# Patient Record
Sex: FEMALE | Race: OTHER | HISPANIC OR LATINO
[De-identification: names, ages, dates, MRNs, and addresses within clinical notes are randomized per-mention and may not be internally consistent; named-entity substitution may affect disease eponyms.]

---

## 2019-01-15 ENCOUNTER — TRANSCRIPTION ENCOUNTER (OUTPATIENT)
Age: 64
End: 2019-01-15

## 2019-02-25 PROBLEM — Z00.00 ENCOUNTER FOR PREVENTIVE HEALTH EXAMINATION: Status: ACTIVE | Noted: 2019-02-25

## 2019-02-27 ENCOUNTER — APPOINTMENT (OUTPATIENT)
Dept: ORTHOPEDIC SURGERY | Facility: CLINIC | Age: 64
End: 2019-02-27
Payer: COMMERCIAL

## 2019-02-27 VITALS
HEART RATE: 80 BPM | SYSTOLIC BLOOD PRESSURE: 125 MMHG | DIASTOLIC BLOOD PRESSURE: 77 MMHG | BODY MASS INDEX: 25.92 KG/M2 | WEIGHT: 175 LBS | HEIGHT: 69 IN

## 2019-02-27 DIAGNOSIS — M16.11 UNILATERAL PRIMARY OSTEOARTHRITIS, RIGHT HIP: ICD-10-CM

## 2019-02-27 PROCEDURE — 73502 X-RAY EXAM HIP UNI 2-3 VIEWS: CPT | Mod: RT

## 2019-02-27 PROCEDURE — 99204 OFFICE O/P NEW MOD 45 MIN: CPT

## 2019-03-08 ENCOUNTER — APPOINTMENT (OUTPATIENT)
Dept: RADIOLOGY | Facility: CLINIC | Age: 64
End: 2019-03-08
Payer: COMMERCIAL

## 2019-03-08 ENCOUNTER — OUTPATIENT (OUTPATIENT)
Dept: OUTPATIENT SERVICES | Facility: HOSPITAL | Age: 64
LOS: 1 days | End: 2019-03-08
Payer: COMMERCIAL

## 2019-03-08 DIAGNOSIS — Z00.8 ENCOUNTER FOR OTHER GENERAL EXAMINATION: ICD-10-CM

## 2019-03-08 PROCEDURE — 27093 INJECTION FOR HIP X-RAY: CPT

## 2019-03-08 PROCEDURE — 73525 CONTRAST X-RAY OF HIP: CPT

## 2019-03-08 PROCEDURE — 27093 INJECTION FOR HIP X-RAY: CPT | Mod: RT

## 2019-03-08 PROCEDURE — 73525 CONTRAST X-RAY OF HIP: CPT | Mod: 26,RT

## 2019-03-27 ENCOUNTER — APPOINTMENT (OUTPATIENT)
Dept: ORTHOPEDIC SURGERY | Facility: CLINIC | Age: 64
End: 2019-03-27

## 2019-03-28 ENCOUNTER — APPOINTMENT (OUTPATIENT)
Dept: ORTHOPEDIC SURGERY | Facility: CLINIC | Age: 64
End: 2019-03-28

## 2019-03-29 ENCOUNTER — APPOINTMENT (OUTPATIENT)
Dept: ORTHOPEDIC SURGERY | Facility: CLINIC | Age: 64
End: 2019-03-29

## 2019-04-19 ENCOUNTER — APPOINTMENT (OUTPATIENT)
Dept: ANESTHESIOLOGY | Facility: CLINIC | Age: 64
End: 2019-04-19

## 2019-04-19 ENCOUNTER — OUTPATIENT (OUTPATIENT)
Dept: OUTPATIENT SERVICES | Facility: HOSPITAL | Age: 64
LOS: 1 days | End: 2019-04-19
Payer: COMMERCIAL

## 2019-04-19 DIAGNOSIS — M46.1 SACROILIITIS, NOT ELSEWHERE CLASSIFIED: ICD-10-CM

## 2019-04-19 PROCEDURE — G0260: CPT

## 2019-06-17 ENCOUNTER — EMERGENCY (EMERGENCY)
Facility: HOSPITAL | Age: 64
LOS: 1 days | Discharge: HOME | End: 2019-06-17
Attending: STUDENT IN AN ORGANIZED HEALTH CARE EDUCATION/TRAINING PROGRAM
Payer: COMMERCIAL

## 2019-06-17 ENCOUNTER — TRANSCRIPTION ENCOUNTER (OUTPATIENT)
Age: 64
End: 2019-06-17

## 2019-06-17 VITALS
HEIGHT: 70 IN | HEART RATE: 83 BPM | DIASTOLIC BLOOD PRESSURE: 92 MMHG | TEMPERATURE: 97 F | RESPIRATION RATE: 16 BRPM | OXYGEN SATURATION: 95 % | SYSTOLIC BLOOD PRESSURE: 144 MMHG | WEIGHT: 289.91 LBS

## 2019-06-17 PROCEDURE — 99283 EMERGENCY DEPT VISIT LOW MDM: CPT | Mod: 25

## 2019-06-17 PROCEDURE — 99282 EMERGENCY DEPT VISIT SF MDM: CPT

## 2019-06-17 NOTE — ED PROVIDER NOTE - OBJECTIVE STATEMENT
64 y/o F w/ PMHx of L leg DVT, no PE at that time, here w/ 2 weeks of redness and swelling to L lower leg. Pt states this feels like his prior DVT. Denies any fever, chills, chest pain, hemoptysis, shortness of breath or any other complaints. Pt had been taken off anticoagulants several months ago after repeat US showed no DVT. Pt notes she took Xarelto that she had left over from before today.

## 2019-06-17 NOTE — ED PROVIDER NOTE - PROGRESS NOTE DETAILS
attempted to obtain doppler, vascular doppler no longer present, patient given order form for doppler in the morning, patient declined lab work and lovenox. poncho attempted to obtain doppler, vascular doppler no longer present, patient given order form for doppler in the morning, patient declined lab work and lovenox. vital stable. no signs of distress. patient states understanding to return for chest pain, sob, worsening pain, swelling or other new symptoms. states she will come back in the morning to get doppler. states that she will take he xarelto till then

## 2019-06-17 NOTE — ED ADULT NURSE NOTE - NSIMPLEMENTINTERV_GEN_ALL_ED
Implemented All Universal Safety Interventions:  [place] to call system. Call bell, personal items and telephone within reach. Instruct patient to call for assistance. Room bathroom lighting operational. Non-slip footwear when patient is off stretcher. Physically safe environment: no spills, clutter or unnecessary equipment. Stretcher in lowest position, wheels locked, appropriate side rails in place.

## 2019-06-17 NOTE — ED PROVIDER NOTE - CLINICAL SUMMARY MEDICAL DECISION MAKING FREE TEXT BOX
62 y/o F here w/ L leg swelling. Vital signs are stable, no signs of PE. Will obtain doppler to r/o DVT.

## 2019-06-17 NOTE — ED ADULT NURSE NOTE - CHIEF COMPLAINT
The patient is a 63y Female complaining of The patient is a 63y Female complaining of redness to foot.

## 2019-06-17 NOTE — ED PROVIDER NOTE - RELIEVING FACTORS
[facility]   [address], [address]  [place] [address]  Phone: [phone]  Fax: [phone]              Encounter Date: 8/16/2018    Dear Dr. Blair,    It was a pleasure seeing your patient, Buster Medina, on 8/16/2018. Diagnoses of Psoriatic arthritis (HCC), Adalimumab (Humira) long-term use, NSAID long-term use, Muscle spasms of both lower extremities, Essential hypertension, H/O gastric bypass, and Personal history of bladder cancer were pertinent to this visit.     Please find attached progress note which includes the history I obtained from Mr. Medina, my physical examination findings, my impression and recommendations.      Once again, it was a pleasure participating in your patient's care.  Please feel free to contact me if you have any questions or if I can be of any further assistance to your patients.      Sincerely,    Mira Mitchell M.D.  Electronically Signed          PROGRESS NOTE:  No notes on file  
none

## 2019-06-18 ENCOUNTER — APPOINTMENT (OUTPATIENT)
Dept: ULTRASOUND IMAGING | Facility: HOSPITAL | Age: 64
End: 2019-06-18

## 2019-06-18 ENCOUNTER — OUTPATIENT (OUTPATIENT)
Dept: OUTPATIENT SERVICES | Facility: HOSPITAL | Age: 64
LOS: 1 days | End: 2019-06-18
Payer: COMMERCIAL

## 2019-06-18 DIAGNOSIS — I82.890 ACUTE EMBOLISM AND THROMBOSIS OF OTHER SPECIFIED VEINS: ICD-10-CM

## 2019-06-18 PROCEDURE — 93971 EXTREMITY STUDY: CPT

## 2019-06-18 PROCEDURE — 93971 EXTREMITY STUDY: CPT | Mod: 26,LT

## 2019-07-12 ENCOUNTER — APPOINTMENT (OUTPATIENT)
Dept: ANESTHESIOLOGY | Facility: CLINIC | Age: 64
End: 2019-07-12

## 2019-07-12 ENCOUNTER — OUTPATIENT (OUTPATIENT)
Dept: OUTPATIENT SERVICES | Facility: HOSPITAL | Age: 64
LOS: 1 days | End: 2019-07-12
Payer: COMMERCIAL

## 2019-07-12 DIAGNOSIS — M25.551 PAIN IN RIGHT HIP: ICD-10-CM

## 2019-07-12 PROCEDURE — 77002 NEEDLE LOCALIZATION BY XRAY: CPT

## 2019-07-12 PROCEDURE — 20610 DRAIN/INJ JOINT/BURSA W/O US: CPT

## 2020-01-01 ENCOUNTER — TRANSCRIPTION ENCOUNTER (OUTPATIENT)
Age: 65
End: 2020-01-01

## 2021-01-01 ENCOUNTER — INPATIENT (INPATIENT)
Facility: HOSPITAL | Age: 66
LOS: 33 days | End: 2021-03-01
Attending: STUDENT IN AN ORGANIZED HEALTH CARE EDUCATION/TRAINING PROGRAM | Admitting: STUDENT IN AN ORGANIZED HEALTH CARE EDUCATION/TRAINING PROGRAM
Payer: COMMERCIAL

## 2021-01-01 ENCOUNTER — TRANSCRIPTION ENCOUNTER (OUTPATIENT)
Age: 66
End: 2021-01-01

## 2021-01-01 VITALS
OXYGEN SATURATION: 71 % | SYSTOLIC BLOOD PRESSURE: 132 MMHG | HEART RATE: 102 BPM | RESPIRATION RATE: 40 BRPM | DIASTOLIC BLOOD PRESSURE: 81 MMHG | TEMPERATURE: 97 F | HEIGHT: 70 IN

## 2021-01-01 DIAGNOSIS — Z51.5 ENCOUNTER FOR PALLIATIVE CARE: ICD-10-CM

## 2021-01-01 DIAGNOSIS — R53.2 FUNCTIONAL QUADRIPLEGIA: ICD-10-CM

## 2021-01-01 DIAGNOSIS — Z71.89 OTHER SPECIFIED COUNSELING: ICD-10-CM

## 2021-01-01 DIAGNOSIS — R09.89 OTHER SPECIFIED SYMPTOMS AND SIGNS INVOLVING THE CIRCULATORY AND RESPIRATORY SYSTEMS: ICD-10-CM

## 2021-01-01 DIAGNOSIS — I82.409 ACUTE EMBOLISM AND THROMBOSIS OF UNSPECIFIED DEEP VEINS OF UNSPECIFIED LOWER EXTREMITY: ICD-10-CM

## 2021-01-01 DIAGNOSIS — R06.03 ACUTE RESPIRATORY DISTRESS: ICD-10-CM

## 2021-01-01 DIAGNOSIS — U07.1 COVID-19: ICD-10-CM

## 2021-01-01 DIAGNOSIS — F41.9 ANXIETY DISORDER, UNSPECIFIED: ICD-10-CM

## 2021-01-01 DIAGNOSIS — Z29.9 ENCOUNTER FOR PROPHYLACTIC MEASURES, UNSPECIFIED: ICD-10-CM

## 2021-01-01 DIAGNOSIS — J96.00 ACUTE RESPIRATORY FAILURE, UNSPECIFIED WHETHER WITH HYPOXIA OR HYPERCAPNIA: ICD-10-CM

## 2021-01-01 DIAGNOSIS — G93.49 OTHER ENCEPHALOPATHY: ICD-10-CM

## 2021-01-01 LAB
-  AMIKACIN: SIGNIFICANT CHANGE UP
-  AMIKACIN: SIGNIFICANT CHANGE UP
-  AMOXICILLIN/CLAVULANIC ACID: SIGNIFICANT CHANGE UP
-  AMOXICILLIN/CLAVULANIC ACID: SIGNIFICANT CHANGE UP
-  AMPICILLIN/SULBACTAM: SIGNIFICANT CHANGE UP
-  AMPICILLIN/SULBACTAM: SIGNIFICANT CHANGE UP
-  AMPICILLIN: SIGNIFICANT CHANGE UP
-  AZTREONAM: SIGNIFICANT CHANGE UP
-  AZTREONAM: SIGNIFICANT CHANGE UP
-  CEFAZOLIN: SIGNIFICANT CHANGE UP
-  CEFAZOLIN: SIGNIFICANT CHANGE UP
-  CEFEPIME: SIGNIFICANT CHANGE UP
-  CEFEPIME: SIGNIFICANT CHANGE UP
-  CEFOXITIN: SIGNIFICANT CHANGE UP
-  CEFOXITIN: SIGNIFICANT CHANGE UP
-  CEFTRIAXONE: SIGNIFICANT CHANGE UP
-  CEFTRIAXONE: SIGNIFICANT CHANGE UP
-  CIPROFLOXACIN: SIGNIFICANT CHANGE UP
-  CIPROFLOXACIN: SIGNIFICANT CHANGE UP
-  DAPTOMYCIN: SIGNIFICANT CHANGE UP
-  ERTAPENEM: SIGNIFICANT CHANGE UP
-  ERTAPENEM: SIGNIFICANT CHANGE UP
-  GENTAMICIN 500: SIGNIFICANT CHANGE UP
-  GENTAMICIN: SIGNIFICANT CHANGE UP
-  GENTAMICIN: SIGNIFICANT CHANGE UP
-  IMIPENEM: SIGNIFICANT CHANGE UP
-  IMIPENEM: SIGNIFICANT CHANGE UP
-  LEVOFLOXACIN: SIGNIFICANT CHANGE UP
-  LEVOFLOXACIN: SIGNIFICANT CHANGE UP
-  LINEZOLID: SIGNIFICANT CHANGE UP
-  MEROPENEM: SIGNIFICANT CHANGE UP
-  MEROPENEM: SIGNIFICANT CHANGE UP
-  NITROFURANTOIN: SIGNIFICANT CHANGE UP
-  PIPERACILLIN/TAZOBACTAM: SIGNIFICANT CHANGE UP
-  PIPERACILLIN/TAZOBACTAM: SIGNIFICANT CHANGE UP
-  STREPTOMYCIN SYNERGY: SIGNIFICANT CHANGE UP
-  TIGECYCLINE: SIGNIFICANT CHANGE UP
-  TOBRAMYCIN: SIGNIFICANT CHANGE UP
-  TOBRAMYCIN: SIGNIFICANT CHANGE UP
-  TRIMETHOPRIM/SULFAMETHOXAZOLE: SIGNIFICANT CHANGE UP
-  TRIMETHOPRIM/SULFAMETHOXAZOLE: SIGNIFICANT CHANGE UP
-  VANCOMYCIN: SIGNIFICANT CHANGE UP
A1C WITH ESTIMATED AVERAGE GLUCOSE RESULT: 6.1 % — HIGH (ref 4–5.6)
ALBUMIN SERPL ELPH-MCNC: 2 G/DL — LOW (ref 3.3–5)
ALBUMIN SERPL ELPH-MCNC: 2.2 G/DL — LOW (ref 3.3–5)
ALBUMIN SERPL ELPH-MCNC: 2.3 G/DL — LOW (ref 3.3–5)
ALBUMIN SERPL ELPH-MCNC: 2.4 G/DL — LOW (ref 3.3–5)
ALBUMIN SERPL ELPH-MCNC: 2.5 G/DL — LOW (ref 3.3–5)
ALBUMIN SERPL ELPH-MCNC: 2.6 G/DL — LOW (ref 3.3–5)
ALBUMIN SERPL ELPH-MCNC: 2.7 G/DL — LOW (ref 3.3–5)
ALBUMIN SERPL ELPH-MCNC: 2.7 G/DL — LOW (ref 3.3–5)
ALBUMIN SERPL ELPH-MCNC: 3.1 G/DL — LOW (ref 3.3–5)
ALBUMIN SERPL ELPH-MCNC: 3.3 G/DL — SIGNIFICANT CHANGE UP (ref 3.3–5)
ALBUMIN SERPL ELPH-MCNC: 3.6 G/DL — SIGNIFICANT CHANGE UP (ref 3.3–5)
ALBUMIN SERPL ELPH-MCNC: 3.8 G/DL — SIGNIFICANT CHANGE UP (ref 3.3–5)
ALBUMIN SERPL ELPH-MCNC: 3.9 G/DL — SIGNIFICANT CHANGE UP (ref 3.3–5)
ALP SERPL-CCNC: 105 U/L — SIGNIFICANT CHANGE UP (ref 40–120)
ALP SERPL-CCNC: 106 U/L — SIGNIFICANT CHANGE UP (ref 40–120)
ALP SERPL-CCNC: 114 U/L — SIGNIFICANT CHANGE UP (ref 40–120)
ALP SERPL-CCNC: 116 U/L — SIGNIFICANT CHANGE UP (ref 40–120)
ALP SERPL-CCNC: 123 U/L — HIGH (ref 40–120)
ALP SERPL-CCNC: 179 U/L — HIGH (ref 40–120)
ALP SERPL-CCNC: 179 U/L — HIGH (ref 40–120)
ALP SERPL-CCNC: 184 U/L — HIGH (ref 40–120)
ALP SERPL-CCNC: 189 U/L — HIGH (ref 40–120)
ALP SERPL-CCNC: 198 U/L — HIGH (ref 40–120)
ALP SERPL-CCNC: 223 U/L — HIGH (ref 40–120)
ALP SERPL-CCNC: 69 U/L — SIGNIFICANT CHANGE UP (ref 40–120)
ALP SERPL-CCNC: 70 U/L — SIGNIFICANT CHANGE UP (ref 40–120)
ALP SERPL-CCNC: 71 U/L — SIGNIFICANT CHANGE UP (ref 40–120)
ALP SERPL-CCNC: 72 U/L — SIGNIFICANT CHANGE UP (ref 40–120)
ALP SERPL-CCNC: 74 U/L — SIGNIFICANT CHANGE UP (ref 40–120)
ALP SERPL-CCNC: 74 U/L — SIGNIFICANT CHANGE UP (ref 40–120)
ALP SERPL-CCNC: 76 U/L — SIGNIFICANT CHANGE UP (ref 40–120)
ALP SERPL-CCNC: 77 U/L — SIGNIFICANT CHANGE UP (ref 40–120)
ALP SERPL-CCNC: 79 U/L — SIGNIFICANT CHANGE UP (ref 40–120)
ALP SERPL-CCNC: 79 U/L — SIGNIFICANT CHANGE UP (ref 40–120)
ALP SERPL-CCNC: 81 U/L — SIGNIFICANT CHANGE UP (ref 40–120)
ALP SERPL-CCNC: 82 U/L — SIGNIFICANT CHANGE UP (ref 40–120)
ALP SERPL-CCNC: 82 U/L — SIGNIFICANT CHANGE UP (ref 40–120)
ALP SERPL-CCNC: 83 U/L — SIGNIFICANT CHANGE UP (ref 40–120)
ALP SERPL-CCNC: 84 U/L — SIGNIFICANT CHANGE UP (ref 40–120)
ALP SERPL-CCNC: 84 U/L — SIGNIFICANT CHANGE UP (ref 40–120)
ALP SERPL-CCNC: 86 U/L — SIGNIFICANT CHANGE UP (ref 40–120)
ALP SERPL-CCNC: 87 U/L — SIGNIFICANT CHANGE UP (ref 40–120)
ALP SERPL-CCNC: 88 U/L — SIGNIFICANT CHANGE UP (ref 40–120)
ALP SERPL-CCNC: 89 U/L — SIGNIFICANT CHANGE UP (ref 40–120)
ALP SERPL-CCNC: 91 U/L — SIGNIFICANT CHANGE UP (ref 40–120)
ALP SERPL-CCNC: 93 U/L — SIGNIFICANT CHANGE UP (ref 40–120)
ALP SERPL-CCNC: 94 U/L — SIGNIFICANT CHANGE UP (ref 40–120)
ALP SERPL-CCNC: 96 U/L — SIGNIFICANT CHANGE UP (ref 40–120)
ALT FLD-CCNC: 14 U/L — SIGNIFICANT CHANGE UP (ref 4–33)
ALT FLD-CCNC: 15 U/L — SIGNIFICANT CHANGE UP (ref 4–33)
ALT FLD-CCNC: 15 U/L — SIGNIFICANT CHANGE UP (ref 4–33)
ALT FLD-CCNC: 16 U/L — SIGNIFICANT CHANGE UP (ref 4–33)
ALT FLD-CCNC: 17 U/L — SIGNIFICANT CHANGE UP (ref 4–33)
ALT FLD-CCNC: 19 U/L — SIGNIFICANT CHANGE UP (ref 4–33)
ALT FLD-CCNC: 20 U/L — SIGNIFICANT CHANGE UP (ref 4–33)
ALT FLD-CCNC: 20 U/L — SIGNIFICANT CHANGE UP (ref 4–33)
ALT FLD-CCNC: 21 U/L — SIGNIFICANT CHANGE UP (ref 4–33)
ALT FLD-CCNC: 21 U/L — SIGNIFICANT CHANGE UP (ref 4–33)
ALT FLD-CCNC: 22 U/L — SIGNIFICANT CHANGE UP (ref 4–33)
ALT FLD-CCNC: 23 U/L — SIGNIFICANT CHANGE UP (ref 4–33)
ALT FLD-CCNC: 24 U/L — SIGNIFICANT CHANGE UP (ref 4–33)
ALT FLD-CCNC: 25 U/L — SIGNIFICANT CHANGE UP (ref 4–33)
ALT FLD-CCNC: 25 U/L — SIGNIFICANT CHANGE UP (ref 4–33)
ALT FLD-CCNC: 26 U/L — SIGNIFICANT CHANGE UP (ref 4–33)
ALT FLD-CCNC: 28 U/L — SIGNIFICANT CHANGE UP (ref 4–33)
ALT FLD-CCNC: 32 U/L — SIGNIFICANT CHANGE UP (ref 4–33)
ALT FLD-CCNC: 36 U/L — HIGH (ref 4–33)
ALT FLD-CCNC: 40 U/L — HIGH (ref 4–33)
ALT FLD-CCNC: 41 U/L — HIGH (ref 4–33)
ALT FLD-CCNC: 69 U/L — HIGH (ref 4–33)
ALT FLD-CCNC: 70 U/L — HIGH (ref 4–33)
ALT FLD-CCNC: 76 U/L — HIGH (ref 4–33)
ALT FLD-CCNC: 77 U/L — HIGH (ref 4–33)
ALT FLD-CCNC: 93 U/L — HIGH (ref 4–33)
ALT FLD-CCNC: 94 U/L — HIGH (ref 4–33)
ANION GAP SERPL CALC-SCNC: 10 MMOL/L — SIGNIFICANT CHANGE UP (ref 7–14)
ANION GAP SERPL CALC-SCNC: 12 MMOL/L — SIGNIFICANT CHANGE UP (ref 7–14)
ANION GAP SERPL CALC-SCNC: 12 MMOL/L — SIGNIFICANT CHANGE UP (ref 7–14)
ANION GAP SERPL CALC-SCNC: 13 MMOL/L — SIGNIFICANT CHANGE UP (ref 7–14)
ANION GAP SERPL CALC-SCNC: 13 MMOL/L — SIGNIFICANT CHANGE UP (ref 7–14)
ANION GAP SERPL CALC-SCNC: 2 MMOL/L — LOW (ref 7–14)
ANION GAP SERPL CALC-SCNC: 4 MMOL/L — LOW (ref 7–14)
ANION GAP SERPL CALC-SCNC: 4 MMOL/L — LOW (ref 7–14)
ANION GAP SERPL CALC-SCNC: 6 MMOL/L — LOW (ref 7–14)
ANION GAP SERPL CALC-SCNC: 7 MMOL/L — SIGNIFICANT CHANGE UP (ref 7–14)
ANION GAP SERPL CALC-SCNC: 8 MMOL/L — SIGNIFICANT CHANGE UP (ref 7–14)
ANION GAP SERPL CALC-SCNC: 9 MMOL/L — SIGNIFICANT CHANGE UP (ref 7–14)
ANISOCYTOSIS BLD QL: SLIGHT — SIGNIFICANT CHANGE UP
APPEARANCE UR: ABNORMAL
APPEARANCE UR: SIGNIFICANT CHANGE UP
APTT BLD: 101.7 SEC — HIGH (ref 27–36.3)
APTT BLD: 120 SEC — SIGNIFICANT CHANGE UP (ref 27–36.3)
APTT BLD: 135.5 SEC — CRITICAL HIGH (ref 27–36.3)
APTT BLD: 25.4 SEC — LOW (ref 27–36.3)
APTT BLD: 31.4 SEC — SIGNIFICANT CHANGE UP (ref 27–36.3)
APTT BLD: 48.1 SEC — HIGH (ref 27–36.3)
APTT BLD: 51.1 SEC — HIGH (ref 27–36.3)
APTT BLD: 53.5 SEC — HIGH (ref 27–36.3)
APTT BLD: 55.1 SEC — HIGH (ref 27–36.3)
APTT BLD: 55.3 SEC — HIGH (ref 27–36.3)
APTT BLD: 57 SEC — HIGH (ref 27–36.3)
APTT BLD: 57 SEC — HIGH (ref 27–36.3)
APTT BLD: 57.6 SEC — HIGH (ref 27–36.3)
APTT BLD: 58.1 SEC — HIGH (ref 27–36.3)
APTT BLD: 59 SEC — HIGH (ref 27–36.3)
APTT BLD: 63 SEC — HIGH (ref 27–36.3)
APTT BLD: 64.3 SEC — HIGH (ref 27–36.3)
APTT BLD: 66.4 SEC — HIGH (ref 27–36.3)
APTT BLD: 68 SEC — HIGH (ref 27–36.3)
APTT BLD: 69.5 SEC — HIGH (ref 27–36.3)
APTT BLD: 70.7 SEC — HIGH (ref 27–36.3)
APTT BLD: 71.2 SEC — HIGH (ref 27–36.3)
APTT BLD: 71.2 SEC — HIGH (ref 27–36.3)
APTT BLD: 71.7 SEC — HIGH (ref 27–36.3)
APTT BLD: 72.2 SEC — HIGH (ref 27–36.3)
APTT BLD: 72.2 SEC — HIGH (ref 27–36.3)
APTT BLD: 73.6 SEC — HIGH (ref 27–36.3)
APTT BLD: 74 SEC — HIGH (ref 27–36.3)
APTT BLD: 74.5 SEC — HIGH (ref 27–36.3)
APTT BLD: 75.7 SEC — HIGH (ref 27–36.3)
APTT BLD: 77.5 SEC — HIGH (ref 27–36.3)
APTT BLD: 78.7 SEC — HIGH (ref 27–36.3)
APTT BLD: 79.9 SEC — HIGH (ref 27–36.3)
APTT BLD: 81.7 SEC — HIGH (ref 27–36.3)
APTT BLD: 84.3 SEC — HIGH (ref 27–36.3)
APTT BLD: 85.5 SEC — HIGH (ref 27–36.3)
APTT BLD: 85.9 SEC — HIGH (ref 27–36.3)
APTT BLD: 86 SEC — HIGH (ref 27–36.3)
APTT BLD: 86.9 SEC — HIGH (ref 27–36.3)
APTT BLD: 87.2 SEC — HIGH (ref 27–36.3)
APTT BLD: 91.1 SEC — HIGH (ref 27–36.3)
APTT BLD: 91.5 SEC — HIGH (ref 27–36.3)
APTT BLD: 91.7 SEC — HIGH (ref 27–36.3)
APTT BLD: 96.5 SEC — HIGH (ref 27–36.3)
APTT BLD: 97.4 SEC — HIGH (ref 27–36.3)
APTT BLD: >200 SEC — CRITICAL HIGH (ref 27–36.3)
AST SERPL-CCNC: 105 U/L — HIGH (ref 4–32)
AST SERPL-CCNC: 130 U/L — HIGH (ref 4–32)
AST SERPL-CCNC: 164 U/L — HIGH (ref 4–32)
AST SERPL-CCNC: 174 U/L — HIGH (ref 4–32)
AST SERPL-CCNC: 22 U/L — SIGNIFICANT CHANGE UP (ref 4–32)
AST SERPL-CCNC: 25 U/L — SIGNIFICANT CHANGE UP (ref 4–32)
AST SERPL-CCNC: 26 U/L — SIGNIFICANT CHANGE UP (ref 4–32)
AST SERPL-CCNC: 27 U/L — SIGNIFICANT CHANGE UP (ref 4–32)
AST SERPL-CCNC: 29 U/L — SIGNIFICANT CHANGE UP (ref 4–32)
AST SERPL-CCNC: 30 U/L — SIGNIFICANT CHANGE UP (ref 4–32)
AST SERPL-CCNC: 33 U/L — HIGH (ref 4–32)
AST SERPL-CCNC: 33 U/L — HIGH (ref 4–32)
AST SERPL-CCNC: 34 U/L — HIGH (ref 4–32)
AST SERPL-CCNC: 35 U/L — HIGH (ref 4–32)
AST SERPL-CCNC: 35 U/L — HIGH (ref 4–32)
AST SERPL-CCNC: 37 U/L — HIGH (ref 4–32)
AST SERPL-CCNC: 40 U/L — HIGH (ref 4–32)
AST SERPL-CCNC: 41 U/L — HIGH (ref 4–32)
AST SERPL-CCNC: 41 U/L — HIGH (ref 4–32)
AST SERPL-CCNC: 43 U/L — HIGH (ref 4–32)
AST SERPL-CCNC: 44 U/L — HIGH (ref 4–32)
AST SERPL-CCNC: 44 U/L — HIGH (ref 4–32)
AST SERPL-CCNC: 46 U/L — HIGH (ref 4–32)
AST SERPL-CCNC: 47 U/L — HIGH (ref 4–32)
AST SERPL-CCNC: 48 U/L — HIGH (ref 4–32)
AST SERPL-CCNC: 55 U/L — HIGH (ref 4–32)
AST SERPL-CCNC: 57 U/L — HIGH (ref 4–32)
AST SERPL-CCNC: 70 U/L — HIGH (ref 4–32)
AST SERPL-CCNC: 70 U/L — HIGH (ref 4–32)
AST SERPL-CCNC: 72 U/L — HIGH (ref 4–32)
AST SERPL-CCNC: 72 U/L — HIGH (ref 4–32)
BACTERIA # UR AUTO: ABNORMAL
BACTERIA # UR AUTO: ABNORMAL
BASE EXCESS BLDA CALC-SCNC: -1.2 MMOL/L — SIGNIFICANT CHANGE UP (ref -2–2)
BASE EXCESS BLDA CALC-SCNC: -1.6 MMOL/L — SIGNIFICANT CHANGE UP (ref -2–2)
BASE EXCESS BLDA CALC-SCNC: -2.3 MMOL/L — LOW (ref -2–2)
BASE EXCESS BLDA CALC-SCNC: -3 MMOL/L — LOW (ref -2–2)
BASE EXCESS BLDA CALC-SCNC: 0.8 MMOL/L — SIGNIFICANT CHANGE UP (ref -2–2)
BASE EXCESS BLDA CALC-SCNC: 1.7 MMOL/L — SIGNIFICANT CHANGE UP (ref -2–2)
BASE EXCESS BLDA CALC-SCNC: 13.9 MMOL/L — HIGH (ref -2–2)
BASE EXCESS BLDA CALC-SCNC: 17.5 MMOL/L — HIGH (ref -2–2)
BASE EXCESS BLDA CALC-SCNC: 2.6 MMOL/L — HIGH (ref -2–2)
BASE EXCESS BLDA CALC-SCNC: 3.8 MMOL/L — HIGH (ref -2–2)
BASE EXCESS BLDA CALC-SCNC: 3.9 MMOL/L — HIGH (ref -2–2)
BASE EXCESS BLDA CALC-SCNC: 4.5 MMOL/L — HIGH (ref -2–2)
BASE EXCESS BLDA CALC-SCNC: 4.8 MMOL/L — HIGH (ref -2–2)
BASE EXCESS BLDA CALC-SCNC: 5 MMOL/L — HIGH (ref -2–2)
BASE EXCESS BLDA CALC-SCNC: 5.4 MMOL/L — HIGH (ref -2–2)
BASE EXCESS BLDA CALC-SCNC: 5.7 MMOL/L — HIGH (ref -2–2)
BASE EXCESS BLDA CALC-SCNC: 5.7 MMOL/L — HIGH (ref -2–2)
BASE EXCESS BLDA CALC-SCNC: 5.9 MMOL/L — HIGH (ref -2–2)
BASE EXCESS BLDV CALC-SCNC: -3 MMOL/L — SIGNIFICANT CHANGE UP (ref -3–2)
BASOPHILS # BLD AUTO: 0 K/UL — SIGNIFICANT CHANGE UP (ref 0–0.2)
BASOPHILS # BLD AUTO: 0 K/UL — SIGNIFICANT CHANGE UP (ref 0–0.2)
BASOPHILS # BLD AUTO: 0.02 K/UL — SIGNIFICANT CHANGE UP (ref 0–0.2)
BASOPHILS # BLD AUTO: 0.03 K/UL — SIGNIFICANT CHANGE UP (ref 0–0.2)
BASOPHILS # BLD AUTO: 0.03 K/UL — SIGNIFICANT CHANGE UP (ref 0–0.2)
BASOPHILS # BLD AUTO: 0.04 K/UL — SIGNIFICANT CHANGE UP (ref 0–0.2)
BASOPHILS # BLD AUTO: 0.05 K/UL — SIGNIFICANT CHANGE UP (ref 0–0.2)
BASOPHILS # BLD AUTO: 0.06 K/UL — SIGNIFICANT CHANGE UP (ref 0–0.2)
BASOPHILS # BLD AUTO: 0.07 K/UL — SIGNIFICANT CHANGE UP (ref 0–0.2)
BASOPHILS # BLD AUTO: 0.08 K/UL — SIGNIFICANT CHANGE UP (ref 0–0.2)
BASOPHILS # BLD AUTO: 0.09 K/UL — SIGNIFICANT CHANGE UP (ref 0–0.2)
BASOPHILS # BLD AUTO: 0.1 K/UL — SIGNIFICANT CHANGE UP (ref 0–0.2)
BASOPHILS # BLD AUTO: 0.11 K/UL — SIGNIFICANT CHANGE UP (ref 0–0.2)
BASOPHILS # BLD AUTO: 0.12 K/UL — SIGNIFICANT CHANGE UP (ref 0–0.2)
BASOPHILS # BLD AUTO: 0.12 K/UL — SIGNIFICANT CHANGE UP (ref 0–0.2)
BASOPHILS # BLD AUTO: 0.13 K/UL — SIGNIFICANT CHANGE UP (ref 0–0.2)
BASOPHILS # BLD AUTO: 0.37 K/UL — HIGH (ref 0–0.2)
BASOPHILS NFR BLD AUTO: 0 % — SIGNIFICANT CHANGE UP (ref 0–2)
BASOPHILS NFR BLD AUTO: 0 % — SIGNIFICANT CHANGE UP (ref 0–2)
BASOPHILS NFR BLD AUTO: 0.1 % — SIGNIFICANT CHANGE UP (ref 0–2)
BASOPHILS NFR BLD AUTO: 0.2 % — SIGNIFICANT CHANGE UP (ref 0–2)
BASOPHILS NFR BLD AUTO: 0.3 % — SIGNIFICANT CHANGE UP (ref 0–2)
BASOPHILS NFR BLD AUTO: 0.3 % — SIGNIFICANT CHANGE UP (ref 0–2)
BASOPHILS NFR BLD AUTO: 0.4 % — SIGNIFICANT CHANGE UP (ref 0–2)
BASOPHILS NFR BLD AUTO: 0.5 % — SIGNIFICANT CHANGE UP (ref 0–2)
BASOPHILS NFR BLD AUTO: 0.6 % — SIGNIFICANT CHANGE UP (ref 0–2)
BASOPHILS NFR BLD AUTO: 0.7 % — SIGNIFICANT CHANGE UP (ref 0–2)
BASOPHILS NFR BLD AUTO: 0.8 % — SIGNIFICANT CHANGE UP (ref 0–2)
BASOPHILS NFR BLD AUTO: 0.9 % — SIGNIFICANT CHANGE UP (ref 0–2)
BASOPHILS NFR BLD AUTO: 2.7 % — HIGH (ref 0–2)
BILIRUB SERPL-MCNC: 0.5 MG/DL — SIGNIFICANT CHANGE UP (ref 0.2–1.2)
BILIRUB SERPL-MCNC: 0.5 MG/DL — SIGNIFICANT CHANGE UP (ref 0.2–1.2)
BILIRUB SERPL-MCNC: 0.6 MG/DL — SIGNIFICANT CHANGE UP (ref 0.2–1.2)
BILIRUB SERPL-MCNC: 0.7 MG/DL — SIGNIFICANT CHANGE UP (ref 0.2–1.2)
BILIRUB SERPL-MCNC: 0.8 MG/DL — SIGNIFICANT CHANGE UP (ref 0.2–1.2)
BILIRUB SERPL-MCNC: 0.9 MG/DL — SIGNIFICANT CHANGE UP (ref 0.2–1.2)
BILIRUB SERPL-MCNC: 1 MG/DL — SIGNIFICANT CHANGE UP (ref 0.2–1.2)
BILIRUB SERPL-MCNC: 1.1 MG/DL — SIGNIFICANT CHANGE UP (ref 0.2–1.2)
BILIRUB SERPL-MCNC: 1.1 MG/DL — SIGNIFICANT CHANGE UP (ref 0.2–1.2)
BILIRUB SERPL-MCNC: 1.2 MG/DL — SIGNIFICANT CHANGE UP (ref 0.2–1.2)
BILIRUB SERPL-MCNC: 1.3 MG/DL — HIGH (ref 0.2–1.2)
BILIRUB SERPL-MCNC: 1.4 MG/DL — HIGH (ref 0.2–1.2)
BILIRUB SERPL-MCNC: 1.5 MG/DL — HIGH (ref 0.2–1.2)
BILIRUB SERPL-MCNC: 1.6 MG/DL — HIGH (ref 0.2–1.2)
BILIRUB SERPL-MCNC: 1.6 MG/DL — HIGH (ref 0.2–1.2)
BILIRUB SERPL-MCNC: 1.9 MG/DL — HIGH (ref 0.2–1.2)
BILIRUB SERPL-MCNC: 2 MG/DL — HIGH (ref 0.2–1.2)
BILIRUB SERPL-MCNC: 2.1 MG/DL — HIGH (ref 0.2–1.2)
BILIRUB SERPL-MCNC: 2.4 MG/DL — HIGH (ref 0.2–1.2)
BILIRUB SERPL-MCNC: 2.8 MG/DL — HIGH (ref 0.2–1.2)
BILIRUB SERPL-MCNC: 2.9 MG/DL — HIGH (ref 0.2–1.2)
BILIRUB UR-MCNC: ABNORMAL
BILIRUB UR-MCNC: NEGATIVE — SIGNIFICANT CHANGE UP
BLD GP AB SCN SERPL QL: NEGATIVE — SIGNIFICANT CHANGE UP
BLOOD GAS ARTERIAL - LYTES,HGB,ICA,LACT RESULT: SIGNIFICANT CHANGE UP
BLOOD GAS ARTERIAL COMPREHENSIVE RESULT: SIGNIFICANT CHANGE UP
BLOOD GAS ARTERIAL COMPREHENSIVE WITH CREATININE RESULT: SIGNIFICANT CHANGE UP
BLOOD GAS VENOUS - CREATININE: 0.8 MG/DL — SIGNIFICANT CHANGE UP (ref 0.5–1.3)
BLOOD GAS VENOUS COMPREHENSIVE RESULT: SIGNIFICANT CHANGE UP
BLOOD GAS VENOUS COMPREHENSIVE RESULT: SIGNIFICANT CHANGE UP
BUN SERPL-MCNC: 10 MG/DL — SIGNIFICANT CHANGE UP (ref 7–23)
BUN SERPL-MCNC: 10 MG/DL — SIGNIFICANT CHANGE UP (ref 7–23)
BUN SERPL-MCNC: 12 MG/DL — SIGNIFICANT CHANGE UP (ref 7–23)
BUN SERPL-MCNC: 12 MG/DL — SIGNIFICANT CHANGE UP (ref 7–23)
BUN SERPL-MCNC: 14 MG/DL — SIGNIFICANT CHANGE UP (ref 7–23)
BUN SERPL-MCNC: 15 MG/DL — SIGNIFICANT CHANGE UP (ref 7–23)
BUN SERPL-MCNC: 16 MG/DL — SIGNIFICANT CHANGE UP (ref 7–23)
BUN SERPL-MCNC: 16 MG/DL — SIGNIFICANT CHANGE UP (ref 7–23)
BUN SERPL-MCNC: 17 MG/DL — SIGNIFICANT CHANGE UP (ref 7–23)
BUN SERPL-MCNC: 17 MG/DL — SIGNIFICANT CHANGE UP (ref 7–23)
BUN SERPL-MCNC: 18 MG/DL — SIGNIFICANT CHANGE UP (ref 7–23)
BUN SERPL-MCNC: 18 MG/DL — SIGNIFICANT CHANGE UP (ref 7–23)
BUN SERPL-MCNC: 19 MG/DL — SIGNIFICANT CHANGE UP (ref 7–23)
BUN SERPL-MCNC: 20 MG/DL — SIGNIFICANT CHANGE UP (ref 7–23)
BUN SERPL-MCNC: 21 MG/DL — SIGNIFICANT CHANGE UP (ref 7–23)
BUN SERPL-MCNC: 22 MG/DL — SIGNIFICANT CHANGE UP (ref 7–23)
BUN SERPL-MCNC: 23 MG/DL — SIGNIFICANT CHANGE UP (ref 7–23)
BUN SERPL-MCNC: 24 MG/DL — HIGH (ref 7–23)
BUN SERPL-MCNC: 24 MG/DL — HIGH (ref 7–23)
BUN SERPL-MCNC: 26 MG/DL — HIGH (ref 7–23)
BUN SERPL-MCNC: 26 MG/DL — HIGH (ref 7–23)
BUN SERPL-MCNC: 27 MG/DL — HIGH (ref 7–23)
BUN SERPL-MCNC: 27 MG/DL — HIGH (ref 7–23)
BUN SERPL-MCNC: 28 MG/DL — HIGH (ref 7–23)
BUN SERPL-MCNC: 29 MG/DL — HIGH (ref 7–23)
BUN SERPL-MCNC: 29 MG/DL — HIGH (ref 7–23)
BUN SERPL-MCNC: 33 MG/DL — HIGH (ref 7–23)
BUN SERPL-MCNC: 9 MG/DL — SIGNIFICANT CHANGE UP (ref 7–23)
BUN SERPL-MCNC: 9 MG/DL — SIGNIFICANT CHANGE UP (ref 7–23)
CALCIUM SERPL-MCNC: 7.7 MG/DL — LOW (ref 8.4–10.5)
CALCIUM SERPL-MCNC: 7.8 MG/DL — LOW (ref 8.4–10.5)
CALCIUM SERPL-MCNC: 7.9 MG/DL — LOW (ref 8.4–10.5)
CALCIUM SERPL-MCNC: 8 MG/DL — LOW (ref 8.4–10.5)
CALCIUM SERPL-MCNC: 8.1 MG/DL — LOW (ref 8.4–10.5)
CALCIUM SERPL-MCNC: 8.2 MG/DL — LOW (ref 8.4–10.5)
CALCIUM SERPL-MCNC: 8.3 MG/DL — LOW (ref 8.4–10.5)
CALCIUM SERPL-MCNC: 8.4 MG/DL — SIGNIFICANT CHANGE UP (ref 8.4–10.5)
CALCIUM SERPL-MCNC: 8.6 MG/DL — SIGNIFICANT CHANGE UP (ref 8.4–10.5)
CALCIUM SERPL-MCNC: 8.7 MG/DL — SIGNIFICANT CHANGE UP (ref 8.4–10.5)
CALCIUM SERPL-MCNC: 8.8 MG/DL — SIGNIFICANT CHANGE UP (ref 8.4–10.5)
CHLORIDE BLDV-SCNC: 111 MMOL/L — HIGH (ref 96–108)
CHLORIDE SERPL-SCNC: 100 MMOL/L — SIGNIFICANT CHANGE UP (ref 98–107)
CHLORIDE SERPL-SCNC: 100 MMOL/L — SIGNIFICANT CHANGE UP (ref 98–107)
CHLORIDE SERPL-SCNC: 102 MMOL/L — SIGNIFICANT CHANGE UP (ref 98–107)
CHLORIDE SERPL-SCNC: 102 MMOL/L — SIGNIFICANT CHANGE UP (ref 98–107)
CHLORIDE SERPL-SCNC: 104 MMOL/L — SIGNIFICANT CHANGE UP (ref 98–107)
CHLORIDE SERPL-SCNC: 105 MMOL/L — SIGNIFICANT CHANGE UP (ref 98–107)
CHLORIDE SERPL-SCNC: 105 MMOL/L — SIGNIFICANT CHANGE UP (ref 98–107)
CHLORIDE SERPL-SCNC: 106 MMOL/L — SIGNIFICANT CHANGE UP (ref 98–107)
CHLORIDE SERPL-SCNC: 107 MMOL/L — SIGNIFICANT CHANGE UP (ref 98–107)
CHLORIDE SERPL-SCNC: 108 MMOL/L — HIGH (ref 98–107)
CHLORIDE SERPL-SCNC: 109 MMOL/L — HIGH (ref 98–107)
CHLORIDE SERPL-SCNC: 110 MMOL/L — HIGH (ref 98–107)
CHLORIDE SERPL-SCNC: 88 MMOL/L — LOW (ref 98–107)
CHLORIDE SERPL-SCNC: 89 MMOL/L — LOW (ref 98–107)
CHLORIDE SERPL-SCNC: 89 MMOL/L — LOW (ref 98–107)
CHLORIDE SERPL-SCNC: 90 MMOL/L — LOW (ref 98–107)
CHLORIDE SERPL-SCNC: 90 MMOL/L — LOW (ref 98–107)
CHLORIDE SERPL-SCNC: 91 MMOL/L — LOW (ref 98–107)
CHLORIDE SERPL-SCNC: 92 MMOL/L — LOW (ref 98–107)
CHLORIDE SERPL-SCNC: 93 MMOL/L — LOW (ref 98–107)
CHLORIDE SERPL-SCNC: 94 MMOL/L — LOW (ref 98–107)
CHLORIDE SERPL-SCNC: 95 MMOL/L — LOW (ref 98–107)
CHLORIDE SERPL-SCNC: 96 MMOL/L — LOW (ref 98–107)
CHLORIDE SERPL-SCNC: 96 MMOL/L — LOW (ref 98–107)
CHLORIDE SERPL-SCNC: 98 MMOL/L — SIGNIFICANT CHANGE UP (ref 98–107)
CHLORIDE SERPL-SCNC: 99 MMOL/L — SIGNIFICANT CHANGE UP (ref 98–107)
CHLORIDE SERPL-SCNC: 99 MMOL/L — SIGNIFICANT CHANGE UP (ref 98–107)
CHLORIDE UR-SCNC: <20 MMOL/L — SIGNIFICANT CHANGE UP
CHOLEST SERPL-MCNC: 163 MG/DL — SIGNIFICANT CHANGE UP
CK SERPL-CCNC: 128 U/L — SIGNIFICANT CHANGE UP (ref 25–170)
CK SERPL-CCNC: 45 U/L — SIGNIFICANT CHANGE UP (ref 25–170)
CO2 SERPL-SCNC: 21 MMOL/L — LOW (ref 22–31)
CO2 SERPL-SCNC: 21 MMOL/L — LOW (ref 22–31)
CO2 SERPL-SCNC: 23 MMOL/L — SIGNIFICANT CHANGE UP (ref 22–31)
CO2 SERPL-SCNC: 23 MMOL/L — SIGNIFICANT CHANGE UP (ref 22–31)
CO2 SERPL-SCNC: 24 MMOL/L — SIGNIFICANT CHANGE UP (ref 22–31)
CO2 SERPL-SCNC: 25 MMOL/L — SIGNIFICANT CHANGE UP (ref 22–31)
CO2 SERPL-SCNC: 27 MMOL/L — SIGNIFICANT CHANGE UP (ref 22–31)
CO2 SERPL-SCNC: 28 MMOL/L — SIGNIFICANT CHANGE UP (ref 22–31)
CO2 SERPL-SCNC: 30 MMOL/L — SIGNIFICANT CHANGE UP (ref 22–31)
CO2 SERPL-SCNC: 32 MMOL/L — HIGH (ref 22–31)
CO2 SERPL-SCNC: 32 MMOL/L — HIGH (ref 22–31)
CO2 SERPL-SCNC: 33 MMOL/L — HIGH (ref 22–31)
CO2 SERPL-SCNC: 34 MMOL/L — HIGH (ref 22–31)
CO2 SERPL-SCNC: 35 MMOL/L — HIGH (ref 22–31)
CO2 SERPL-SCNC: 36 MMOL/L — HIGH (ref 22–31)
CO2 SERPL-SCNC: 37 MMOL/L — HIGH (ref 22–31)
CO2 SERPL-SCNC: 37 MMOL/L — HIGH (ref 22–31)
CO2 SERPL-SCNC: 39 MMOL/L — HIGH (ref 22–31)
CO2 SERPL-SCNC: 39 MMOL/L — HIGH (ref 22–31)
CO2 SERPL-SCNC: 40 MMOL/L — HIGH (ref 22–31)
CO2 SERPL-SCNC: 40 MMOL/L — HIGH (ref 22–31)
CO2 SERPL-SCNC: 41 MMOL/L — HIGH (ref 22–31)
CO2 SERPL-SCNC: 43 MMOL/L — HIGH (ref 22–31)
CO2 SERPL-SCNC: 45 MMOL/L — CRITICAL HIGH (ref 22–31)
CO2 SERPL-SCNC: 46 MMOL/L — CRITICAL HIGH (ref 22–31)
CO2 SERPL-SCNC: 46 MMOL/L — CRITICAL HIGH (ref 22–31)
CO2 SERPL-SCNC: 47 MMOL/L — CRITICAL HIGH (ref 22–31)
COLOR SPEC: ABNORMAL
COLOR SPEC: ABNORMAL
CREAT ?TM UR-MCNC: 12 MG/DL — SIGNIFICANT CHANGE UP
CREAT SERPL-MCNC: 0.36 MG/DL — LOW (ref 0.5–1.3)
CREAT SERPL-MCNC: 0.39 MG/DL — LOW (ref 0.5–1.3)
CREAT SERPL-MCNC: 0.41 MG/DL — LOW (ref 0.5–1.3)
CREAT SERPL-MCNC: 0.43 MG/DL — LOW (ref 0.5–1.3)
CREAT SERPL-MCNC: 0.46 MG/DL — LOW (ref 0.5–1.3)
CREAT SERPL-MCNC: 0.46 MG/DL — LOW (ref 0.5–1.3)
CREAT SERPL-MCNC: 0.48 MG/DL — LOW (ref 0.5–1.3)
CREAT SERPL-MCNC: 0.51 MG/DL — SIGNIFICANT CHANGE UP (ref 0.5–1.3)
CREAT SERPL-MCNC: 0.56 MG/DL — SIGNIFICANT CHANGE UP (ref 0.5–1.3)
CREAT SERPL-MCNC: 0.58 MG/DL — SIGNIFICANT CHANGE UP (ref 0.5–1.3)
CREAT SERPL-MCNC: 0.59 MG/DL — SIGNIFICANT CHANGE UP (ref 0.5–1.3)
CREAT SERPL-MCNC: 0.63 MG/DL — SIGNIFICANT CHANGE UP (ref 0.5–1.3)
CREAT SERPL-MCNC: 0.64 MG/DL — SIGNIFICANT CHANGE UP (ref 0.5–1.3)
CREAT SERPL-MCNC: 0.64 MG/DL — SIGNIFICANT CHANGE UP (ref 0.5–1.3)
CREAT SERPL-MCNC: 0.65 MG/DL — SIGNIFICANT CHANGE UP (ref 0.5–1.3)
CREAT SERPL-MCNC: 0.66 MG/DL — SIGNIFICANT CHANGE UP (ref 0.5–1.3)
CREAT SERPL-MCNC: 0.67 MG/DL — SIGNIFICANT CHANGE UP (ref 0.5–1.3)
CREAT SERPL-MCNC: 0.67 MG/DL — SIGNIFICANT CHANGE UP (ref 0.5–1.3)
CREAT SERPL-MCNC: 0.69 MG/DL — SIGNIFICANT CHANGE UP (ref 0.5–1.3)
CREAT SERPL-MCNC: 0.7 MG/DL — SIGNIFICANT CHANGE UP (ref 0.5–1.3)
CREAT SERPL-MCNC: 0.7 MG/DL — SIGNIFICANT CHANGE UP (ref 0.5–1.3)
CREAT SERPL-MCNC: 0.71 MG/DL — SIGNIFICANT CHANGE UP (ref 0.5–1.3)
CREAT SERPL-MCNC: 0.72 MG/DL — SIGNIFICANT CHANGE UP (ref 0.5–1.3)
CREAT SERPL-MCNC: 0.74 MG/DL — SIGNIFICANT CHANGE UP (ref 0.5–1.3)
CREAT SERPL-MCNC: 0.77 MG/DL — SIGNIFICANT CHANGE UP (ref 0.5–1.3)
CREAT SERPL-MCNC: 0.77 MG/DL — SIGNIFICANT CHANGE UP (ref 0.5–1.3)
CREAT SERPL-MCNC: 0.8 MG/DL — SIGNIFICANT CHANGE UP (ref 0.5–1.3)
CREAT SERPL-MCNC: 0.81 MG/DL — SIGNIFICANT CHANGE UP (ref 0.5–1.3)
CREAT SERPL-MCNC: 0.85 MG/DL — SIGNIFICANT CHANGE UP (ref 0.5–1.3)
CREAT SERPL-MCNC: 0.85 MG/DL — SIGNIFICANT CHANGE UP (ref 0.5–1.3)
CREAT SERPL-MCNC: 0.86 MG/DL — SIGNIFICANT CHANGE UP (ref 0.5–1.3)
CREAT SERPL-MCNC: 1.09 MG/DL — SIGNIFICANT CHANGE UP (ref 0.5–1.3)
CRP SERPL-MCNC: 108.1 MG/L — HIGH
CRP SERPL-MCNC: 137 MG/L — HIGH
CRP SERPL-MCNC: 140.4 MG/L — HIGH
CRP SERPL-MCNC: 147.5 MG/L — HIGH
CRP SERPL-MCNC: 152.4 MG/L — HIGH
CRP SERPL-MCNC: 155.6 MG/L — HIGH
CRP SERPL-MCNC: 193.8 MG/L — HIGH
CRP SERPL-MCNC: 263.7 MG/L — HIGH
CULTURE RESULTS: NO GROWTH — SIGNIFICANT CHANGE UP
CULTURE RESULTS: SIGNIFICANT CHANGE UP
D DIMER BLD IA.RAPID-MCNC: 1001 NG/ML DDU — HIGH
D DIMER BLD IA.RAPID-MCNC: 1054 NG/ML DDU — HIGH
D DIMER BLD IA.RAPID-MCNC: 1199 NG/ML DDU — HIGH
D DIMER BLD IA.RAPID-MCNC: 1212 NG/ML DDU — HIGH
D DIMER BLD IA.RAPID-MCNC: 1242 NG/ML DDU — HIGH
D DIMER BLD IA.RAPID-MCNC: 1266 NG/ML DDU — HIGH
D DIMER BLD IA.RAPID-MCNC: 1459 NG/ML DDU — HIGH
D DIMER BLD IA.RAPID-MCNC: 2300 NG/ML DDU — HIGH
D DIMER BLD IA.RAPID-MCNC: 675 NG/ML DDU — HIGH
D DIMER BLD IA.RAPID-MCNC: 7899 NG/ML DDU — HIGH
D DIMER BLD IA.RAPID-MCNC: 812 NG/ML DDU — HIGH
D DIMER BLD IA.RAPID-MCNC: 994 NG/ML DDU — HIGH
D DIMER BLD IA.RAPID-MCNC: HIGH NG/ML DDU
D DIMER BLD IA.RAPID-MCNC: HIGH NG/ML DDU
DACRYOCYTES BLD QL SMEAR: SLIGHT — SIGNIFICANT CHANGE UP
DIFF PNL FLD: ABNORMAL
DIFF PNL FLD: ABNORMAL
E FAECIUM DNA BLD POS QL NAA+NON-PROBE: SIGNIFICANT CHANGE UP
EOSINOPHIL # BLD AUTO: 0 K/UL — SIGNIFICANT CHANGE UP (ref 0–0.5)
EOSINOPHIL # BLD AUTO: 0.01 K/UL — SIGNIFICANT CHANGE UP (ref 0–0.5)
EOSINOPHIL # BLD AUTO: 0.02 K/UL — SIGNIFICANT CHANGE UP (ref 0–0.5)
EOSINOPHIL # BLD AUTO: 0.02 K/UL — SIGNIFICANT CHANGE UP (ref 0–0.5)
EOSINOPHIL # BLD AUTO: 0.03 K/UL — SIGNIFICANT CHANGE UP (ref 0–0.5)
EOSINOPHIL # BLD AUTO: 0.07 K/UL — SIGNIFICANT CHANGE UP (ref 0–0.5)
EOSINOPHIL # BLD AUTO: 0.09 K/UL — SIGNIFICANT CHANGE UP (ref 0–0.5)
EOSINOPHIL # BLD AUTO: 0.1 K/UL — SIGNIFICANT CHANGE UP (ref 0–0.5)
EOSINOPHIL # BLD AUTO: 0.1 K/UL — SIGNIFICANT CHANGE UP (ref 0–0.5)
EOSINOPHIL # BLD AUTO: 0.11 K/UL — SIGNIFICANT CHANGE UP (ref 0–0.5)
EOSINOPHIL # BLD AUTO: 0.13 K/UL — SIGNIFICANT CHANGE UP (ref 0–0.5)
EOSINOPHIL # BLD AUTO: 0.15 K/UL — SIGNIFICANT CHANGE UP (ref 0–0.5)
EOSINOPHIL # BLD AUTO: 0.15 K/UL — SIGNIFICANT CHANGE UP (ref 0–0.5)
EOSINOPHIL # BLD AUTO: 0.17 K/UL — SIGNIFICANT CHANGE UP (ref 0–0.5)
EOSINOPHIL # BLD AUTO: 0.17 K/UL — SIGNIFICANT CHANGE UP (ref 0–0.5)
EOSINOPHIL # BLD AUTO: 0.18 K/UL — SIGNIFICANT CHANGE UP (ref 0–0.5)
EOSINOPHIL # BLD AUTO: 0.19 K/UL — SIGNIFICANT CHANGE UP (ref 0–0.5)
EOSINOPHIL # BLD AUTO: 0.24 K/UL — SIGNIFICANT CHANGE UP (ref 0–0.5)
EOSINOPHIL # BLD AUTO: 0.26 K/UL — SIGNIFICANT CHANGE UP (ref 0–0.5)
EOSINOPHIL # BLD AUTO: 0.29 K/UL — SIGNIFICANT CHANGE UP (ref 0–0.5)
EOSINOPHIL # BLD AUTO: 0.35 K/UL — SIGNIFICANT CHANGE UP (ref 0–0.5)
EOSINOPHIL # BLD AUTO: 0.37 K/UL — SIGNIFICANT CHANGE UP (ref 0–0.5)
EOSINOPHIL # BLD AUTO: 0.41 K/UL — SIGNIFICANT CHANGE UP (ref 0–0.5)
EOSINOPHIL # BLD AUTO: 0.51 K/UL — HIGH (ref 0–0.5)
EOSINOPHIL # BLD AUTO: 0.52 K/UL — HIGH (ref 0–0.5)
EOSINOPHIL # BLD AUTO: 0.58 K/UL — HIGH (ref 0–0.5)
EOSINOPHIL # BLD AUTO: 0.63 K/UL — HIGH (ref 0–0.5)
EOSINOPHIL # BLD AUTO: 0.64 K/UL — HIGH (ref 0–0.5)
EOSINOPHIL # BLD AUTO: 0.68 K/UL — HIGH (ref 0–0.5)
EOSINOPHIL # BLD AUTO: 0.78 K/UL — HIGH (ref 0–0.5)
EOSINOPHIL # BLD AUTO: 1.34 K/UL — HIGH (ref 0–0.5)
EOSINOPHIL NFR BLD AUTO: 0 % — SIGNIFICANT CHANGE UP (ref 0–6)
EOSINOPHIL NFR BLD AUTO: 0.1 % — SIGNIFICANT CHANGE UP (ref 0–6)
EOSINOPHIL NFR BLD AUTO: 0.2 % — SIGNIFICANT CHANGE UP (ref 0–6)
EOSINOPHIL NFR BLD AUTO: 0.5 % — SIGNIFICANT CHANGE UP (ref 0–6)
EOSINOPHIL NFR BLD AUTO: 0.6 % — SIGNIFICANT CHANGE UP (ref 0–6)
EOSINOPHIL NFR BLD AUTO: 0.6 % — SIGNIFICANT CHANGE UP (ref 0–6)
EOSINOPHIL NFR BLD AUTO: 0.7 % — SIGNIFICANT CHANGE UP (ref 0–6)
EOSINOPHIL NFR BLD AUTO: 0.7 % — SIGNIFICANT CHANGE UP (ref 0–6)
EOSINOPHIL NFR BLD AUTO: 0.8 % — SIGNIFICANT CHANGE UP (ref 0–6)
EOSINOPHIL NFR BLD AUTO: 0.9 % — SIGNIFICANT CHANGE UP (ref 0–6)
EOSINOPHIL NFR BLD AUTO: 1 % — SIGNIFICANT CHANGE UP (ref 0–6)
EOSINOPHIL NFR BLD AUTO: 1 % — SIGNIFICANT CHANGE UP (ref 0–6)
EOSINOPHIL NFR BLD AUTO: 1.1 % — SIGNIFICANT CHANGE UP (ref 0–6)
EOSINOPHIL NFR BLD AUTO: 1.4 % — SIGNIFICANT CHANGE UP (ref 0–6)
EOSINOPHIL NFR BLD AUTO: 1.7 % — SIGNIFICANT CHANGE UP (ref 0–6)
EOSINOPHIL NFR BLD AUTO: 1.9 % — SIGNIFICANT CHANGE UP (ref 0–6)
EOSINOPHIL NFR BLD AUTO: 2.4 % — SIGNIFICANT CHANGE UP (ref 0–6)
EOSINOPHIL NFR BLD AUTO: 2.4 % — SIGNIFICANT CHANGE UP (ref 0–6)
EOSINOPHIL NFR BLD AUTO: 2.5 % — SIGNIFICANT CHANGE UP (ref 0–6)
EOSINOPHIL NFR BLD AUTO: 2.7 % — SIGNIFICANT CHANGE UP (ref 0–6)
EOSINOPHIL NFR BLD AUTO: 3.3 % — SIGNIFICANT CHANGE UP (ref 0–6)
EOSINOPHIL NFR BLD AUTO: 4 % — SIGNIFICANT CHANGE UP (ref 0–6)
EOSINOPHIL NFR BLD AUTO: 4.8 % — SIGNIFICANT CHANGE UP (ref 0–6)
EOSINOPHIL NFR BLD AUTO: 4.9 % — SIGNIFICANT CHANGE UP (ref 0–6)
EOSINOPHIL NFR BLD AUTO: 5.2 % — SIGNIFICANT CHANGE UP (ref 0–6)
EOSINOPHIL NFR BLD AUTO: 6.2 % — HIGH (ref 0–6)
EOSINOPHIL NFR BLD AUTO: 9.9 % — HIGH (ref 0–6)
EPI CELLS # UR: 0 /HPF — SIGNIFICANT CHANGE UP (ref 0–5)
ESTIMATED AVERAGE GLUCOSE: 128 MG/DL — HIGH (ref 68–114)
FACT X ACT/NOR PPP: 65.8 % — LOW (ref 70–150)
FERRITIN SERPL-MCNC: 1030 NG/ML — HIGH (ref 15–150)
FERRITIN SERPL-MCNC: 1098 NG/ML — HIGH (ref 15–150)
FERRITIN SERPL-MCNC: 1378 NG/ML — HIGH (ref 15–150)
FERRITIN SERPL-MCNC: 1425 NG/ML — HIGH (ref 15–150)
FERRITIN SERPL-MCNC: 1523 NG/ML — HIGH (ref 15–150)
FERRITIN SERPL-MCNC: 1601 NG/ML — HIGH (ref 15–150)
FERRITIN SERPL-MCNC: 1737 NG/ML — HIGH (ref 15–150)
FERRITIN SERPL-MCNC: 2183 NG/ML — HIGH (ref 15–150)
FERRITIN SERPL-MCNC: 880 NG/ML — HIGH (ref 15–150)
FIBRINOGEN PPP-MCNC: 497 MG/DL — SIGNIFICANT CHANGE UP (ref 290–520)
FLUAV AG NPH QL: SIGNIFICANT CHANGE UP
FLUBV AG NPH QL: SIGNIFICANT CHANGE UP
GAS PNL BLDA: SIGNIFICANT CHANGE UP
GAS PNL BLDV: 138 MMOL/L — SIGNIFICANT CHANGE UP (ref 136–146)
GAS PNL BLDV: 142 MMOL/L — SIGNIFICANT CHANGE UP (ref 136–146)
GLUCOSE BLDC GLUCOMTR-MCNC: 101 MG/DL — HIGH (ref 70–99)
GLUCOSE BLDC GLUCOMTR-MCNC: 102 MG/DL — HIGH (ref 70–99)
GLUCOSE BLDC GLUCOMTR-MCNC: 102 MG/DL — HIGH (ref 70–99)
GLUCOSE BLDC GLUCOMTR-MCNC: 104 MG/DL — HIGH (ref 70–99)
GLUCOSE BLDC GLUCOMTR-MCNC: 105 MG/DL — HIGH (ref 70–99)
GLUCOSE BLDC GLUCOMTR-MCNC: 105 MG/DL — HIGH (ref 70–99)
GLUCOSE BLDC GLUCOMTR-MCNC: 107 MG/DL — HIGH (ref 70–99)
GLUCOSE BLDC GLUCOMTR-MCNC: 108 MG/DL — HIGH (ref 70–99)
GLUCOSE BLDC GLUCOMTR-MCNC: 108 MG/DL — HIGH (ref 70–99)
GLUCOSE BLDC GLUCOMTR-MCNC: 109 MG/DL — HIGH (ref 70–99)
GLUCOSE BLDC GLUCOMTR-MCNC: 110 MG/DL — HIGH (ref 70–99)
GLUCOSE BLDC GLUCOMTR-MCNC: 110 MG/DL — HIGH (ref 70–99)
GLUCOSE BLDC GLUCOMTR-MCNC: 111 MG/DL — HIGH (ref 70–99)
GLUCOSE BLDC GLUCOMTR-MCNC: 111 MG/DL — HIGH (ref 70–99)
GLUCOSE BLDC GLUCOMTR-MCNC: 113 MG/DL — HIGH (ref 70–99)
GLUCOSE BLDC GLUCOMTR-MCNC: 114 MG/DL — HIGH (ref 70–99)
GLUCOSE BLDC GLUCOMTR-MCNC: 115 MG/DL — HIGH (ref 70–99)
GLUCOSE BLDC GLUCOMTR-MCNC: 117 MG/DL — HIGH (ref 70–99)
GLUCOSE BLDC GLUCOMTR-MCNC: 118 MG/DL — HIGH (ref 70–99)
GLUCOSE BLDC GLUCOMTR-MCNC: 119 MG/DL — HIGH (ref 70–99)
GLUCOSE BLDC GLUCOMTR-MCNC: 119 MG/DL — HIGH (ref 70–99)
GLUCOSE BLDC GLUCOMTR-MCNC: 120 MG/DL — HIGH (ref 70–99)
GLUCOSE BLDC GLUCOMTR-MCNC: 121 MG/DL — HIGH (ref 70–99)
GLUCOSE BLDC GLUCOMTR-MCNC: 121 MG/DL — HIGH (ref 70–99)
GLUCOSE BLDC GLUCOMTR-MCNC: 122 MG/DL — HIGH (ref 70–99)
GLUCOSE BLDC GLUCOMTR-MCNC: 122 MG/DL — HIGH (ref 70–99)
GLUCOSE BLDC GLUCOMTR-MCNC: 123 MG/DL — HIGH (ref 70–99)
GLUCOSE BLDC GLUCOMTR-MCNC: 124 MG/DL — HIGH (ref 70–99)
GLUCOSE BLDC GLUCOMTR-MCNC: 125 MG/DL — HIGH (ref 70–99)
GLUCOSE BLDC GLUCOMTR-MCNC: 125 MG/DL — HIGH (ref 70–99)
GLUCOSE BLDC GLUCOMTR-MCNC: 127 MG/DL — HIGH (ref 70–99)
GLUCOSE BLDC GLUCOMTR-MCNC: 128 MG/DL — HIGH (ref 70–99)
GLUCOSE BLDC GLUCOMTR-MCNC: 129 MG/DL — HIGH (ref 70–99)
GLUCOSE BLDC GLUCOMTR-MCNC: 130 MG/DL — HIGH (ref 70–99)
GLUCOSE BLDC GLUCOMTR-MCNC: 130 MG/DL — HIGH (ref 70–99)
GLUCOSE BLDC GLUCOMTR-MCNC: 131 MG/DL — HIGH (ref 70–99)
GLUCOSE BLDC GLUCOMTR-MCNC: 132 MG/DL — HIGH (ref 70–99)
GLUCOSE BLDC GLUCOMTR-MCNC: 133 MG/DL — HIGH (ref 70–99)
GLUCOSE BLDC GLUCOMTR-MCNC: 134 MG/DL — HIGH (ref 70–99)
GLUCOSE BLDC GLUCOMTR-MCNC: 134 MG/DL — HIGH (ref 70–99)
GLUCOSE BLDC GLUCOMTR-MCNC: 135 MG/DL — HIGH (ref 70–99)
GLUCOSE BLDC GLUCOMTR-MCNC: 135 MG/DL — HIGH (ref 70–99)
GLUCOSE BLDC GLUCOMTR-MCNC: 136 MG/DL — HIGH (ref 70–99)
GLUCOSE BLDC GLUCOMTR-MCNC: 137 MG/DL — HIGH (ref 70–99)
GLUCOSE BLDC GLUCOMTR-MCNC: 137 MG/DL — HIGH (ref 70–99)
GLUCOSE BLDC GLUCOMTR-MCNC: 138 MG/DL — HIGH (ref 70–99)
GLUCOSE BLDC GLUCOMTR-MCNC: 139 MG/DL — HIGH (ref 70–99)
GLUCOSE BLDC GLUCOMTR-MCNC: 140 MG/DL — HIGH (ref 70–99)
GLUCOSE BLDC GLUCOMTR-MCNC: 141 MG/DL — HIGH (ref 70–99)
GLUCOSE BLDC GLUCOMTR-MCNC: 141 MG/DL — HIGH (ref 70–99)
GLUCOSE BLDC GLUCOMTR-MCNC: 142 MG/DL — HIGH (ref 70–99)
GLUCOSE BLDC GLUCOMTR-MCNC: 143 MG/DL — HIGH (ref 70–99)
GLUCOSE BLDC GLUCOMTR-MCNC: 143 MG/DL — HIGH (ref 70–99)
GLUCOSE BLDC GLUCOMTR-MCNC: 144 MG/DL — HIGH (ref 70–99)
GLUCOSE BLDC GLUCOMTR-MCNC: 144 MG/DL — HIGH (ref 70–99)
GLUCOSE BLDC GLUCOMTR-MCNC: 145 MG/DL — HIGH (ref 70–99)
GLUCOSE BLDC GLUCOMTR-MCNC: 145 MG/DL — HIGH (ref 70–99)
GLUCOSE BLDC GLUCOMTR-MCNC: 147 MG/DL — HIGH (ref 70–99)
GLUCOSE BLDC GLUCOMTR-MCNC: 148 MG/DL — HIGH (ref 70–99)
GLUCOSE BLDC GLUCOMTR-MCNC: 150 MG/DL — HIGH (ref 70–99)
GLUCOSE BLDC GLUCOMTR-MCNC: 151 MG/DL — HIGH (ref 70–99)
GLUCOSE BLDC GLUCOMTR-MCNC: 151 MG/DL — HIGH (ref 70–99)
GLUCOSE BLDC GLUCOMTR-MCNC: 152 MG/DL — HIGH (ref 70–99)
GLUCOSE BLDC GLUCOMTR-MCNC: 152 MG/DL — HIGH (ref 70–99)
GLUCOSE BLDC GLUCOMTR-MCNC: 153 MG/DL — HIGH (ref 70–99)
GLUCOSE BLDC GLUCOMTR-MCNC: 154 MG/DL — HIGH (ref 70–99)
GLUCOSE BLDC GLUCOMTR-MCNC: 155 MG/DL — HIGH (ref 70–99)
GLUCOSE BLDC GLUCOMTR-MCNC: 156 MG/DL — HIGH (ref 70–99)
GLUCOSE BLDC GLUCOMTR-MCNC: 158 MG/DL — HIGH (ref 70–99)
GLUCOSE BLDC GLUCOMTR-MCNC: 159 MG/DL — HIGH (ref 70–99)
GLUCOSE BLDC GLUCOMTR-MCNC: 160 MG/DL — HIGH (ref 70–99)
GLUCOSE BLDC GLUCOMTR-MCNC: 162 MG/DL — HIGH (ref 70–99)
GLUCOSE BLDC GLUCOMTR-MCNC: 162 MG/DL — HIGH (ref 70–99)
GLUCOSE BLDC GLUCOMTR-MCNC: 163 MG/DL — HIGH (ref 70–99)
GLUCOSE BLDC GLUCOMTR-MCNC: 164 MG/DL — HIGH (ref 70–99)
GLUCOSE BLDC GLUCOMTR-MCNC: 165 MG/DL — HIGH (ref 70–99)
GLUCOSE BLDC GLUCOMTR-MCNC: 165 MG/DL — HIGH (ref 70–99)
GLUCOSE BLDC GLUCOMTR-MCNC: 167 MG/DL — HIGH (ref 70–99)
GLUCOSE BLDC GLUCOMTR-MCNC: 170 MG/DL — HIGH (ref 70–99)
GLUCOSE BLDC GLUCOMTR-MCNC: 171 MG/DL — HIGH (ref 70–99)
GLUCOSE BLDC GLUCOMTR-MCNC: 172 MG/DL — HIGH (ref 70–99)
GLUCOSE BLDC GLUCOMTR-MCNC: 173 MG/DL — HIGH (ref 70–99)
GLUCOSE BLDC GLUCOMTR-MCNC: 187 MG/DL — HIGH (ref 70–99)
GLUCOSE BLDC GLUCOMTR-MCNC: 195 MG/DL — HIGH (ref 70–99)
GLUCOSE BLDC GLUCOMTR-MCNC: 196 MG/DL — HIGH (ref 70–99)
GLUCOSE BLDC GLUCOMTR-MCNC: 200 MG/DL — HIGH (ref 70–99)
GLUCOSE BLDC GLUCOMTR-MCNC: 202 MG/DL — HIGH (ref 70–99)
GLUCOSE BLDC GLUCOMTR-MCNC: 223 MG/DL — HIGH (ref 70–99)
GLUCOSE BLDC GLUCOMTR-MCNC: 87 MG/DL — SIGNIFICANT CHANGE UP (ref 70–99)
GLUCOSE BLDC GLUCOMTR-MCNC: 88 MG/DL — SIGNIFICANT CHANGE UP (ref 70–99)
GLUCOSE BLDC GLUCOMTR-MCNC: 89 MG/DL — SIGNIFICANT CHANGE UP (ref 70–99)
GLUCOSE BLDC GLUCOMTR-MCNC: 91 MG/DL — SIGNIFICANT CHANGE UP (ref 70–99)
GLUCOSE BLDC GLUCOMTR-MCNC: 95 MG/DL — SIGNIFICANT CHANGE UP (ref 70–99)
GLUCOSE BLDC GLUCOMTR-MCNC: 95 MG/DL — SIGNIFICANT CHANGE UP (ref 70–99)
GLUCOSE BLDC GLUCOMTR-MCNC: 98 MG/DL — SIGNIFICANT CHANGE UP (ref 70–99)
GLUCOSE BLDV-MCNC: 147 MG/DL — HIGH (ref 70–99)
GLUCOSE SERPL-MCNC: 100 MG/DL — HIGH (ref 70–99)
GLUCOSE SERPL-MCNC: 101 MG/DL — HIGH (ref 70–99)
GLUCOSE SERPL-MCNC: 103 MG/DL — HIGH (ref 70–99)
GLUCOSE SERPL-MCNC: 105 MG/DL — HIGH (ref 70–99)
GLUCOSE SERPL-MCNC: 105 MG/DL — HIGH (ref 70–99)
GLUCOSE SERPL-MCNC: 106 MG/DL — HIGH (ref 70–99)
GLUCOSE SERPL-MCNC: 108 MG/DL — HIGH (ref 70–99)
GLUCOSE SERPL-MCNC: 111 MG/DL — HIGH (ref 70–99)
GLUCOSE SERPL-MCNC: 112 MG/DL — HIGH (ref 70–99)
GLUCOSE SERPL-MCNC: 118 MG/DL — HIGH (ref 70–99)
GLUCOSE SERPL-MCNC: 118 MG/DL — HIGH (ref 70–99)
GLUCOSE SERPL-MCNC: 120 MG/DL — HIGH (ref 70–99)
GLUCOSE SERPL-MCNC: 124 MG/DL — HIGH (ref 70–99)
GLUCOSE SERPL-MCNC: 127 MG/DL — HIGH (ref 70–99)
GLUCOSE SERPL-MCNC: 132 MG/DL — HIGH (ref 70–99)
GLUCOSE SERPL-MCNC: 136 MG/DL — HIGH (ref 70–99)
GLUCOSE SERPL-MCNC: 137 MG/DL — HIGH (ref 70–99)
GLUCOSE SERPL-MCNC: 137 MG/DL — HIGH (ref 70–99)
GLUCOSE SERPL-MCNC: 139 MG/DL — HIGH (ref 70–99)
GLUCOSE SERPL-MCNC: 140 MG/DL — HIGH (ref 70–99)
GLUCOSE SERPL-MCNC: 141 MG/DL — HIGH (ref 70–99)
GLUCOSE SERPL-MCNC: 141 MG/DL — HIGH (ref 70–99)
GLUCOSE SERPL-MCNC: 142 MG/DL — HIGH (ref 70–99)
GLUCOSE SERPL-MCNC: 143 MG/DL — HIGH (ref 70–99)
GLUCOSE SERPL-MCNC: 149 MG/DL — HIGH (ref 70–99)
GLUCOSE SERPL-MCNC: 150 MG/DL — HIGH (ref 70–99)
GLUCOSE SERPL-MCNC: 153 MG/DL — HIGH (ref 70–99)
GLUCOSE SERPL-MCNC: 154 MG/DL — HIGH (ref 70–99)
GLUCOSE SERPL-MCNC: 168 MG/DL — HIGH (ref 70–99)
GLUCOSE SERPL-MCNC: 195 MG/DL — HIGH (ref 70–99)
GLUCOSE SERPL-MCNC: 90 MG/DL — SIGNIFICANT CHANGE UP (ref 70–99)
GLUCOSE SERPL-MCNC: 92 MG/DL — SIGNIFICANT CHANGE UP (ref 70–99)
GLUCOSE SERPL-MCNC: 96 MG/DL — SIGNIFICANT CHANGE UP (ref 70–99)
GLUCOSE SERPL-MCNC: 98 MG/DL — SIGNIFICANT CHANGE UP (ref 70–99)
GLUCOSE UR QL: NEGATIVE — SIGNIFICANT CHANGE UP
GLUCOSE UR QL: NEGATIVE — SIGNIFICANT CHANGE UP
GRAM STN FLD: SIGNIFICANT CHANGE UP
HCO3 BLDA-SCNC: 20 MMOL/L — LOW (ref 22–26)
HCO3 BLDA-SCNC: 22 MMOL/L — SIGNIFICANT CHANGE UP (ref 22–26)
HCO3 BLDA-SCNC: 22 MMOL/L — SIGNIFICANT CHANGE UP (ref 22–26)
HCO3 BLDA-SCNC: 23 MMOL/L — SIGNIFICANT CHANGE UP (ref 22–26)
HCO3 BLDA-SCNC: 24 MMOL/L — SIGNIFICANT CHANGE UP (ref 22–26)
HCO3 BLDA-SCNC: 26 MMOL/L — SIGNIFICANT CHANGE UP (ref 22–26)
HCO3 BLDA-SCNC: 28 MMOL/L — HIGH (ref 22–26)
HCO3 BLDA-SCNC: 29 MMOL/L — HIGH (ref 22–26)
HCO3 BLDA-SCNC: 37 MMOL/L — HIGH (ref 22–26)
HCO3 BLDA-SCNC: 40 MMOL/L — HIGH (ref 22–26)
HCO3 BLDV-SCNC: 22 MMOL/L — SIGNIFICANT CHANGE UP (ref 20–27)
HCT VFR BLD CALC: 15.2 % — CRITICAL LOW (ref 34.5–45)
HCT VFR BLD CALC: 23.9 % — LOW (ref 34.5–45)
HCT VFR BLD CALC: 24.4 % — LOW (ref 34.5–45)
HCT VFR BLD CALC: 26.3 % — LOW (ref 34.5–45)
HCT VFR BLD CALC: 26.5 % — LOW (ref 34.5–45)
HCT VFR BLD CALC: 26.7 % — LOW (ref 34.5–45)
HCT VFR BLD CALC: 27 % — LOW (ref 34.5–45)
HCT VFR BLD CALC: 27.1 % — LOW (ref 34.5–45)
HCT VFR BLD CALC: 27.5 % — LOW (ref 34.5–45)
HCT VFR BLD CALC: 27.8 % — LOW (ref 34.5–45)
HCT VFR BLD CALC: 28.3 % — LOW (ref 34.5–45)
HCT VFR BLD CALC: 28.6 % — LOW (ref 34.5–45)
HCT VFR BLD CALC: 28.7 % — LOW (ref 34.5–45)
HCT VFR BLD CALC: 29.1 % — LOW (ref 34.5–45)
HCT VFR BLD CALC: 29.2 % — LOW (ref 34.5–45)
HCT VFR BLD CALC: 29.2 % — LOW (ref 34.5–45)
HCT VFR BLD CALC: 29.9 % — LOW (ref 34.5–45)
HCT VFR BLD CALC: 30.3 % — LOW (ref 34.5–45)
HCT VFR BLD CALC: 30.5 % — LOW (ref 34.5–45)
HCT VFR BLD CALC: 30.8 % — LOW (ref 34.5–45)
HCT VFR BLD CALC: 31.1 % — LOW (ref 34.5–45)
HCT VFR BLD CALC: 31.4 % — LOW (ref 34.5–45)
HCT VFR BLD CALC: 31.7 % — LOW (ref 34.5–45)
HCT VFR BLD CALC: 31.9 % — LOW (ref 34.5–45)
HCT VFR BLD CALC: 32 % — LOW (ref 34.5–45)
HCT VFR BLD CALC: 34 % — LOW (ref 34.5–45)
HCT VFR BLD CALC: 35.9 % — SIGNIFICANT CHANGE UP (ref 34.5–45)
HCT VFR BLD CALC: 37.3 % — SIGNIFICANT CHANGE UP (ref 34.5–45)
HCT VFR BLD CALC: 38.7 % — SIGNIFICANT CHANGE UP (ref 34.5–45)
HCT VFR BLD CALC: 40.4 % — SIGNIFICANT CHANGE UP (ref 34.5–45)
HCT VFR BLD CALC: 40.6 % — SIGNIFICANT CHANGE UP (ref 34.5–45)
HCT VFR BLD CALC: 40.7 % — SIGNIFICANT CHANGE UP (ref 34.5–45)
HCT VFR BLD CALC: 40.7 % — SIGNIFICANT CHANGE UP (ref 34.5–45)
HCT VFR BLD CALC: 44.3 % — SIGNIFICANT CHANGE UP (ref 34.5–45)
HCT VFR BLD CALC: 45.3 % — HIGH (ref 34.5–45)
HCT VFR BLD CALC: 45.4 % — HIGH (ref 34.5–45)
HCT VFR BLD CALC: 45.8 % — HIGH (ref 34.5–45)
HCT VFR BLD CALC: 45.9 % — HIGH (ref 34.5–45)
HCT VFR BLD CALC: 46.9 % — HIGH (ref 34.5–45)
HCT VFR BLDA CALC: 46 % — SIGNIFICANT CHANGE UP (ref 34.5–46.5)
HCV AB S/CO SERPL IA: 0.15 S/CO — SIGNIFICANT CHANGE UP (ref 0–0.99)
HCV AB SERPL-IMP: SIGNIFICANT CHANGE UP
HDLC SERPL-MCNC: 44 MG/DL — LOW
HGB BLD CALC-MCNC: 15 G/DL — SIGNIFICANT CHANGE UP (ref 11.5–15.5)
HGB BLD-MCNC: 10.3 G/DL — LOW (ref 11.5–15.5)
HGB BLD-MCNC: 10.7 G/DL — LOW (ref 11.5–15.5)
HGB BLD-MCNC: 11.3 G/DL — LOW (ref 11.5–15.5)
HGB BLD-MCNC: 11.5 G/DL — SIGNIFICANT CHANGE UP (ref 11.5–15.5)
HGB BLD-MCNC: 12.3 G/DL — SIGNIFICANT CHANGE UP (ref 11.5–15.5)
HGB BLD-MCNC: 12.5 G/DL — SIGNIFICANT CHANGE UP (ref 11.5–15.5)
HGB BLD-MCNC: 13 G/DL — SIGNIFICANT CHANGE UP (ref 11.5–15.5)
HGB BLD-MCNC: 13.5 G/DL — SIGNIFICANT CHANGE UP (ref 11.5–15.5)
HGB BLD-MCNC: 13.9 G/DL — SIGNIFICANT CHANGE UP (ref 11.5–15.5)
HGB BLD-MCNC: 14.4 G/DL — SIGNIFICANT CHANGE UP (ref 11.5–15.5)
HGB BLD-MCNC: 14.4 G/DL — SIGNIFICANT CHANGE UP (ref 11.5–15.5)
HGB BLD-MCNC: 14.9 G/DL — SIGNIFICANT CHANGE UP (ref 11.5–15.5)
HGB BLD-MCNC: 5.3 G/DL — CRITICAL LOW (ref 11.5–15.5)
HGB BLD-MCNC: 6.9 G/DL — CRITICAL LOW (ref 11.5–15.5)
HGB BLD-MCNC: 7.2 G/DL — LOW (ref 11.5–15.5)
HGB BLD-MCNC: 7.4 G/DL — LOW (ref 11.5–15.5)
HGB BLD-MCNC: 7.4 G/DL — LOW (ref 11.5–15.5)
HGB BLD-MCNC: 7.5 G/DL — LOW (ref 11.5–15.5)
HGB BLD-MCNC: 7.7 G/DL — LOW (ref 11.5–15.5)
HGB BLD-MCNC: 7.8 G/DL — LOW (ref 11.5–15.5)
HGB BLD-MCNC: 7.9 G/DL — LOW (ref 11.5–15.5)
HGB BLD-MCNC: 8 G/DL — LOW (ref 11.5–15.5)
HGB BLD-MCNC: 8.1 G/DL — LOW (ref 11.5–15.5)
HGB BLD-MCNC: 8.1 G/DL — LOW (ref 11.5–15.5)
HGB BLD-MCNC: 8.2 G/DL — LOW (ref 11.5–15.5)
HGB BLD-MCNC: 8.2 G/DL — LOW (ref 11.5–15.5)
HGB BLD-MCNC: 8.3 G/DL — LOW (ref 11.5–15.5)
HGB BLD-MCNC: 8.4 G/DL — LOW (ref 11.5–15.5)
HGB BLD-MCNC: 8.5 G/DL — LOW (ref 11.5–15.5)
HGB BLD-MCNC: 8.6 G/DL — LOW (ref 11.5–15.5)
HGB BLD-MCNC: 8.6 G/DL — LOW (ref 11.5–15.5)
HGB BLD-MCNC: 8.7 G/DL — LOW (ref 11.5–15.5)
HGB BLD-MCNC: 8.7 G/DL — LOW (ref 11.5–15.5)
HGB BLD-MCNC: 8.8 G/DL — LOW (ref 11.5–15.5)
HGB BLD-MCNC: 8.9 G/DL — LOW (ref 11.5–15.5)
HGB BLD-MCNC: 9.2 G/DL — LOW (ref 11.5–15.5)
HGB BLD-MCNC: 9.2 G/DL — LOW (ref 11.5–15.5)
HGB BLD-MCNC: 9.3 G/DL — LOW (ref 11.5–15.5)
HGB BLD-MCNC: 9.4 G/DL — LOW (ref 11.5–15.5)
HGB BLD-MCNC: 9.4 G/DL — LOW (ref 11.5–15.5)
HGB BLD-MCNC: 9.6 G/DL — LOW (ref 11.5–15.5)
HOROWITZ INDEX BLDV+IHG-RTO: SIGNIFICANT CHANGE UP
HYALINE CASTS # UR AUTO: 4 /LPF — SIGNIFICANT CHANGE UP (ref 0–7)
IANC: 10 K/UL — HIGH (ref 1.5–8.5)
IANC: 10.77 K/UL — HIGH (ref 1.5–8.5)
IANC: 11.09 K/UL — HIGH (ref 1.5–8.5)
IANC: 11.34 K/UL — HIGH (ref 1.5–8.5)
IANC: 11.35 K/UL — HIGH (ref 1.5–8.5)
IANC: 11.74 K/UL — HIGH (ref 1.5–8.5)
IANC: 12.17 K/UL — HIGH (ref 1.5–8.5)
IANC: 12.29 K/UL — HIGH (ref 1.5–8.5)
IANC: 12.62 K/UL — HIGH (ref 1.5–8.5)
IANC: 12.8 K/UL — HIGH (ref 1.5–8.5)
IANC: 13.52 K/UL — HIGH (ref 1.5–8.5)
IANC: 13.55 K/UL — HIGH (ref 1.5–8.5)
IANC: 13.6 K/UL — HIGH (ref 1.5–8.5)
IANC: 15.18 K/UL — HIGH (ref 1.5–8.5)
IANC: 15.88 K/UL — HIGH (ref 1.5–8.5)
IANC: 6.17 K/UL — SIGNIFICANT CHANGE UP (ref 1.5–8.5)
IANC: 6.7 K/UL — SIGNIFICANT CHANGE UP (ref 1.5–8.5)
IANC: 6.76 K/UL — SIGNIFICANT CHANGE UP (ref 1.5–8.5)
IANC: 6.9 K/UL — SIGNIFICANT CHANGE UP (ref 1.5–8.5)
IANC: 7.37 K/UL — SIGNIFICANT CHANGE UP (ref 1.5–8.5)
IANC: 7.51 K/UL — SIGNIFICANT CHANGE UP (ref 1.5–8.5)
IANC: 7.63 K/UL — SIGNIFICANT CHANGE UP (ref 1.5–8.5)
IANC: 7.65 K/UL — SIGNIFICANT CHANGE UP (ref 1.5–8.5)
IANC: 7.76 K/UL — SIGNIFICANT CHANGE UP (ref 1.5–8.5)
IANC: 7.91 K/UL — SIGNIFICANT CHANGE UP (ref 1.5–8.5)
IANC: 8.09 K/UL — SIGNIFICANT CHANGE UP (ref 1.5–8.5)
IANC: 8.12 K/UL — SIGNIFICANT CHANGE UP (ref 1.5–8.5)
IANC: 8.13 K/UL — SIGNIFICANT CHANGE UP (ref 1.5–8.5)
IANC: 8.26 K/UL — SIGNIFICANT CHANGE UP (ref 1.5–8.5)
IANC: 8.42 K/UL — SIGNIFICANT CHANGE UP (ref 1.5–8.5)
IANC: 8.63 K/UL — HIGH (ref 1.5–8.5)
IANC: 8.66 K/UL — HIGH (ref 1.5–8.5)
IANC: 9.26 K/UL — HIGH (ref 1.5–8.5)
IANC: 9.94 K/UL — HIGH (ref 1.5–8.5)
IMM GRANULOCYTES NFR BLD AUTO: 10.1 % — HIGH (ref 0–1.5)
IMM GRANULOCYTES NFR BLD AUTO: 10.3 % — HIGH (ref 0–1.5)
IMM GRANULOCYTES NFR BLD AUTO: 12 % — HIGH (ref 0–1.5)
IMM GRANULOCYTES NFR BLD AUTO: 12.4 % — HIGH (ref 0–1.5)
IMM GRANULOCYTES NFR BLD AUTO: 12.5 % — HIGH (ref 0–1.5)
IMM GRANULOCYTES NFR BLD AUTO: 12.8 % — HIGH (ref 0–1.5)
IMM GRANULOCYTES NFR BLD AUTO: 13 % — HIGH (ref 0–1.5)
IMM GRANULOCYTES NFR BLD AUTO: 13.2 % — HIGH (ref 0–1.5)
IMM GRANULOCYTES NFR BLD AUTO: 13.6 % — HIGH (ref 0–1.5)
IMM GRANULOCYTES NFR BLD AUTO: 13.8 % — HIGH (ref 0–1.5)
IMM GRANULOCYTES NFR BLD AUTO: 14.4 % — HIGH (ref 0–1.5)
IMM GRANULOCYTES NFR BLD AUTO: 2 % — HIGH (ref 0–1.5)
IMM GRANULOCYTES NFR BLD AUTO: 2.1 % — HIGH (ref 0–1.5)
IMM GRANULOCYTES NFR BLD AUTO: 2.5 % — HIGH (ref 0–1.5)
IMM GRANULOCYTES NFR BLD AUTO: 4.5 % — HIGH (ref 0–1.5)
IMM GRANULOCYTES NFR BLD AUTO: 4.6 % — HIGH (ref 0–1.5)
IMM GRANULOCYTES NFR BLD AUTO: 4.7 % — HIGH (ref 0–1.5)
IMM GRANULOCYTES NFR BLD AUTO: 4.7 % — HIGH (ref 0–1.5)
IMM GRANULOCYTES NFR BLD AUTO: 4.8 % — HIGH (ref 0–1.5)
IMM GRANULOCYTES NFR BLD AUTO: 4.9 % — HIGH (ref 0–1.5)
IMM GRANULOCYTES NFR BLD AUTO: 6.4 % — HIGH (ref 0–1.5)
IMM GRANULOCYTES NFR BLD AUTO: 6.8 % — HIGH (ref 0–1.5)
IMM GRANULOCYTES NFR BLD AUTO: 7.1 % — HIGH (ref 0–1.5)
IMM GRANULOCYTES NFR BLD AUTO: 7.2 % — HIGH (ref 0–1.5)
IMM GRANULOCYTES NFR BLD AUTO: 7.4 % — HIGH (ref 0–1.5)
IMM GRANULOCYTES NFR BLD AUTO: 7.5 % — HIGH (ref 0–1.5)
IMM GRANULOCYTES NFR BLD AUTO: 7.8 % — HIGH (ref 0–1.5)
IMM GRANULOCYTES NFR BLD AUTO: 9 % — HIGH (ref 0–1.5)
IMM GRANULOCYTES NFR BLD AUTO: 9.5 % — HIGH (ref 0–1.5)
INR BLD: 1.14 RATIO — SIGNIFICANT CHANGE UP (ref 0.88–1.16)
INR BLD: 1.14 RATIO — SIGNIFICANT CHANGE UP (ref 0.88–1.16)
INR BLD: 1.15 RATIO — SIGNIFICANT CHANGE UP (ref 0.88–1.16)
INR BLD: 1.15 RATIO — SIGNIFICANT CHANGE UP (ref 0.88–1.16)
INR BLD: 1.16 RATIO — SIGNIFICANT CHANGE UP (ref 0.88–1.16)
INR BLD: 1.26 RATIO — HIGH (ref 0.88–1.16)
INR BLD: 1.27 RATIO — HIGH (ref 0.88–1.16)
INR BLD: 1.28 RATIO — HIGH (ref 0.88–1.16)
INR BLD: 1.28 RATIO — SIGNIFICANT CHANGE UP (ref 0.88–1.16)
INR BLD: 1.32 RATIO — HIGH (ref 0.88–1.16)
INR BLD: 1.33 RATIO — HIGH (ref 0.88–1.16)
INR BLD: 1.36 RATIO — HIGH (ref 0.88–1.16)
KETONES UR-MCNC: NEGATIVE — SIGNIFICANT CHANGE UP
KETONES UR-MCNC: NEGATIVE — SIGNIFICANT CHANGE UP
LACTATE BLDA-MCNC: 1.4 MMOL/L — SIGNIFICANT CHANGE UP (ref 0.5–2)
LACTATE BLDA-MCNC: 2 MMOL/L — SIGNIFICANT CHANGE UP (ref 0.5–2)
LACTATE BLDA-MCNC: 2.4 MMOL/L — HIGH (ref 0.5–2)
LACTATE BLDV-MCNC: 1.9 MMOL/L — SIGNIFICANT CHANGE UP (ref 0.5–2)
LDH SERPL L TO P-CCNC: 1158 U/L — HIGH (ref 135–225)
LDH SERPL L TO P-CCNC: 343 U/L — HIGH (ref 135–225)
LDH SERPL L TO P-CCNC: 355 U/L — HIGH (ref 135–225)
LDH SERPL L TO P-CCNC: 404 U/L — HIGH (ref 135–225)
LDH SERPL L TO P-CCNC: 418 U/L — HIGH (ref 135–225)
LDH SERPL L TO P-CCNC: 478 U/L — HIGH (ref 135–225)
LEUKOCYTE ESTERASE UR-ACNC: ABNORMAL
LEUKOCYTE ESTERASE UR-ACNC: ABNORMAL
LIPID PNL WITH DIRECT LDL SERPL: 96 MG/DL — SIGNIFICANT CHANGE UP
LMWH PPP CHRO-ACNC: 0.33 IU/ML — LOW (ref 0.5–1)
LYMPHOCYTES # BLD AUTO: 0.24 K/UL — LOW (ref 1–3.3)
LYMPHOCYTES # BLD AUTO: 0.42 K/UL — LOW (ref 1–3.3)
LYMPHOCYTES # BLD AUTO: 0.51 K/UL — LOW (ref 1–3.3)
LYMPHOCYTES # BLD AUTO: 0.71 K/UL — LOW (ref 1–3.3)
LYMPHOCYTES # BLD AUTO: 0.8 K/UL — LOW (ref 1–3.3)
LYMPHOCYTES # BLD AUTO: 0.91 K/UL — LOW (ref 1–3.3)
LYMPHOCYTES # BLD AUTO: 0.94 K/UL — LOW (ref 1–3.3)
LYMPHOCYTES # BLD AUTO: 0.99 K/UL — LOW (ref 1–3.3)
LYMPHOCYTES # BLD AUTO: 1.08 K/UL — SIGNIFICANT CHANGE UP (ref 1–3.3)
LYMPHOCYTES # BLD AUTO: 1.1 K/UL — SIGNIFICANT CHANGE UP (ref 1–3.3)
LYMPHOCYTES # BLD AUTO: 1.11 K/UL — SIGNIFICANT CHANGE UP (ref 1–3.3)
LYMPHOCYTES # BLD AUTO: 1.13 K/UL — SIGNIFICANT CHANGE UP (ref 1–3.3)
LYMPHOCYTES # BLD AUTO: 1.13 K/UL — SIGNIFICANT CHANGE UP (ref 1–3.3)
LYMPHOCYTES # BLD AUTO: 1.14 K/UL — SIGNIFICANT CHANGE UP (ref 1–3.3)
LYMPHOCYTES # BLD AUTO: 1.16 K/UL — SIGNIFICANT CHANGE UP (ref 1–3.3)
LYMPHOCYTES # BLD AUTO: 1.22 K/UL — SIGNIFICANT CHANGE UP (ref 1–3.3)
LYMPHOCYTES # BLD AUTO: 1.26 K/UL — SIGNIFICANT CHANGE UP (ref 1–3.3)
LYMPHOCYTES # BLD AUTO: 1.28 K/UL — SIGNIFICANT CHANGE UP (ref 1–3.3)
LYMPHOCYTES # BLD AUTO: 1.3 K/UL — SIGNIFICANT CHANGE UP (ref 1–3.3)
LYMPHOCYTES # BLD AUTO: 1.34 K/UL — SIGNIFICANT CHANGE UP (ref 1–3.3)
LYMPHOCYTES # BLD AUTO: 1.37 K/UL — SIGNIFICANT CHANGE UP (ref 1–3.3)
LYMPHOCYTES # BLD AUTO: 1.46 K/UL — SIGNIFICANT CHANGE UP (ref 1–3.3)
LYMPHOCYTES # BLD AUTO: 1.54 K/UL — SIGNIFICANT CHANGE UP (ref 1–3.3)
LYMPHOCYTES # BLD AUTO: 1.56 K/UL — SIGNIFICANT CHANGE UP (ref 1–3.3)
LYMPHOCYTES # BLD AUTO: 1.6 K/UL — SIGNIFICANT CHANGE UP (ref 1–3.3)
LYMPHOCYTES # BLD AUTO: 1.67 K/UL — SIGNIFICANT CHANGE UP (ref 1–3.3)
LYMPHOCYTES # BLD AUTO: 1.68 K/UL — SIGNIFICANT CHANGE UP (ref 1–3.3)
LYMPHOCYTES # BLD AUTO: 1.79 K/UL — SIGNIFICANT CHANGE UP (ref 1–3.3)
LYMPHOCYTES # BLD AUTO: 1.88 K/UL — SIGNIFICANT CHANGE UP (ref 1–3.3)
LYMPHOCYTES # BLD AUTO: 1.89 K/UL — SIGNIFICANT CHANGE UP (ref 1–3.3)
LYMPHOCYTES # BLD AUTO: 1.98 K/UL — SIGNIFICANT CHANGE UP (ref 1–3.3)
LYMPHOCYTES # BLD AUTO: 10.4 % — LOW (ref 13–44)
LYMPHOCYTES # BLD AUTO: 10.5 % — LOW (ref 13–44)
LYMPHOCYTES # BLD AUTO: 10.9 % — LOW (ref 13–44)
LYMPHOCYTES # BLD AUTO: 10.9 % — LOW (ref 13–44)
LYMPHOCYTES # BLD AUTO: 11.4 % — LOW (ref 13–44)
LYMPHOCYTES # BLD AUTO: 11.8 % — LOW (ref 13–44)
LYMPHOCYTES # BLD AUTO: 12.6 % — LOW (ref 13–44)
LYMPHOCYTES # BLD AUTO: 14.6 % — SIGNIFICANT CHANGE UP (ref 13–44)
LYMPHOCYTES # BLD AUTO: 14.6 % — SIGNIFICANT CHANGE UP (ref 13–44)
LYMPHOCYTES # BLD AUTO: 15 % — SIGNIFICANT CHANGE UP (ref 13–44)
LYMPHOCYTES # BLD AUTO: 15.4 % — SIGNIFICANT CHANGE UP (ref 13–44)
LYMPHOCYTES # BLD AUTO: 15.9 % — SIGNIFICANT CHANGE UP (ref 13–44)
LYMPHOCYTES # BLD AUTO: 18.4 % — SIGNIFICANT CHANGE UP (ref 13–44)
LYMPHOCYTES # BLD AUTO: 2.2 K/UL — SIGNIFICANT CHANGE UP (ref 1–3.3)
LYMPHOCYTES # BLD AUTO: 2.27 K/UL — SIGNIFICANT CHANGE UP (ref 1–3.3)
LYMPHOCYTES # BLD AUTO: 2.4 K/UL — SIGNIFICANT CHANGE UP (ref 1–3.3)
LYMPHOCYTES # BLD AUTO: 2.6 % — LOW (ref 13–44)
LYMPHOCYTES # BLD AUTO: 2.6 % — LOW (ref 13–44)
LYMPHOCYTES # BLD AUTO: 21.6 % — SIGNIFICANT CHANGE UP (ref 13–44)
LYMPHOCYTES # BLD AUTO: 3.7 % — LOW (ref 13–44)
LYMPHOCYTES # BLD AUTO: 5.7 % — LOW (ref 13–44)
LYMPHOCYTES # BLD AUTO: 5.9 % — LOW (ref 13–44)
LYMPHOCYTES # BLD AUTO: 6 % — LOW (ref 13–44)
LYMPHOCYTES # BLD AUTO: 6.4 % — LOW (ref 13–44)
LYMPHOCYTES # BLD AUTO: 6.7 % — LOW (ref 13–44)
LYMPHOCYTES # BLD AUTO: 6.9 % — LOW (ref 13–44)
LYMPHOCYTES # BLD AUTO: 6.9 % — LOW (ref 13–44)
LYMPHOCYTES # BLD AUTO: 7.5 % — LOW (ref 13–44)
LYMPHOCYTES # BLD AUTO: 7.5 % — LOW (ref 13–44)
LYMPHOCYTES # BLD AUTO: 7.8 % — LOW (ref 13–44)
LYMPHOCYTES # BLD AUTO: 8.2 % — LOW (ref 13–44)
LYMPHOCYTES # BLD AUTO: 8.5 % — LOW (ref 13–44)
LYMPHOCYTES # BLD AUTO: 8.6 % — LOW (ref 13–44)
LYMPHOCYTES # BLD AUTO: 8.7 % — LOW (ref 13–44)
LYMPHOCYTES # BLD AUTO: 8.8 % — LOW (ref 13–44)
LYMPHOCYTES # BLD AUTO: 9.5 % — LOW (ref 13–44)
LYMPHOCYTES # BLD AUTO: 9.9 % — LOW (ref 13–44)
MACROCYTES BLD QL: SLIGHT — SIGNIFICANT CHANGE UP
MAGNESIUM SERPL-MCNC: 1.7 MG/DL — SIGNIFICANT CHANGE UP (ref 1.6–2.6)
MAGNESIUM SERPL-MCNC: 1.9 MG/DL — SIGNIFICANT CHANGE UP (ref 1.6–2.6)
MAGNESIUM SERPL-MCNC: 1.9 MG/DL — SIGNIFICANT CHANGE UP (ref 1.6–2.6)
MAGNESIUM SERPL-MCNC: 2 MG/DL — SIGNIFICANT CHANGE UP (ref 1.6–2.6)
MAGNESIUM SERPL-MCNC: 2 MG/DL — SIGNIFICANT CHANGE UP (ref 1.6–2.6)
MAGNESIUM SERPL-MCNC: 2.1 MG/DL — SIGNIFICANT CHANGE UP (ref 1.6–2.6)
MAGNESIUM SERPL-MCNC: 2.2 MG/DL — SIGNIFICANT CHANGE UP (ref 1.6–2.6)
MAGNESIUM SERPL-MCNC: 2.3 MG/DL — SIGNIFICANT CHANGE UP (ref 1.6–2.6)
MAGNESIUM SERPL-MCNC: 2.4 MG/DL — SIGNIFICANT CHANGE UP (ref 1.6–2.6)
MAGNESIUM SERPL-MCNC: 2.5 MG/DL — SIGNIFICANT CHANGE UP (ref 1.6–2.6)
MAGNESIUM SERPL-MCNC: 2.6 MG/DL — SIGNIFICANT CHANGE UP (ref 1.6–2.6)
MAGNESIUM SERPL-MCNC: 2.7 MG/DL — HIGH (ref 1.6–2.6)
MAGNESIUM SERPL-MCNC: 2.9 MG/DL — HIGH (ref 1.6–2.6)
MAGNESIUM SERPL-MCNC: 2.9 MG/DL — HIGH (ref 1.6–2.6)
MAGNESIUM SERPL-MCNC: 3.1 MG/DL — HIGH (ref 1.6–2.6)
MANUAL SMEAR VERIFICATION: SIGNIFICANT CHANGE UP
MCHC RBC-ENTMCNC: 27.6 GM/DL — LOW (ref 32–36)
MCHC RBC-ENTMCNC: 27.7 GM/DL — LOW (ref 32–36)
MCHC RBC-ENTMCNC: 27.9 PG — SIGNIFICANT CHANGE UP (ref 27–34)
MCHC RBC-ENTMCNC: 28 GM/DL — LOW (ref 32–36)
MCHC RBC-ENTMCNC: 28 PG — SIGNIFICANT CHANGE UP (ref 27–34)
MCHC RBC-ENTMCNC: 28.1 GM/DL — LOW (ref 32–36)
MCHC RBC-ENTMCNC: 28.2 PG — SIGNIFICANT CHANGE UP (ref 27–34)
MCHC RBC-ENTMCNC: 28.3 GM/DL — LOW (ref 32–36)
MCHC RBC-ENTMCNC: 28.3 PG — SIGNIFICANT CHANGE UP (ref 27–34)
MCHC RBC-ENTMCNC: 28.4 GM/DL — LOW (ref 32–36)
MCHC RBC-ENTMCNC: 28.4 PG — SIGNIFICANT CHANGE UP (ref 27–34)
MCHC RBC-ENTMCNC: 28.5 GM/DL — LOW (ref 32–36)
MCHC RBC-ENTMCNC: 28.5 GM/DL — LOW (ref 32–36)
MCHC RBC-ENTMCNC: 28.5 PG — SIGNIFICANT CHANGE UP (ref 27–34)
MCHC RBC-ENTMCNC: 28.6 GM/DL — LOW (ref 32–36)
MCHC RBC-ENTMCNC: 28.6 PG — SIGNIFICANT CHANGE UP (ref 27–34)
MCHC RBC-ENTMCNC: 28.7 PG — SIGNIFICANT CHANGE UP (ref 27–34)
MCHC RBC-ENTMCNC: 28.8 GM/DL — LOW (ref 32–36)
MCHC RBC-ENTMCNC: 28.8 GM/DL — LOW (ref 32–36)
MCHC RBC-ENTMCNC: 28.9 GM/DL — LOW (ref 32–36)
MCHC RBC-ENTMCNC: 28.9 PG — SIGNIFICANT CHANGE UP (ref 27–34)
MCHC RBC-ENTMCNC: 29 GM/DL — LOW (ref 32–36)
MCHC RBC-ENTMCNC: 29 PG — SIGNIFICANT CHANGE UP (ref 27–34)
MCHC RBC-ENTMCNC: 29.2 PG — SIGNIFICANT CHANGE UP (ref 27–34)
MCHC RBC-ENTMCNC: 29.3 GM/DL — LOW (ref 32–36)
MCHC RBC-ENTMCNC: 29.3 GM/DL — LOW (ref 32–36)
MCHC RBC-ENTMCNC: 29.4 GM/DL — LOW (ref 32–36)
MCHC RBC-ENTMCNC: 29.4 GM/DL — LOW (ref 32–36)
MCHC RBC-ENTMCNC: 29.4 PG — SIGNIFICANT CHANGE UP (ref 27–34)
MCHC RBC-ENTMCNC: 29.5 GM/DL — LOW (ref 32–36)
MCHC RBC-ENTMCNC: 29.5 PG — SIGNIFICANT CHANGE UP (ref 27–34)
MCHC RBC-ENTMCNC: 29.6 GM/DL — LOW (ref 32–36)
MCHC RBC-ENTMCNC: 29.7 GM/DL — LOW (ref 32–36)
MCHC RBC-ENTMCNC: 29.7 GM/DL — LOW (ref 32–36)
MCHC RBC-ENTMCNC: 29.8 GM/DL — LOW (ref 32–36)
MCHC RBC-ENTMCNC: 29.8 PG — SIGNIFICANT CHANGE UP (ref 27–34)
MCHC RBC-ENTMCNC: 29.8 PG — SIGNIFICANT CHANGE UP (ref 27–34)
MCHC RBC-ENTMCNC: 30.1 GM/DL — LOW (ref 32–36)
MCHC RBC-ENTMCNC: 30.2 GM/DL — LOW (ref 32–36)
MCHC RBC-ENTMCNC: 30.3 GM/DL — LOW (ref 32–36)
MCHC RBC-ENTMCNC: 30.4 GM/DL — LOW (ref 32–36)
MCHC RBC-ENTMCNC: 30.6 GM/DL — LOW (ref 32–36)
MCHC RBC-ENTMCNC: 30.6 GM/DL — LOW (ref 32–36)
MCHC RBC-ENTMCNC: 30.7 GM/DL — LOW (ref 32–36)
MCHC RBC-ENTMCNC: 31.7 GM/DL — LOW (ref 32–36)
MCHC RBC-ENTMCNC: 31.8 GM/DL — LOW (ref 32–36)
MCHC RBC-ENTMCNC: 31.8 GM/DL — LOW (ref 32–36)
MCHC RBC-ENTMCNC: 32.9 PG — SIGNIFICANT CHANGE UP (ref 27–34)
MCHC RBC-ENTMCNC: 34.9 GM/DL — SIGNIFICANT CHANGE UP (ref 32–36)
MCV RBC AUTO: 100 FL — SIGNIFICANT CHANGE UP (ref 80–100)
MCV RBC AUTO: 100.3 FL — HIGH (ref 80–100)
MCV RBC AUTO: 100.7 FL — HIGH (ref 80–100)
MCV RBC AUTO: 100.7 FL — HIGH (ref 80–100)
MCV RBC AUTO: 100.8 FL — HIGH (ref 80–100)
MCV RBC AUTO: 101.1 FL — HIGH (ref 80–100)
MCV RBC AUTO: 101.3 FL — HIGH (ref 80–100)
MCV RBC AUTO: 102.5 FL — HIGH (ref 80–100)
MCV RBC AUTO: 102.7 FL — HIGH (ref 80–100)
MCV RBC AUTO: 89.5 FL — SIGNIFICANT CHANGE UP (ref 80–100)
MCV RBC AUTO: 89.9 FL — SIGNIFICANT CHANGE UP (ref 80–100)
MCV RBC AUTO: 90 FL — SIGNIFICANT CHANGE UP (ref 80–100)
MCV RBC AUTO: 92.9 FL — SIGNIFICANT CHANGE UP (ref 80–100)
MCV RBC AUTO: 93.1 FL — SIGNIFICANT CHANGE UP (ref 80–100)
MCV RBC AUTO: 93.7 FL — SIGNIFICANT CHANGE UP (ref 80–100)
MCV RBC AUTO: 93.9 FL — SIGNIFICANT CHANGE UP (ref 80–100)
MCV RBC AUTO: 94 FL — SIGNIFICANT CHANGE UP (ref 80–100)
MCV RBC AUTO: 94.2 FL — SIGNIFICANT CHANGE UP (ref 80–100)
MCV RBC AUTO: 94.2 FL — SIGNIFICANT CHANGE UP (ref 80–100)
MCV RBC AUTO: 94.4 FL — SIGNIFICANT CHANGE UP (ref 80–100)
MCV RBC AUTO: 94.4 FL — SIGNIFICANT CHANGE UP (ref 80–100)
MCV RBC AUTO: 95.1 FL — SIGNIFICANT CHANGE UP (ref 80–100)
MCV RBC AUTO: 95.7 FL — SIGNIFICANT CHANGE UP (ref 80–100)
MCV RBC AUTO: 96 FL — SIGNIFICANT CHANGE UP (ref 80–100)
MCV RBC AUTO: 96.1 FL — SIGNIFICANT CHANGE UP (ref 80–100)
MCV RBC AUTO: 96.3 FL — SIGNIFICANT CHANGE UP (ref 80–100)
MCV RBC AUTO: 96.4 FL — SIGNIFICANT CHANGE UP (ref 80–100)
MCV RBC AUTO: 96.9 FL — SIGNIFICANT CHANGE UP (ref 80–100)
MCV RBC AUTO: 97.1 FL — SIGNIFICANT CHANGE UP (ref 80–100)
MCV RBC AUTO: 97.3 FL — SIGNIFICANT CHANGE UP (ref 80–100)
MCV RBC AUTO: 97.3 FL — SIGNIFICANT CHANGE UP (ref 80–100)
MCV RBC AUTO: 97.8 FL — SIGNIFICANT CHANGE UP (ref 80–100)
MCV RBC AUTO: 97.9 FL — SIGNIFICANT CHANGE UP (ref 80–100)
MCV RBC AUTO: 98 FL — SIGNIFICANT CHANGE UP (ref 80–100)
MCV RBC AUTO: 98.1 FL — SIGNIFICANT CHANGE UP (ref 80–100)
MCV RBC AUTO: 98.2 FL — SIGNIFICANT CHANGE UP (ref 80–100)
MCV RBC AUTO: 98.6 FL — SIGNIFICANT CHANGE UP (ref 80–100)
MCV RBC AUTO: 99 FL — SIGNIFICANT CHANGE UP (ref 80–100)
MCV RBC AUTO: 99.6 FL — SIGNIFICANT CHANGE UP (ref 80–100)
MCV RBC AUTO: 99.6 FL — SIGNIFICANT CHANGE UP (ref 80–100)
MCV RBC AUTO: 99.7 FL — SIGNIFICANT CHANGE UP (ref 80–100)
METAMYELOCYTES # FLD: 3.6 % — HIGH (ref 0–1)
METHOD TYPE: SIGNIFICANT CHANGE UP
MICROCYTES BLD QL: SLIGHT — SIGNIFICANT CHANGE UP
MONOCYTES # BLD AUTO: 0.24 K/UL — SIGNIFICANT CHANGE UP (ref 0–0.9)
MONOCYTES # BLD AUTO: 0.26 K/UL — SIGNIFICANT CHANGE UP (ref 0–0.9)
MONOCYTES # BLD AUTO: 0.49 K/UL — SIGNIFICANT CHANGE UP (ref 0–0.9)
MONOCYTES # BLD AUTO: 0.66 K/UL — SIGNIFICANT CHANGE UP (ref 0–0.9)
MONOCYTES # BLD AUTO: 0.73 K/UL — SIGNIFICANT CHANGE UP (ref 0–0.9)
MONOCYTES # BLD AUTO: 0.75 K/UL — SIGNIFICANT CHANGE UP (ref 0–0.9)
MONOCYTES # BLD AUTO: 0.76 K/UL — SIGNIFICANT CHANGE UP (ref 0–0.9)
MONOCYTES # BLD AUTO: 0.79 K/UL — SIGNIFICANT CHANGE UP (ref 0–0.9)
MONOCYTES # BLD AUTO: 0.83 K/UL — SIGNIFICANT CHANGE UP (ref 0–0.9)
MONOCYTES # BLD AUTO: 0.87 K/UL — SIGNIFICANT CHANGE UP (ref 0–0.9)
MONOCYTES # BLD AUTO: 0.91 K/UL — HIGH (ref 0–0.9)
MONOCYTES # BLD AUTO: 0.93 K/UL — HIGH (ref 0–0.9)
MONOCYTES # BLD AUTO: 0.95 K/UL — HIGH (ref 0–0.9)
MONOCYTES # BLD AUTO: 0.96 K/UL — HIGH (ref 0–0.9)
MONOCYTES # BLD AUTO: 0.97 K/UL — HIGH (ref 0–0.9)
MONOCYTES # BLD AUTO: 1 K/UL — HIGH (ref 0–0.9)
MONOCYTES # BLD AUTO: 1.03 K/UL — HIGH (ref 0–0.9)
MONOCYTES # BLD AUTO: 1.14 K/UL — HIGH (ref 0–0.9)
MONOCYTES # BLD AUTO: 1.16 K/UL — HIGH (ref 0–0.9)
MONOCYTES # BLD AUTO: 1.21 K/UL — HIGH (ref 0–0.9)
MONOCYTES # BLD AUTO: 1.22 K/UL — HIGH (ref 0–0.9)
MONOCYTES # BLD AUTO: 1.29 K/UL — HIGH (ref 0–0.9)
MONOCYTES # BLD AUTO: 1.3 K/UL — HIGH (ref 0–0.9)
MONOCYTES # BLD AUTO: 1.31 K/UL — HIGH (ref 0–0.9)
MONOCYTES # BLD AUTO: 1.35 K/UL — HIGH (ref 0–0.9)
MONOCYTES # BLD AUTO: 1.37 K/UL — HIGH (ref 0–0.9)
MONOCYTES # BLD AUTO: 1.46 K/UL — HIGH (ref 0–0.9)
MONOCYTES # BLD AUTO: 1.51 K/UL — HIGH (ref 0–0.9)
MONOCYTES # BLD AUTO: 1.52 K/UL — HIGH (ref 0–0.9)
MONOCYTES # BLD AUTO: 1.64 K/UL — HIGH (ref 0–0.9)
MONOCYTES # BLD AUTO: 1.84 K/UL — HIGH (ref 0–0.9)
MONOCYTES # BLD AUTO: 2.07 K/UL — HIGH (ref 0–0.9)
MONOCYTES NFR BLD AUTO: 1.8 % — LOW (ref 2–14)
MONOCYTES NFR BLD AUTO: 10.5 % — SIGNIFICANT CHANGE UP (ref 2–14)
MONOCYTES NFR BLD AUTO: 10.5 % — SIGNIFICANT CHANGE UP (ref 2–14)
MONOCYTES NFR BLD AUTO: 10.6 % — SIGNIFICANT CHANGE UP (ref 2–14)
MONOCYTES NFR BLD AUTO: 10.6 % — SIGNIFICANT CHANGE UP (ref 2–14)
MONOCYTES NFR BLD AUTO: 11.4 % — SIGNIFICANT CHANGE UP (ref 2–14)
MONOCYTES NFR BLD AUTO: 11.5 % — SIGNIFICANT CHANGE UP (ref 2–14)
MONOCYTES NFR BLD AUTO: 11.8 % — SIGNIFICANT CHANGE UP (ref 2–14)
MONOCYTES NFR BLD AUTO: 12.2 % — SIGNIFICANT CHANGE UP (ref 2–14)
MONOCYTES NFR BLD AUTO: 12.2 % — SIGNIFICANT CHANGE UP (ref 2–14)
MONOCYTES NFR BLD AUTO: 2.6 % — SIGNIFICANT CHANGE UP (ref 2–14)
MONOCYTES NFR BLD AUTO: 4.5 % — SIGNIFICANT CHANGE UP (ref 2–14)
MONOCYTES NFR BLD AUTO: 4.8 % — SIGNIFICANT CHANGE UP (ref 2–14)
MONOCYTES NFR BLD AUTO: 4.9 % — SIGNIFICANT CHANGE UP (ref 2–14)
MONOCYTES NFR BLD AUTO: 5.3 % — SIGNIFICANT CHANGE UP (ref 2–14)
MONOCYTES NFR BLD AUTO: 5.4 % — SIGNIFICANT CHANGE UP (ref 2–14)
MONOCYTES NFR BLD AUTO: 5.4 % — SIGNIFICANT CHANGE UP (ref 2–14)
MONOCYTES NFR BLD AUTO: 5.5 % — SIGNIFICANT CHANGE UP (ref 2–14)
MONOCYTES NFR BLD AUTO: 6 % — SIGNIFICANT CHANGE UP (ref 2–14)
MONOCYTES NFR BLD AUTO: 6 % — SIGNIFICANT CHANGE UP (ref 2–14)
MONOCYTES NFR BLD AUTO: 6.3 % — SIGNIFICANT CHANGE UP (ref 2–14)
MONOCYTES NFR BLD AUTO: 6.8 % — SIGNIFICANT CHANGE UP (ref 2–14)
MONOCYTES NFR BLD AUTO: 7.2 % — SIGNIFICANT CHANGE UP (ref 2–14)
MONOCYTES NFR BLD AUTO: 7.8 % — SIGNIFICANT CHANGE UP (ref 2–14)
MONOCYTES NFR BLD AUTO: 7.9 % — SIGNIFICANT CHANGE UP (ref 2–14)
MONOCYTES NFR BLD AUTO: 8.1 % — SIGNIFICANT CHANGE UP (ref 2–14)
MONOCYTES NFR BLD AUTO: 8.1 % — SIGNIFICANT CHANGE UP (ref 2–14)
MONOCYTES NFR BLD AUTO: 8.2 % — SIGNIFICANT CHANGE UP (ref 2–14)
MONOCYTES NFR BLD AUTO: 8.4 % — SIGNIFICANT CHANGE UP (ref 2–14)
MONOCYTES NFR BLD AUTO: 8.7 % — SIGNIFICANT CHANGE UP (ref 2–14)
MONOCYTES NFR BLD AUTO: 8.8 % — SIGNIFICANT CHANGE UP (ref 2–14)
MONOCYTES NFR BLD AUTO: 8.9 % — SIGNIFICANT CHANGE UP (ref 2–14)
MONOCYTES NFR BLD AUTO: 9.3 % — SIGNIFICANT CHANGE UP (ref 2–14)
MONOCYTES NFR BLD AUTO: 9.6 % — SIGNIFICANT CHANGE UP (ref 2–14)
MRSA PCR RESULT.: DETECTED
MYELOCYTES NFR BLD: 0.9 % — HIGH (ref 0–0)
NEUTROPHILS # BLD AUTO: 10 K/UL — HIGH (ref 1.8–7.4)
NEUTROPHILS # BLD AUTO: 10.77 K/UL — HIGH (ref 1.8–7.4)
NEUTROPHILS # BLD AUTO: 11.09 K/UL — HIGH (ref 1.8–7.4)
NEUTROPHILS # BLD AUTO: 11.17 K/UL — HIGH (ref 1.8–7.4)
NEUTROPHILS # BLD AUTO: 11.34 K/UL — HIGH (ref 1.8–7.4)
NEUTROPHILS # BLD AUTO: 11.35 K/UL — HIGH (ref 1.8–7.4)
NEUTROPHILS # BLD AUTO: 11.38 K/UL — HIGH (ref 1.8–7.4)
NEUTROPHILS # BLD AUTO: 11.74 K/UL — HIGH (ref 1.8–7.4)
NEUTROPHILS # BLD AUTO: 12.62 K/UL — HIGH (ref 1.8–7.4)
NEUTROPHILS # BLD AUTO: 12.8 K/UL — HIGH (ref 1.8–7.4)
NEUTROPHILS # BLD AUTO: 13.52 K/UL — HIGH (ref 1.8–7.4)
NEUTROPHILS # BLD AUTO: 13.55 K/UL — HIGH (ref 1.8–7.4)
NEUTROPHILS # BLD AUTO: 13.55 K/UL — HIGH (ref 1.8–7.4)
NEUTROPHILS # BLD AUTO: 13.6 K/UL — HIGH (ref 1.8–7.4)
NEUTROPHILS # BLD AUTO: 15.18 K/UL — HIGH (ref 1.8–7.4)
NEUTROPHILS # BLD AUTO: 15.88 K/UL — HIGH (ref 1.8–7.4)
NEUTROPHILS # BLD AUTO: 6.17 K/UL — SIGNIFICANT CHANGE UP (ref 1.8–7.4)
NEUTROPHILS # BLD AUTO: 6.7 K/UL — SIGNIFICANT CHANGE UP (ref 1.8–7.4)
NEUTROPHILS # BLD AUTO: 6.76 K/UL — SIGNIFICANT CHANGE UP (ref 1.8–7.4)
NEUTROPHILS # BLD AUTO: 6.9 K/UL — SIGNIFICANT CHANGE UP (ref 1.8–7.4)
NEUTROPHILS # BLD AUTO: 7.37 K/UL — SIGNIFICANT CHANGE UP (ref 1.8–7.4)
NEUTROPHILS # BLD AUTO: 7.51 K/UL — HIGH (ref 1.8–7.4)
NEUTROPHILS # BLD AUTO: 7.63 K/UL — HIGH (ref 1.8–7.4)
NEUTROPHILS # BLD AUTO: 7.65 K/UL — HIGH (ref 1.8–7.4)
NEUTROPHILS # BLD AUTO: 7.91 K/UL — HIGH (ref 1.8–7.4)
NEUTROPHILS # BLD AUTO: 8.09 K/UL — HIGH (ref 1.8–7.4)
NEUTROPHILS # BLD AUTO: 8.12 K/UL — HIGH (ref 1.8–7.4)
NEUTROPHILS # BLD AUTO: 8.26 K/UL — HIGH (ref 1.8–7.4)
NEUTROPHILS # BLD AUTO: 8.42 K/UL — HIGH (ref 1.8–7.4)
NEUTROPHILS # BLD AUTO: 8.6 K/UL — HIGH (ref 1.8–7.4)
NEUTROPHILS # BLD AUTO: 8.63 K/UL — HIGH (ref 1.8–7.4)
NEUTROPHILS # BLD AUTO: 8.66 K/UL — HIGH (ref 1.8–7.4)
NEUTROPHILS # BLD AUTO: 9.04 K/UL — HIGH (ref 1.8–7.4)
NEUTROPHILS # BLD AUTO: 9.94 K/UL — HIGH (ref 1.8–7.4)
NEUTROPHILS NFR BLD AUTO: 41.8 % — LOW (ref 43–77)
NEUTROPHILS NFR BLD AUTO: 53.6 % — SIGNIFICANT CHANGE UP (ref 43–77)
NEUTROPHILS NFR BLD AUTO: 55.2 % — SIGNIFICANT CHANGE UP (ref 43–77)
NEUTROPHILS NFR BLD AUTO: 55.7 % — SIGNIFICANT CHANGE UP (ref 43–77)
NEUTROPHILS NFR BLD AUTO: 61.4 % — SIGNIFICANT CHANGE UP (ref 43–77)
NEUTROPHILS NFR BLD AUTO: 61.4 % — SIGNIFICANT CHANGE UP (ref 43–77)
NEUTROPHILS NFR BLD AUTO: 61.7 % — SIGNIFICANT CHANGE UP (ref 43–77)
NEUTROPHILS NFR BLD AUTO: 62.4 % — SIGNIFICANT CHANGE UP (ref 43–77)
NEUTROPHILS NFR BLD AUTO: 63.6 % — SIGNIFICANT CHANGE UP (ref 43–77)
NEUTROPHILS NFR BLD AUTO: 64.7 % — SIGNIFICANT CHANGE UP (ref 43–77)
NEUTROPHILS NFR BLD AUTO: 65.3 % — SIGNIFICANT CHANGE UP (ref 43–77)
NEUTROPHILS NFR BLD AUTO: 66.2 % — SIGNIFICANT CHANGE UP (ref 43–77)
NEUTROPHILS NFR BLD AUTO: 66.7 % — SIGNIFICANT CHANGE UP (ref 43–77)
NEUTROPHILS NFR BLD AUTO: 67 % — SIGNIFICANT CHANGE UP (ref 43–77)
NEUTROPHILS NFR BLD AUTO: 67.8 % — SIGNIFICANT CHANGE UP (ref 43–77)
NEUTROPHILS NFR BLD AUTO: 69.9 % — SIGNIFICANT CHANGE UP (ref 43–77)
NEUTROPHILS NFR BLD AUTO: 70.3 % — SIGNIFICANT CHANGE UP (ref 43–77)
NEUTROPHILS NFR BLD AUTO: 73.3 % — SIGNIFICANT CHANGE UP (ref 43–77)
NEUTROPHILS NFR BLD AUTO: 73.4 % — SIGNIFICANT CHANGE UP (ref 43–77)
NEUTROPHILS NFR BLD AUTO: 74.6 % — SIGNIFICANT CHANGE UP (ref 43–77)
NEUTROPHILS NFR BLD AUTO: 75.1 % — SIGNIFICANT CHANGE UP (ref 43–77)
NEUTROPHILS NFR BLD AUTO: 75.4 % — SIGNIFICANT CHANGE UP (ref 43–77)
NEUTROPHILS NFR BLD AUTO: 75.9 % — SIGNIFICANT CHANGE UP (ref 43–77)
NEUTROPHILS NFR BLD AUTO: 77.3 % — HIGH (ref 43–77)
NEUTROPHILS NFR BLD AUTO: 77.7 % — HIGH (ref 43–77)
NEUTROPHILS NFR BLD AUTO: 80.9 % — HIGH (ref 43–77)
NEUTROPHILS NFR BLD AUTO: 82.2 % — HIGH (ref 43–77)
NEUTROPHILS NFR BLD AUTO: 82.5 % — HIGH (ref 43–77)
NEUTROPHILS NFR BLD AUTO: 84.2 % — HIGH (ref 43–77)
NEUTROPHILS NFR BLD AUTO: 85.4 % — HIGH (ref 43–77)
NEUTROPHILS NFR BLD AUTO: 85.9 % — HIGH (ref 43–77)
NEUTROPHILS NFR BLD AUTO: 93.1 % — HIGH (ref 43–77)
NEUTS BAND # BLD: 38.2 % — CRITICAL HIGH (ref 0–6)
NITRITE UR-MCNC: NEGATIVE — SIGNIFICANT CHANGE UP
NITRITE UR-MCNC: POSITIVE
NON HDL CHOLESTEROL: 119 MG/DL — SIGNIFICANT CHANGE UP
NRBC # BLD: 0 /100 WBCS — SIGNIFICANT CHANGE UP
NRBC # BLD: 1 /100 WBCS — SIGNIFICANT CHANGE UP
NRBC # BLD: 2 /100 WBCS — SIGNIFICANT CHANGE UP
NRBC # BLD: 3 /100 WBCS — SIGNIFICANT CHANGE UP
NRBC # BLD: 4 /100 WBCS — SIGNIFICANT CHANGE UP
NRBC # BLD: 4 /100 WBCS — SIGNIFICANT CHANGE UP
NRBC # BLD: 5 /100 WBCS — SIGNIFICANT CHANGE UP
NRBC # BLD: 5 /100 WBCS — SIGNIFICANT CHANGE UP
NRBC # FLD: 0 K/UL — SIGNIFICANT CHANGE UP
NRBC # FLD: 0.02 K/UL — HIGH
NRBC # FLD: 0.02 K/UL — HIGH
NRBC # FLD: 0.03 K/UL — HIGH
NRBC # FLD: 0.04 K/UL — HIGH
NRBC # FLD: 0.04 K/UL — HIGH
NRBC # FLD: 0.06 K/UL — HIGH
NRBC # FLD: 0.08 K/UL — HIGH
NRBC # FLD: 0.08 K/UL — HIGH
NRBC # FLD: 0.09 K/UL — HIGH
NRBC # FLD: 0.09 K/UL — HIGH
NRBC # FLD: 0.1 K/UL — HIGH
NRBC # FLD: 0.13 K/UL — HIGH
NRBC # FLD: 0.17 K/UL — HIGH
NRBC # FLD: 0.18 K/UL — HIGH
NRBC # FLD: 0.19 K/UL — HIGH
NRBC # FLD: 0.2 K/UL — HIGH
NRBC # FLD: 0.23 K/UL — HIGH
NRBC # FLD: 0.25 K/UL — HIGH
NRBC # FLD: 0.32 K/UL — HIGH
NRBC # FLD: 0.32 K/UL — HIGH
NRBC # FLD: 0.36 K/UL — HIGH
NRBC # FLD: 0.43 K/UL — HIGH
NRBC # FLD: 0.45 K/UL — HIGH
NRBC # FLD: 0.47 K/UL — HIGH
NRBC # FLD: 0.55 K/UL — HIGH
NRBC # FLD: 0.58 K/UL — HIGH
NRBC # FLD: 0.66 K/UL — HIGH
NRBC # FLD: 0.77 K/UL — HIGH
NT-PROBNP SERPL-SCNC: 1416 PG/ML — HIGH
ORGANISM # SPEC MICROSCOPIC CNT: SIGNIFICANT CHANGE UP
OSMOLALITY UR: 268 MOSM/KG — SIGNIFICANT CHANGE UP (ref 50–1200)
PCO2 BLDA: 35 MMHG — SIGNIFICANT CHANGE UP (ref 32–48)
PCO2 BLDA: 48 MMHG — SIGNIFICANT CHANGE UP (ref 32–48)
PCO2 BLDA: 49 MMHG — HIGH (ref 32–48)
PCO2 BLDA: 50 MMHG — HIGH (ref 32–48)
PCO2 BLDA: 51 MMHG — HIGH (ref 32–48)
PCO2 BLDA: 52 MMHG — HIGH (ref 32–48)
PCO2 BLDA: 53 MMHG — HIGH (ref 32–48)
PCO2 BLDA: 53 MMHG — HIGH (ref 32–48)
PCO2 BLDA: 55 MMHG — HIGH (ref 32–48)
PCO2 BLDA: 55 MMHG — HIGH (ref 32–48)
PCO2 BLDA: 57 MMHG — HIGH (ref 32–48)
PCO2 BLDA: 59 MMHG — HIGH (ref 32–48)
PCO2 BLDA: 60 MMHG — HIGH (ref 32–48)
PCO2 BLDA: 64 MMHG — HIGH (ref 32–48)
PCO2 BLDA: 70 MMHG — CRITICAL HIGH (ref 32–48)
PCO2 BLDA: 70 MMHG — CRITICAL HIGH (ref 32–48)
PCO2 BLDA: 73 MMHG — CRITICAL HIGH (ref 32–48)
PCO2 BLDA: 85 MMHG — CRITICAL HIGH (ref 32–48)
PCO2 BLDV: 33 MMHG — LOW (ref 41–51)
PH BLD: 7.37 — SIGNIFICANT CHANGE UP (ref 7.35–7.45)
PH BLDA: 7.17 — CRITICAL LOW (ref 7.35–7.45)
PH BLDA: 7.19 — CRITICAL LOW (ref 7.35–7.45)
PH BLDA: 7.27 — LOW (ref 7.35–7.45)
PH BLDA: 7.29 — LOW (ref 7.35–7.45)
PH BLDA: 7.3 — LOW (ref 7.35–7.45)
PH BLDA: 7.31 — LOW (ref 7.35–7.45)
PH BLDA: 7.33 — LOW (ref 7.35–7.45)
PH BLDA: 7.34 — LOW (ref 7.35–7.45)
PH BLDA: 7.36 — SIGNIFICANT CHANGE UP (ref 7.35–7.45)
PH BLDA: 7.37 — SIGNIFICANT CHANGE UP (ref 7.35–7.45)
PH BLDA: 7.38 — SIGNIFICANT CHANGE UP (ref 7.35–7.45)
PH BLDA: 7.39 — SIGNIFICANT CHANGE UP (ref 7.35–7.45)
PH BLDA: 7.39 — SIGNIFICANT CHANGE UP (ref 7.35–7.45)
PH BLDA: 7.4 — SIGNIFICANT CHANGE UP (ref 7.35–7.45)
PH BLDA: 7.41 — SIGNIFICANT CHANGE UP (ref 7.35–7.45)
PH BLDV: 7.41 — SIGNIFICANT CHANGE UP (ref 7.32–7.43)
PH UR: 6 — SIGNIFICANT CHANGE UP (ref 5–8)
PH UR: 6.5 — SIGNIFICANT CHANGE UP (ref 5–8)
PHOSPHATE SERPL-MCNC: 1.5 MG/DL — LOW (ref 2.5–4.5)
PHOSPHATE SERPL-MCNC: 1.8 MG/DL — LOW (ref 2.5–4.5)
PHOSPHATE SERPL-MCNC: 1.9 MG/DL — LOW (ref 2.5–4.5)
PHOSPHATE SERPL-MCNC: 2 MG/DL — LOW (ref 2.5–4.5)
PHOSPHATE SERPL-MCNC: 2.1 MG/DL — LOW (ref 2.5–4.5)
PHOSPHATE SERPL-MCNC: 2.2 MG/DL — LOW (ref 2.5–4.5)
PHOSPHATE SERPL-MCNC: 2.3 MG/DL — LOW (ref 2.5–4.5)
PHOSPHATE SERPL-MCNC: 2.4 MG/DL — LOW (ref 2.5–4.5)
PHOSPHATE SERPL-MCNC: 2.7 MG/DL — SIGNIFICANT CHANGE UP (ref 2.5–4.5)
PHOSPHATE SERPL-MCNC: 2.8 MG/DL — SIGNIFICANT CHANGE UP (ref 2.5–4.5)
PHOSPHATE SERPL-MCNC: 2.8 MG/DL — SIGNIFICANT CHANGE UP (ref 2.5–4.5)
PHOSPHATE SERPL-MCNC: 2.9 MG/DL — SIGNIFICANT CHANGE UP (ref 2.5–4.5)
PHOSPHATE SERPL-MCNC: 2.9 MG/DL — SIGNIFICANT CHANGE UP (ref 2.5–4.5)
PHOSPHATE SERPL-MCNC: 3.1 MG/DL — SIGNIFICANT CHANGE UP (ref 2.5–4.5)
PHOSPHATE SERPL-MCNC: 3.1 MG/DL — SIGNIFICANT CHANGE UP (ref 2.5–4.5)
PHOSPHATE SERPL-MCNC: 3.2 MG/DL — SIGNIFICANT CHANGE UP (ref 2.5–4.5)
PHOSPHATE SERPL-MCNC: 3.2 MG/DL — SIGNIFICANT CHANGE UP (ref 2.5–4.5)
PHOSPHATE SERPL-MCNC: 3.3 MG/DL — SIGNIFICANT CHANGE UP (ref 2.5–4.5)
PHOSPHATE SERPL-MCNC: 3.3 MG/DL — SIGNIFICANT CHANGE UP (ref 2.5–4.5)
PHOSPHATE SERPL-MCNC: 3.4 MG/DL — SIGNIFICANT CHANGE UP (ref 2.5–4.5)
PHOSPHATE SERPL-MCNC: 3.4 MG/DL — SIGNIFICANT CHANGE UP (ref 2.5–4.5)
PHOSPHATE SERPL-MCNC: 3.5 MG/DL — SIGNIFICANT CHANGE UP (ref 2.5–4.5)
PHOSPHATE SERPL-MCNC: 3.7 MG/DL — SIGNIFICANT CHANGE UP (ref 2.5–4.5)
PHOSPHATE SERPL-MCNC: 4 MG/DL — SIGNIFICANT CHANGE UP (ref 2.5–4.5)
PHOSPHATE SERPL-MCNC: 4.1 MG/DL — SIGNIFICANT CHANGE UP (ref 2.5–4.5)
PHOSPHATE SERPL-MCNC: 4.1 MG/DL — SIGNIFICANT CHANGE UP (ref 2.5–4.5)
PHOSPHATE SERPL-MCNC: 4.4 MG/DL — SIGNIFICANT CHANGE UP (ref 2.5–4.5)
PLAT MORPH BLD: NORMAL — SIGNIFICANT CHANGE UP
PLATELET # BLD AUTO: 151 K/UL — SIGNIFICANT CHANGE UP (ref 150–400)
PLATELET # BLD AUTO: 171 K/UL — SIGNIFICANT CHANGE UP (ref 150–400)
PLATELET # BLD AUTO: 174 K/UL — SIGNIFICANT CHANGE UP (ref 150–400)
PLATELET # BLD AUTO: 181 K/UL — SIGNIFICANT CHANGE UP (ref 150–400)
PLATELET # BLD AUTO: 182 K/UL — SIGNIFICANT CHANGE UP (ref 150–400)
PLATELET # BLD AUTO: 185 K/UL — SIGNIFICANT CHANGE UP (ref 150–400)
PLATELET # BLD AUTO: 186 K/UL — SIGNIFICANT CHANGE UP (ref 150–400)
PLATELET # BLD AUTO: 194 K/UL — SIGNIFICANT CHANGE UP (ref 150–400)
PLATELET # BLD AUTO: 196 K/UL — SIGNIFICANT CHANGE UP (ref 150–400)
PLATELET # BLD AUTO: 197 K/UL — SIGNIFICANT CHANGE UP (ref 150–400)
PLATELET # BLD AUTO: 204 K/UL — SIGNIFICANT CHANGE UP (ref 150–400)
PLATELET # BLD AUTO: 213 K/UL — SIGNIFICANT CHANGE UP (ref 150–400)
PLATELET # BLD AUTO: 214 K/UL — SIGNIFICANT CHANGE UP (ref 150–400)
PLATELET # BLD AUTO: 218 K/UL — SIGNIFICANT CHANGE UP (ref 150–400)
PLATELET # BLD AUTO: 219 K/UL — SIGNIFICANT CHANGE UP (ref 150–400)
PLATELET # BLD AUTO: 222 K/UL — SIGNIFICANT CHANGE UP (ref 150–400)
PLATELET # BLD AUTO: 224 K/UL — SIGNIFICANT CHANGE UP (ref 150–400)
PLATELET # BLD AUTO: 224 K/UL — SIGNIFICANT CHANGE UP (ref 150–400)
PLATELET # BLD AUTO: 228 K/UL — SIGNIFICANT CHANGE UP (ref 150–400)
PLATELET # BLD AUTO: 229 K/UL — SIGNIFICANT CHANGE UP (ref 150–400)
PLATELET # BLD AUTO: 232 K/UL — SIGNIFICANT CHANGE UP (ref 150–400)
PLATELET # BLD AUTO: 236 K/UL — SIGNIFICANT CHANGE UP (ref 150–400)
PLATELET # BLD AUTO: 239 K/UL — SIGNIFICANT CHANGE UP (ref 150–400)
PLATELET # BLD AUTO: 239 K/UL — SIGNIFICANT CHANGE UP (ref 150–400)
PLATELET # BLD AUTO: 241 K/UL — SIGNIFICANT CHANGE UP (ref 150–400)
PLATELET # BLD AUTO: 242 K/UL — SIGNIFICANT CHANGE UP (ref 150–400)
PLATELET # BLD AUTO: 242 K/UL — SIGNIFICANT CHANGE UP (ref 150–400)
PLATELET # BLD AUTO: 254 K/UL — SIGNIFICANT CHANGE UP (ref 150–400)
PLATELET # BLD AUTO: 254 K/UL — SIGNIFICANT CHANGE UP (ref 150–400)
PLATELET # BLD AUTO: 255 K/UL — SIGNIFICANT CHANGE UP (ref 150–400)
PLATELET # BLD AUTO: 256 K/UL — SIGNIFICANT CHANGE UP (ref 150–400)
PLATELET # BLD AUTO: 264 K/UL — SIGNIFICANT CHANGE UP (ref 150–400)
PLATELET # BLD AUTO: 269 K/UL — SIGNIFICANT CHANGE UP (ref 150–400)
PLATELET # BLD AUTO: 270 K/UL — SIGNIFICANT CHANGE UP (ref 150–400)
PLATELET # BLD AUTO: 272 K/UL — SIGNIFICANT CHANGE UP (ref 150–400)
PLATELET # BLD AUTO: 272 K/UL — SIGNIFICANT CHANGE UP (ref 150–400)
PLATELET # BLD AUTO: 274 K/UL — SIGNIFICANT CHANGE UP (ref 150–400)
PLATELET # BLD AUTO: 278 K/UL — SIGNIFICANT CHANGE UP (ref 150–400)
PLATELET # BLD AUTO: 280 K/UL — SIGNIFICANT CHANGE UP (ref 150–400)
PLATELET # BLD AUTO: 283 K/UL — SIGNIFICANT CHANGE UP (ref 150–400)
PLATELET # BLD AUTO: 285 K/UL — SIGNIFICANT CHANGE UP (ref 150–400)
PLATELET # BLD AUTO: 288 K/UL — SIGNIFICANT CHANGE UP (ref 150–400)
PLATELET # BLD AUTO: 302 K/UL — SIGNIFICANT CHANGE UP (ref 150–400)
PLATELET COUNT - ESTIMATE: NORMAL — SIGNIFICANT CHANGE UP
PO2 BLDA: 100 MMHG — SIGNIFICANT CHANGE UP (ref 83–108)
PO2 BLDA: 101 MMHG — SIGNIFICANT CHANGE UP (ref 83–108)
PO2 BLDA: 106 MMHG — SIGNIFICANT CHANGE UP (ref 83–108)
PO2 BLDA: 114 MMHG — HIGH (ref 83–108)
PO2 BLDA: 56 MMHG — LOW (ref 83–108)
PO2 BLDA: 64 MMHG — LOW (ref 83–108)
PO2 BLDA: 66 MMHG — LOW (ref 83–108)
PO2 BLDA: 67 MMHG — LOW (ref 83–108)
PO2 BLDA: 70 MMHG — LOW (ref 83–108)
PO2 BLDA: 72 MMHG — LOW (ref 83–108)
PO2 BLDA: 74 MMHG — LOW (ref 83–108)
PO2 BLDA: 75 MMHG — LOW (ref 83–108)
PO2 BLDA: 75 MMHG — LOW (ref 83–108)
PO2 BLDA: 77 MMHG — LOW (ref 83–108)
PO2 BLDA: 78 MMHG — LOW (ref 83–108)
PO2 BLDA: 86 MMHG — SIGNIFICANT CHANGE UP (ref 83–108)
PO2 BLDA: 89 MMHG — SIGNIFICANT CHANGE UP (ref 83–108)
PO2 BLDA: 90 MMHG — SIGNIFICANT CHANGE UP (ref 83–108)
PO2 BLDV: 64 MMHG — HIGH (ref 35–40)
POIKILOCYTOSIS BLD QL AUTO: SLIGHT — SIGNIFICANT CHANGE UP
POLYCHROMASIA BLD QL SMEAR: SLIGHT — SIGNIFICANT CHANGE UP
POTASSIUM BLDV-SCNC: 3.4 MMOL/L — SIGNIFICANT CHANGE UP (ref 3.4–4.5)
POTASSIUM BLDV-SCNC: 4.2 MMOL/L — SIGNIFICANT CHANGE UP (ref 3.4–4.5)
POTASSIUM SERPL-MCNC: 2.9 MMOL/L — CRITICAL LOW (ref 3.5–5.3)
POTASSIUM SERPL-MCNC: 3.1 MMOL/L — LOW (ref 3.5–5.3)
POTASSIUM SERPL-MCNC: 3.5 MMOL/L — SIGNIFICANT CHANGE UP (ref 3.5–5.3)
POTASSIUM SERPL-MCNC: 3.6 MMOL/L — SIGNIFICANT CHANGE UP (ref 3.5–5.3)
POTASSIUM SERPL-MCNC: 3.7 MMOL/L — SIGNIFICANT CHANGE UP (ref 3.5–5.3)
POTASSIUM SERPL-MCNC: 3.8 MMOL/L — SIGNIFICANT CHANGE UP (ref 3.5–5.3)
POTASSIUM SERPL-MCNC: 3.9 MMOL/L — SIGNIFICANT CHANGE UP (ref 3.5–5.3)
POTASSIUM SERPL-MCNC: 4 MMOL/L — SIGNIFICANT CHANGE UP (ref 3.5–5.3)
POTASSIUM SERPL-MCNC: 4.1 MMOL/L — SIGNIFICANT CHANGE UP (ref 3.5–5.3)
POTASSIUM SERPL-MCNC: 4.2 MMOL/L — SIGNIFICANT CHANGE UP (ref 3.5–5.3)
POTASSIUM SERPL-MCNC: 4.3 MMOL/L — SIGNIFICANT CHANGE UP (ref 3.5–5.3)
POTASSIUM SERPL-MCNC: 4.4 MMOL/L — SIGNIFICANT CHANGE UP (ref 3.5–5.3)
POTASSIUM SERPL-MCNC: 4.5 MMOL/L — SIGNIFICANT CHANGE UP (ref 3.5–5.3)
POTASSIUM SERPL-MCNC: 4.6 MMOL/L — SIGNIFICANT CHANGE UP (ref 3.5–5.3)
POTASSIUM SERPL-MCNC: 4.8 MMOL/L — SIGNIFICANT CHANGE UP (ref 3.5–5.3)
POTASSIUM SERPL-MCNC: 4.9 MMOL/L — SIGNIFICANT CHANGE UP (ref 3.5–5.3)
POTASSIUM SERPL-MCNC: 5 MMOL/L — SIGNIFICANT CHANGE UP (ref 3.5–5.3)
POTASSIUM SERPL-MCNC: 5.2 MMOL/L — SIGNIFICANT CHANGE UP (ref 3.5–5.3)
POTASSIUM SERPL-SCNC: 2.9 MMOL/L — CRITICAL LOW (ref 3.5–5.3)
POTASSIUM SERPL-SCNC: 3.1 MMOL/L — LOW (ref 3.5–5.3)
POTASSIUM SERPL-SCNC: 3.5 MMOL/L — SIGNIFICANT CHANGE UP (ref 3.5–5.3)
POTASSIUM SERPL-SCNC: 3.6 MMOL/L — SIGNIFICANT CHANGE UP (ref 3.5–5.3)
POTASSIUM SERPL-SCNC: 3.7 MMOL/L — SIGNIFICANT CHANGE UP (ref 3.5–5.3)
POTASSIUM SERPL-SCNC: 3.8 MMOL/L — SIGNIFICANT CHANGE UP (ref 3.5–5.3)
POTASSIUM SERPL-SCNC: 3.9 MMOL/L — SIGNIFICANT CHANGE UP (ref 3.5–5.3)
POTASSIUM SERPL-SCNC: 4 MMOL/L — SIGNIFICANT CHANGE UP (ref 3.5–5.3)
POTASSIUM SERPL-SCNC: 4.1 MMOL/L — SIGNIFICANT CHANGE UP (ref 3.5–5.3)
POTASSIUM SERPL-SCNC: 4.2 MMOL/L — SIGNIFICANT CHANGE UP (ref 3.5–5.3)
POTASSIUM SERPL-SCNC: 4.3 MMOL/L — SIGNIFICANT CHANGE UP (ref 3.5–5.3)
POTASSIUM SERPL-SCNC: 4.4 MMOL/L — SIGNIFICANT CHANGE UP (ref 3.5–5.3)
POTASSIUM SERPL-SCNC: 4.5 MMOL/L — SIGNIFICANT CHANGE UP (ref 3.5–5.3)
POTASSIUM SERPL-SCNC: 4.6 MMOL/L — SIGNIFICANT CHANGE UP (ref 3.5–5.3)
POTASSIUM SERPL-SCNC: 4.8 MMOL/L — SIGNIFICANT CHANGE UP (ref 3.5–5.3)
POTASSIUM SERPL-SCNC: 4.9 MMOL/L — SIGNIFICANT CHANGE UP (ref 3.5–5.3)
POTASSIUM SERPL-SCNC: 5 MMOL/L — SIGNIFICANT CHANGE UP (ref 3.5–5.3)
POTASSIUM SERPL-SCNC: 5.2 MMOL/L — SIGNIFICANT CHANGE UP (ref 3.5–5.3)
POTASSIUM UR-SCNC: 5.3 MMOL/L — SIGNIFICANT CHANGE UP
PROCALCITONIN SERPL-MCNC: 0.2 NG/ML — HIGH (ref 0.02–0.1)
PROCALCITONIN SERPL-MCNC: 0.34 NG/ML — HIGH (ref 0.02–0.1)
PROCALCITONIN SERPL-MCNC: 0.35 NG/ML — HIGH (ref 0.02–0.1)
PROCALCITONIN SERPL-MCNC: 0.38 NG/ML — HIGH (ref 0.02–0.1)
PROCALCITONIN SERPL-MCNC: 0.39 NG/ML — HIGH (ref 0.02–0.1)
PROCALCITONIN SERPL-MCNC: 0.46 NG/ML — HIGH (ref 0.02–0.1)
PROCALCITONIN SERPL-MCNC: 0.49 NG/ML — HIGH (ref 0.02–0.1)
PROCALCITONIN SERPL-MCNC: 0.64 NG/ML — HIGH (ref 0.02–0.1)
PROCALCITONIN SERPL-MCNC: 0.74 NG/ML — HIGH (ref 0.02–0.1)
PROCALCITONIN SERPL-MCNC: 1 NG/ML — HIGH (ref 0.02–0.1)
PROT SERPL-MCNC: 6.4 G/DL — SIGNIFICANT CHANGE UP (ref 6–8.3)
PROT SERPL-MCNC: 6.5 G/DL — SIGNIFICANT CHANGE UP (ref 6–8.3)
PROT SERPL-MCNC: 6.5 G/DL — SIGNIFICANT CHANGE UP (ref 6–8.3)
PROT SERPL-MCNC: 6.6 G/DL — SIGNIFICANT CHANGE UP (ref 6–8.3)
PROT SERPL-MCNC: 6.7 G/DL — SIGNIFICANT CHANGE UP (ref 6–8.3)
PROT SERPL-MCNC: 6.8 G/DL — SIGNIFICANT CHANGE UP (ref 6–8.3)
PROT SERPL-MCNC: 6.9 G/DL — SIGNIFICANT CHANGE UP (ref 6–8.3)
PROT SERPL-MCNC: 7 G/DL — SIGNIFICANT CHANGE UP (ref 6–8.3)
PROT SERPL-MCNC: 7.1 G/DL — SIGNIFICANT CHANGE UP (ref 6–8.3)
PROT SERPL-MCNC: 7.2 G/DL — SIGNIFICANT CHANGE UP (ref 6–8.3)
PROT SERPL-MCNC: 7.3 G/DL — SIGNIFICANT CHANGE UP (ref 6–8.3)
PROT SERPL-MCNC: 7.4 G/DL — SIGNIFICANT CHANGE UP (ref 6–8.3)
PROT SERPL-MCNC: 7.5 G/DL — SIGNIFICANT CHANGE UP (ref 6–8.3)
PROT SERPL-MCNC: 7.8 G/DL — SIGNIFICANT CHANGE UP (ref 6–8.3)
PROT SERPL-MCNC: 7.9 G/DL — SIGNIFICANT CHANGE UP (ref 6–8.3)
PROT SERPL-MCNC: 7.9 G/DL — SIGNIFICANT CHANGE UP (ref 6–8.3)
PROT UR-MCNC: ABNORMAL
PROT UR-MCNC: ABNORMAL
PROTHROM AB SERPL-ACNC: 13 SEC — SIGNIFICANT CHANGE UP (ref 10.6–13.6)
PROTHROM AB SERPL-ACNC: 13.2 SEC — SIGNIFICANT CHANGE UP (ref 10.6–13.6)
PROTHROM AB SERPL-ACNC: 14.2 SEC — HIGH (ref 10.6–13.6)
PROTHROM AB SERPL-ACNC: 14.4 SEC — HIGH (ref 10.6–13.6)
PROTHROM AB SERPL-ACNC: 14.4 SEC — HIGH (ref 10.6–13.6)
PROTHROM AB SERPL-ACNC: 14.4 SEC — SIGNIFICANT CHANGE UP (ref 10.6–13.6)
PROTHROM AB SERPL-ACNC: 14.9 SEC — HIGH (ref 10.6–13.6)
PROTHROM AB SERPL-ACNC: 15.1 SEC — HIGH (ref 10.6–13.6)
PROTHROM AB SERPL-ACNC: 15.3 SEC — HIGH (ref 10.6–13.6)
RBC # BLD: 1.61 M/UL — LOW (ref 3.8–5.2)
RBC # BLD: 2.36 M/UL — LOW (ref 3.8–5.2)
RBC # BLD: 2.42 M/UL — LOW (ref 3.8–5.2)
RBC # BLD: 2.56 M/UL — LOW (ref 3.8–5.2)
RBC # BLD: 2.64 M/UL — LOW (ref 3.8–5.2)
RBC # BLD: 2.66 M/UL — LOW (ref 3.8–5.2)
RBC # BLD: 2.68 M/UL — LOW (ref 3.8–5.2)
RBC # BLD: 2.72 M/UL — LOW (ref 3.8–5.2)
RBC # BLD: 2.75 M/UL — LOW (ref 3.8–5.2)
RBC # BLD: 2.79 M/UL — LOW (ref 3.8–5.2)
RBC # BLD: 2.79 M/UL — LOW (ref 3.8–5.2)
RBC # BLD: 2.81 M/UL — LOW (ref 3.8–5.2)
RBC # BLD: 2.83 M/UL — LOW (ref 3.8–5.2)
RBC # BLD: 2.84 M/UL — LOW (ref 3.8–5.2)
RBC # BLD: 2.86 M/UL — LOW (ref 3.8–5.2)
RBC # BLD: 2.93 M/UL — LOW (ref 3.8–5.2)
RBC # BLD: 2.98 M/UL — LOW (ref 3.8–5.2)
RBC # BLD: 3 M/UL — LOW (ref 3.8–5.2)
RBC # BLD: 3 M/UL — LOW (ref 3.8–5.2)
RBC # BLD: 3.03 M/UL — LOW (ref 3.8–5.2)
RBC # BLD: 3.04 M/UL — LOW (ref 3.8–5.2)
RBC # BLD: 3.09 M/UL — LOW (ref 3.8–5.2)
RBC # BLD: 3.12 M/UL — LOW (ref 3.8–5.2)
RBC # BLD: 3.18 M/UL — LOW (ref 3.8–5.2)
RBC # BLD: 3.21 M/UL — LOW (ref 3.8–5.2)
RBC # BLD: 3.27 M/UL — LOW (ref 3.8–5.2)
RBC # BLD: 3.29 M/UL — LOW (ref 3.8–5.2)
RBC # BLD: 3.29 M/UL — LOW (ref 3.8–5.2)
RBC # BLD: 3.34 M/UL — LOW (ref 3.8–5.2)
RBC # BLD: 3.62 M/UL — LOW (ref 3.8–5.2)
RBC # BLD: 3.75 M/UL — LOW (ref 3.8–5.2)
RBC # BLD: 3.98 M/UL — SIGNIFICANT CHANGE UP (ref 3.8–5.2)
RBC # BLD: 4.02 M/UL — SIGNIFICANT CHANGE UP (ref 3.8–5.2)
RBC # BLD: 4.28 M/UL — SIGNIFICANT CHANGE UP (ref 3.8–5.2)
RBC # BLD: 4.3 M/UL — SIGNIFICANT CHANGE UP (ref 3.8–5.2)
RBC # BLD: 4.34 M/UL — SIGNIFICANT CHANGE UP (ref 3.8–5.2)
RBC # BLD: 4.38 M/UL — SIGNIFICANT CHANGE UP (ref 3.8–5.2)
RBC # BLD: 4.56 M/UL — SIGNIFICANT CHANGE UP (ref 3.8–5.2)
RBC # BLD: 4.78 M/UL — SIGNIFICANT CHANGE UP (ref 3.8–5.2)
RBC # BLD: 4.86 M/UL — SIGNIFICANT CHANGE UP (ref 3.8–5.2)
RBC # BLD: 5.04 M/UL — SIGNIFICANT CHANGE UP (ref 3.8–5.2)
RBC # BLD: 5.07 M/UL — SIGNIFICANT CHANGE UP (ref 3.8–5.2)
RBC # BLD: 5.21 M/UL — HIGH (ref 3.8–5.2)
RBC # FLD: 14.5 % — SIGNIFICANT CHANGE UP (ref 10.3–14.5)
RBC # FLD: 15.3 % — HIGH (ref 10.3–14.5)
RBC # FLD: 15.3 % — HIGH (ref 10.3–14.5)
RBC # FLD: 15.4 % — HIGH (ref 10.3–14.5)
RBC # FLD: 15.4 % — HIGH (ref 10.3–14.5)
RBC # FLD: 15.5 % — HIGH (ref 10.3–14.5)
RBC # FLD: 15.6 % — HIGH (ref 10.3–14.5)
RBC # FLD: 15.7 % — HIGH (ref 10.3–14.5)
RBC # FLD: 15.8 % — HIGH (ref 10.3–14.5)
RBC # FLD: 15.8 % — HIGH (ref 10.3–14.5)
RBC # FLD: 15.9 % — HIGH (ref 10.3–14.5)
RBC # FLD: 16.2 % — HIGH (ref 10.3–14.5)
RBC # FLD: 16.2 % — HIGH (ref 10.3–14.5)
RBC # FLD: 17.2 % — HIGH (ref 10.3–14.5)
RBC # FLD: 18.6 % — HIGH (ref 10.3–14.5)
RBC # FLD: 18.7 % — HIGH (ref 10.3–14.5)
RBC # FLD: 18.9 % — HIGH (ref 10.3–14.5)
RBC # FLD: 19.3 % — HIGH (ref 10.3–14.5)
RBC # FLD: 19.5 % — HIGH (ref 10.3–14.5)
RBC # FLD: 19.5 % — HIGH (ref 10.3–14.5)
RBC # FLD: 19.6 % — HIGH (ref 10.3–14.5)
RBC # FLD: 21 % — HIGH (ref 10.3–14.5)
RBC # FLD: 21 % — HIGH (ref 10.3–14.5)
RBC # FLD: 21.1 % — HIGH (ref 10.3–14.5)
RBC # FLD: 21.3 % — HIGH (ref 10.3–14.5)
RBC # FLD: 21.4 % — HIGH (ref 10.3–14.5)
RBC # FLD: 21.4 % — HIGH (ref 10.3–14.5)
RBC BLD AUTO: ABNORMAL
RBC CASTS # UR COMP ASSIST: 41 /HPF — HIGH (ref 0–4)
RBC CASTS # UR COMP ASSIST: >50 /HPF — SIGNIFICANT CHANGE UP (ref 0–4)
RH IG SCN BLD-IMP: POSITIVE — SIGNIFICANT CHANGE UP
S AUREUS DNA NOSE QL NAA+PROBE: DETECTED
SAO2 % BLDA: 87.5 % — LOW (ref 95–99)
SAO2 % BLDA: 91.5 % — LOW (ref 95–99)
SAO2 % BLDA: 91.9 % — LOW (ref 95–99)
SAO2 % BLDA: 92.6 % — LOW (ref 95–99)
SAO2 % BLDA: 93.4 % — LOW (ref 95–99)
SAO2 % BLDA: 94.2 % — LOW (ref 95–99)
SAO2 % BLDA: 94.4 % — LOW (ref 95–99)
SAO2 % BLDA: 94.6 % — LOW (ref 95–99)
SAO2 % BLDA: 94.7 % — LOW (ref 95–99)
SAO2 % BLDA: 94.7 % — LOW (ref 95–99)
SAO2 % BLDA: 94.9 % — LOW (ref 95–99)
SAO2 % BLDA: 95.1 % — SIGNIFICANT CHANGE UP (ref 95–99)
SAO2 % BLDA: 95.1 % — SIGNIFICANT CHANGE UP (ref 95–99)
SAO2 % BLDA: 95.6 % — SIGNIFICANT CHANGE UP (ref 95–99)
SAO2 % BLDA: 97 % — SIGNIFICANT CHANGE UP (ref 95–99)
SAO2 % BLDA: 97.1 % — SIGNIFICANT CHANGE UP (ref 95–99)
SAO2 % BLDA: 98.2 % — SIGNIFICANT CHANGE UP (ref 95–99)
SAO2 % BLDA: 98.2 % — SIGNIFICANT CHANGE UP (ref 95–99)
SAO2 % BLDV: 92.6 % — HIGH (ref 60–85)
SARS-COV-2 RNA SPEC QL NAA+PROBE: DETECTED
SARS-COV-2 RNA SPEC QL NAA+PROBE: SIGNIFICANT CHANGE UP
SMUDGE CELLS # BLD: PRESENT — SIGNIFICANT CHANGE UP
SODIUM SERPL-SCNC: 127 MMOL/L — LOW (ref 135–145)
SODIUM SERPL-SCNC: 130 MMOL/L — LOW (ref 135–145)
SODIUM SERPL-SCNC: 131 MMOL/L — LOW (ref 135–145)
SODIUM SERPL-SCNC: 132 MMOL/L — LOW (ref 135–145)
SODIUM SERPL-SCNC: 132 MMOL/L — LOW (ref 135–145)
SODIUM SERPL-SCNC: 133 MMOL/L — LOW (ref 135–145)
SODIUM SERPL-SCNC: 134 MMOL/L — LOW (ref 135–145)
SODIUM SERPL-SCNC: 134 MMOL/L — LOW (ref 135–145)
SODIUM SERPL-SCNC: 136 MMOL/L — SIGNIFICANT CHANGE UP (ref 135–145)
SODIUM SERPL-SCNC: 137 MMOL/L — SIGNIFICANT CHANGE UP (ref 135–145)
SODIUM SERPL-SCNC: 138 MMOL/L — SIGNIFICANT CHANGE UP (ref 135–145)
SODIUM SERPL-SCNC: 139 MMOL/L — SIGNIFICANT CHANGE UP (ref 135–145)
SODIUM SERPL-SCNC: 140 MMOL/L — SIGNIFICANT CHANGE UP (ref 135–145)
SODIUM SERPL-SCNC: 141 MMOL/L — SIGNIFICANT CHANGE UP (ref 135–145)
SODIUM SERPL-SCNC: 142 MMOL/L — SIGNIFICANT CHANGE UP (ref 135–145)
SODIUM SERPL-SCNC: 142 MMOL/L — SIGNIFICANT CHANGE UP (ref 135–145)
SODIUM SERPL-SCNC: 143 MMOL/L — SIGNIFICANT CHANGE UP (ref 135–145)
SODIUM SERPL-SCNC: 143 MMOL/L — SIGNIFICANT CHANGE UP (ref 135–145)
SODIUM SERPL-SCNC: 145 MMOL/L — SIGNIFICANT CHANGE UP (ref 135–145)
SODIUM SERPL-SCNC: 145 MMOL/L — SIGNIFICANT CHANGE UP (ref 135–145)
SODIUM UR-SCNC: <20 MMOL/L — SIGNIFICANT CHANGE UP
SP GR SPEC: 1.01 — SIGNIFICANT CHANGE UP (ref 1.01–1.02)
SP GR SPEC: 1.03 — HIGH (ref 1.01–1.02)
SPECIMEN SOURCE: SIGNIFICANT CHANGE UP
T3 SERPL-MCNC: 33 NG/DL — LOW (ref 80–200)
T4 FREE SERPL-MCNC: 0.7 NG/DL — LOW (ref 0.9–1.8)
TRIGL SERPL-MCNC: 117 MG/DL — SIGNIFICANT CHANGE UP
TRIGL SERPL-MCNC: 229 MG/DL — HIGH
TRIGL SERPL-MCNC: 246 MG/DL — HIGH
TRIGL SERPL-MCNC: 322 MG/DL — HIGH
TROPONIN T, HIGH SENSITIVITY RESULT: 65 NG/L — CRITICAL HIGH
TROPONIN T, HIGH SENSITIVITY RESULT: 67 NG/L — CRITICAL HIGH
TSH SERPL-MCNC: 0.52 UIU/ML — SIGNIFICANT CHANGE UP (ref 0.27–4.2)
TSH SERPL-MCNC: 4.1 UIU/ML — SIGNIFICANT CHANGE UP (ref 0.27–4.2)
UROBILINOGEN FLD QL: ABNORMAL
UROBILINOGEN FLD QL: ABNORMAL
VANCOMYCIN TROUGH SERPL-MCNC: 12.5 UG/ML — SIGNIFICANT CHANGE UP (ref 10–20)
VANCOMYCIN TROUGH SERPL-MCNC: 14.6 UG/ML — SIGNIFICANT CHANGE UP (ref 10–20)
VANCOMYCIN TROUGH SERPL-MCNC: 22.3 UG/ML — HIGH (ref 10–20)
VANCOMYCIN TROUGH SERPL-MCNC: 41.8 UG/ML — CRITICAL HIGH (ref 10–20)
VANCOMYCIN TROUGH SERPL-MCNC: 55.5 UG/ML — CRITICAL HIGH (ref 10–20)
VANCOMYCIN TROUGH SERPL-MCNC: 9.1 UG/ML — LOW (ref 10–20)
VARIANT LYMPHS # BLD: 6.4 % — HIGH (ref 0–6)
VWF AG ACT/NOR PPP IA: 756 % — HIGH (ref 50–150)
WBC # BLD: 10.21 K/UL — SIGNIFICANT CHANGE UP (ref 3.8–10.5)
WBC # BLD: 10.25 K/UL — SIGNIFICANT CHANGE UP (ref 3.8–10.5)
WBC # BLD: 10.25 K/UL — SIGNIFICANT CHANGE UP (ref 3.8–10.5)
WBC # BLD: 10.35 K/UL — SIGNIFICANT CHANGE UP (ref 3.8–10.5)
WBC # BLD: 10.54 K/UL — HIGH (ref 3.8–10.5)
WBC # BLD: 10.85 K/UL — HIGH (ref 3.8–10.5)
WBC # BLD: 11.04 K/UL — HIGH (ref 3.8–10.5)
WBC # BLD: 11.09 K/UL — HIGH (ref 3.8–10.5)
WBC # BLD: 11.22 K/UL — HIGH (ref 3.8–10.5)
WBC # BLD: 11.96 K/UL — HIGH (ref 3.8–10.5)
WBC # BLD: 12.21 K/UL — HIGH (ref 3.8–10.5)
WBC # BLD: 12.25 K/UL — HIGH (ref 3.8–10.5)
WBC # BLD: 12.44 K/UL — HIGH (ref 3.8–10.5)
WBC # BLD: 12.51 K/UL — HIGH (ref 3.8–10.5)
WBC # BLD: 12.73 K/UL — HIGH (ref 3.8–10.5)
WBC # BLD: 12.77 K/UL — HIGH (ref 3.8–10.5)
WBC # BLD: 12.88 K/UL — HIGH (ref 3.8–10.5)
WBC # BLD: 13.04 K/UL — HIGH (ref 3.8–10.5)
WBC # BLD: 13.24 K/UL — HIGH (ref 3.8–10.5)
WBC # BLD: 13.25 K/UL — HIGH (ref 3.8–10.5)
WBC # BLD: 13.43 K/UL — HIGH (ref 3.8–10.5)
WBC # BLD: 13.5 K/UL — HIGH (ref 3.8–10.5)
WBC # BLD: 13.56 K/UL — HIGH (ref 3.8–10.5)
WBC # BLD: 13.84 K/UL — HIGH (ref 3.8–10.5)
WBC # BLD: 14.04 K/UL — HIGH (ref 3.8–10.5)
WBC # BLD: 14.23 K/UL — HIGH (ref 3.8–10.5)
WBC # BLD: 14.25 K/UL — HIGH (ref 3.8–10.5)
WBC # BLD: 14.35 K/UL — HIGH (ref 3.8–10.5)
WBC # BLD: 14.81 K/UL — HIGH (ref 3.8–10.5)
WBC # BLD: 15.11 K/UL — HIGH (ref 3.8–10.5)
WBC # BLD: 15.4 K/UL — HIGH (ref 3.8–10.5)
WBC # BLD: 16.19 K/UL — HIGH (ref 3.8–10.5)
WBC # BLD: 16.53 K/UL — HIGH (ref 3.8–10.5)
WBC # BLD: 16.76 K/UL — HIGH (ref 3.8–10.5)
WBC # BLD: 16.8 K/UL — HIGH (ref 3.8–10.5)
WBC # BLD: 17.44 K/UL — HIGH (ref 3.8–10.5)
WBC # BLD: 17.7 K/UL — HIGH (ref 3.8–10.5)
WBC # BLD: 17.77 K/UL — HIGH (ref 3.8–10.5)
WBC # BLD: 18.17 K/UL — HIGH (ref 3.8–10.5)
WBC # BLD: 18.48 K/UL — HIGH (ref 3.8–10.5)
WBC # BLD: 19.19 K/UL — HIGH (ref 3.8–10.5)
WBC # BLD: 9.24 K/UL — SIGNIFICANT CHANGE UP (ref 3.8–10.5)
WBC # BLD: 9.71 K/UL — SIGNIFICANT CHANGE UP (ref 3.8–10.5)
WBC # FLD AUTO: 10.21 K/UL — SIGNIFICANT CHANGE UP (ref 3.8–10.5)
WBC # FLD AUTO: 10.25 K/UL — SIGNIFICANT CHANGE UP (ref 3.8–10.5)
WBC # FLD AUTO: 10.25 K/UL — SIGNIFICANT CHANGE UP (ref 3.8–10.5)
WBC # FLD AUTO: 10.35 K/UL — SIGNIFICANT CHANGE UP (ref 3.8–10.5)
WBC # FLD AUTO: 10.54 K/UL — HIGH (ref 3.8–10.5)
WBC # FLD AUTO: 10.85 K/UL — HIGH (ref 3.8–10.5)
WBC # FLD AUTO: 11.04 K/UL — HIGH (ref 3.8–10.5)
WBC # FLD AUTO: 11.09 K/UL — HIGH (ref 3.8–10.5)
WBC # FLD AUTO: 11.22 K/UL — HIGH (ref 3.8–10.5)
WBC # FLD AUTO: 11.96 K/UL — HIGH (ref 3.8–10.5)
WBC # FLD AUTO: 12.21 K/UL — HIGH (ref 3.8–10.5)
WBC # FLD AUTO: 12.25 K/UL — HIGH (ref 3.8–10.5)
WBC # FLD AUTO: 12.44 K/UL — HIGH (ref 3.8–10.5)
WBC # FLD AUTO: 12.51 K/UL — HIGH (ref 3.8–10.5)
WBC # FLD AUTO: 12.73 K/UL — HIGH (ref 3.8–10.5)
WBC # FLD AUTO: 12.77 K/UL — HIGH (ref 3.8–10.5)
WBC # FLD AUTO: 12.88 K/UL — HIGH (ref 3.8–10.5)
WBC # FLD AUTO: 13.04 K/UL — HIGH (ref 3.8–10.5)
WBC # FLD AUTO: 13.24 K/UL — HIGH (ref 3.8–10.5)
WBC # FLD AUTO: 13.25 K/UL — HIGH (ref 3.8–10.5)
WBC # FLD AUTO: 13.43 K/UL — HIGH (ref 3.8–10.5)
WBC # FLD AUTO: 13.5 K/UL — HIGH (ref 3.8–10.5)
WBC # FLD AUTO: 13.56 K/UL — HIGH (ref 3.8–10.5)
WBC # FLD AUTO: 13.84 K/UL — HIGH (ref 3.8–10.5)
WBC # FLD AUTO: 14.04 K/UL — HIGH (ref 3.8–10.5)
WBC # FLD AUTO: 14.23 K/UL — HIGH (ref 3.8–10.5)
WBC # FLD AUTO: 14.25 K/UL — HIGH (ref 3.8–10.5)
WBC # FLD AUTO: 14.35 K/UL — HIGH (ref 3.8–10.5)
WBC # FLD AUTO: 14.81 K/UL — HIGH (ref 3.8–10.5)
WBC # FLD AUTO: 15.11 K/UL — HIGH (ref 3.8–10.5)
WBC # FLD AUTO: 15.4 K/UL — HIGH (ref 3.8–10.5)
WBC # FLD AUTO: 16.19 K/UL — HIGH (ref 3.8–10.5)
WBC # FLD AUTO: 16.53 K/UL — HIGH (ref 3.8–10.5)
WBC # FLD AUTO: 16.76 K/UL — HIGH (ref 3.8–10.5)
WBC # FLD AUTO: 16.8 K/UL — HIGH (ref 3.8–10.5)
WBC # FLD AUTO: 17.44 K/UL — HIGH (ref 3.8–10.5)
WBC # FLD AUTO: 17.7 K/UL — HIGH (ref 3.8–10.5)
WBC # FLD AUTO: 17.77 K/UL — HIGH (ref 3.8–10.5)
WBC # FLD AUTO: 18.17 K/UL — HIGH (ref 3.8–10.5)
WBC # FLD AUTO: 18.48 K/UL — HIGH (ref 3.8–10.5)
WBC # FLD AUTO: 19.19 K/UL — HIGH (ref 3.8–10.5)
WBC # FLD AUTO: 9.24 K/UL — SIGNIFICANT CHANGE UP (ref 3.8–10.5)
WBC # FLD AUTO: 9.71 K/UL — SIGNIFICANT CHANGE UP (ref 3.8–10.5)
WBC UR QL: 47 /HPF — HIGH (ref 0–5)
WBC UR QL: SIGNIFICANT CHANGE UP /HPF (ref 0–5)

## 2021-01-01 PROCEDURE — 99291 CRITICAL CARE FIRST HOUR: CPT | Mod: 25

## 2021-01-01 PROCEDURE — 99291 CRITICAL CARE FIRST HOUR: CPT

## 2021-01-01 PROCEDURE — 99223 1ST HOSP IP/OBS HIGH 75: CPT

## 2021-01-01 PROCEDURE — 71045 X-RAY EXAM CHEST 1 VIEW: CPT | Mod: 26,76

## 2021-01-01 PROCEDURE — 71045 X-RAY EXAM CHEST 1 VIEW: CPT | Mod: 26

## 2021-01-01 PROCEDURE — 36556 INSERT NON-TUNNEL CV CATH: CPT

## 2021-01-01 PROCEDURE — 93970 EXTREMITY STUDY: CPT | Mod: 26

## 2021-01-01 PROCEDURE — 99285 EMERGENCY DEPT VISIT HI MDM: CPT

## 2021-01-01 PROCEDURE — 99254 IP/OBS CNSLTJ NEW/EST MOD 60: CPT

## 2021-01-01 PROCEDURE — 99233 SBSQ HOSP IP/OBS HIGH 50: CPT

## 2021-01-01 PROCEDURE — 76937 US GUIDE VASCULAR ACCESS: CPT | Mod: 26

## 2021-01-01 PROCEDURE — 99233 SBSQ HOSP IP/OBS HIGH 50: CPT | Mod: GC

## 2021-01-01 PROCEDURE — 74177 CT ABD & PELVIS W/CONTRAST: CPT | Mod: 26

## 2021-01-01 PROCEDURE — 99255 IP/OBS CONSLTJ NEW/EST HI 80: CPT

## 2021-01-01 PROCEDURE — 31624 DX BRONCHOSCOPE/LAVAGE: CPT

## 2021-01-01 PROCEDURE — 76536 US EXAM OF HEAD AND NECK: CPT | Mod: 26,GC

## 2021-01-01 PROCEDURE — 71260 CT THORAX DX C+: CPT | Mod: 26

## 2021-01-01 PROCEDURE — 99254 IP/OBS CNSLTJ NEW/EST MOD 60: CPT | Mod: 57

## 2021-01-01 PROCEDURE — 99358 PROLONG SERVICE W/O CONTACT: CPT

## 2021-01-01 PROCEDURE — 76705 ECHO EXAM OF ABDOMEN: CPT | Mod: 26

## 2021-01-01 PROCEDURE — 99253 IP/OBS CNSLTJ NEW/EST LOW 45: CPT

## 2021-01-01 RX ORDER — DEXAMETHASONE 0.5 MG/5ML
6 ELIXIR ORAL ONCE
Refills: 0 | Status: COMPLETED | OUTPATIENT
Start: 2021-01-01 | End: 2021-01-01

## 2021-01-01 RX ORDER — LANOLIN ALCOHOL/MO/W.PET/CERES
3 CREAM (GRAM) TOPICAL AT BEDTIME
Refills: 0 | Status: DISCONTINUED | OUTPATIENT
Start: 2021-01-01 | End: 2021-01-01

## 2021-01-01 RX ORDER — ACETAMINOPHEN 500 MG
1000 TABLET ORAL ONCE
Refills: 0 | Status: COMPLETED | OUTPATIENT
Start: 2021-01-01 | End: 2021-01-01

## 2021-01-01 RX ORDER — MEROPENEM 1 G/30ML
1000 INJECTION INTRAVENOUS EVERY 8 HOURS
Refills: 0 | Status: DISCONTINUED | OUTPATIENT
Start: 2021-01-01 | End: 2021-01-01

## 2021-01-01 RX ORDER — CASPOFUNGIN ACETATE 7 MG/ML
50 INJECTION, POWDER, LYOPHILIZED, FOR SOLUTION INTRAVENOUS EVERY 24 HOURS
Refills: 0 | Status: DISCONTINUED | OUTPATIENT
Start: 2021-01-01 | End: 2021-01-01

## 2021-01-01 RX ORDER — MEROPENEM 1 G/30ML
INJECTION INTRAVENOUS
Refills: 0 | Status: DISCONTINUED | OUTPATIENT
Start: 2021-01-01 | End: 2021-01-01

## 2021-01-01 RX ORDER — DEXAMETHASONE 0.5 MG/5ML
2 ELIXIR ORAL DAILY
Refills: 0 | Status: DISCONTINUED | OUTPATIENT
Start: 2021-01-01 | End: 2021-01-01

## 2021-01-01 RX ORDER — DEXTROSE 50 % IN WATER 50 %
15 SYRINGE (ML) INTRAVENOUS ONCE
Refills: 0 | Status: DISCONTINUED | OUTPATIENT
Start: 2021-01-01 | End: 2021-01-01

## 2021-01-01 RX ORDER — KETAMINE HYDROCHLORIDE 100 MG/ML
0.25 INJECTION INTRAMUSCULAR; INTRAVENOUS
Qty: 1000 | Refills: 0 | Status: DISCONTINUED | OUTPATIENT
Start: 2021-01-01 | End: 2021-01-01

## 2021-01-01 RX ORDER — MIDAZOLAM HYDROCHLORIDE 1 MG/ML
0.02 INJECTION, SOLUTION INTRAMUSCULAR; INTRAVENOUS
Qty: 100 | Refills: 0 | Status: DISCONTINUED | OUTPATIENT
Start: 2021-01-01 | End: 2021-01-01

## 2021-01-01 RX ORDER — HEPARIN SODIUM 5000 [USP'U]/ML
10000 INJECTION INTRAVENOUS; SUBCUTANEOUS EVERY 6 HOURS
Refills: 0 | Status: DISCONTINUED | OUTPATIENT
Start: 2021-01-01 | End: 2021-01-01

## 2021-01-01 RX ORDER — SODIUM CHLORIDE 9 MG/ML
500 INJECTION, SOLUTION INTRAVENOUS ONCE
Refills: 0 | Status: COMPLETED | OUTPATIENT
Start: 2021-01-01 | End: 2021-01-01

## 2021-01-01 RX ORDER — POLYETHYLENE GLYCOL 3350 17 G/17G
17 POWDER, FOR SOLUTION ORAL DAILY
Refills: 0 | Status: DISCONTINUED | OUTPATIENT
Start: 2021-01-01 | End: 2021-01-01

## 2021-01-01 RX ORDER — NYSTATIN CREAM 100000 [USP'U]/G
1 CREAM TOPICAL
Refills: 0 | Status: DISCONTINUED | OUTPATIENT
Start: 2021-01-01 | End: 2021-01-01

## 2021-01-01 RX ORDER — CASPOFUNGIN ACETATE 7 MG/ML
50 INJECTION, POWDER, LYOPHILIZED, FOR SOLUTION INTRAVENOUS EVERY 24 HOURS
Refills: 0 | Status: DISCONTINUED | OUTPATIENT
Start: 2021-03-02 | End: 2021-01-01

## 2021-01-01 RX ORDER — QUETIAPINE FUMARATE 200 MG/1
0 TABLET, FILM COATED ORAL
Qty: 0 | Refills: 1 | DISCHARGE

## 2021-01-01 RX ORDER — PROPOFOL 10 MG/ML
50 INJECTION, EMULSION INTRAVENOUS
Qty: 500 | Refills: 0 | Status: DISCONTINUED | OUTPATIENT
Start: 2021-01-01 | End: 2021-01-01

## 2021-01-01 RX ORDER — INSULIN LISPRO 100/ML
VIAL (ML) SUBCUTANEOUS EVERY 6 HOURS
Refills: 0 | Status: DISCONTINUED | OUTPATIENT
Start: 2021-01-01 | End: 2021-01-01

## 2021-01-01 RX ORDER — REMDESIVIR 5 MG/ML
100 INJECTION INTRAVENOUS EVERY 24 HOURS
Refills: 0 | Status: DISCONTINUED | OUTPATIENT
Start: 2021-01-01 | End: 2021-01-01

## 2021-01-01 RX ORDER — POTASSIUM CHLORIDE 20 MEQ
10 PACKET (EA) ORAL
Refills: 0 | Status: COMPLETED | OUTPATIENT
Start: 2021-01-01 | End: 2021-01-01

## 2021-01-01 RX ORDER — SODIUM CHLORIDE 9 MG/ML
1000 INJECTION, SOLUTION INTRAVENOUS
Refills: 0 | Status: DISCONTINUED | OUTPATIENT
Start: 2021-01-01 | End: 2021-01-01

## 2021-01-01 RX ORDER — IBUPROFEN 200 MG
400 TABLET ORAL ONCE
Refills: 0 | Status: COMPLETED | OUTPATIENT
Start: 2021-01-01 | End: 2021-01-01

## 2021-01-01 RX ORDER — KETAMINE HYDROCHLORIDE 100 MG/ML
2 INJECTION INTRAMUSCULAR; INTRAVENOUS
Qty: 2000 | Refills: 0 | Status: DISCONTINUED | OUTPATIENT
Start: 2021-01-01 | End: 2021-01-01

## 2021-01-01 RX ORDER — ZOLPIDEM TARTRATE 10 MG/1
0 TABLET ORAL
Qty: 0 | Refills: 0 | DISCHARGE

## 2021-01-01 RX ORDER — KETAMINE HYDROCHLORIDE 100 MG/ML
0.2 INJECTION INTRAMUSCULAR; INTRAVENOUS
Qty: 1000 | Refills: 0 | Status: DISCONTINUED | OUTPATIENT
Start: 2021-01-01 | End: 2021-01-01

## 2021-01-01 RX ORDER — CASPOFUNGIN ACETATE 7 MG/ML
70 INJECTION, POWDER, LYOPHILIZED, FOR SOLUTION INTRAVENOUS ONCE
Refills: 0 | Status: COMPLETED | OUTPATIENT
Start: 2021-01-01 | End: 2021-01-01

## 2021-01-01 RX ORDER — MIDAZOLAM HYDROCHLORIDE 1 MG/ML
2 INJECTION, SOLUTION INTRAMUSCULAR; INTRAVENOUS ONCE
Refills: 0 | Status: DISCONTINUED | OUTPATIENT
Start: 2021-01-01 | End: 2021-01-01

## 2021-01-01 RX ORDER — ENOXAPARIN SODIUM 100 MG/ML
160 INJECTION SUBCUTANEOUS
Refills: 0 | Status: DISCONTINUED | OUTPATIENT
Start: 2021-01-01 | End: 2021-01-01

## 2021-01-01 RX ORDER — LINEZOLID 600 MG/300ML
INJECTION, SOLUTION INTRAVENOUS
Refills: 0 | Status: DISCONTINUED | OUTPATIENT
Start: 2021-01-01 | End: 2021-01-01

## 2021-01-01 RX ORDER — ALPRAZOLAM 0.25 MG
0 TABLET ORAL
Qty: 0 | Refills: 0 | DISCHARGE

## 2021-01-01 RX ORDER — POTASSIUM CHLORIDE 20 MEQ
40 PACKET (EA) ORAL ONCE
Refills: 0 | Status: COMPLETED | OUTPATIENT
Start: 2021-01-01 | End: 2021-01-01

## 2021-01-01 RX ORDER — HEPARIN SODIUM 5000 [USP'U]/ML
1500 INJECTION INTRAVENOUS; SUBCUTANEOUS
Qty: 25000 | Refills: 0 | Status: DISCONTINUED | OUTPATIENT
Start: 2021-01-01 | End: 2021-01-01

## 2021-01-01 RX ORDER — VANCOMYCIN HCL 1 G
1000 VIAL (EA) INTRAVENOUS EVERY 12 HOURS
Refills: 0 | Status: DISCONTINUED | OUTPATIENT
Start: 2021-01-01 | End: 2021-01-01

## 2021-01-01 RX ORDER — CEFTRIAXONE 500 MG/1
1000 INJECTION, POWDER, FOR SOLUTION INTRAMUSCULAR; INTRAVENOUS ONCE
Refills: 0 | Status: COMPLETED | OUTPATIENT
Start: 2021-01-01 | End: 2021-01-01

## 2021-01-01 RX ORDER — SODIUM CHLORIDE 9 MG/ML
500 INJECTION, SOLUTION INTRAVENOUS ONCE
Refills: 0 | Status: DISCONTINUED | OUTPATIENT
Start: 2021-01-01 | End: 2021-01-01

## 2021-01-01 RX ORDER — HEPARIN SODIUM 5000 [USP'U]/ML
1600 INJECTION INTRAVENOUS; SUBCUTANEOUS
Qty: 25000 | Refills: 0 | Status: DISCONTINUED | OUTPATIENT
Start: 2021-01-01 | End: 2021-01-01

## 2021-01-01 RX ORDER — ERYTHROMYCIN BASE 5 MG/GRAM
1 OINTMENT (GRAM) OPHTHALMIC (EYE) DAILY
Refills: 0 | Status: DISCONTINUED | OUTPATIENT
Start: 2021-01-01 | End: 2021-01-01

## 2021-01-01 RX ORDER — FENTANYL CITRATE 50 UG/ML
0.5 INJECTION INTRAVENOUS
Qty: 2500 | Refills: 0 | Status: DISCONTINUED | OUTPATIENT
Start: 2021-01-01 | End: 2021-01-01

## 2021-01-01 RX ORDER — ENOXAPARIN SODIUM 100 MG/ML
130 INJECTION SUBCUTANEOUS
Refills: 0 | Status: DISCONTINUED | OUTPATIENT
Start: 2021-01-01 | End: 2021-01-01

## 2021-01-01 RX ORDER — ACETAZOLAMIDE 250 MG/1
500 TABLET ORAL ONCE
Refills: 0 | Status: COMPLETED | OUTPATIENT
Start: 2021-01-01 | End: 2021-01-01

## 2021-01-01 RX ORDER — SODIUM,POTASSIUM PHOSPHATES 278-250MG
1 POWDER IN PACKET (EA) ORAL
Refills: 0 | Status: COMPLETED | OUTPATIENT
Start: 2021-01-01 | End: 2021-01-01

## 2021-01-01 RX ORDER — HEPARIN SODIUM 5000 [USP'U]/ML
10000 INJECTION INTRAVENOUS; SUBCUTANEOUS ONCE
Refills: 0 | Status: COMPLETED | OUTPATIENT
Start: 2021-01-01 | End: 2021-01-01

## 2021-01-01 RX ORDER — NOREPINEPHRINE BITARTRATE/D5W 8 MG/250ML
0.05 PLASTIC BAG, INJECTION (ML) INTRAVENOUS
Qty: 16 | Refills: 0 | Status: DISCONTINUED | OUTPATIENT
Start: 2021-01-01 | End: 2021-01-01

## 2021-01-01 RX ORDER — CEFTRIAXONE 500 MG/1
1000 INJECTION, POWDER, FOR SOLUTION INTRAMUSCULAR; INTRAVENOUS EVERY 24 HOURS
Refills: 0 | Status: DISCONTINUED | OUTPATIENT
Start: 2021-01-01 | End: 2021-01-01

## 2021-01-01 RX ORDER — LINEZOLID 600 MG/300ML
600 INJECTION, SOLUTION INTRAVENOUS ONCE
Refills: 0 | Status: COMPLETED | OUTPATIENT
Start: 2021-01-01 | End: 2021-01-01

## 2021-01-01 RX ORDER — DEXAMETHASONE 0.5 MG/5ML
6 ELIXIR ORAL DAILY
Refills: 0 | Status: DISCONTINUED | OUTPATIENT
Start: 2021-01-01 | End: 2021-01-01

## 2021-01-01 RX ORDER — CISATRACURIUM BESYLATE 2 MG/ML
3 INJECTION INTRAVENOUS
Qty: 200 | Refills: 0 | Status: DISCONTINUED | OUTPATIENT
Start: 2021-01-01 | End: 2021-01-01

## 2021-01-01 RX ORDER — FUROSEMIDE 40 MG
40 TABLET ORAL ONCE
Refills: 0 | Status: COMPLETED | OUTPATIENT
Start: 2021-01-01 | End: 2021-01-01

## 2021-01-01 RX ORDER — FUROSEMIDE 40 MG
60 TABLET ORAL ONCE
Refills: 0 | Status: COMPLETED | OUTPATIENT
Start: 2021-01-01 | End: 2021-01-01

## 2021-01-01 RX ORDER — ZOLPIDEM TARTRATE 10 MG/1
5 TABLET ORAL AT BEDTIME
Refills: 0 | Status: DISCONTINUED | OUTPATIENT
Start: 2021-01-01 | End: 2021-01-01

## 2021-01-01 RX ORDER — CISATRACURIUM BESYLATE 2 MG/ML
10 INJECTION INTRAVENOUS ONCE
Refills: 0 | Status: COMPLETED | OUTPATIENT
Start: 2021-01-01 | End: 2021-01-01

## 2021-01-01 RX ORDER — ALBUMIN HUMAN 25 %
250 VIAL (ML) INTRAVENOUS ONCE
Refills: 0 | Status: COMPLETED | OUTPATIENT
Start: 2021-01-01 | End: 2021-01-01

## 2021-01-01 RX ORDER — DEXAMETHASONE 0.5 MG/5ML
4 ELIXIR ORAL DAILY
Refills: 0 | Status: DISCONTINUED | OUTPATIENT
Start: 2021-01-01 | End: 2021-01-01

## 2021-01-01 RX ORDER — CASPOFUNGIN ACETATE 7 MG/ML
INJECTION, POWDER, LYOPHILIZED, FOR SOLUTION INTRAVENOUS
Refills: 0 | Status: DISCONTINUED | OUTPATIENT
Start: 2021-01-01 | End: 2021-01-01

## 2021-01-01 RX ORDER — MAGNESIUM SULFATE 500 MG/ML
1 VIAL (ML) INJECTION ONCE
Refills: 0 | Status: COMPLETED | OUTPATIENT
Start: 2021-01-01 | End: 2021-01-01

## 2021-01-01 RX ORDER — QUETIAPINE FUMARATE 200 MG/1
400 TABLET, FILM COATED ORAL AT BEDTIME
Refills: 0 | Status: DISCONTINUED | OUTPATIENT
Start: 2021-01-01 | End: 2021-01-01

## 2021-01-01 RX ORDER — HEPARIN SODIUM 5000 [USP'U]/ML
1400 INJECTION INTRAVENOUS; SUBCUTANEOUS
Qty: 25000 | Refills: 0 | Status: DISCONTINUED | OUTPATIENT
Start: 2021-01-01 | End: 2021-01-01

## 2021-01-01 RX ORDER — CISATRACURIUM BESYLATE 2 MG/ML
20 INJECTION INTRAVENOUS ONCE
Refills: 0 | Status: COMPLETED | OUTPATIENT
Start: 2021-01-01 | End: 2021-01-01

## 2021-01-01 RX ORDER — LINEZOLID 600 MG/300ML
600 INJECTION, SOLUTION INTRAVENOUS EVERY 12 HOURS
Refills: 0 | Status: DISCONTINUED | OUTPATIENT
Start: 2021-01-01 | End: 2021-01-01

## 2021-01-01 RX ORDER — MEROPENEM 1 G/30ML
1000 INJECTION INTRAVENOUS ONCE
Refills: 0 | Status: COMPLETED | OUTPATIENT
Start: 2021-01-01 | End: 2021-01-01

## 2021-01-01 RX ORDER — ACETAMINOPHEN 500 MG
650 TABLET ORAL EVERY 6 HOURS
Refills: 0 | Status: DISCONTINUED | OUTPATIENT
Start: 2021-01-01 | End: 2021-01-01

## 2021-01-01 RX ORDER — DEXAMETHASONE 0.5 MG/5ML
6 ELIXIR ORAL DAILY
Refills: 0 | Status: COMPLETED | OUTPATIENT
Start: 2021-01-01 | End: 2021-01-01

## 2021-01-01 RX ORDER — FENTANYL CITRATE 50 UG/ML
4 INJECTION INTRAVENOUS
Qty: 2500 | Refills: 0 | Status: DISCONTINUED | OUTPATIENT
Start: 2021-01-01 | End: 2021-01-01

## 2021-01-01 RX ORDER — FUROSEMIDE 40 MG
40 TABLET ORAL
Refills: 0 | Status: COMPLETED | OUTPATIENT
Start: 2021-01-01 | End: 2021-01-01

## 2021-01-01 RX ORDER — POTASSIUM CHLORIDE 20 MEQ
40 PACKET (EA) ORAL EVERY 4 HOURS
Refills: 0 | Status: COMPLETED | OUTPATIENT
Start: 2021-01-01 | End: 2021-01-01

## 2021-01-01 RX ORDER — PANTOPRAZOLE SODIUM 20 MG/1
40 TABLET, DELAYED RELEASE ORAL DAILY
Refills: 0 | Status: DISCONTINUED | OUTPATIENT
Start: 2021-01-01 | End: 2021-01-01

## 2021-01-01 RX ORDER — HEPARIN SODIUM 5000 [USP'U]/ML
5000 INJECTION INTRAVENOUS; SUBCUTANEOUS EVERY 6 HOURS
Refills: 0 | Status: DISCONTINUED | OUTPATIENT
Start: 2021-01-01 | End: 2021-01-01

## 2021-01-01 RX ORDER — HEPARIN SODIUM 5000 [USP'U]/ML
INJECTION INTRAVENOUS; SUBCUTANEOUS
Qty: 25000 | Refills: 0 | Status: DISCONTINUED | OUTPATIENT
Start: 2021-01-01 | End: 2021-01-01

## 2021-01-01 RX ORDER — POTASSIUM PHOSPHATE, MONOBASIC POTASSIUM PHOSPHATE, DIBASIC 236; 224 MG/ML; MG/ML
15 INJECTION, SOLUTION INTRAVENOUS ONCE
Refills: 0 | Status: COMPLETED | OUTPATIENT
Start: 2021-01-01 | End: 2021-01-01

## 2021-01-01 RX ORDER — LACTULOSE 10 G/15ML
20 SOLUTION ORAL ONCE
Refills: 0 | Status: COMPLETED | OUTPATIENT
Start: 2021-01-01 | End: 2021-01-01

## 2021-01-01 RX ORDER — CHLORHEXIDINE GLUCONATE 213 G/1000ML
1 SOLUTION TOPICAL
Refills: 0 | Status: DISCONTINUED | OUTPATIENT
Start: 2021-01-01 | End: 2021-01-01

## 2021-01-01 RX ORDER — POLYETHYLENE GLYCOL 3350 17 G/17G
17 POWDER, FOR SOLUTION ORAL
Refills: 0 | Status: DISCONTINUED | OUTPATIENT
Start: 2021-01-01 | End: 2021-01-01

## 2021-01-01 RX ORDER — HEPARIN SODIUM 5000 [USP'U]/ML
1800 INJECTION INTRAVENOUS; SUBCUTANEOUS
Qty: 25000 | Refills: 0 | Status: DISCONTINUED | OUTPATIENT
Start: 2021-01-01 | End: 2021-01-01

## 2021-01-01 RX ORDER — METHYLNALTREXONE BROMIDE 12 MG/.6ML
12 INJECTION, SOLUTION SUBCUTANEOUS ONCE
Refills: 0 | Status: COMPLETED | OUTPATIENT
Start: 2021-01-01 | End: 2021-01-01

## 2021-01-01 RX ORDER — MUPIROCIN 20 MG/G
1 OINTMENT TOPICAL
Refills: 0 | Status: COMPLETED | OUTPATIENT
Start: 2021-01-01 | End: 2021-01-01

## 2021-01-01 RX ORDER — INSULIN LISPRO 100/ML
VIAL (ML) SUBCUTANEOUS
Refills: 0 | Status: DISCONTINUED | OUTPATIENT
Start: 2021-01-01 | End: 2021-01-01

## 2021-01-01 RX ORDER — HEPARIN SODIUM 5000 [USP'U]/ML
14 INJECTION INTRAVENOUS; SUBCUTANEOUS
Qty: 25000 | Refills: 0 | Status: DISCONTINUED | OUTPATIENT
Start: 2021-01-01 | End: 2021-01-01

## 2021-01-01 RX ORDER — PIPERACILLIN AND TAZOBACTAM 4; .5 G/20ML; G/20ML
3.38 INJECTION, POWDER, LYOPHILIZED, FOR SOLUTION INTRAVENOUS EVERY 8 HOURS
Refills: 0 | Status: DISCONTINUED | OUTPATIENT
Start: 2021-01-01 | End: 2021-01-01

## 2021-01-01 RX ORDER — LEVOTHYROXINE SODIUM 125 MCG
0 TABLET ORAL
Qty: 0 | Refills: 3 | DISCHARGE

## 2021-01-01 RX ORDER — ACETAZOLAMIDE 250 MG/1
250 TABLET ORAL ONCE
Refills: 0 | Status: COMPLETED | OUTPATIENT
Start: 2021-01-01 | End: 2021-01-01

## 2021-01-01 RX ORDER — NOREPINEPHRINE BITARTRATE/D5W 8 MG/250ML
0.07 PLASTIC BAG, INJECTION (ML) INTRAVENOUS
Qty: 16 | Refills: 0 | Status: DISCONTINUED | OUTPATIENT
Start: 2021-01-01 | End: 2021-01-01

## 2021-01-01 RX ORDER — ALPRAZOLAM 0.25 MG
0.5 TABLET ORAL
Refills: 0 | Status: DISCONTINUED | OUTPATIENT
Start: 2021-01-01 | End: 2021-01-01

## 2021-01-01 RX ORDER — ACETAZOLAMIDE 250 MG/1
250 TABLET ORAL ONCE
Refills: 0 | Status: DISCONTINUED | OUTPATIENT
Start: 2021-01-01 | End: 2021-01-01

## 2021-01-01 RX ORDER — ONDANSETRON 8 MG/1
4 TABLET, FILM COATED ORAL EVERY 8 HOURS
Refills: 0 | Status: DISCONTINUED | OUTPATIENT
Start: 2021-01-01 | End: 2021-01-01

## 2021-01-01 RX ORDER — REMDESIVIR 5 MG/ML
INJECTION INTRAVENOUS
Refills: 0 | Status: DISCONTINUED | OUTPATIENT
Start: 2021-01-01 | End: 2021-01-01

## 2021-01-01 RX ORDER — VANCOMYCIN HCL 1 G
1750 VIAL (EA) INTRAVENOUS EVERY 8 HOURS
Refills: 0 | Status: DISCONTINUED | OUTPATIENT
Start: 2021-01-01 | End: 2021-01-01

## 2021-01-01 RX ORDER — PROPOFOL 10 MG/ML
50 INJECTION, EMULSION INTRAVENOUS
Qty: 1000 | Refills: 0 | Status: DISCONTINUED | OUTPATIENT
Start: 2021-01-01 | End: 2021-01-01

## 2021-01-01 RX ORDER — SODIUM CHLORIDE 9 MG/ML
1000 INJECTION, SOLUTION INTRAVENOUS
Refills: 0 | Status: COMPLETED | OUTPATIENT
Start: 2021-01-01 | End: 2021-01-01

## 2021-01-01 RX ORDER — PHENOBARBITAL 60 MG
700 TABLET ORAL ONCE
Refills: 0 | Status: DISCONTINUED | OUTPATIENT
Start: 2021-01-01 | End: 2021-01-01

## 2021-01-01 RX ORDER — VANCOMYCIN HCL 1 G
1750 VIAL (EA) INTRAVENOUS EVERY 12 HOURS
Refills: 0 | Status: DISCONTINUED | OUTPATIENT
Start: 2021-01-01 | End: 2021-01-01

## 2021-01-01 RX ORDER — FENTANYL CITRATE 50 UG/ML
100 INJECTION INTRAVENOUS ONCE
Refills: 0 | Status: DISCONTINUED | OUTPATIENT
Start: 2021-01-01 | End: 2021-01-01

## 2021-01-01 RX ORDER — REMDESIVIR 5 MG/ML
200 INJECTION INTRAVENOUS EVERY 24 HOURS
Refills: 0 | Status: COMPLETED | OUTPATIENT
Start: 2021-01-01 | End: 2021-01-01

## 2021-01-01 RX ORDER — POTASSIUM CHLORIDE 20 MEQ
40 PACKET (EA) ORAL EVERY 4 HOURS
Refills: 0 | Status: DISCONTINUED | OUTPATIENT
Start: 2021-01-01 | End: 2021-01-01

## 2021-01-01 RX ORDER — POTASSIUM PHOSPHATE, MONOBASIC POTASSIUM PHOSPHATE, DIBASIC 236; 224 MG/ML; MG/ML
30 INJECTION, SOLUTION INTRAVENOUS ONCE
Refills: 0 | Status: COMPLETED | OUTPATIENT
Start: 2021-01-01 | End: 2021-01-01

## 2021-01-01 RX ORDER — MUPIROCIN 20 MG/G
1 OINTMENT TOPICAL
Refills: 0 | Status: DISCONTINUED | OUTPATIENT
Start: 2021-01-01 | End: 2021-01-01

## 2021-01-01 RX ORDER — CEFTRIAXONE 500 MG/1
INJECTION, POWDER, FOR SOLUTION INTRAMUSCULAR; INTRAVENOUS
Refills: 0 | Status: DISCONTINUED | OUTPATIENT
Start: 2021-01-01 | End: 2021-01-01

## 2021-01-01 RX ORDER — LACTULOSE 10 G/15ML
20 SOLUTION ORAL
Refills: 0 | Status: DISCONTINUED | OUTPATIENT
Start: 2021-01-01 | End: 2021-01-01

## 2021-01-01 RX ORDER — ALBUMIN HUMAN 25 %
100 VIAL (ML) INTRAVENOUS ONCE
Refills: 0 | Status: COMPLETED | OUTPATIENT
Start: 2021-01-01 | End: 2021-01-01

## 2021-01-01 RX ORDER — VANCOMYCIN HCL 1 G
1500 VIAL (EA) INTRAVENOUS ONCE
Refills: 0 | Status: COMPLETED | OUTPATIENT
Start: 2021-01-01 | End: 2021-01-01

## 2021-01-01 RX ORDER — ALBUMIN HUMAN 25 %
100 VIAL (ML) INTRAVENOUS ONCE
Refills: 0 | Status: DISCONTINUED | OUTPATIENT
Start: 2021-01-01 | End: 2021-01-01

## 2021-01-01 RX ORDER — VANCOMYCIN HCL 1 G
1500 VIAL (EA) INTRAVENOUS EVERY 12 HOURS
Refills: 0 | Status: DISCONTINUED | OUTPATIENT
Start: 2021-01-01 | End: 2021-01-01

## 2021-01-01 RX ORDER — PIPERACILLIN AND TAZOBACTAM 4; .5 G/20ML; G/20ML
3.38 INJECTION, POWDER, LYOPHILIZED, FOR SOLUTION INTRAVENOUS ONCE
Refills: 0 | Status: COMPLETED | OUTPATIENT
Start: 2021-01-01 | End: 2021-01-01

## 2021-01-01 RX ORDER — ACETAMINOPHEN 500 MG
975 TABLET ORAL EVERY 6 HOURS
Refills: 0 | Status: DISCONTINUED | OUTPATIENT
Start: 2021-01-01 | End: 2021-01-01

## 2021-01-01 RX ORDER — VASOPRESSIN 20 [USP'U]/ML
0.04 INJECTION INTRAVENOUS
Qty: 50 | Refills: 0 | Status: DISCONTINUED | OUTPATIENT
Start: 2021-01-01 | End: 2021-01-01

## 2021-01-01 RX ORDER — DIAZEPAM 5 MG
10 TABLET ORAL EVERY 8 HOURS
Refills: 0 | Status: DISCONTINUED | OUTPATIENT
Start: 2021-01-01 | End: 2021-01-01

## 2021-01-01 RX ORDER — FUROSEMIDE 40 MG
40 TABLET ORAL
Refills: 0 | Status: DISCONTINUED | OUTPATIENT
Start: 2021-01-01 | End: 2021-01-01

## 2021-01-01 RX ORDER — SODIUM CHLORIDE 9 MG/ML
10 INJECTION INTRAMUSCULAR; INTRAVENOUS; SUBCUTANEOUS
Refills: 0 | Status: DISCONTINUED | OUTPATIENT
Start: 2021-01-01 | End: 2021-01-01

## 2021-01-01 RX ORDER — CASPOFUNGIN ACETATE 7 MG/ML
50 INJECTION, POWDER, LYOPHILIZED, FOR SOLUTION INTRAVENOUS ONCE
Refills: 0 | Status: COMPLETED | OUTPATIENT
Start: 2021-01-01 | End: 2021-01-01

## 2021-01-01 RX ORDER — NOREPINEPHRINE BITARTRATE/D5W 8 MG/250ML
0.05 PLASTIC BAG, INJECTION (ML) INTRAVENOUS
Qty: 8 | Refills: 0 | Status: DISCONTINUED | OUTPATIENT
Start: 2021-01-01 | End: 2021-01-01

## 2021-01-01 RX ORDER — PROPOFOL 10 MG/ML
10 INJECTION, EMULSION INTRAVENOUS
Qty: 1000 | Refills: 0 | Status: DISCONTINUED | OUTPATIENT
Start: 2021-01-01 | End: 2021-01-01

## 2021-01-01 RX ORDER — VANCOMYCIN HCL 1 G
VIAL (EA) INTRAVENOUS
Refills: 0 | Status: DISCONTINUED | OUTPATIENT
Start: 2021-01-01 | End: 2021-01-01

## 2021-01-01 RX ORDER — DEXTROSE 50 % IN WATER 50 %
25 SYRINGE (ML) INTRAVENOUS ONCE
Refills: 0 | Status: DISCONTINUED | OUTPATIENT
Start: 2021-01-01 | End: 2021-01-01

## 2021-01-01 RX ORDER — CHLORHEXIDINE GLUCONATE 213 G/1000ML
15 SOLUTION TOPICAL EVERY 12 HOURS
Refills: 0 | Status: DISCONTINUED | OUTPATIENT
Start: 2021-01-01 | End: 2021-01-01

## 2021-01-01 RX ORDER — POTASSIUM CHLORIDE 20 MEQ
40 PACKET (EA) ORAL ONCE
Refills: 0 | Status: DISCONTINUED | OUTPATIENT
Start: 2021-01-01 | End: 2021-01-01

## 2021-01-01 RX ORDER — DEXTROSE 50 % IN WATER 50 %
12.5 SYRINGE (ML) INTRAVENOUS ONCE
Refills: 0 | Status: DISCONTINUED | OUTPATIENT
Start: 2021-01-01 | End: 2021-01-01

## 2021-01-01 RX ORDER — MEROPENEM 1 G/30ML
1000 INJECTION INTRAVENOUS EVERY 8 HOURS
Refills: 0 | Status: COMPLETED | OUTPATIENT
Start: 2021-01-01 | End: 2021-01-01

## 2021-01-01 RX ORDER — POTASSIUM CHLORIDE 20 MEQ
20 PACKET (EA) ORAL
Refills: 0 | Status: COMPLETED | OUTPATIENT
Start: 2021-01-01 | End: 2021-01-01

## 2021-01-01 RX ORDER — SENNA PLUS 8.6 MG/1
10 TABLET ORAL AT BEDTIME
Refills: 0 | Status: DISCONTINUED | OUTPATIENT
Start: 2021-01-01 | End: 2021-01-01

## 2021-01-01 RX ORDER — GLUCAGON INJECTION, SOLUTION 0.5 MG/.1ML
1 INJECTION, SOLUTION SUBCUTANEOUS ONCE
Refills: 0 | Status: DISCONTINUED | OUTPATIENT
Start: 2021-01-01 | End: 2021-01-01

## 2021-01-01 RX ORDER — ENOXAPARIN SODIUM 100 MG/ML
40 INJECTION SUBCUTANEOUS DAILY
Refills: 0 | Status: DISCONTINUED | OUTPATIENT
Start: 2021-01-01 | End: 2021-01-01

## 2021-01-01 RX ADMIN — CHLORHEXIDINE GLUCONATE 15 MILLILITER(S): 213 SOLUTION TOPICAL at 05:36

## 2021-01-01 RX ADMIN — PROPOFOL 39 MICROGRAM(S)/KG/MIN: 10 INJECTION, EMULSION INTRAVENOUS at 13:54

## 2021-01-01 RX ADMIN — MIDAZOLAM HYDROCHLORIDE 2.6 MG/KG/HR: 1 INJECTION, SOLUTION INTRAMUSCULAR; INTRAVENOUS at 16:00

## 2021-01-01 RX ADMIN — CHLORHEXIDINE GLUCONATE 15 MILLILITER(S): 213 SOLUTION TOPICAL at 05:19

## 2021-01-01 RX ADMIN — PROPOFOL 39 MICROGRAM(S)/KG/MIN: 10 INJECTION, EMULSION INTRAVENOUS at 02:52

## 2021-01-01 RX ADMIN — CHLORHEXIDINE GLUCONATE 1 APPLICATION(S): 213 SOLUTION TOPICAL at 10:58

## 2021-01-01 RX ADMIN — Medication 1: at 23:46

## 2021-01-01 RX ADMIN — Medication 300 MILLIGRAM(S): at 07:13

## 2021-01-01 RX ADMIN — POLYETHYLENE GLYCOL 3350 17 GRAM(S): 17 POWDER, FOR SOLUTION ORAL at 18:42

## 2021-01-01 RX ADMIN — Medication 210.76 MILLIGRAM(S): at 11:53

## 2021-01-01 RX ADMIN — HEPARIN SODIUM 16 UNIT(S)/HR: 5000 INJECTION INTRAVENOUS; SUBCUTANEOUS at 10:00

## 2021-01-01 RX ADMIN — Medication 40 MILLIGRAM(S): at 09:59

## 2021-01-01 RX ADMIN — POTASSIUM PHOSPHATE, MONOBASIC POTASSIUM PHOSPHATE, DIBASIC 83.33 MILLIMOLE(S): 236; 224 INJECTION, SOLUTION INTRAVENOUS at 06:27

## 2021-01-01 RX ADMIN — SODIUM CHLORIDE 80 MILLILITER(S): 9 INJECTION, SOLUTION INTRAVENOUS at 23:48

## 2021-01-01 RX ADMIN — Medication 60 MILLIGRAM(S): at 11:19

## 2021-01-01 RX ADMIN — KETAMINE HYDROCHLORIDE 3.25 MG/KG/HR: 100 INJECTION INTRAMUSCULAR; INTRAVENOUS at 23:29

## 2021-01-01 RX ADMIN — CHLORHEXIDINE GLUCONATE 15 MILLILITER(S): 213 SOLUTION TOPICAL at 17:01

## 2021-01-01 RX ADMIN — FENTANYL CITRATE 1.3 MICROGRAM(S)/KG/HR: 50 INJECTION INTRAVENOUS at 09:16

## 2021-01-01 RX ADMIN — HEPARIN SODIUM 1700 UNIT(S)/HR: 5000 INJECTION INTRAVENOUS; SUBCUTANEOUS at 13:41

## 2021-01-01 RX ADMIN — MEROPENEM 100 MILLIGRAM(S): 1 INJECTION INTRAVENOUS at 14:31

## 2021-01-01 RX ADMIN — Medication 650 MILLIGRAM(S): at 04:12

## 2021-01-01 RX ADMIN — MIDAZOLAM HYDROCHLORIDE 2 MILLIGRAM(S): 1 INJECTION, SOLUTION INTRAMUSCULAR; INTRAVENOUS at 08:56

## 2021-01-01 RX ADMIN — PANTOPRAZOLE SODIUM 40 MILLIGRAM(S): 20 TABLET, DELAYED RELEASE ORAL at 10:31

## 2021-01-01 RX ADMIN — FENTANYL CITRATE 1.3 MICROGRAM(S)/KG/HR: 50 INJECTION INTRAVENOUS at 11:58

## 2021-01-01 RX ADMIN — KETAMINE HYDROCHLORIDE 3.25 MG/KG/HR: 100 INJECTION INTRAMUSCULAR; INTRAVENOUS at 02:22

## 2021-01-01 RX ADMIN — CHLORHEXIDINE GLUCONATE 15 MILLILITER(S): 213 SOLUTION TOPICAL at 17:35

## 2021-01-01 RX ADMIN — MIDAZOLAM HYDROCHLORIDE 2.6 MG/KG/HR: 1 INJECTION, SOLUTION INTRAMUSCULAR; INTRAVENOUS at 07:44

## 2021-01-01 RX ADMIN — Medication 6 MILLIGRAM(S): at 05:19

## 2021-01-01 RX ADMIN — MEROPENEM 100 MILLIGRAM(S): 1 INJECTION INTRAVENOUS at 16:18

## 2021-01-01 RX ADMIN — ENOXAPARIN SODIUM 130 MILLIGRAM(S): 100 INJECTION SUBCUTANEOUS at 17:07

## 2021-01-01 RX ADMIN — LINEZOLID 300 MILLIGRAM(S): 600 INJECTION, SOLUTION INTRAVENOUS at 17:19

## 2021-01-01 RX ADMIN — MUPIROCIN 1 APPLICATION(S): 20 OINTMENT TOPICAL at 17:38

## 2021-01-01 RX ADMIN — POLYETHYLENE GLYCOL 3350 17 GRAM(S): 17 POWDER, FOR SOLUTION ORAL at 04:27

## 2021-01-01 RX ADMIN — PANTOPRAZOLE SODIUM 40 MILLIGRAM(S): 20 TABLET, DELAYED RELEASE ORAL at 12:25

## 2021-01-01 RX ADMIN — FENTANYL CITRATE 6.5 MICROGRAM(S)/KG/HR: 50 INJECTION INTRAVENOUS at 07:00

## 2021-01-01 RX ADMIN — Medication 8.53 MICROGRAM(S)/KG/MIN: at 21:31

## 2021-01-01 RX ADMIN — PANTOPRAZOLE SODIUM 40 MILLIGRAM(S): 20 TABLET, DELAYED RELEASE ORAL at 10:08

## 2021-01-01 RX ADMIN — MEROPENEM 100 MILLIGRAM(S): 1 INJECTION INTRAVENOUS at 13:17

## 2021-01-01 RX ADMIN — Medication 12.2 MICROGRAM(S)/KG/MIN: at 16:16

## 2021-01-01 RX ADMIN — POLYETHYLENE GLYCOL 3350 17 GRAM(S): 17 POWDER, FOR SOLUTION ORAL at 16:01

## 2021-01-01 RX ADMIN — PANTOPRAZOLE SODIUM 40 MILLIGRAM(S): 20 TABLET, DELAYED RELEASE ORAL at 14:13

## 2021-01-01 RX ADMIN — CHLORHEXIDINE GLUCONATE 15 MILLILITER(S): 213 SOLUTION TOPICAL at 18:05

## 2021-01-01 RX ADMIN — FENTANYL CITRATE 1.3 MICROGRAM(S)/KG/HR: 50 INJECTION INTRAVENOUS at 08:13

## 2021-01-01 RX ADMIN — MIDAZOLAM HYDROCHLORIDE 2.6 MG/KG/HR: 1 INJECTION, SOLUTION INTRAMUSCULAR; INTRAVENOUS at 23:30

## 2021-01-01 RX ADMIN — PROPOFOL 39 MICROGRAM(S)/KG/MIN: 10 INJECTION, EMULSION INTRAVENOUS at 09:01

## 2021-01-01 RX ADMIN — PANTOPRAZOLE SODIUM 40 MILLIGRAM(S): 20 TABLET, DELAYED RELEASE ORAL at 12:35

## 2021-01-01 RX ADMIN — PROPOFOL 39 MICROGRAM(S)/KG/MIN: 10 INJECTION, EMULSION INTRAVENOUS at 04:22

## 2021-01-01 RX ADMIN — FENTANYL CITRATE 1.3 MICROGRAM(S)/KG/HR: 50 INJECTION INTRAVENOUS at 12:54

## 2021-01-01 RX ADMIN — CHLORHEXIDINE GLUCONATE 1 APPLICATION(S): 213 SOLUTION TOPICAL at 05:29

## 2021-01-01 RX ADMIN — SENNA PLUS 10 MILLILITER(S): 8.6 TABLET ORAL at 21:57

## 2021-01-01 RX ADMIN — PROPOFOL 39 MICROGRAM(S)/KG/MIN: 10 INJECTION, EMULSION INTRAVENOUS at 06:09

## 2021-01-01 RX ADMIN — Medication 8.53 MICROGRAM(S)/KG/MIN: at 08:14

## 2021-01-01 RX ADMIN — Medication 975 MILLIGRAM(S): at 10:31

## 2021-01-01 RX ADMIN — PROPOFOL 39 MICROGRAM(S)/KG/MIN: 10 INJECTION, EMULSION INTRAVENOUS at 18:46

## 2021-01-01 RX ADMIN — PROPOFOL 39 MICROGRAM(S)/KG/MIN: 10 INJECTION, EMULSION INTRAVENOUS at 11:50

## 2021-01-01 RX ADMIN — FENTANYL CITRATE 1.3 MICROGRAM(S)/KG/HR: 50 INJECTION INTRAVENOUS at 10:37

## 2021-01-01 RX ADMIN — KETAMINE HYDROCHLORIDE 2.6 MG/KG/HR: 100 INJECTION INTRAMUSCULAR; INTRAVENOUS at 12:12

## 2021-01-01 RX ADMIN — MIDAZOLAM HYDROCHLORIDE 2 MILLIGRAM(S): 1 INJECTION, SOLUTION INTRAMUSCULAR; INTRAVENOUS at 10:12

## 2021-01-01 RX ADMIN — CHLORHEXIDINE GLUCONATE 15 MILLILITER(S): 213 SOLUTION TOPICAL at 18:45

## 2021-01-01 RX ADMIN — FENTANYL CITRATE 10.4 MICROGRAM(S)/KG/HR: 50 INJECTION INTRAVENOUS at 04:17

## 2021-01-01 RX ADMIN — CHLORHEXIDINE GLUCONATE 1 APPLICATION(S): 213 SOLUTION TOPICAL at 05:23

## 2021-01-01 RX ADMIN — PROPOFOL 39 MICROGRAM(S)/KG/MIN: 10 INJECTION, EMULSION INTRAVENOUS at 18:07

## 2021-01-01 RX ADMIN — HEPARIN SODIUM 10000 UNIT(S): 5000 INJECTION INTRAVENOUS; SUBCUTANEOUS at 22:00

## 2021-01-01 RX ADMIN — KETAMINE HYDROCHLORIDE 2.6 MG/KG/HR: 100 INJECTION INTRAMUSCULAR; INTRAVENOUS at 00:19

## 2021-01-01 RX ADMIN — Medication 8.53 MICROGRAM(S)/KG/MIN: at 02:49

## 2021-01-01 RX ADMIN — Medication 0: at 17:05

## 2021-01-01 RX ADMIN — CHLORHEXIDINE GLUCONATE 15 MILLILITER(S): 213 SOLUTION TOPICAL at 04:05

## 2021-01-01 RX ADMIN — CASPOFUNGIN ACETATE 260 MILLIGRAM(S): 7 INJECTION, POWDER, LYOPHILIZED, FOR SOLUTION INTRAVENOUS at 14:33

## 2021-01-01 RX ADMIN — ONDANSETRON 4 MILLIGRAM(S): 8 TABLET, FILM COATED ORAL at 12:16

## 2021-01-01 RX ADMIN — CHLORHEXIDINE GLUCONATE 1 APPLICATION(S): 213 SOLUTION TOPICAL at 04:30

## 2021-01-01 RX ADMIN — Medication 250 MILLIGRAM(S): at 20:03

## 2021-01-01 RX ADMIN — KETAMINE HYDROCHLORIDE 2.6 MG/KG/HR: 100 INJECTION INTRAMUSCULAR; INTRAVENOUS at 16:02

## 2021-01-01 RX ADMIN — CHLORHEXIDINE GLUCONATE 15 MILLILITER(S): 213 SOLUTION TOPICAL at 18:52

## 2021-01-01 RX ADMIN — Medication 6 MILLIGRAM(S): at 05:14

## 2021-01-01 RX ADMIN — CHLORHEXIDINE GLUCONATE 15 MILLILITER(S): 213 SOLUTION TOPICAL at 05:34

## 2021-01-01 RX ADMIN — Medication 40 MILLIGRAM(S): at 12:26

## 2021-01-01 RX ADMIN — CHLORHEXIDINE GLUCONATE 15 MILLILITER(S): 213 SOLUTION TOPICAL at 05:48

## 2021-01-01 RX ADMIN — CHLORHEXIDINE GLUCONATE 15 MILLILITER(S): 213 SOLUTION TOPICAL at 04:20

## 2021-01-01 RX ADMIN — FENTANYL CITRATE 10.4 MICROGRAM(S)/KG/HR: 50 INJECTION INTRAVENOUS at 19:49

## 2021-01-01 RX ADMIN — POLYETHYLENE GLYCOL 3350 17 GRAM(S): 17 POWDER, FOR SOLUTION ORAL at 05:29

## 2021-01-01 RX ADMIN — KETAMINE HYDROCHLORIDE 3.25 MG/KG/HR: 100 INJECTION INTRAMUSCULAR; INTRAVENOUS at 12:43

## 2021-01-01 RX ADMIN — HEPARIN SODIUM 1400 UNIT(S)/HR: 5000 INJECTION INTRAVENOUS; SUBCUTANEOUS at 23:32

## 2021-01-01 RX ADMIN — PROPOFOL 39 MICROGRAM(S)/KG/MIN: 10 INJECTION, EMULSION INTRAVENOUS at 10:55

## 2021-01-01 RX ADMIN — FENTANYL CITRATE 6.5 MICROGRAM(S)/KG/HR: 50 INJECTION INTRAVENOUS at 03:58

## 2021-01-01 RX ADMIN — PROPOFOL 39 MICROGRAM(S)/KG/MIN: 10 INJECTION, EMULSION INTRAVENOUS at 07:12

## 2021-01-01 RX ADMIN — Medication 2 MILLIGRAM(S): at 04:32

## 2021-01-01 RX ADMIN — Medication 8.53 MICROGRAM(S)/KG/MIN: at 07:12

## 2021-01-01 RX ADMIN — FENTANYL CITRATE 1.3 MICROGRAM(S)/KG/HR: 50 INJECTION INTRAVENOUS at 00:18

## 2021-01-01 RX ADMIN — Medication 1: at 23:28

## 2021-01-01 RX ADMIN — FENTANYL CITRATE 10.4 MICROGRAM(S)/KG/HR: 50 INJECTION INTRAVENOUS at 05:22

## 2021-01-01 RX ADMIN — HEPARIN SODIUM 15 UNIT(S)/HR: 5000 INJECTION INTRAVENOUS; SUBCUTANEOUS at 08:17

## 2021-01-01 RX ADMIN — PANTOPRAZOLE SODIUM 40 MILLIGRAM(S): 20 TABLET, DELAYED RELEASE ORAL at 12:16

## 2021-01-01 RX ADMIN — CISATRACURIUM BESYLATE 23.4 MICROGRAM(S)/KG/MIN: 2 INJECTION INTRAVENOUS at 00:30

## 2021-01-01 RX ADMIN — FENTANYL CITRATE 10.4 MICROGRAM(S)/KG/HR: 50 INJECTION INTRAVENOUS at 09:40

## 2021-01-01 RX ADMIN — FENTANYL CITRATE 10.4 MICROGRAM(S)/KG/HR: 50 INJECTION INTRAVENOUS at 13:00

## 2021-01-01 RX ADMIN — KETAMINE HYDROCHLORIDE 2.6 MG/KG/HR: 100 INJECTION INTRAMUSCULAR; INTRAVENOUS at 10:56

## 2021-01-01 RX ADMIN — Medication 1: at 17:18

## 2021-01-01 RX ADMIN — CISATRACURIUM BESYLATE 23.4 MICROGRAM(S)/KG/MIN: 2 INJECTION INTRAVENOUS at 09:19

## 2021-01-01 RX ADMIN — POLYETHYLENE GLYCOL 3350 17 GRAM(S): 17 POWDER, FOR SOLUTION ORAL at 04:52

## 2021-01-01 RX ADMIN — CISATRACURIUM BESYLATE 23.4 MICROGRAM(S)/KG/MIN: 2 INJECTION INTRAVENOUS at 23:11

## 2021-01-01 RX ADMIN — Medication 1: at 16:59

## 2021-01-01 RX ADMIN — Medication 1: at 05:47

## 2021-01-01 RX ADMIN — MEROPENEM 100 MILLIGRAM(S): 1 INJECTION INTRAVENOUS at 04:19

## 2021-01-01 RX ADMIN — Medication 1: at 04:02

## 2021-01-01 RX ADMIN — LINEZOLID 300 MILLIGRAM(S): 600 INJECTION, SOLUTION INTRAVENOUS at 04:21

## 2021-01-01 RX ADMIN — PROPOFOL 39 MICROGRAM(S)/KG/MIN: 10 INJECTION, EMULSION INTRAVENOUS at 02:45

## 2021-01-01 RX ADMIN — KETAMINE HYDROCHLORIDE 2.6 MG/KG/HR: 100 INJECTION INTRAMUSCULAR; INTRAVENOUS at 11:37

## 2021-01-01 RX ADMIN — Medication 975 MILLIGRAM(S): at 08:00

## 2021-01-01 RX ADMIN — CHLORHEXIDINE GLUCONATE 1 APPLICATION(S): 213 SOLUTION TOPICAL at 09:30

## 2021-01-01 RX ADMIN — Medication 40 MILLIGRAM(S): at 10:54

## 2021-01-01 RX ADMIN — Medication 50 MILLIEQUIVALENT(S): at 02:10

## 2021-01-01 RX ADMIN — Medication 6 MILLIGRAM(S): at 04:52

## 2021-01-01 RX ADMIN — PROPOFOL 39 MICROGRAM(S)/KG/MIN: 10 INJECTION, EMULSION INTRAVENOUS at 04:33

## 2021-01-01 RX ADMIN — PANTOPRAZOLE SODIUM 40 MILLIGRAM(S): 20 TABLET, DELAYED RELEASE ORAL at 11:19

## 2021-01-01 RX ADMIN — MIDAZOLAM HYDROCHLORIDE 2.6 MG/KG/HR: 1 INJECTION, SOLUTION INTRAMUSCULAR; INTRAVENOUS at 11:37

## 2021-01-01 RX ADMIN — POLYETHYLENE GLYCOL 3350 17 GRAM(S): 17 POWDER, FOR SOLUTION ORAL at 04:35

## 2021-01-01 RX ADMIN — Medication 40 MILLIGRAM(S): at 16:02

## 2021-01-01 RX ADMIN — CISATRACURIUM BESYLATE 23.4 MICROGRAM(S)/KG/MIN: 2 INJECTION INTRAVENOUS at 23:40

## 2021-01-01 RX ADMIN — Medication 60 MILLIGRAM(S): at 09:31

## 2021-01-01 RX ADMIN — Medication 6.09 MICROGRAM(S)/KG/MIN: at 20:40

## 2021-01-01 RX ADMIN — CHLORHEXIDINE GLUCONATE 15 MILLILITER(S): 213 SOLUTION TOPICAL at 17:17

## 2021-01-01 RX ADMIN — Medication 1: at 12:00

## 2021-01-01 RX ADMIN — CHLORHEXIDINE GLUCONATE 1 APPLICATION(S): 213 SOLUTION TOPICAL at 05:53

## 2021-01-01 RX ADMIN — Medication 1: at 11:13

## 2021-01-01 RX ADMIN — MEROPENEM 100 MILLIGRAM(S): 1 INJECTION INTRAVENOUS at 22:00

## 2021-01-01 RX ADMIN — PANTOPRAZOLE SODIUM 40 MILLIGRAM(S): 20 TABLET, DELAYED RELEASE ORAL at 10:48

## 2021-01-01 RX ADMIN — KETAMINE HYDROCHLORIDE 3.25 MG/KG/HR: 100 INJECTION INTRAMUSCULAR; INTRAVENOUS at 06:24

## 2021-01-01 RX ADMIN — CHLORHEXIDINE GLUCONATE 15 MILLILITER(S): 213 SOLUTION TOPICAL at 17:19

## 2021-01-01 RX ADMIN — CHLORHEXIDINE GLUCONATE 15 MILLILITER(S): 213 SOLUTION TOPICAL at 08:54

## 2021-01-01 RX ADMIN — CHLORHEXIDINE GLUCONATE 15 MILLILITER(S): 213 SOLUTION TOPICAL at 04:47

## 2021-01-01 RX ADMIN — MUPIROCIN 1 APPLICATION(S): 20 OINTMENT TOPICAL at 20:04

## 2021-01-01 RX ADMIN — POLYETHYLENE GLYCOL 3350 17 GRAM(S): 17 POWDER, FOR SOLUTION ORAL at 13:15

## 2021-01-01 RX ADMIN — MIDAZOLAM HYDROCHLORIDE 2.6 MG/KG/HR: 1 INJECTION, SOLUTION INTRAMUSCULAR; INTRAVENOUS at 14:54

## 2021-01-01 RX ADMIN — CISATRACURIUM BESYLATE 23.4 MICROGRAM(S)/KG/MIN: 2 INJECTION INTRAVENOUS at 07:53

## 2021-01-01 RX ADMIN — MEROPENEM 100 MILLIGRAM(S): 1 INJECTION INTRAVENOUS at 22:45

## 2021-01-01 RX ADMIN — CHLORHEXIDINE GLUCONATE 15 MILLILITER(S): 213 SOLUTION TOPICAL at 16:06

## 2021-01-01 RX ADMIN — FENTANYL CITRATE 1.3 MICROGRAM(S)/KG/HR: 50 INJECTION INTRAVENOUS at 11:37

## 2021-01-01 RX ADMIN — CHLORHEXIDINE GLUCONATE 1 APPLICATION(S): 213 SOLUTION TOPICAL at 04:03

## 2021-01-01 RX ADMIN — KETAMINE HYDROCHLORIDE 2.6 MG/KG/HR: 100 INJECTION INTRAMUSCULAR; INTRAVENOUS at 14:54

## 2021-01-01 RX ADMIN — FENTANYL CITRATE 10.4 MICROGRAM(S)/KG/HR: 50 INJECTION INTRAVENOUS at 10:15

## 2021-01-01 RX ADMIN — HEPARIN SODIUM 14 UNIT(S)/HR: 5000 INJECTION INTRAVENOUS; SUBCUTANEOUS at 07:52

## 2021-01-01 RX ADMIN — Medication 6 MILLIGRAM(S): at 04:17

## 2021-01-01 RX ADMIN — LINEZOLID 300 MILLIGRAM(S): 600 INJECTION, SOLUTION INTRAVENOUS at 14:16

## 2021-01-01 RX ADMIN — CISATRACURIUM BESYLATE 23.4 MICROGRAM(S)/KG/MIN: 2 INJECTION INTRAVENOUS at 14:46

## 2021-01-01 RX ADMIN — CISATRACURIUM BESYLATE 23.4 MICROGRAM(S)/KG/MIN: 2 INJECTION INTRAVENOUS at 17:22

## 2021-01-01 RX ADMIN — MEROPENEM 100 MILLIGRAM(S): 1 INJECTION INTRAVENOUS at 05:34

## 2021-01-01 RX ADMIN — MIDAZOLAM HYDROCHLORIDE 2.6 MG/KG/HR: 1 INJECTION, SOLUTION INTRAMUSCULAR; INTRAVENOUS at 10:57

## 2021-01-01 RX ADMIN — KETAMINE HYDROCHLORIDE 3.25 MG/KG/HR: 100 INJECTION INTRAMUSCULAR; INTRAVENOUS at 13:32

## 2021-01-01 RX ADMIN — FENTANYL CITRATE 1.3 MICROGRAM(S)/KG/HR: 50 INJECTION INTRAVENOUS at 21:40

## 2021-01-01 RX ADMIN — FENTANYL CITRATE 1.3 MICROGRAM(S)/KG/HR: 50 INJECTION INTRAVENOUS at 13:09

## 2021-01-01 RX ADMIN — Medication 4 MILLIGRAM(S): at 05:47

## 2021-01-01 RX ADMIN — Medication 975 MILLIGRAM(S): at 07:50

## 2021-01-01 RX ADMIN — Medication 40 MILLIGRAM(S): at 10:01

## 2021-01-01 RX ADMIN — MEROPENEM 100 MILLIGRAM(S): 1 INJECTION INTRAVENOUS at 22:31

## 2021-01-01 RX ADMIN — LINEZOLID 300 MILLIGRAM(S): 600 INJECTION, SOLUTION INTRAVENOUS at 16:50

## 2021-01-01 RX ADMIN — Medication 6 MILLIGRAM(S): at 16:14

## 2021-01-01 RX ADMIN — HEPARIN SODIUM 1400 UNIT(S)/HR: 5000 INJECTION INTRAVENOUS; SUBCUTANEOUS at 05:30

## 2021-01-01 RX ADMIN — CHLORHEXIDINE GLUCONATE 15 MILLILITER(S): 213 SOLUTION TOPICAL at 05:16

## 2021-01-01 RX ADMIN — CISATRACURIUM BESYLATE 23.4 MICROGRAM(S)/KG/MIN: 2 INJECTION INTRAVENOUS at 15:25

## 2021-01-01 RX ADMIN — ACETAZOLAMIDE 250 MILLIGRAM(S): 250 TABLET ORAL at 12:40

## 2021-01-01 RX ADMIN — PROPOFOL 39 MICROGRAM(S)/KG/MIN: 10 INJECTION, EMULSION INTRAVENOUS at 07:38

## 2021-01-01 RX ADMIN — PANTOPRAZOLE SODIUM 40 MILLIGRAM(S): 20 TABLET, DELAYED RELEASE ORAL at 11:18

## 2021-01-01 RX ADMIN — PROPOFOL 39 MICROGRAM(S)/KG/MIN: 10 INJECTION, EMULSION INTRAVENOUS at 20:21

## 2021-01-01 RX ADMIN — ACETAZOLAMIDE 500 MILLIGRAM(S): 250 TABLET ORAL at 01:42

## 2021-01-01 RX ADMIN — PROPOFOL 39 MICROGRAM(S)/KG/MIN: 10 INJECTION, EMULSION INTRAVENOUS at 17:01

## 2021-01-01 RX ADMIN — Medication 6 MILLIGRAM(S): at 05:16

## 2021-01-01 RX ADMIN — POLYETHYLENE GLYCOL 3350 17 GRAM(S): 17 POWDER, FOR SOLUTION ORAL at 16:50

## 2021-01-01 RX ADMIN — KETAMINE HYDROCHLORIDE 3.25 MG/KG/HR: 100 INJECTION INTRAMUSCULAR; INTRAVENOUS at 04:12

## 2021-01-01 RX ADMIN — KETAMINE HYDROCHLORIDE 3.25 MG/KG/HR: 100 INJECTION INTRAMUSCULAR; INTRAVENOUS at 06:13

## 2021-01-01 RX ADMIN — KETAMINE HYDROCHLORIDE 3.25 MG/KG/HR: 100 INJECTION INTRAMUSCULAR; INTRAVENOUS at 18:27

## 2021-01-01 RX ADMIN — Medication 10 MILLIGRAM(S): at 04:37

## 2021-01-01 RX ADMIN — FENTANYL CITRATE 10.4 MICROGRAM(S)/KG/HR: 50 INJECTION INTRAVENOUS at 00:51

## 2021-01-01 RX ADMIN — FENTANYL CITRATE 10.4 MICROGRAM(S)/KG/HR: 50 INJECTION INTRAVENOUS at 20:39

## 2021-01-01 RX ADMIN — LINEZOLID 300 MILLIGRAM(S): 600 INJECTION, SOLUTION INTRAVENOUS at 16:01

## 2021-01-01 RX ADMIN — KETAMINE HYDROCHLORIDE 3.25 MG/KG/HR: 100 INJECTION INTRAMUSCULAR; INTRAVENOUS at 11:04

## 2021-01-01 RX ADMIN — Medication 63.75 MILLIMOLE(S): at 05:00

## 2021-01-01 RX ADMIN — MIDAZOLAM HYDROCHLORIDE 2.6 MG/KG/HR: 1 INJECTION, SOLUTION INTRAMUSCULAR; INTRAVENOUS at 05:44

## 2021-01-01 RX ADMIN — PROPOFOL 39 MICROGRAM(S)/KG/MIN: 10 INJECTION, EMULSION INTRAVENOUS at 20:40

## 2021-01-01 RX ADMIN — KETAMINE HYDROCHLORIDE 3.25 MG/KG/HR: 100 INJECTION INTRAMUSCULAR; INTRAVENOUS at 10:54

## 2021-01-01 RX ADMIN — Medication 650 MILLIGRAM(S): at 18:43

## 2021-01-01 RX ADMIN — POLYETHYLENE GLYCOL 3350 17 GRAM(S): 17 POWDER, FOR SOLUTION ORAL at 22:37

## 2021-01-01 RX ADMIN — POLYETHYLENE GLYCOL 3350 17 GRAM(S): 17 POWDER, FOR SOLUTION ORAL at 14:31

## 2021-01-01 RX ADMIN — Medication 40 MILLIEQUIVALENT(S): at 11:41

## 2021-01-01 RX ADMIN — HEPARIN SODIUM 14 UNIT(S)/HR: 5000 INJECTION INTRAVENOUS; SUBCUTANEOUS at 06:57

## 2021-01-01 RX ADMIN — Medication 250 MILLIGRAM(S): at 18:43

## 2021-01-01 RX ADMIN — CHLORHEXIDINE GLUCONATE 1 APPLICATION(S): 213 SOLUTION TOPICAL at 04:22

## 2021-01-01 RX ADMIN — CHLORHEXIDINE GLUCONATE 1 APPLICATION(S): 213 SOLUTION TOPICAL at 05:02

## 2021-01-01 RX ADMIN — Medication 1 PACKET(S): at 05:44

## 2021-01-01 RX ADMIN — ENOXAPARIN SODIUM 130 MILLIGRAM(S): 100 INJECTION SUBCUTANEOUS at 04:53

## 2021-01-01 RX ADMIN — MEROPENEM 100 MILLIGRAM(S): 1 INJECTION INTRAVENOUS at 04:33

## 2021-01-01 RX ADMIN — MIDAZOLAM HYDROCHLORIDE 2.6 MG/KG/HR: 1 INJECTION, SOLUTION INTRAMUSCULAR; INTRAVENOUS at 10:07

## 2021-01-01 RX ADMIN — Medication 85 MILLIMOLE(S): at 08:30

## 2021-01-01 RX ADMIN — Medication 40 MILLIEQUIVALENT(S): at 18:30

## 2021-01-01 RX ADMIN — FENTANYL CITRATE 1.3 MICROGRAM(S)/KG/HR: 50 INJECTION INTRAVENOUS at 04:32

## 2021-01-01 RX ADMIN — CHLORHEXIDINE GLUCONATE 15 MILLILITER(S): 213 SOLUTION TOPICAL at 04:29

## 2021-01-01 RX ADMIN — FENTANYL CITRATE 1.3 MICROGRAM(S)/KG/HR: 50 INJECTION INTRAVENOUS at 18:18

## 2021-01-01 RX ADMIN — PROPOFOL 39 MICROGRAM(S)/KG/MIN: 10 INJECTION, EMULSION INTRAVENOUS at 05:23

## 2021-01-01 RX ADMIN — Medication 1: at 00:31

## 2021-01-01 RX ADMIN — CISATRACURIUM BESYLATE 23.4 MICROGRAM(S)/KG/MIN: 2 INJECTION INTRAVENOUS at 07:00

## 2021-01-01 RX ADMIN — Medication 2 MILLIGRAM(S): at 05:22

## 2021-01-01 RX ADMIN — Medication 0.5 MILLIGRAM(S): at 22:51

## 2021-01-01 RX ADMIN — Medication 8.53 MICROGRAM(S)/KG/MIN: at 07:38

## 2021-01-01 RX ADMIN — CISATRACURIUM BESYLATE 23.4 MICROGRAM(S)/KG/MIN: 2 INJECTION INTRAVENOUS at 04:55

## 2021-01-01 RX ADMIN — PROPOFOL 39 MICROGRAM(S)/KG/MIN: 10 INJECTION, EMULSION INTRAVENOUS at 05:45

## 2021-01-01 RX ADMIN — FENTANYL CITRATE 1.3 MICROGRAM(S)/KG/HR: 50 INJECTION INTRAVENOUS at 00:10

## 2021-01-01 RX ADMIN — HEPARIN SODIUM 14 UNIT(S)/HR: 5000 INJECTION INTRAVENOUS; SUBCUTANEOUS at 08:45

## 2021-01-01 RX ADMIN — CHLORHEXIDINE GLUCONATE 1 APPLICATION(S): 213 SOLUTION TOPICAL at 06:04

## 2021-01-01 RX ADMIN — PANTOPRAZOLE SODIUM 40 MILLIGRAM(S): 20 TABLET, DELAYED RELEASE ORAL at 11:39

## 2021-01-01 RX ADMIN — MEROPENEM 100 MILLIGRAM(S): 1 INJECTION INTRAVENOUS at 04:20

## 2021-01-01 RX ADMIN — Medication 1: at 01:39

## 2021-01-01 RX ADMIN — Medication 125 MILLILITER(S): at 12:17

## 2021-01-01 RX ADMIN — FENTANYL CITRATE 10.4 MICROGRAM(S)/KG/HR: 50 INJECTION INTRAVENOUS at 02:23

## 2021-01-01 RX ADMIN — Medication 650 MILLIGRAM(S): at 08:18

## 2021-01-01 RX ADMIN — FENTANYL CITRATE 1.3 MICROGRAM(S)/KG/HR: 50 INJECTION INTRAVENOUS at 16:02

## 2021-01-01 RX ADMIN — CASPOFUNGIN ACETATE 260 MILLIGRAM(S): 7 INJECTION, POWDER, LYOPHILIZED, FOR SOLUTION INTRAVENOUS at 12:26

## 2021-01-01 RX ADMIN — CHLORHEXIDINE GLUCONATE 15 MILLILITER(S): 213 SOLUTION TOPICAL at 16:01

## 2021-01-01 RX ADMIN — FENTANYL CITRATE 1.3 MICROGRAM(S)/KG/HR: 50 INJECTION INTRAVENOUS at 14:16

## 2021-01-01 RX ADMIN — PROPOFOL 39 MICROGRAM(S)/KG/MIN: 10 INJECTION, EMULSION INTRAVENOUS at 15:25

## 2021-01-01 RX ADMIN — Medication 40 MILLIGRAM(S): at 17:00

## 2021-01-01 RX ADMIN — MEROPENEM 100 MILLIGRAM(S): 1 INJECTION INTRAVENOUS at 13:55

## 2021-01-01 RX ADMIN — HEPARIN SODIUM 18 UNIT(S)/HR: 5000 INJECTION INTRAVENOUS; SUBCUTANEOUS at 20:42

## 2021-01-01 RX ADMIN — HEPARIN SODIUM 15 UNIT(S)/HR: 5000 INJECTION INTRAVENOUS; SUBCUTANEOUS at 06:31

## 2021-01-01 RX ADMIN — KETAMINE HYDROCHLORIDE 3.25 MG/KG/HR: 100 INJECTION INTRAMUSCULAR; INTRAVENOUS at 19:39

## 2021-01-01 RX ADMIN — FENTANYL CITRATE 1.3 MICROGRAM(S)/KG/HR: 50 INJECTION INTRAVENOUS at 12:05

## 2021-01-01 RX ADMIN — SENNA PLUS 10 MILLILITER(S): 8.6 TABLET ORAL at 20:49

## 2021-01-01 RX ADMIN — CHLORHEXIDINE GLUCONATE 1 APPLICATION(S): 213 SOLUTION TOPICAL at 04:04

## 2021-01-01 RX ADMIN — CHLORHEXIDINE GLUCONATE 15 MILLILITER(S): 213 SOLUTION TOPICAL at 06:27

## 2021-01-01 RX ADMIN — CISATRACURIUM BESYLATE 23.4 MICROGRAM(S)/KG/MIN: 2 INJECTION INTRAVENOUS at 02:41

## 2021-01-01 RX ADMIN — CHLORHEXIDINE GLUCONATE 15 MILLILITER(S): 213 SOLUTION TOPICAL at 17:06

## 2021-01-01 RX ADMIN — LINEZOLID 300 MILLIGRAM(S): 600 INJECTION, SOLUTION INTRAVENOUS at 16:03

## 2021-01-01 RX ADMIN — MEROPENEM 100 MILLIGRAM(S): 1 INJECTION INTRAVENOUS at 05:35

## 2021-01-01 RX ADMIN — CASPOFUNGIN ACETATE 260 MILLIGRAM(S): 7 INJECTION, POWDER, LYOPHILIZED, FOR SOLUTION INTRAVENOUS at 10:09

## 2021-01-01 RX ADMIN — MEROPENEM 100 MILLIGRAM(S): 1 INJECTION INTRAVENOUS at 13:43

## 2021-01-01 RX ADMIN — LINEZOLID 300 MILLIGRAM(S): 600 INJECTION, SOLUTION INTRAVENOUS at 17:07

## 2021-01-01 RX ADMIN — MEROPENEM 100 MILLIGRAM(S): 1 INJECTION INTRAVENOUS at 15:34

## 2021-01-01 RX ADMIN — MIDAZOLAM HYDROCHLORIDE 2.6 MG/KG/HR: 1 INJECTION, SOLUTION INTRAMUSCULAR; INTRAVENOUS at 21:30

## 2021-01-01 RX ADMIN — POLYETHYLENE GLYCOL 3350 17 GRAM(S): 17 POWDER, FOR SOLUTION ORAL at 10:06

## 2021-01-01 RX ADMIN — PANTOPRAZOLE SODIUM 40 MILLIGRAM(S): 20 TABLET, DELAYED RELEASE ORAL at 10:38

## 2021-01-01 RX ADMIN — HEPARIN SODIUM 15 UNIT(S)/HR: 5000 INJECTION INTRAVENOUS; SUBCUTANEOUS at 00:46

## 2021-01-01 RX ADMIN — Medication 3 MILLIGRAM(S): at 21:55

## 2021-01-01 RX ADMIN — MIDAZOLAM HYDROCHLORIDE 2.6 MG/KG/HR: 1 INJECTION, SOLUTION INTRAMUSCULAR; INTRAVENOUS at 15:26

## 2021-01-01 RX ADMIN — KETAMINE HYDROCHLORIDE 3.25 MG/KG/HR: 100 INJECTION INTRAMUSCULAR; INTRAVENOUS at 05:24

## 2021-01-01 RX ADMIN — ENOXAPARIN SODIUM 130 MILLIGRAM(S): 100 INJECTION SUBCUTANEOUS at 22:20

## 2021-01-01 RX ADMIN — KETAMINE HYDROCHLORIDE 3.25 MG/KG/HR: 100 INJECTION INTRAMUSCULAR; INTRAVENOUS at 08:46

## 2021-01-01 RX ADMIN — SENNA PLUS 10 MILLILITER(S): 8.6 TABLET ORAL at 21:47

## 2021-01-01 RX ADMIN — Medication 60 MILLIGRAM(S): at 04:12

## 2021-01-01 RX ADMIN — CHLORHEXIDINE GLUCONATE 15 MILLILITER(S): 213 SOLUTION TOPICAL at 05:30

## 2021-01-01 RX ADMIN — HEPARIN SODIUM 14 UNIT(S)/HR: 5000 INJECTION INTRAVENOUS; SUBCUTANEOUS at 07:38

## 2021-01-01 RX ADMIN — CHLORHEXIDINE GLUCONATE 15 MILLILITER(S): 213 SOLUTION TOPICAL at 17:07

## 2021-01-01 RX ADMIN — MEROPENEM 100 MILLIGRAM(S): 1 INJECTION INTRAVENOUS at 20:39

## 2021-01-01 RX ADMIN — HEPARIN SODIUM 14 UNIT(S)/HR: 5000 INJECTION INTRAVENOUS; SUBCUTANEOUS at 20:43

## 2021-01-01 RX ADMIN — KETAMINE HYDROCHLORIDE 3.25 MG/KG/HR: 100 INJECTION INTRAMUSCULAR; INTRAVENOUS at 23:11

## 2021-01-01 RX ADMIN — KETAMINE HYDROCHLORIDE 3.25 MG/KG/HR: 100 INJECTION INTRAMUSCULAR; INTRAVENOUS at 12:38

## 2021-01-01 RX ADMIN — FENTANYL CITRATE 1.3 MICROGRAM(S)/KG/HR: 50 INJECTION INTRAVENOUS at 10:41

## 2021-01-01 RX ADMIN — KETAMINE HYDROCHLORIDE 3.25 MG/KG/HR: 100 INJECTION INTRAMUSCULAR; INTRAVENOUS at 15:06

## 2021-01-01 RX ADMIN — CHLORHEXIDINE GLUCONATE 1 APPLICATION(S): 213 SOLUTION TOPICAL at 04:50

## 2021-01-01 RX ADMIN — NYSTATIN CREAM 1 APPLICATION(S): 100000 CREAM TOPICAL at 16:02

## 2021-01-01 RX ADMIN — PROPOFOL 39 MICROGRAM(S)/KG/MIN: 10 INJECTION, EMULSION INTRAVENOUS at 04:24

## 2021-01-01 RX ADMIN — Medication 250 MILLIGRAM(S): at 14:28

## 2021-01-01 RX ADMIN — PANTOPRAZOLE SODIUM 40 MILLIGRAM(S): 20 TABLET, DELAYED RELEASE ORAL at 12:27

## 2021-01-01 RX ADMIN — Medication 650 MILLIGRAM(S): at 08:34

## 2021-01-01 RX ADMIN — SODIUM CHLORIDE 80 MILLILITER(S): 9 INJECTION, SOLUTION INTRAVENOUS at 11:17

## 2021-01-01 RX ADMIN — Medication 1 APPLICATION(S): at 11:31

## 2021-01-01 RX ADMIN — Medication 650 MILLIGRAM(S): at 21:48

## 2021-01-01 RX ADMIN — Medication 1: at 16:36

## 2021-01-01 RX ADMIN — KETAMINE HYDROCHLORIDE 3.25 MG/KG/HR: 100 INJECTION INTRAMUSCULAR; INTRAVENOUS at 13:01

## 2021-01-01 RX ADMIN — LINEZOLID 300 MILLIGRAM(S): 600 INJECTION, SOLUTION INTRAVENOUS at 05:12

## 2021-01-01 RX ADMIN — Medication 8.53 MICROGRAM(S)/KG/MIN: at 14:05

## 2021-01-01 RX ADMIN — Medication 50 MILLIEQUIVALENT(S): at 04:34

## 2021-01-01 RX ADMIN — Medication 40 MILLIEQUIVALENT(S): at 05:44

## 2021-01-01 RX ADMIN — PANTOPRAZOLE SODIUM 40 MILLIGRAM(S): 20 TABLET, DELAYED RELEASE ORAL at 11:31

## 2021-01-01 RX ADMIN — CHLORHEXIDINE GLUCONATE 1 APPLICATION(S): 213 SOLUTION TOPICAL at 05:30

## 2021-01-01 RX ADMIN — CISATRACURIUM BESYLATE 23.4 MICROGRAM(S)/KG/MIN: 2 INJECTION INTRAVENOUS at 10:36

## 2021-01-01 RX ADMIN — Medication 250 MILLIGRAM(S): at 06:13

## 2021-01-01 RX ADMIN — CEFTRIAXONE 100 MILLIGRAM(S): 500 INJECTION, POWDER, FOR SOLUTION INTRAMUSCULAR; INTRAVENOUS at 15:46

## 2021-01-01 RX ADMIN — PROPOFOL 39 MICROGRAM(S)/KG/MIN: 10 INJECTION, EMULSION INTRAVENOUS at 19:45

## 2021-01-01 RX ADMIN — CHLORHEXIDINE GLUCONATE 15 MILLILITER(S): 213 SOLUTION TOPICAL at 04:50

## 2021-01-01 RX ADMIN — CHLORHEXIDINE GLUCONATE 15 MILLILITER(S): 213 SOLUTION TOPICAL at 16:16

## 2021-01-01 RX ADMIN — PANTOPRAZOLE SODIUM 40 MILLIGRAM(S): 20 TABLET, DELAYED RELEASE ORAL at 12:32

## 2021-01-01 RX ADMIN — REMDESIVIR 500 MILLIGRAM(S): 5 INJECTION INTRAVENOUS at 12:17

## 2021-01-01 RX ADMIN — Medication 650 MILLIGRAM(S): at 23:01

## 2021-01-01 RX ADMIN — FENTANYL CITRATE 1.3 MICROGRAM(S)/KG/HR: 50 INJECTION INTRAVENOUS at 20:02

## 2021-01-01 RX ADMIN — PANTOPRAZOLE SODIUM 40 MILLIGRAM(S): 20 TABLET, DELAYED RELEASE ORAL at 11:08

## 2021-01-01 RX ADMIN — Medication 250 MILLIGRAM(S): at 15:39

## 2021-01-01 RX ADMIN — PANTOPRAZOLE SODIUM 40 MILLIGRAM(S): 20 TABLET, DELAYED RELEASE ORAL at 16:02

## 2021-01-01 RX ADMIN — KETAMINE HYDROCHLORIDE 3.25 MG/KG/HR: 100 INJECTION INTRAMUSCULAR; INTRAVENOUS at 22:44

## 2021-01-01 RX ADMIN — KETAMINE HYDROCHLORIDE 2.6 MG/KG/HR: 100 INJECTION INTRAMUSCULAR; INTRAVENOUS at 19:10

## 2021-01-01 RX ADMIN — FENTANYL CITRATE 1.3 MICROGRAM(S)/KG/HR: 50 INJECTION INTRAVENOUS at 16:15

## 2021-01-01 RX ADMIN — CISATRACURIUM BESYLATE 23.4 MICROGRAM(S)/KG/MIN: 2 INJECTION INTRAVENOUS at 17:31

## 2021-01-01 RX ADMIN — FENTANYL CITRATE 10.4 MICROGRAM(S)/KG/HR: 50 INJECTION INTRAVENOUS at 07:44

## 2021-01-01 RX ADMIN — Medication 2 MILLIGRAM(S): at 04:37

## 2021-01-01 RX ADMIN — MIDAZOLAM HYDROCHLORIDE 2.6 MG/KG/HR: 1 INJECTION, SOLUTION INTRAMUSCULAR; INTRAVENOUS at 05:34

## 2021-01-01 RX ADMIN — FENTANYL CITRATE 1.3 MICROGRAM(S)/KG/HR: 50 INJECTION INTRAVENOUS at 08:46

## 2021-01-01 RX ADMIN — Medication 300 MILLIGRAM(S): at 12:24

## 2021-01-01 RX ADMIN — Medication 400 MILLIGRAM(S): at 12:15

## 2021-01-01 RX ADMIN — PROPOFOL 39 MICROGRAM(S)/KG/MIN: 10 INJECTION, EMULSION INTRAVENOUS at 11:37

## 2021-01-01 RX ADMIN — MEROPENEM 100 MILLIGRAM(S): 1 INJECTION INTRAVENOUS at 20:50

## 2021-01-01 RX ADMIN — Medication 40 MILLIEQUIVALENT(S): at 05:27

## 2021-01-01 RX ADMIN — PROPOFOL 39 MICROGRAM(S)/KG/MIN: 10 INJECTION, EMULSION INTRAVENOUS at 23:30

## 2021-01-01 RX ADMIN — Medication 6.09 MICROGRAM(S)/KG/MIN: at 08:01

## 2021-01-01 RX ADMIN — CEFTRIAXONE 100 MILLIGRAM(S): 500 INJECTION, POWDER, FOR SOLUTION INTRAMUSCULAR; INTRAVENOUS at 12:24

## 2021-01-01 RX ADMIN — FENTANYL CITRATE 1.3 MICROGRAM(S)/KG/HR: 50 INJECTION INTRAVENOUS at 09:30

## 2021-01-01 RX ADMIN — Medication 250 MILLIGRAM(S): at 04:02

## 2021-01-01 RX ADMIN — CHLORHEXIDINE GLUCONATE 15 MILLILITER(S): 213 SOLUTION TOPICAL at 04:13

## 2021-01-01 RX ADMIN — PANTOPRAZOLE SODIUM 40 MILLIGRAM(S): 20 TABLET, DELAYED RELEASE ORAL at 11:14

## 2021-01-01 RX ADMIN — PANTOPRAZOLE SODIUM 40 MILLIGRAM(S): 20 TABLET, DELAYED RELEASE ORAL at 10:51

## 2021-01-01 RX ADMIN — CISATRACURIUM BESYLATE 23.4 MICROGRAM(S)/KG/MIN: 2 INJECTION INTRAVENOUS at 15:52

## 2021-01-01 RX ADMIN — CHLORHEXIDINE GLUCONATE 15 MILLILITER(S): 213 SOLUTION TOPICAL at 16:37

## 2021-01-01 RX ADMIN — Medication 400 MILLIGRAM(S): at 14:46

## 2021-01-01 RX ADMIN — PROPOFOL 39 MICROGRAM(S)/KG/MIN: 10 INJECTION, EMULSION INTRAVENOUS at 08:14

## 2021-01-01 RX ADMIN — NYSTATIN CREAM 1 APPLICATION(S): 100000 CREAM TOPICAL at 16:37

## 2021-01-01 RX ADMIN — CHLORHEXIDINE GLUCONATE 1 APPLICATION(S): 213 SOLUTION TOPICAL at 05:07

## 2021-01-01 RX ADMIN — MEROPENEM 100 MILLIGRAM(S): 1 INJECTION INTRAVENOUS at 04:05

## 2021-01-01 RX ADMIN — CEFTRIAXONE 100 MILLIGRAM(S): 500 INJECTION, POWDER, FOR SOLUTION INTRAMUSCULAR; INTRAVENOUS at 20:30

## 2021-01-01 RX ADMIN — CASPOFUNGIN ACETATE 260 MILLIGRAM(S): 7 INJECTION, POWDER, LYOPHILIZED, FOR SOLUTION INTRAVENOUS at 18:03

## 2021-01-01 RX ADMIN — KETAMINE HYDROCHLORIDE 3.25 MG/KG/HR: 100 INJECTION INTRAMUSCULAR; INTRAVENOUS at 20:28

## 2021-01-01 RX ADMIN — ENOXAPARIN SODIUM 130 MILLIGRAM(S): 100 INJECTION SUBCUTANEOUS at 05:14

## 2021-01-01 RX ADMIN — PROPOFOL 39 MICROGRAM(S)/KG/MIN: 10 INJECTION, EMULSION INTRAVENOUS at 06:12

## 2021-01-01 RX ADMIN — Medication 1 PACKET(S): at 00:00

## 2021-01-01 RX ADMIN — MEROPENEM 100 MILLIGRAM(S): 1 INJECTION INTRAVENOUS at 21:25

## 2021-01-01 RX ADMIN — Medication 40 MILLIGRAM(S): at 15:56

## 2021-01-01 RX ADMIN — PANTOPRAZOLE SODIUM 40 MILLIGRAM(S): 20 TABLET, DELAYED RELEASE ORAL at 11:00

## 2021-01-01 RX ADMIN — PANTOPRAZOLE SODIUM 40 MILLIGRAM(S): 20 TABLET, DELAYED RELEASE ORAL at 12:05

## 2021-01-01 RX ADMIN — SENNA PLUS 10 MILLILITER(S): 8.6 TABLET ORAL at 01:13

## 2021-01-01 RX ADMIN — Medication 1 APPLICATION(S): at 12:00

## 2021-01-01 RX ADMIN — KETAMINE HYDROCHLORIDE 3.25 MG/KG/HR: 100 INJECTION INTRAMUSCULAR; INTRAVENOUS at 01:49

## 2021-01-01 RX ADMIN — FENTANYL CITRATE 1.3 MICROGRAM(S)/KG/HR: 50 INJECTION INTRAVENOUS at 05:56

## 2021-01-01 RX ADMIN — PROPOFOL 39 MICROGRAM(S)/KG/MIN: 10 INJECTION, EMULSION INTRAVENOUS at 14:01

## 2021-01-01 RX ADMIN — CHLORHEXIDINE GLUCONATE 15 MILLILITER(S): 213 SOLUTION TOPICAL at 18:42

## 2021-01-01 RX ADMIN — Medication 1: at 05:11

## 2021-01-01 RX ADMIN — CHLORHEXIDINE GLUCONATE 15 MILLILITER(S): 213 SOLUTION TOPICAL at 17:04

## 2021-01-01 RX ADMIN — PROPOFOL 39 MICROGRAM(S)/KG/MIN: 10 INJECTION, EMULSION INTRAVENOUS at 06:36

## 2021-01-01 RX ADMIN — FENTANYL CITRATE 1.3 MICROGRAM(S)/KG/HR: 50 INJECTION INTRAVENOUS at 05:14

## 2021-01-01 RX ADMIN — PROPOFOL 39 MICROGRAM(S)/KG/MIN: 10 INJECTION, EMULSION INTRAVENOUS at 02:23

## 2021-01-01 RX ADMIN — FENTANYL CITRATE 1.3 MICROGRAM(S)/KG/HR: 50 INJECTION INTRAVENOUS at 04:48

## 2021-01-01 RX ADMIN — CHLORHEXIDINE GLUCONATE 1 APPLICATION(S): 213 SOLUTION TOPICAL at 05:04

## 2021-01-01 RX ADMIN — CASPOFUNGIN ACETATE 260 MILLIGRAM(S): 7 INJECTION, POWDER, LYOPHILIZED, FOR SOLUTION INTRAVENOUS at 17:04

## 2021-01-01 RX ADMIN — PROPOFOL 39 MICROGRAM(S)/KG/MIN: 10 INJECTION, EMULSION INTRAVENOUS at 00:20

## 2021-01-01 RX ADMIN — Medication 1 APPLICATION(S): at 10:03

## 2021-01-01 RX ADMIN — Medication 1 APPLICATION(S): at 10:08

## 2021-01-01 RX ADMIN — CHLORHEXIDINE GLUCONATE 15 MILLILITER(S): 213 SOLUTION TOPICAL at 17:00

## 2021-01-01 RX ADMIN — Medication 1: at 12:41

## 2021-01-01 RX ADMIN — Medication 1: at 16:51

## 2021-01-01 RX ADMIN — FENTANYL CITRATE 1.3 MICROGRAM(S)/KG/HR: 50 INJECTION INTRAVENOUS at 19:38

## 2021-01-01 RX ADMIN — Medication 1 APPLICATION(S): at 10:05

## 2021-01-01 RX ADMIN — FENTANYL CITRATE 10.4 MICROGRAM(S)/KG/HR: 50 INJECTION INTRAVENOUS at 20:52

## 2021-01-01 RX ADMIN — PROPOFOL 39 MICROGRAM(S)/KG/MIN: 10 INJECTION, EMULSION INTRAVENOUS at 09:40

## 2021-01-01 RX ADMIN — CHLORHEXIDINE GLUCONATE 1 APPLICATION(S): 213 SOLUTION TOPICAL at 05:46

## 2021-01-01 RX ADMIN — MEROPENEM 100 MILLIGRAM(S): 1 INJECTION INTRAVENOUS at 20:02

## 2021-01-01 RX ADMIN — PROPOFOL 39 MICROGRAM(S)/KG/MIN: 10 INJECTION, EMULSION INTRAVENOUS at 06:25

## 2021-01-01 RX ADMIN — PROPOFOL 39 MICROGRAM(S)/KG/MIN: 10 INJECTION, EMULSION INTRAVENOUS at 12:35

## 2021-01-01 RX ADMIN — PIPERACILLIN AND TAZOBACTAM 200 GRAM(S): 4; .5 INJECTION, POWDER, LYOPHILIZED, FOR SOLUTION INTRAVENOUS at 02:37

## 2021-01-01 RX ADMIN — Medication 650 MILLIGRAM(S): at 05:01

## 2021-01-01 RX ADMIN — MEROPENEM 100 MILLIGRAM(S): 1 INJECTION INTRAVENOUS at 00:30

## 2021-01-01 RX ADMIN — PROPOFOL 39 MICROGRAM(S)/KG/MIN: 10 INJECTION, EMULSION INTRAVENOUS at 09:18

## 2021-01-01 RX ADMIN — POLYETHYLENE GLYCOL 3350 17 GRAM(S): 17 POWDER, FOR SOLUTION ORAL at 18:40

## 2021-01-01 RX ADMIN — Medication 100 MILLIEQUIVALENT(S): at 13:43

## 2021-01-01 RX ADMIN — KETAMINE HYDROCHLORIDE 2.6 MG/KG/HR: 100 INJECTION INTRAMUSCULAR; INTRAVENOUS at 14:33

## 2021-01-01 RX ADMIN — KETAMINE HYDROCHLORIDE 3.25 MG/KG/HR: 100 INJECTION INTRAMUSCULAR; INTRAVENOUS at 17:43

## 2021-01-01 RX ADMIN — FENTANYL CITRATE 1.3 MICROGRAM(S)/KG/HR: 50 INJECTION INTRAVENOUS at 17:58

## 2021-01-01 RX ADMIN — SENNA PLUS 10 MILLILITER(S): 8.6 TABLET ORAL at 22:31

## 2021-01-01 RX ADMIN — CHLORHEXIDINE GLUCONATE 15 MILLILITER(S): 213 SOLUTION TOPICAL at 05:46

## 2021-01-01 RX ADMIN — Medication 1: at 10:04

## 2021-01-01 RX ADMIN — POLYETHYLENE GLYCOL 3350 17 GRAM(S): 17 POWDER, FOR SOLUTION ORAL at 06:41

## 2021-01-01 RX ADMIN — PROPOFOL 39 MICROGRAM(S)/KG/MIN: 10 INJECTION, EMULSION INTRAVENOUS at 03:21

## 2021-01-01 RX ADMIN — SENNA PLUS 10 MILLILITER(S): 8.6 TABLET ORAL at 21:23

## 2021-01-01 RX ADMIN — SENNA PLUS 10 MILLILITER(S): 8.6 TABLET ORAL at 20:32

## 2021-01-01 RX ADMIN — FENTANYL CITRATE 1.3 MICROGRAM(S)/KG/HR: 50 INJECTION INTRAVENOUS at 04:00

## 2021-01-01 RX ADMIN — KETAMINE HYDROCHLORIDE 3.25 MG/KG/HR: 100 INJECTION INTRAMUSCULAR; INTRAVENOUS at 18:58

## 2021-01-01 RX ADMIN — MEROPENEM 100 MILLIGRAM(S): 1 INJECTION INTRAVENOUS at 13:54

## 2021-01-01 RX ADMIN — FENTANYL CITRATE 1.3 MICROGRAM(S)/KG/HR: 50 INJECTION INTRAVENOUS at 22:57

## 2021-01-01 RX ADMIN — MEROPENEM 100 MILLIGRAM(S): 1 INJECTION INTRAVENOUS at 04:13

## 2021-01-01 RX ADMIN — CISATRACURIUM BESYLATE 23.4 MICROGRAM(S)/KG/MIN: 2 INJECTION INTRAVENOUS at 22:13

## 2021-01-01 RX ADMIN — HEPARIN SODIUM 1400 UNIT(S)/HR: 5000 INJECTION INTRAVENOUS; SUBCUTANEOUS at 11:39

## 2021-01-01 RX ADMIN — PROPOFOL 39 MICROGRAM(S)/KG/MIN: 10 INJECTION, EMULSION INTRAVENOUS at 14:05

## 2021-01-01 RX ADMIN — KETAMINE HYDROCHLORIDE 2.6 MG/KG/HR: 100 INJECTION INTRAMUSCULAR; INTRAVENOUS at 23:29

## 2021-01-01 RX ADMIN — FENTANYL CITRATE 1.3 MICROGRAM(S)/KG/HR: 50 INJECTION INTRAVENOUS at 04:04

## 2021-01-01 RX ADMIN — FENTANYL CITRATE 10.4 MICROGRAM(S)/KG/HR: 50 INJECTION INTRAVENOUS at 14:04

## 2021-01-01 RX ADMIN — CHLORHEXIDINE GLUCONATE 15 MILLILITER(S): 213 SOLUTION TOPICAL at 17:20

## 2021-01-01 RX ADMIN — Medication 1 PACKET(S): at 09:43

## 2021-01-01 RX ADMIN — PROPOFOL 39 MICROGRAM(S)/KG/MIN: 10 INJECTION, EMULSION INTRAVENOUS at 03:39

## 2021-01-01 RX ADMIN — Medication 100 MILLIEQUIVALENT(S): at 15:44

## 2021-01-01 RX ADMIN — REMDESIVIR 500 MILLIGRAM(S): 5 INJECTION INTRAVENOUS at 12:16

## 2021-01-01 RX ADMIN — FENTANYL CITRATE 1.3 MICROGRAM(S)/KG/HR: 50 INJECTION INTRAVENOUS at 04:25

## 2021-01-01 RX ADMIN — FENTANYL CITRATE 1.3 MICROGRAM(S)/KG/HR: 50 INJECTION INTRAVENOUS at 07:30

## 2021-01-01 RX ADMIN — FENTANYL CITRATE 1.3 MICROGRAM(S)/KG/HR: 50 INJECTION INTRAVENOUS at 02:41

## 2021-01-01 RX ADMIN — NYSTATIN CREAM 1 APPLICATION(S): 100000 CREAM TOPICAL at 05:29

## 2021-01-01 RX ADMIN — MUPIROCIN 1 APPLICATION(S): 20 OINTMENT TOPICAL at 18:43

## 2021-01-01 RX ADMIN — PANTOPRAZOLE SODIUM 40 MILLIGRAM(S): 20 TABLET, DELAYED RELEASE ORAL at 11:25

## 2021-01-01 RX ADMIN — MUPIROCIN 1 APPLICATION(S): 20 OINTMENT TOPICAL at 04:35

## 2021-01-01 RX ADMIN — MUPIROCIN 1 APPLICATION(S): 20 OINTMENT TOPICAL at 16:27

## 2021-01-01 RX ADMIN — FENTANYL CITRATE 1.3 MICROGRAM(S)/KG/HR: 50 INJECTION INTRAVENOUS at 18:44

## 2021-01-01 RX ADMIN — FENTANYL CITRATE 10.4 MICROGRAM(S)/KG/HR: 50 INJECTION INTRAVENOUS at 16:39

## 2021-01-01 RX ADMIN — FENTANYL CITRATE 1.3 MICROGRAM(S)/KG/HR: 50 INJECTION INTRAVENOUS at 17:00

## 2021-01-01 RX ADMIN — KETAMINE HYDROCHLORIDE 2.6 MG/KG/HR: 100 INJECTION INTRAMUSCULAR; INTRAVENOUS at 05:22

## 2021-01-01 RX ADMIN — CHLORHEXIDINE GLUCONATE 15 MILLILITER(S): 213 SOLUTION TOPICAL at 05:12

## 2021-01-01 RX ADMIN — MIDAZOLAM HYDROCHLORIDE 2.6 MG/KG/HR: 1 INJECTION, SOLUTION INTRAMUSCULAR; INTRAVENOUS at 10:29

## 2021-01-01 RX ADMIN — CHLORHEXIDINE GLUCONATE 15 MILLILITER(S): 213 SOLUTION TOPICAL at 16:56

## 2021-01-01 RX ADMIN — Medication 1 APPLICATION(S): at 10:02

## 2021-01-01 RX ADMIN — Medication 60 MILLIGRAM(S): at 01:42

## 2021-01-01 RX ADMIN — Medication 250 MILLIGRAM(S): at 06:10

## 2021-01-01 RX ADMIN — Medication 1 PACKET(S): at 12:38

## 2021-01-01 RX ADMIN — Medication 12.2 MICROGRAM(S)/KG/MIN: at 22:52

## 2021-01-01 RX ADMIN — PANTOPRAZOLE SODIUM 40 MILLIGRAM(S): 20 TABLET, DELAYED RELEASE ORAL at 10:03

## 2021-01-01 RX ADMIN — CHLORHEXIDINE GLUCONATE 15 MILLILITER(S): 213 SOLUTION TOPICAL at 05:02

## 2021-01-01 RX ADMIN — LINEZOLID 300 MILLIGRAM(S): 600 INJECTION, SOLUTION INTRAVENOUS at 05:22

## 2021-01-01 RX ADMIN — HEPARIN SODIUM 1700 UNIT(S)/HR: 5000 INJECTION INTRAVENOUS; SUBCUTANEOUS at 14:56

## 2021-01-01 RX ADMIN — KETAMINE HYDROCHLORIDE 3.25 MG/KG/HR: 100 INJECTION INTRAMUSCULAR; INTRAVENOUS at 19:45

## 2021-01-01 RX ADMIN — KETAMINE HYDROCHLORIDE 2.6 MG/KG/HR: 100 INJECTION INTRAMUSCULAR; INTRAVENOUS at 04:17

## 2021-01-01 RX ADMIN — CHLORHEXIDINE GLUCONATE 15 MILLILITER(S): 213 SOLUTION TOPICAL at 16:17

## 2021-01-01 RX ADMIN — PROPOFOL 39 MICROGRAM(S)/KG/MIN: 10 INJECTION, EMULSION INTRAVENOUS at 11:53

## 2021-01-01 RX ADMIN — NYSTATIN CREAM 1 APPLICATION(S): 100000 CREAM TOPICAL at 05:02

## 2021-01-01 RX ADMIN — CISATRACURIUM BESYLATE 23.4 MICROGRAM(S)/KG/MIN: 2 INJECTION INTRAVENOUS at 06:11

## 2021-01-01 RX ADMIN — PANTOPRAZOLE SODIUM 40 MILLIGRAM(S): 20 TABLET, DELAYED RELEASE ORAL at 10:01

## 2021-01-01 RX ADMIN — PROPOFOL 39 MICROGRAM(S)/KG/MIN: 10 INJECTION, EMULSION INTRAVENOUS at 23:59

## 2021-01-01 RX ADMIN — CISATRACURIUM BESYLATE 23.4 MICROGRAM(S)/KG/MIN: 2 INJECTION INTRAVENOUS at 05:45

## 2021-01-01 RX ADMIN — MEROPENEM 100 MILLIGRAM(S): 1 INJECTION INTRAVENOUS at 05:59

## 2021-01-01 RX ADMIN — PROPOFOL 39 MICROGRAM(S)/KG/MIN: 10 INJECTION, EMULSION INTRAVENOUS at 08:47

## 2021-01-01 RX ADMIN — CASPOFUNGIN ACETATE 260 MILLIGRAM(S): 7 INJECTION, POWDER, LYOPHILIZED, FOR SOLUTION INTRAVENOUS at 12:27

## 2021-01-01 RX ADMIN — PANTOPRAZOLE SODIUM 40 MILLIGRAM(S): 20 TABLET, DELAYED RELEASE ORAL at 15:34

## 2021-01-01 RX ADMIN — HEPARIN SODIUM 14 UNIT(S)/HR: 5000 INJECTION INTRAVENOUS; SUBCUTANEOUS at 04:33

## 2021-01-01 RX ADMIN — POLYETHYLENE GLYCOL 3350 17 GRAM(S): 17 POWDER, FOR SOLUTION ORAL at 10:08

## 2021-01-01 RX ADMIN — HEPARIN SODIUM 18 UNIT(S)/HR: 5000 INJECTION INTRAVENOUS; SUBCUTANEOUS at 14:30

## 2021-01-01 RX ADMIN — HEPARIN SODIUM 2300 UNIT(S)/HR: 5000 INJECTION INTRAVENOUS; SUBCUTANEOUS at 21:54

## 2021-01-01 RX ADMIN — CHLORHEXIDINE GLUCONATE 15 MILLILITER(S): 213 SOLUTION TOPICAL at 05:22

## 2021-01-01 RX ADMIN — PANTOPRAZOLE SODIUM 40 MILLIGRAM(S): 20 TABLET, DELAYED RELEASE ORAL at 14:31

## 2021-01-01 RX ADMIN — KETAMINE HYDROCHLORIDE 3.25 MG/KG/HR: 100 INJECTION INTRAMUSCULAR; INTRAVENOUS at 10:36

## 2021-01-01 RX ADMIN — CISATRACURIUM BESYLATE 23.4 MICROGRAM(S)/KG/MIN: 2 INJECTION INTRAVENOUS at 04:16

## 2021-01-01 RX ADMIN — Medication 4 MILLIGRAM(S): at 05:56

## 2021-01-01 RX ADMIN — FENTANYL CITRATE 10.4 MICROGRAM(S)/KG/HR: 50 INJECTION INTRAVENOUS at 15:14

## 2021-01-01 RX ADMIN — POLYETHYLENE GLYCOL 3350 17 GRAM(S): 17 POWDER, FOR SOLUTION ORAL at 18:43

## 2021-01-01 RX ADMIN — CHLORHEXIDINE GLUCONATE 1 APPLICATION(S): 213 SOLUTION TOPICAL at 05:51

## 2021-01-01 RX ADMIN — PROPOFOL 39 MICROGRAM(S)/KG/MIN: 10 INJECTION, EMULSION INTRAVENOUS at 01:30

## 2021-01-01 RX ADMIN — Medication 6 MILLIGRAM(S): at 05:32

## 2021-01-01 RX ADMIN — KETAMINE HYDROCHLORIDE 2.6 MG/KG/HR: 100 INJECTION INTRAMUSCULAR; INTRAVENOUS at 15:04

## 2021-01-01 RX ADMIN — CISATRACURIUM BESYLATE 23.4 MICROGRAM(S)/KG/MIN: 2 INJECTION INTRAVENOUS at 19:41

## 2021-01-01 RX ADMIN — FENTANYL CITRATE 1.3 MICROGRAM(S)/KG/HR: 50 INJECTION INTRAVENOUS at 22:00

## 2021-01-01 RX ADMIN — KETAMINE HYDROCHLORIDE 2.6 MG/KG/HR: 100 INJECTION INTRAMUSCULAR; INTRAVENOUS at 08:13

## 2021-01-01 RX ADMIN — Medication 50 MILLILITER(S): at 18:51

## 2021-01-01 RX ADMIN — POLYETHYLENE GLYCOL 3350 17 GRAM(S): 17 POWDER, FOR SOLUTION ORAL at 10:07

## 2021-01-01 RX ADMIN — KETAMINE HYDROCHLORIDE 3.25 MG/KG/HR: 100 INJECTION INTRAMUSCULAR; INTRAVENOUS at 06:09

## 2021-01-01 RX ADMIN — MEROPENEM 100 MILLIGRAM(S): 1 INJECTION INTRAVENOUS at 14:17

## 2021-01-01 RX ADMIN — CHLORHEXIDINE GLUCONATE 15 MILLILITER(S): 213 SOLUTION TOPICAL at 06:22

## 2021-01-01 RX ADMIN — CHLORHEXIDINE GLUCONATE 1 APPLICATION(S): 213 SOLUTION TOPICAL at 04:06

## 2021-01-01 RX ADMIN — Medication 100 GRAM(S): at 05:26

## 2021-01-01 RX ADMIN — PROPOFOL 39 MICROGRAM(S)/KG/MIN: 10 INJECTION, EMULSION INTRAVENOUS at 16:24

## 2021-01-01 RX ADMIN — Medication 300 MILLIGRAM(S): at 18:15

## 2021-01-01 RX ADMIN — CHLORHEXIDINE GLUCONATE 15 MILLILITER(S): 213 SOLUTION TOPICAL at 05:51

## 2021-01-01 RX ADMIN — HEPARIN SODIUM 18 UNIT(S)/HR: 5000 INJECTION INTRAVENOUS; SUBCUTANEOUS at 03:42

## 2021-01-01 RX ADMIN — FENTANYL CITRATE 1.3 MICROGRAM(S)/KG/HR: 50 INJECTION INTRAVENOUS at 06:36

## 2021-01-01 RX ADMIN — KETAMINE HYDROCHLORIDE 3.25 MG/KG/HR: 100 INJECTION INTRAMUSCULAR; INTRAVENOUS at 10:50

## 2021-01-01 RX ADMIN — PROPOFOL 39 MICROGRAM(S)/KG/MIN: 10 INJECTION, EMULSION INTRAVENOUS at 15:32

## 2021-01-01 RX ADMIN — CHLORHEXIDINE GLUCONATE 15 MILLILITER(S): 213 SOLUTION TOPICAL at 18:40

## 2021-01-01 RX ADMIN — FENTANYL CITRATE 1.3 MICROGRAM(S)/KG/HR: 50 INJECTION INTRAVENOUS at 05:32

## 2021-01-01 RX ADMIN — Medication 975 MILLIGRAM(S): at 13:42

## 2021-01-01 RX ADMIN — CHLORHEXIDINE GLUCONATE 1 APPLICATION(S): 213 SOLUTION TOPICAL at 04:20

## 2021-01-01 RX ADMIN — HEPARIN SODIUM 1400 UNIT(S)/HR: 5000 INJECTION INTRAVENOUS; SUBCUTANEOUS at 06:37

## 2021-01-01 RX ADMIN — FENTANYL CITRATE 1.3 MICROGRAM(S)/KG/HR: 50 INJECTION INTRAVENOUS at 06:37

## 2021-01-01 RX ADMIN — MEROPENEM 100 MILLIGRAM(S): 1 INJECTION INTRAVENOUS at 04:02

## 2021-01-01 RX ADMIN — MUPIROCIN 1 APPLICATION(S): 20 OINTMENT TOPICAL at 04:22

## 2021-01-01 RX ADMIN — ENOXAPARIN SODIUM 130 MILLIGRAM(S): 100 INJECTION SUBCUTANEOUS at 05:06

## 2021-01-01 RX ADMIN — CHLORHEXIDINE GLUCONATE 1 APPLICATION(S): 213 SOLUTION TOPICAL at 06:38

## 2021-01-01 RX ADMIN — CEFTRIAXONE 100 MILLIGRAM(S): 500 INJECTION, POWDER, FOR SOLUTION INTRAMUSCULAR; INTRAVENOUS at 15:51

## 2021-01-01 RX ADMIN — MIDAZOLAM HYDROCHLORIDE 2.6 MG/KG/HR: 1 INJECTION, SOLUTION INTRAMUSCULAR; INTRAVENOUS at 00:20

## 2021-01-01 RX ADMIN — FENTANYL CITRATE 10.4 MICROGRAM(S)/KG/HR: 50 INJECTION INTRAVENOUS at 22:00

## 2021-01-01 RX ADMIN — Medication 300 MILLIGRAM(S): at 04:21

## 2021-01-01 RX ADMIN — HEPARIN SODIUM 18 UNIT(S)/HR: 5000 INJECTION INTRAVENOUS; SUBCUTANEOUS at 05:57

## 2021-01-01 RX ADMIN — Medication 0.5 MILLIGRAM(S): at 15:47

## 2021-01-01 RX ADMIN — CHLORHEXIDINE GLUCONATE 15 MILLILITER(S): 213 SOLUTION TOPICAL at 16:03

## 2021-01-01 RX ADMIN — FENTANYL CITRATE 10.4 MICROGRAM(S)/KG/HR: 50 INJECTION INTRAVENOUS at 10:45

## 2021-01-01 RX ADMIN — FENTANYL CITRATE 10.4 MICROGRAM(S)/KG/HR: 50 INJECTION INTRAVENOUS at 08:17

## 2021-01-01 RX ADMIN — KETAMINE HYDROCHLORIDE 2.6 MG/KG/HR: 100 INJECTION INTRAMUSCULAR; INTRAVENOUS at 16:37

## 2021-01-01 RX ADMIN — FENTANYL CITRATE 1.3 MICROGRAM(S)/KG/HR: 50 INJECTION INTRAVENOUS at 15:51

## 2021-01-01 RX ADMIN — PROPOFOL 39 MICROGRAM(S)/KG/MIN: 10 INJECTION, EMULSION INTRAVENOUS at 22:59

## 2021-01-01 RX ADMIN — Medication 250 MILLIGRAM(S): at 07:00

## 2021-01-01 RX ADMIN — KETAMINE HYDROCHLORIDE 2.6 MG/KG/HR: 100 INJECTION INTRAMUSCULAR; INTRAVENOUS at 03:25

## 2021-01-01 RX ADMIN — PANTOPRAZOLE SODIUM 40 MILLIGRAM(S): 20 TABLET, DELAYED RELEASE ORAL at 11:28

## 2021-01-01 RX ADMIN — PROPOFOL 39 MICROGRAM(S)/KG/MIN: 10 INJECTION, EMULSION INTRAVENOUS at 10:29

## 2021-01-01 RX ADMIN — PROPOFOL 39 MICROGRAM(S)/KG/MIN: 10 INJECTION, EMULSION INTRAVENOUS at 07:44

## 2021-01-01 RX ADMIN — CISATRACURIUM BESYLATE 23.4 MICROGRAM(S)/KG/MIN: 2 INJECTION INTRAVENOUS at 08:13

## 2021-01-01 RX ADMIN — Medication 650 MILLIGRAM(S): at 16:59

## 2021-01-01 RX ADMIN — CISATRACURIUM BESYLATE 10 MILLIGRAM(S): 2 INJECTION INTRAVENOUS at 23:44

## 2021-01-01 RX ADMIN — MIDAZOLAM HYDROCHLORIDE 2.6 MG/KG/HR: 1 INJECTION, SOLUTION INTRAMUSCULAR; INTRAVENOUS at 10:46

## 2021-01-01 RX ADMIN — KETAMINE HYDROCHLORIDE 3.25 MG/KG/HR: 100 INJECTION INTRAMUSCULAR; INTRAVENOUS at 04:17

## 2021-01-01 RX ADMIN — CHLORHEXIDINE GLUCONATE 15 MILLILITER(S): 213 SOLUTION TOPICAL at 04:36

## 2021-01-01 RX ADMIN — MEROPENEM 100 MILLIGRAM(S): 1 INJECTION INTRAVENOUS at 17:08

## 2021-01-01 RX ADMIN — PROPOFOL 39 MICROGRAM(S)/KG/MIN: 10 INJECTION, EMULSION INTRAVENOUS at 07:00

## 2021-01-01 RX ADMIN — FENTANYL CITRATE 10.4 MICROGRAM(S)/KG/HR: 50 INJECTION INTRAVENOUS at 06:08

## 2021-01-01 RX ADMIN — Medication 250 MILLIGRAM(S): at 21:05

## 2021-01-01 RX ADMIN — FENTANYL CITRATE 10.4 MICROGRAM(S)/KG/HR: 50 INJECTION INTRAVENOUS at 01:49

## 2021-01-01 RX ADMIN — CHLORHEXIDINE GLUCONATE 1 APPLICATION(S): 213 SOLUTION TOPICAL at 04:14

## 2021-01-01 RX ADMIN — FENTANYL CITRATE 1.3 MICROGRAM(S)/KG/HR: 50 INJECTION INTRAVENOUS at 19:20

## 2021-01-01 RX ADMIN — PROPOFOL 39 MICROGRAM(S)/KG/MIN: 10 INJECTION, EMULSION INTRAVENOUS at 20:01

## 2021-01-01 RX ADMIN — PANTOPRAZOLE SODIUM 40 MILLIGRAM(S): 20 TABLET, DELAYED RELEASE ORAL at 12:21

## 2021-01-01 RX ADMIN — Medication 40 MILLIGRAM(S): at 21:22

## 2021-01-01 RX ADMIN — SENNA PLUS 10 MILLILITER(S): 8.6 TABLET ORAL at 22:53

## 2021-01-01 RX ADMIN — Medication 10 MILLIGRAM(S): at 13:54

## 2021-01-01 RX ADMIN — PROPOFOL 39 MICROGRAM(S)/KG/MIN: 10 INJECTION, EMULSION INTRAVENOUS at 21:03

## 2021-01-01 RX ADMIN — MEROPENEM 100 MILLIGRAM(S): 1 INJECTION INTRAVENOUS at 04:47

## 2021-01-01 RX ADMIN — FENTANYL CITRATE 1.3 MICROGRAM(S)/KG/HR: 50 INJECTION INTRAVENOUS at 10:36

## 2021-01-01 RX ADMIN — Medication 650 MILLIGRAM(S): at 17:08

## 2021-01-01 RX ADMIN — HEPARIN SODIUM 1400 UNIT(S)/HR: 5000 INJECTION INTRAVENOUS; SUBCUTANEOUS at 07:52

## 2021-01-01 RX ADMIN — CISATRACURIUM BESYLATE 23.4 MICROGRAM(S)/KG/MIN: 2 INJECTION INTRAVENOUS at 18:46

## 2021-01-01 RX ADMIN — FENTANYL CITRATE 1.3 MICROGRAM(S)/KG/HR: 50 INJECTION INTRAVENOUS at 13:00

## 2021-01-01 RX ADMIN — MEROPENEM 100 MILLIGRAM(S): 1 INJECTION INTRAVENOUS at 04:31

## 2021-01-01 RX ADMIN — PROPOFOL 39 MICROGRAM(S)/KG/MIN: 10 INJECTION, EMULSION INTRAVENOUS at 05:51

## 2021-01-01 RX ADMIN — MIDAZOLAM HYDROCHLORIDE 2.6 MG/KG/HR: 1 INJECTION, SOLUTION INTRAMUSCULAR; INTRAVENOUS at 18:13

## 2021-01-01 RX ADMIN — PROPOFOL 39 MICROGRAM(S)/KG/MIN: 10 INJECTION, EMULSION INTRAVENOUS at 04:04

## 2021-01-01 RX ADMIN — KETAMINE HYDROCHLORIDE 2.6 MG/KG/HR: 100 INJECTION INTRAMUSCULAR; INTRAVENOUS at 11:10

## 2021-01-01 RX ADMIN — MUPIROCIN 1 APPLICATION(S): 20 OINTMENT TOPICAL at 04:28

## 2021-01-01 RX ADMIN — KETAMINE HYDROCHLORIDE 3.25 MG/KG/HR: 100 INJECTION INTRAMUSCULAR; INTRAVENOUS at 18:18

## 2021-01-01 RX ADMIN — Medication 1: at 22:20

## 2021-01-01 RX ADMIN — LINEZOLID 300 MILLIGRAM(S): 600 INJECTION, SOLUTION INTRAVENOUS at 16:05

## 2021-01-01 RX ADMIN — POLYETHYLENE GLYCOL 3350 17 GRAM(S): 17 POWDER, FOR SOLUTION ORAL at 17:07

## 2021-01-01 RX ADMIN — CASPOFUNGIN ACETATE 260 MILLIGRAM(S): 7 INJECTION, POWDER, LYOPHILIZED, FOR SOLUTION INTRAVENOUS at 16:02

## 2021-01-01 RX ADMIN — PROPOFOL 39 MICROGRAM(S)/KG/MIN: 10 INJECTION, EMULSION INTRAVENOUS at 08:55

## 2021-01-01 RX ADMIN — PROPOFOL 39 MICROGRAM(S)/KG/MIN: 10 INJECTION, EMULSION INTRAVENOUS at 04:48

## 2021-01-01 RX ADMIN — POLYETHYLENE GLYCOL 3350 17 GRAM(S): 17 POWDER, FOR SOLUTION ORAL at 04:05

## 2021-01-01 RX ADMIN — POLYETHYLENE GLYCOL 3350 17 GRAM(S): 17 POWDER, FOR SOLUTION ORAL at 18:05

## 2021-01-01 RX ADMIN — KETAMINE HYDROCHLORIDE 2.6 MG/KG/HR: 100 INJECTION INTRAMUSCULAR; INTRAVENOUS at 14:51

## 2021-01-01 RX ADMIN — Medication 40 MILLIGRAM(S): at 10:28

## 2021-01-01 RX ADMIN — Medication 8.53 MICROGRAM(S)/KG/MIN: at 07:51

## 2021-01-01 RX ADMIN — Medication 0.5 MILLIGRAM(S): at 19:52

## 2021-01-01 RX ADMIN — POLYETHYLENE GLYCOL 3350 17 GRAM(S): 17 POWDER, FOR SOLUTION ORAL at 16:58

## 2021-01-01 RX ADMIN — KETAMINE HYDROCHLORIDE 2.6 MG/KG/HR: 100 INJECTION INTRAMUSCULAR; INTRAVENOUS at 08:55

## 2021-01-01 RX ADMIN — CHLORHEXIDINE GLUCONATE 15 MILLILITER(S): 213 SOLUTION TOPICAL at 18:04

## 2021-01-01 RX ADMIN — POLYETHYLENE GLYCOL 3350 17 GRAM(S): 17 POWDER, FOR SOLUTION ORAL at 05:47

## 2021-01-01 RX ADMIN — KETAMINE HYDROCHLORIDE 3.25 MG/KG/HR: 100 INJECTION INTRAMUSCULAR; INTRAVENOUS at 10:09

## 2021-01-01 RX ADMIN — FENTANYL CITRATE 1.3 MICROGRAM(S)/KG/HR: 50 INJECTION INTRAVENOUS at 07:57

## 2021-01-01 RX ADMIN — Medication 650 MILLIGRAM(S): at 16:37

## 2021-01-01 RX ADMIN — Medication 40 MILLIGRAM(S): at 18:40

## 2021-01-01 RX ADMIN — SENNA PLUS 10 MILLILITER(S): 8.6 TABLET ORAL at 20:01

## 2021-01-01 RX ADMIN — Medication 250 MILLIGRAM(S): at 04:32

## 2021-01-01 RX ADMIN — FENTANYL CITRATE 1.3 MICROGRAM(S)/KG/HR: 50 INJECTION INTRAVENOUS at 21:00

## 2021-01-01 RX ADMIN — KETAMINE HYDROCHLORIDE 3.25 MG/KG/HR: 100 INJECTION INTRAMUSCULAR; INTRAVENOUS at 11:55

## 2021-01-01 RX ADMIN — Medication 60 MILLIGRAM(S): at 10:38

## 2021-01-01 RX ADMIN — Medication 12.2 MICROGRAM(S)/KG/MIN: at 21:04

## 2021-01-01 RX ADMIN — PROPOFOL 39 MICROGRAM(S)/KG/MIN: 10 INJECTION, EMULSION INTRAVENOUS at 05:56

## 2021-01-01 RX ADMIN — Medication 975 MILLIGRAM(S): at 13:56

## 2021-01-01 RX ADMIN — Medication 1 PACKET(S): at 11:37

## 2021-01-01 RX ADMIN — Medication 650 MILLIGRAM(S): at 16:50

## 2021-01-01 RX ADMIN — CISATRACURIUM BESYLATE 23.4 MICROGRAM(S)/KG/MIN: 2 INJECTION INTRAVENOUS at 05:37

## 2021-01-01 RX ADMIN — Medication 1: at 10:27

## 2021-01-01 RX ADMIN — Medication 1 APPLICATION(S): at 12:21

## 2021-01-01 RX ADMIN — SENNA PLUS 10 MILLILITER(S): 8.6 TABLET ORAL at 21:25

## 2021-01-01 RX ADMIN — MEROPENEM 100 MILLIGRAM(S): 1 INJECTION INTRAVENOUS at 05:24

## 2021-01-01 RX ADMIN — MIDAZOLAM HYDROCHLORIDE 2.6 MG/KG/HR: 1 INJECTION, SOLUTION INTRAMUSCULAR; INTRAVENOUS at 04:33

## 2021-01-01 RX ADMIN — MEROPENEM 100 MILLIGRAM(S): 1 INJECTION INTRAVENOUS at 05:57

## 2021-01-01 RX ADMIN — Medication 6 MILLIGRAM(S): at 20:34

## 2021-01-01 RX ADMIN — FENTANYL CITRATE 10.4 MICROGRAM(S)/KG/HR: 50 INJECTION INTRAVENOUS at 08:37

## 2021-01-01 RX ADMIN — CHLORHEXIDINE GLUCONATE 15 MILLILITER(S): 213 SOLUTION TOPICAL at 16:58

## 2021-01-01 RX ADMIN — Medication 250 MILLIGRAM(S): at 22:14

## 2021-01-01 RX ADMIN — Medication 40 MILLIEQUIVALENT(S): at 21:52

## 2021-01-01 RX ADMIN — KETAMINE HYDROCHLORIDE 3.25 MG/KG/HR: 100 INJECTION INTRAMUSCULAR; INTRAVENOUS at 07:12

## 2021-01-01 RX ADMIN — Medication 1: at 05:58

## 2021-01-01 RX ADMIN — FENTANYL CITRATE 1.3 MICROGRAM(S)/KG/HR: 50 INJECTION INTRAVENOUS at 19:41

## 2021-01-01 RX ADMIN — KETAMINE HYDROCHLORIDE 3.25 MG/KG/HR: 100 INJECTION INTRAMUSCULAR; INTRAVENOUS at 20:39

## 2021-01-01 RX ADMIN — FENTANYL CITRATE 1.3 MICROGRAM(S)/KG/HR: 50 INJECTION INTRAVENOUS at 09:50

## 2021-01-01 RX ADMIN — POLYETHYLENE GLYCOL 3350 17 GRAM(S): 17 POWDER, FOR SOLUTION ORAL at 18:03

## 2021-01-01 RX ADMIN — Medication 8.53 MICROGRAM(S)/KG/MIN: at 05:45

## 2021-01-01 RX ADMIN — FENTANYL CITRATE 1.3 MICROGRAM(S)/KG/HR: 50 INJECTION INTRAVENOUS at 03:27

## 2021-01-01 RX ADMIN — Medication 650 MILLIGRAM(S): at 12:39

## 2021-01-01 RX ADMIN — FENTANYL CITRATE 1.3 MICROGRAM(S)/KG/HR: 50 INJECTION INTRAVENOUS at 21:03

## 2021-01-01 RX ADMIN — FENTANYL CITRATE 10.4 MICROGRAM(S)/KG/HR: 50 INJECTION INTRAVENOUS at 17:35

## 2021-01-01 RX ADMIN — Medication 1: at 11:38

## 2021-01-01 RX ADMIN — FENTANYL CITRATE 1.3 MICROGRAM(S)/KG/HR: 50 INJECTION INTRAVENOUS at 22:43

## 2021-01-01 RX ADMIN — LINEZOLID 300 MILLIGRAM(S): 600 INJECTION, SOLUTION INTRAVENOUS at 06:28

## 2021-01-01 RX ADMIN — PANTOPRAZOLE SODIUM 40 MILLIGRAM(S): 20 TABLET, DELAYED RELEASE ORAL at 12:40

## 2021-01-01 RX ADMIN — Medication 975 MILLIGRAM(S): at 22:42

## 2021-01-01 RX ADMIN — KETAMINE HYDROCHLORIDE 3.25 MG/KG/HR: 100 INJECTION INTRAMUSCULAR; INTRAVENOUS at 17:35

## 2021-01-01 RX ADMIN — KETAMINE HYDROCHLORIDE 3.25 MG/KG/HR: 100 INJECTION INTRAMUSCULAR; INTRAVENOUS at 18:56

## 2021-01-01 RX ADMIN — Medication 1 APPLICATION(S): at 11:20

## 2021-01-01 RX ADMIN — CHLORHEXIDINE GLUCONATE 15 MILLILITER(S): 213 SOLUTION TOPICAL at 04:02

## 2021-01-01 RX ADMIN — KETAMINE HYDROCHLORIDE 2.6 MG/KG/HR: 100 INJECTION INTRAMUSCULAR; INTRAVENOUS at 07:52

## 2021-01-01 RX ADMIN — Medication 2: at 23:03

## 2021-01-01 RX ADMIN — KETAMINE HYDROCHLORIDE 3.25 MG/KG/HR: 100 INJECTION INTRAMUSCULAR; INTRAVENOUS at 22:58

## 2021-01-01 RX ADMIN — MEROPENEM 100 MILLIGRAM(S): 1 INJECTION INTRAVENOUS at 13:15

## 2021-01-01 RX ADMIN — PROPOFOL 39 MICROGRAM(S)/KG/MIN: 10 INJECTION, EMULSION INTRAVENOUS at 08:17

## 2021-01-01 RX ADMIN — FENTANYL CITRATE 1.3 MICROGRAM(S)/KG/HR: 50 INJECTION INTRAVENOUS at 22:37

## 2021-01-01 RX ADMIN — FENTANYL CITRATE 1.3 MICROGRAM(S)/KG/HR: 50 INJECTION INTRAVENOUS at 17:05

## 2021-01-01 RX ADMIN — SENNA PLUS 10 MILLILITER(S): 8.6 TABLET ORAL at 22:38

## 2021-01-01 RX ADMIN — POLYETHYLENE GLYCOL 3350 17 GRAM(S): 17 POWDER, FOR SOLUTION ORAL at 05:52

## 2021-01-01 RX ADMIN — CHLORHEXIDINE GLUCONATE 15 MILLILITER(S): 213 SOLUTION TOPICAL at 05:37

## 2021-01-01 RX ADMIN — Medication 40 MILLIGRAM(S): at 23:36

## 2021-01-01 RX ADMIN — PROPOFOL 39 MICROGRAM(S)/KG/MIN: 10 INJECTION, EMULSION INTRAVENOUS at 19:38

## 2021-01-01 RX ADMIN — FENTANYL CITRATE 1.3 MICROGRAM(S)/KG/HR: 50 INJECTION INTRAVENOUS at 17:01

## 2021-01-01 RX ADMIN — CISATRACURIUM BESYLATE 23.4 MICROGRAM(S)/KG/MIN: 2 INJECTION INTRAVENOUS at 00:10

## 2021-01-01 RX ADMIN — KETAMINE HYDROCHLORIDE 3.25 MG/KG/HR: 100 INJECTION INTRAMUSCULAR; INTRAVENOUS at 19:49

## 2021-01-01 RX ADMIN — PANTOPRAZOLE SODIUM 40 MILLIGRAM(S): 20 TABLET, DELAYED RELEASE ORAL at 13:15

## 2021-01-01 RX ADMIN — PROPOFOL 39 MICROGRAM(S)/KG/MIN: 10 INJECTION, EMULSION INTRAVENOUS at 08:46

## 2021-01-01 RX ADMIN — PROPOFOL 39 MICROGRAM(S)/KG/MIN: 10 INJECTION, EMULSION INTRAVENOUS at 13:03

## 2021-01-01 RX ADMIN — KETAMINE HYDROCHLORIDE 2.6 MG/KG/HR: 100 INJECTION INTRAMUSCULAR; INTRAVENOUS at 19:40

## 2021-01-01 RX ADMIN — POLYETHYLENE GLYCOL 3350 17 GRAM(S): 17 POWDER, FOR SOLUTION ORAL at 05:13

## 2021-01-01 RX ADMIN — CHLORHEXIDINE GLUCONATE 15 MILLILITER(S): 213 SOLUTION TOPICAL at 17:48

## 2021-01-01 RX ADMIN — MEROPENEM 100 MILLIGRAM(S): 1 INJECTION INTRAVENOUS at 18:41

## 2021-01-01 RX ADMIN — Medication 40 MILLIGRAM(S): at 18:05

## 2021-01-01 RX ADMIN — CASPOFUNGIN ACETATE 260 MILLIGRAM(S): 7 INJECTION, POWDER, LYOPHILIZED, FOR SOLUTION INTRAVENOUS at 16:16

## 2021-01-01 RX ADMIN — Medication 6 MILLIGRAM(S): at 04:20

## 2021-01-01 RX ADMIN — PROPOFOL 39 MICROGRAM(S)/KG/MIN: 10 INJECTION, EMULSION INTRAVENOUS at 22:56

## 2021-01-01 RX ADMIN — CISATRACURIUM BESYLATE 23.4 MICROGRAM(S)/KG/MIN: 2 INJECTION INTRAVENOUS at 12:17

## 2021-01-01 RX ADMIN — CHLORHEXIDINE GLUCONATE 15 MILLILITER(S): 213 SOLUTION TOPICAL at 16:02

## 2021-01-01 RX ADMIN — Medication 6.09 MICROGRAM(S)/KG/MIN: at 08:53

## 2021-01-01 RX ADMIN — PROPOFOL 39 MICROGRAM(S)/KG/MIN: 10 INJECTION, EMULSION INTRAVENOUS at 09:20

## 2021-01-01 RX ADMIN — MEROPENEM 100 MILLIGRAM(S): 1 INJECTION INTRAVENOUS at 12:12

## 2021-01-01 RX ADMIN — CASPOFUNGIN ACETATE 260 MILLIGRAM(S): 7 INJECTION, POWDER, LYOPHILIZED, FOR SOLUTION INTRAVENOUS at 12:24

## 2021-01-01 RX ADMIN — FENTANYL CITRATE 1.3 MICROGRAM(S)/KG/HR: 50 INJECTION INTRAVENOUS at 15:55

## 2021-01-01 RX ADMIN — HEPARIN SODIUM 5000 UNIT(S): 5000 INJECTION INTRAVENOUS; SUBCUTANEOUS at 13:44

## 2021-01-01 RX ADMIN — Medication 40 MILLIEQUIVALENT(S): at 04:13

## 2021-01-01 RX ADMIN — FENTANYL CITRATE 1.3 MICROGRAM(S)/KG/HR: 50 INJECTION INTRAVENOUS at 16:54

## 2021-01-01 RX ADMIN — Medication 10 MILLIGRAM(S): at 21:24

## 2021-01-01 RX ADMIN — KETAMINE HYDROCHLORIDE 3.25 MG/KG/HR: 100 INJECTION INTRAMUSCULAR; INTRAVENOUS at 02:43

## 2021-01-01 RX ADMIN — Medication 650 MILLIGRAM(S): at 19:08

## 2021-01-01 RX ADMIN — CHLORHEXIDINE GLUCONATE 1 APPLICATION(S): 213 SOLUTION TOPICAL at 05:17

## 2021-01-01 RX ADMIN — PROPOFOL 39 MICROGRAM(S)/KG/MIN: 10 INJECTION, EMULSION INTRAVENOUS at 17:05

## 2021-01-01 RX ADMIN — POLYETHYLENE GLYCOL 3350 17 GRAM(S): 17 POWDER, FOR SOLUTION ORAL at 04:14

## 2021-01-01 RX ADMIN — Medication 1: at 23:09

## 2021-01-01 RX ADMIN — FENTANYL CITRATE 1.3 MICROGRAM(S)/KG/HR: 50 INJECTION INTRAVENOUS at 14:08

## 2021-01-01 RX ADMIN — FENTANYL CITRATE 1.3 MICROGRAM(S)/KG/HR: 50 INJECTION INTRAVENOUS at 16:37

## 2021-01-01 RX ADMIN — Medication 1 APPLICATION(S): at 10:07

## 2021-01-01 RX ADMIN — Medication 12.2 MICROGRAM(S)/KG/MIN: at 07:53

## 2021-01-01 RX ADMIN — FENTANYL CITRATE 1.3 MICROGRAM(S)/KG/HR: 50 INJECTION INTRAVENOUS at 09:48

## 2021-01-01 RX ADMIN — Medication 50 MILLIEQUIVALENT(S): at 10:10

## 2021-01-01 RX ADMIN — PROPOFOL 39 MICROGRAM(S)/KG/MIN: 10 INJECTION, EMULSION INTRAVENOUS at 22:52

## 2021-01-01 RX ADMIN — PROPOFOL 39 MICROGRAM(S)/KG/MIN: 10 INJECTION, EMULSION INTRAVENOUS at 17:00

## 2021-01-01 RX ADMIN — CHLORHEXIDINE GLUCONATE 1 APPLICATION(S): 213 SOLUTION TOPICAL at 05:12

## 2021-01-01 RX ADMIN — Medication 650 MILLIGRAM(S): at 18:06

## 2021-01-01 RX ADMIN — MIDAZOLAM HYDROCHLORIDE 2.6 MG/KG/HR: 1 INJECTION, SOLUTION INTRAMUSCULAR; INTRAVENOUS at 11:30

## 2021-01-01 RX ADMIN — KETAMINE HYDROCHLORIDE 3.25 MG/KG/HR: 100 INJECTION INTRAMUSCULAR; INTRAVENOUS at 08:17

## 2021-01-01 RX ADMIN — VASOPRESSIN 2.4 UNIT(S)/MIN: 20 INJECTION INTRAVENOUS at 08:15

## 2021-01-01 RX ADMIN — HEPARIN SODIUM 15 UNIT(S)/HR: 5000 INJECTION INTRAVENOUS; SUBCUTANEOUS at 14:06

## 2021-01-01 RX ADMIN — ENOXAPARIN SODIUM 130 MILLIGRAM(S): 100 INJECTION SUBCUTANEOUS at 04:20

## 2021-01-01 RX ADMIN — FENTANYL CITRATE 1.3 MICROGRAM(S)/KG/HR: 50 INJECTION INTRAVENOUS at 08:53

## 2021-01-01 RX ADMIN — Medication 1: at 05:46

## 2021-01-01 RX ADMIN — PROPOFOL 39 MICROGRAM(S)/KG/MIN: 10 INJECTION, EMULSION INTRAVENOUS at 01:02

## 2021-01-01 RX ADMIN — KETAMINE HYDROCHLORIDE 3.25 MG/KG/HR: 100 INJECTION INTRAMUSCULAR; INTRAVENOUS at 19:38

## 2021-01-01 RX ADMIN — FENTANYL CITRATE 1.3 MICROGRAM(S)/KG/HR: 50 INJECTION INTRAVENOUS at 04:30

## 2021-01-01 RX ADMIN — HEPARIN SODIUM 18 UNIT(S)/HR: 5000 INJECTION INTRAVENOUS; SUBCUTANEOUS at 04:30

## 2021-01-01 RX ADMIN — Medication 300 MILLIGRAM(S): at 17:04

## 2021-01-01 RX ADMIN — CISATRACURIUM BESYLATE 20 MILLIGRAM(S): 2 INJECTION INTRAVENOUS at 10:34

## 2021-01-01 RX ADMIN — FENTANYL CITRATE 10.4 MICROGRAM(S)/KG/HR: 50 INJECTION INTRAVENOUS at 21:24

## 2021-01-01 RX ADMIN — Medication 6 MILLIGRAM(S): at 20:31

## 2021-01-01 RX ADMIN — Medication 8.53 MICROGRAM(S)/KG/MIN: at 08:16

## 2021-01-01 RX ADMIN — HEPARIN SODIUM 5000 UNIT(S): 5000 INJECTION INTRAVENOUS; SUBCUTANEOUS at 14:57

## 2021-01-01 RX ADMIN — SENNA PLUS 10 MILLILITER(S): 8.6 TABLET ORAL at 20:39

## 2021-01-01 RX ADMIN — Medication 125 MILLILITER(S): at 11:51

## 2021-01-01 RX ADMIN — FENTANYL CITRATE 1.3 MICROGRAM(S)/KG/HR: 50 INJECTION INTRAVENOUS at 01:45

## 2021-01-01 RX ADMIN — MEROPENEM 100 MILLIGRAM(S): 1 INJECTION INTRAVENOUS at 20:34

## 2021-01-01 RX ADMIN — KETAMINE HYDROCHLORIDE 3.25 MG/KG/HR: 100 INJECTION INTRAMUSCULAR; INTRAVENOUS at 08:56

## 2021-01-01 RX ADMIN — Medication 1 PACKET(S): at 17:48

## 2021-01-01 RX ADMIN — PROPOFOL 39 MICROGRAM(S)/KG/MIN: 10 INJECTION, EMULSION INTRAVENOUS at 01:49

## 2021-01-01 RX ADMIN — CHLORHEXIDINE GLUCONATE 1 APPLICATION(S): 213 SOLUTION TOPICAL at 06:22

## 2021-01-01 RX ADMIN — HEPARIN SODIUM 14 UNIT(S)/HR: 5000 INJECTION INTRAVENOUS; SUBCUTANEOUS at 10:57

## 2021-01-01 RX ADMIN — MUPIROCIN 1 APPLICATION(S): 20 OINTMENT TOPICAL at 21:57

## 2021-01-01 RX ADMIN — CHLORHEXIDINE GLUCONATE 1 APPLICATION(S): 213 SOLUTION TOPICAL at 05:38

## 2021-01-01 RX ADMIN — Medication 6 MILLIGRAM(S): at 05:52

## 2021-01-01 RX ADMIN — SODIUM CHLORIDE 500 MILLILITER(S): 9 INJECTION, SOLUTION INTRAVENOUS at 01:00

## 2021-01-01 RX ADMIN — KETAMINE HYDROCHLORIDE 3.25 MG/KG/HR: 100 INJECTION INTRAMUSCULAR; INTRAVENOUS at 21:07

## 2021-01-01 RX ADMIN — KETAMINE HYDROCHLORIDE 3.25 MG/KG/HR: 100 INJECTION INTRAMUSCULAR; INTRAVENOUS at 14:04

## 2021-01-01 RX ADMIN — SENNA PLUS 10 MILLILITER(S): 8.6 TABLET ORAL at 20:34

## 2021-01-01 RX ADMIN — Medication 650 MILLIGRAM(S): at 06:04

## 2021-01-01 RX ADMIN — PROPOFOL 39 MICROGRAM(S)/KG/MIN: 10 INJECTION, EMULSION INTRAVENOUS at 20:39

## 2021-01-01 RX ADMIN — KETAMINE HYDROCHLORIDE 3.25 MG/KG/HR: 100 INJECTION INTRAMUSCULAR; INTRAVENOUS at 22:10

## 2021-01-01 RX ADMIN — FENTANYL CITRATE 1.3 MICROGRAM(S)/KG/HR: 50 INJECTION INTRAVENOUS at 01:30

## 2021-01-01 RX ADMIN — ONDANSETRON 4 MILLIGRAM(S): 8 TABLET, FILM COATED ORAL at 15:43

## 2021-01-01 RX ADMIN — PROPOFOL 39 MICROGRAM(S)/KG/MIN: 10 INJECTION, EMULSION INTRAVENOUS at 04:18

## 2021-01-01 RX ADMIN — FENTANYL CITRATE 1.3 MICROGRAM(S)/KG/HR: 50 INJECTION INTRAVENOUS at 06:13

## 2021-01-01 RX ADMIN — PANTOPRAZOLE SODIUM 40 MILLIGRAM(S): 20 TABLET, DELAYED RELEASE ORAL at 12:38

## 2021-01-01 RX ADMIN — KETAMINE HYDROCHLORIDE 3.25 MG/KG/HR: 100 INJECTION INTRAMUSCULAR; INTRAVENOUS at 02:27

## 2021-01-01 RX ADMIN — KETAMINE HYDROCHLORIDE 3.25 MG/KG/HR: 100 INJECTION INTRAMUSCULAR; INTRAVENOUS at 07:38

## 2021-01-01 RX ADMIN — Medication 1 PACKET(S): at 17:17

## 2021-01-01 RX ADMIN — Medication 85 MILLIMOLE(S): at 09:32

## 2021-01-01 RX ADMIN — Medication 40 MILLIGRAM(S): at 10:57

## 2021-01-01 RX ADMIN — MUPIROCIN 1 APPLICATION(S): 20 OINTMENT TOPICAL at 09:58

## 2021-01-01 RX ADMIN — FENTANYL CITRATE 6.5 MICROGRAM(S)/KG/HR: 50 INJECTION INTRAVENOUS at 22:51

## 2021-01-01 RX ADMIN — FENTANYL CITRATE 10.4 MICROGRAM(S)/KG/HR: 50 INJECTION INTRAVENOUS at 02:49

## 2021-01-01 RX ADMIN — Medication 650 MILLIGRAM(S): at 01:31

## 2021-01-01 RX ADMIN — Medication 400 MILLIGRAM(S): at 22:19

## 2021-01-01 RX ADMIN — Medication 2 MILLIGRAM(S): at 05:35

## 2021-01-01 RX ADMIN — CHLORHEXIDINE GLUCONATE 1 APPLICATION(S): 213 SOLUTION TOPICAL at 05:37

## 2021-01-01 RX ADMIN — FENTANYL CITRATE 10.4 MICROGRAM(S)/KG/HR: 50 INJECTION INTRAVENOUS at 13:02

## 2021-01-01 RX ADMIN — HEPARIN SODIUM 14 UNIT(S)/HR: 5000 INJECTION INTRAVENOUS; SUBCUTANEOUS at 18:05

## 2021-01-01 RX ADMIN — MEROPENEM 100 MILLIGRAM(S): 1 INJECTION INTRAVENOUS at 21:22

## 2021-01-01 RX ADMIN — FENTANYL CITRATE 1.3 MICROGRAM(S)/KG/HR: 50 INJECTION INTRAVENOUS at 10:25

## 2021-01-01 RX ADMIN — Medication 6 MILLIGRAM(S): at 20:11

## 2021-01-01 RX ADMIN — CHLORHEXIDINE GLUCONATE 1 APPLICATION(S): 213 SOLUTION TOPICAL at 04:46

## 2021-01-01 RX ADMIN — KETAMINE HYDROCHLORIDE 3.25 MG/KG/HR: 100 INJECTION INTRAMUSCULAR; INTRAVENOUS at 07:00

## 2021-01-01 RX ADMIN — PROPOFOL 39 MICROGRAM(S)/KG/MIN: 10 INJECTION, EMULSION INTRAVENOUS at 19:10

## 2021-01-01 RX ADMIN — Medication 250 MILLIGRAM(S): at 21:24

## 2021-01-01 RX ADMIN — HEPARIN SODIUM 15 UNIT(S)/HR: 5000 INJECTION INTRAVENOUS; SUBCUTANEOUS at 13:04

## 2021-01-01 RX ADMIN — PROPOFOL 39 MICROGRAM(S)/KG/MIN: 10 INJECTION, EMULSION INTRAVENOUS at 22:17

## 2021-01-01 RX ADMIN — MEROPENEM 100 MILLIGRAM(S): 1 INJECTION INTRAVENOUS at 14:09

## 2021-01-01 RX ADMIN — MIDAZOLAM HYDROCHLORIDE 2.6 MG/KG/HR: 1 INJECTION, SOLUTION INTRAMUSCULAR; INTRAVENOUS at 09:50

## 2021-01-01 RX ADMIN — CHLORHEXIDINE GLUCONATE 15 MILLILITER(S): 213 SOLUTION TOPICAL at 05:07

## 2021-01-01 RX ADMIN — LINEZOLID 300 MILLIGRAM(S): 600 INJECTION, SOLUTION INTRAVENOUS at 05:01

## 2021-01-01 RX ADMIN — HEPARIN SODIUM 14 UNIT(S)/HR: 5000 INJECTION INTRAVENOUS; SUBCUTANEOUS at 22:15

## 2021-01-01 RX ADMIN — FENTANYL CITRATE 6.5 MICROGRAM(S)/KG/HR: 50 INJECTION INTRAVENOUS at 17:43

## 2021-01-01 RX ADMIN — CHLORHEXIDINE GLUCONATE 15 MILLILITER(S): 213 SOLUTION TOPICAL at 16:50

## 2021-01-01 RX ADMIN — Medication 650 MILLIGRAM(S): at 08:53

## 2021-01-01 RX ADMIN — MIDAZOLAM HYDROCHLORIDE 2.6 MG/KG/HR: 1 INJECTION, SOLUTION INTRAMUSCULAR; INTRAVENOUS at 13:00

## 2021-01-01 RX ADMIN — Medication 8.53 MICROGRAM(S)/KG/MIN: at 19:45

## 2021-01-01 RX ADMIN — KETAMINE HYDROCHLORIDE 3.25 MG/KG/HR: 100 INJECTION INTRAMUSCULAR; INTRAVENOUS at 15:42

## 2021-01-01 RX ADMIN — CHLORHEXIDINE GLUCONATE 15 MILLILITER(S): 213 SOLUTION TOPICAL at 05:53

## 2021-01-01 RX ADMIN — HEPARIN SODIUM 14 UNIT(S)/HR: 5000 INJECTION INTRAVENOUS; SUBCUTANEOUS at 08:14

## 2021-01-01 RX ADMIN — PANTOPRAZOLE SODIUM 40 MILLIGRAM(S): 20 TABLET, DELAYED RELEASE ORAL at 11:13

## 2021-01-01 RX ADMIN — FENTANYL CITRATE 1.3 MICROGRAM(S)/KG/HR: 50 INJECTION INTRAVENOUS at 02:21

## 2021-01-01 RX ADMIN — Medication 6 MILLIGRAM(S): at 19:40

## 2021-01-01 RX ADMIN — REMDESIVIR 500 MILLIGRAM(S): 5 INJECTION INTRAVENOUS at 12:08

## 2021-01-01 RX ADMIN — POLYETHYLENE GLYCOL 3350 17 GRAM(S): 17 POWDER, FOR SOLUTION ORAL at 17:35

## 2021-01-01 RX ADMIN — CHLORHEXIDINE GLUCONATE 15 MILLILITER(S): 213 SOLUTION TOPICAL at 06:03

## 2021-01-01 RX ADMIN — HEPARIN SODIUM 14 UNIT(S)/HR: 5000 INJECTION INTRAVENOUS; SUBCUTANEOUS at 05:45

## 2021-01-01 RX ADMIN — PANTOPRAZOLE SODIUM 40 MILLIGRAM(S): 20 TABLET, DELAYED RELEASE ORAL at 15:43

## 2021-01-01 RX ADMIN — PROPOFOL 39 MICROGRAM(S)/KG/MIN: 10 INJECTION, EMULSION INTRAVENOUS at 00:24

## 2021-01-01 RX ADMIN — FENTANYL CITRATE 10.4 MICROGRAM(S)/KG/HR: 50 INJECTION INTRAVENOUS at 13:32

## 2021-01-01 RX ADMIN — Medication 100 MILLIEQUIVALENT(S): at 14:43

## 2021-01-01 RX ADMIN — KETAMINE HYDROCHLORIDE 2.6 MG/KG/HR: 100 INJECTION INTRAMUSCULAR; INTRAVENOUS at 11:50

## 2021-01-01 RX ADMIN — Medication 400 MILLIGRAM(S): at 22:51

## 2021-01-01 RX ADMIN — KETAMINE HYDROCHLORIDE 3.25 MG/KG/HR: 100 INJECTION INTRAMUSCULAR; INTRAVENOUS at 08:14

## 2021-01-01 RX ADMIN — FENTANYL CITRATE 10.4 MICROGRAM(S)/KG/HR: 50 INJECTION INTRAVENOUS at 07:37

## 2021-01-01 RX ADMIN — CHLORHEXIDINE GLUCONATE 1 APPLICATION(S): 213 SOLUTION TOPICAL at 04:33

## 2021-01-01 RX ADMIN — SODIUM CHLORIDE 80 MILLILITER(S): 9 INJECTION, SOLUTION INTRAVENOUS at 14:28

## 2021-01-01 RX ADMIN — POTASSIUM PHOSPHATE, MONOBASIC POTASSIUM PHOSPHATE, DIBASIC 62.5 MILLIMOLE(S): 236; 224 INJECTION, SOLUTION INTRAVENOUS at 07:45

## 2021-01-01 RX ADMIN — Medication 6 MILLIGRAM(S): at 05:29

## 2021-01-01 RX ADMIN — POLYETHYLENE GLYCOL 3350 17 GRAM(S): 17 POWDER, FOR SOLUTION ORAL at 04:31

## 2021-01-01 RX ADMIN — Medication 650 MILLIGRAM(S): at 13:05

## 2021-01-01 RX ADMIN — PROPOFOL 39 MICROGRAM(S)/KG/MIN: 10 INJECTION, EMULSION INTRAVENOUS at 19:50

## 2021-01-01 RX ADMIN — Medication 3 MILLIGRAM(S): at 01:03

## 2021-01-01 RX ADMIN — Medication 1: at 05:34

## 2021-01-01 RX ADMIN — FENTANYL CITRATE 10.4 MICROGRAM(S)/KG/HR: 50 INJECTION INTRAVENOUS at 04:12

## 2021-01-01 RX ADMIN — MEROPENEM 100 MILLIGRAM(S): 1 INJECTION INTRAVENOUS at 21:56

## 2021-01-01 RX ADMIN — Medication 6 MILLIGRAM(S): at 20:48

## 2021-01-01 RX ADMIN — MEROPENEM 100 MILLIGRAM(S): 1 INJECTION INTRAVENOUS at 04:27

## 2021-01-01 RX ADMIN — MEROPENEM 100 MILLIGRAM(S): 1 INJECTION INTRAVENOUS at 09:04

## 2021-01-01 RX ADMIN — QUETIAPINE FUMARATE 400 MILLIGRAM(S): 200 TABLET, FILM COATED ORAL at 00:40

## 2021-01-01 RX ADMIN — MUPIROCIN 1 APPLICATION(S): 20 OINTMENT TOPICAL at 04:46

## 2021-01-01 RX ADMIN — SENNA PLUS 10 MILLILITER(S): 8.6 TABLET ORAL at 20:15

## 2021-01-01 RX ADMIN — ENOXAPARIN SODIUM 160 MILLIGRAM(S): 100 INJECTION SUBCUTANEOUS at 16:24

## 2021-01-01 RX ADMIN — PROPOFOL 39 MICROGRAM(S)/KG/MIN: 10 INJECTION, EMULSION INTRAVENOUS at 11:10

## 2021-01-01 RX ADMIN — Medication 400 MILLIGRAM(S): at 10:05

## 2021-01-01 RX ADMIN — LINEZOLID 300 MILLIGRAM(S): 600 INJECTION, SOLUTION INTRAVENOUS at 06:26

## 2021-01-01 RX ADMIN — MEROPENEM 100 MILLIGRAM(S): 1 INJECTION INTRAVENOUS at 21:55

## 2021-01-01 RX ADMIN — PROPOFOL 39 MICROGRAM(S)/KG/MIN: 10 INJECTION, EMULSION INTRAVENOUS at 16:02

## 2021-01-01 RX ADMIN — Medication 1 PACKET(S): at 08:54

## 2021-01-01 RX ADMIN — POLYETHYLENE GLYCOL 3350 17 GRAM(S): 17 POWDER, FOR SOLUTION ORAL at 11:28

## 2021-01-01 RX ADMIN — CHLORHEXIDINE GLUCONATE 1 APPLICATION(S): 213 SOLUTION TOPICAL at 04:34

## 2021-01-01 RX ADMIN — CISATRACURIUM BESYLATE 23.4 MICROGRAM(S)/KG/MIN: 2 INJECTION INTRAVENOUS at 14:54

## 2021-01-01 RX ADMIN — Medication 1: at 16:06

## 2021-01-01 RX ADMIN — MIDAZOLAM HYDROCHLORIDE 2.6 MG/KG/HR: 1 INJECTION, SOLUTION INTRAMUSCULAR; INTRAVENOUS at 02:49

## 2021-01-01 RX ADMIN — CISATRACURIUM BESYLATE 20 MILLIGRAM(S): 2 INJECTION INTRAVENOUS at 09:13

## 2021-01-01 RX ADMIN — PANTOPRAZOLE SODIUM 40 MILLIGRAM(S): 20 TABLET, DELAYED RELEASE ORAL at 11:42

## 2021-01-01 RX ADMIN — CISATRACURIUM BESYLATE 20 MILLIGRAM(S): 2 INJECTION INTRAVENOUS at 17:22

## 2021-01-01 RX ADMIN — Medication 6 MILLIGRAM(S): at 20:38

## 2021-01-01 RX ADMIN — Medication 40 MILLIEQUIVALENT(S): at 04:33

## 2021-01-01 RX ADMIN — PROPOFOL 39 MICROGRAM(S)/KG/MIN: 10 INJECTION, EMULSION INTRAVENOUS at 00:18

## 2021-01-01 RX ADMIN — FENTANYL CITRATE 1.3 MICROGRAM(S)/KG/HR: 50 INJECTION INTRAVENOUS at 12:17

## 2021-01-01 RX ADMIN — FENTANYL CITRATE 1.3 MICROGRAM(S)/KG/HR: 50 INJECTION INTRAVENOUS at 17:18

## 2021-01-01 RX ADMIN — PROPOFOL 39 MICROGRAM(S)/KG/MIN: 10 INJECTION, EMULSION INTRAVENOUS at 09:50

## 2021-01-01 RX ADMIN — CHLORHEXIDINE GLUCONATE 15 MILLILITER(S): 213 SOLUTION TOPICAL at 04:04

## 2021-01-01 RX ADMIN — CEFTRIAXONE 100 MILLIGRAM(S): 500 INJECTION, POWDER, FOR SOLUTION INTRAMUSCULAR; INTRAVENOUS at 11:26

## 2021-01-01 RX ADMIN — HEPARIN SODIUM 18 UNIT(S)/HR: 5000 INJECTION INTRAVENOUS; SUBCUTANEOUS at 07:00

## 2021-01-01 RX ADMIN — PIPERACILLIN AND TAZOBACTAM 25 GRAM(S): 4; .5 INJECTION, POWDER, LYOPHILIZED, FOR SOLUTION INTRAVENOUS at 10:13

## 2021-01-01 RX ADMIN — CHLORHEXIDINE GLUCONATE 1 APPLICATION(S): 213 SOLUTION TOPICAL at 09:58

## 2021-01-01 RX ADMIN — KETAMINE HYDROCHLORIDE 2.6 MG/KG/HR: 100 INJECTION INTRAMUSCULAR; INTRAVENOUS at 05:45

## 2021-01-01 RX ADMIN — FENTANYL CITRATE 1.3 MICROGRAM(S)/KG/HR: 50 INJECTION INTRAVENOUS at 17:03

## 2021-01-01 RX ADMIN — SENNA PLUS 10 MILLILITER(S): 8.6 TABLET ORAL at 20:07

## 2021-01-01 RX ADMIN — MEROPENEM 100 MILLIGRAM(S): 1 INJECTION INTRAVENOUS at 21:52

## 2021-01-01 RX ADMIN — SENNA PLUS 10 MILLILITER(S): 8.6 TABLET ORAL at 21:56

## 2021-01-01 RX ADMIN — MEROPENEM 100 MILLIGRAM(S): 1 INJECTION INTRAVENOUS at 20:16

## 2021-01-01 RX ADMIN — LINEZOLID 300 MILLIGRAM(S): 600 INJECTION, SOLUTION INTRAVENOUS at 04:34

## 2021-01-01 RX ADMIN — MEROPENEM 100 MILLIGRAM(S): 1 INJECTION INTRAVENOUS at 20:31

## 2021-01-01 RX ADMIN — MIDAZOLAM HYDROCHLORIDE 2.6 MG/KG/HR: 1 INJECTION, SOLUTION INTRAMUSCULAR; INTRAVENOUS at 22:00

## 2021-01-01 RX ADMIN — LACTULOSE 20 GRAM(S): 10 SOLUTION ORAL at 17:35

## 2021-01-01 RX ADMIN — KETAMINE HYDROCHLORIDE 2.6 MG/KG/HR: 100 INJECTION INTRAMUSCULAR; INTRAVENOUS at 22:40

## 2021-01-01 RX ADMIN — CISATRACURIUM BESYLATE 23.4 MICROGRAM(S)/KG/MIN: 2 INJECTION INTRAVENOUS at 21:40

## 2021-01-01 RX ADMIN — CISATRACURIUM BESYLATE 23.4 MICROGRAM(S)/KG/MIN: 2 INJECTION INTRAVENOUS at 14:56

## 2021-01-01 RX ADMIN — CHLORHEXIDINE GLUCONATE 15 MILLILITER(S): 213 SOLUTION TOPICAL at 04:32

## 2021-01-01 RX ADMIN — Medication 250 MILLIGRAM(S): at 06:04

## 2021-01-01 RX ADMIN — FENTANYL CITRATE 1.3 MICROGRAM(S)/KG/HR: 50 INJECTION INTRAVENOUS at 21:38

## 2021-01-01 RX ADMIN — KETAMINE HYDROCHLORIDE 2.6 MG/KG/HR: 100 INJECTION INTRAMUSCULAR; INTRAVENOUS at 08:54

## 2021-01-01 RX ADMIN — FENTANYL CITRATE 1.3 MICROGRAM(S)/KG/HR: 50 INJECTION INTRAVENOUS at 20:39

## 2021-01-01 RX ADMIN — Medication 2: at 10:30

## 2021-01-01 RX ADMIN — LINEZOLID 300 MILLIGRAM(S): 600 INJECTION, SOLUTION INTRAVENOUS at 05:44

## 2021-01-01 RX ADMIN — METHYLNALTREXONE BROMIDE 12 MILLIGRAM(S): 12 INJECTION, SOLUTION SUBCUTANEOUS at 18:47

## 2021-01-01 RX ADMIN — FENTANYL CITRATE 10.4 MICROGRAM(S)/KG/HR: 50 INJECTION INTRAVENOUS at 09:13

## 2021-01-01 RX ADMIN — CASPOFUNGIN ACETATE 260 MILLIGRAM(S): 7 INJECTION, POWDER, LYOPHILIZED, FOR SOLUTION INTRAVENOUS at 12:04

## 2021-01-26 NOTE — H&P ADULT - ASSESSMENT
64 yo F with history of HTN and LLE DVT not currently on AC, diagnosed COVID+ 15d ago, presenting for acutely worsening SOB.

## 2021-01-26 NOTE — ED PROVIDER NOTE - CLINICAL SUMMARY MEDICAL DECISION MAKING FREE TEXT BOX
65 year old female with history of HTN and LLE DVT not currently on AC, COVID+ 15d ago, presenting from PMD office (Dr. Pompa) for acutely worsening SOB in past 2 days. 65 year old female with history of HTN and LLE DVT not currently on AC, COVID+ 15d ago, presenting from PMD office (Dr. Pompa) for acutely worsening SOB in past 2 days. Patient is tachypneic here to 40s, O2 sat 80s on 15L NRB, appears nontoxic and cooperative with exam, runs out of breath. Will put patient on BIPAP, draw comprehensive labs, MICU consult for first time BIPAP, decadron, tba.

## 2021-01-26 NOTE — H&P ADULT - HISTORY OF PRESENT ILLNESS
64 yo F with history of HTN and LLE DVT not currently on AC, diagnosed COVID+ 15d ago, presenting from PMD office (Dr. Pompa) for acutely worsening SOB in past 2 days. States she was tested postive for covid at an urgent care not associated with [facility]. Patient reported she lives by herself. However, daughter who lives next door was sick. Patient has been experiencing worsening sob. and cough prior to admission. In the ED, patient was placed on bipap for resp distress. MICU was consulted and in agreement with current plan of care.

## 2021-01-26 NOTE — H&P ADULT - NSHPLABSRESULTS_GEN_ALL_CORE
(01-26 @ 16:16)                      14.9  9.24 )-----------( 181                 46.9    Neutrophils = 8.60 (93.1%)  Lymphocytes = 0.24 (2.6%)  Eosinophils = 0.00 (0.0%)  Basophils = 0.00 (0.0%)  Monocytes = 0.24 (2.6%)  Bands = --%    01-26    141  |  105  |  26<H>  ----------------------------<  153<H>  4.5   |  23  |  1.09    Ca    8.4      26 Jan 2021 16:16    TPro  7.8  /  Alb  3.9  /  TBili  0.5  /  DBili  x   /  AST  43<H>  /  ALT  25  /  AlkPhos  106  01-26    Venous Blood Gas:  01-26 @ 16:16  7.36/41/<24/22/22.2  VBG Lactate: 3.3

## 2021-01-26 NOTE — ED ADULT NURSE NOTE - OBJECTIVE STATEMENT
Break RN: Patient is a 65-year-old female received to room 25, A&OX4, coming in for respiratory distress. Patient arrives on non-rebreather, O2 in the 80's. Pt was found to be 55% by EMS. Pt with a 20 G accessed to R AC. Labs drawn and sent. Covid sent. Respiratory at bedside. Patient placed on bi-pap mask, patient O2 to >95%. Patient appearing more comfortable. Pt tachypneic. Respirations labored. Safety being maintained. Bed set in lowest. Will continue to monitor.

## 2021-01-26 NOTE — H&P ADULT - NSHPPHYSICALEXAM_GEN_ALL_CORE
T(C): 36.5 (01-26-21 @ 18:57), Max: 36.5 (01-26-21 @ 18:57)  HR: 103 (01-26-21 @ 18:57) (98 - 103)  BP: 141/101 (01-26-21 @ 18:57) (126/73 - 141/101)  RR: 25 (01-26-21 @ 18:57) (25 - 40)  SpO2: 95% (01-26-21 @ 18:57) (71% - 97%)  Wt(kg): --  GENERAL: NAD, well-developed  HEAD:  Atraumatic, Normocephalic  EYES: EOMI, PERRLA, conjunctiva and sclera clear  NECK: Supple, No JVD  CHEST/LUNG: coarse lung sounds  HEART: Regular rate and rhythm; No murmurs, rubs, or gallops  ABDOMEN: Soft, Nontender, Nondistended; Bowel sounds present  EXTREMITIES:  2+ Peripheral Pulses, No clubbing, cyanosis, or edema  PSYCH: AAOx3  NEUROLOGY: non-focal  SKIN: No rashes or lesions

## 2021-01-26 NOTE — ED PROVIDER NOTE - ATTENDING CONTRIBUTION TO CARE
DR. RANGEL, ATTENDING MD-  I performed a face to face bedside interview with the patient regarding history of present illness, review of symptoms and past medical history. I completed an independent physical exam.  I have discussed the patient's plan of care with the resident.   Documentation as above in the note.    64 y/o female covid pos with worsening sob over past few days.  Satting in 50's on ra per ems.  On nrb satting 80's currently.  Tachypneic to 40's.  In resp distress.  Will order for bipap.  Likely covid pna with hypoxia.  Consult micu for first time bipap.  Obtain cbc cmp covid cxr ekg vbg will need admission for further care and evaluation.

## 2021-01-26 NOTE — ED ADULT NURSE REASSESSMENT NOTE - NS ED NURSE REASSESS COMMENT FT1
Pt received from prior nurse, currently on the BIPAP, tolerating well, currently saturating 95% on BIPAP.  IPAP 12 EPAP 6 O2L 100%.

## 2021-01-26 NOTE — ED ADULT NURSE NOTE - NSIMPLEMENTINTERV_GEN_ALL_ED
Implemented All Fall Risk Interventions:  [place] to call system. Call bell, personal items and telephone within reach. Instruct patient to call for assistance. Room bathroom lighting operational. Non-slip footwear when patient is off stretcher. Physically safe environment: no spills, clutter or unnecessary equipment. Stretcher in lowest position, wheels locked, appropriate side rails in place. Provide visual cue, wrist band, yellow gown, etc. Monitor gait and stability. Monitor for mental status changes and reorient to person, place, and time. Review medications for side effects contributing to fall risk. Reinforce activity limits and safety measures with patient and family.

## 2021-01-26 NOTE — H&P ADULT - PROBLEM SELECTOR PLAN 2
- Isolation  - Dexamethasone and remdesivir ordered  - Trend inflammatory markers  - Lovenox for DVT ppx

## 2021-01-26 NOTE — ED PROVIDER NOTE - OBJECTIVE STATEMENT
65 year old female with history of HTN and LLE DVT not currently on AC, COVID+ 15d ago, presenting from PMD office (Dr. Pompa) for acutely worsening SOB in past 2 days.      PMD: Dr. Pompa 65 year old female with history of HTN and LLE DVT not currently on AC, COVID+ 15d ago, presenting from PMD office (Dr. Pompa) for acutely worsening SOB in past 2 days. Has had fever, cough, SOB, generalized weakness that has been significant worse in past 2 days. Per EMS, O2 sat in 50s on RA and 80s on 15L NRB with increased WOB. Not currently on AC. Denies LE edema/swelling. No significant chest pain.    PMD: Dr. Pompa

## 2021-01-26 NOTE — H&P ADULT - NSHPREVIEWOFSYSTEMS_GEN_ALL_CORE
CONSTITUTIONAL: No weakness, fevers or chills  EYES/ENT: No visual changes;  No vertigo or throat pain   NECK: No pain or stiffness  RESPIRATORY: cough, sob  CARDIOVASCULAR: No chest pain or palpitations  GASTROINTESTINAL: No abdominal or epigastric pain. No nausea, vomiting, or hematemesis; No diarrhea or constipation. No melena or hematochezia.  GENITOURINARY: No dysuria, frequency or hematuria  NEUROLOGICAL: No numbness or weakness  SKIN: No itching, burning, rashes, or lesions   PSYCH: No Depression, no anxiety  All other review of systems is negative unless indicated above.

## 2021-01-27 NOTE — CHART NOTE - NSCHARTNOTEFT_GEN_A_CORE
Patient seen earlier in the night noted to be satting mid-upper 80s on BiPAP. Covid inflammatory markers ordered. [place] [address]. Patient started on full dose lovenox 130mg BID. Patient currently satting 91 comfortably on BiPAP. Patient seen earlier in the night noted to be satting mid-upper 80s on BiPAP. Covid inflammatory markers ordered. [place] 79[address]. Patient started on full dose lovenox 130mg BID. Patient currently satting 91 comfortably on BiPAP. Will consider CT angio when patient is more stable. Will monitor patient closely.

## 2021-01-27 NOTE — RAPID RESPONSE TEAM SUMMARY - NSSITUATIONBACKGROUNDRRT_GEN_ALL_CORE
66 yo F w/ PMH of HTN and LLE DVT not on AC on admission, dx w/ COVID 15d ago, p/w acutely worsening SOB, placed on BiPAP ON. Pt satting low-mid 90s but upon transition from bed to commode she desatted to ~84%. Nurse at that time transitioned pt to bed w/ no improvement in oxygen saturation. RRT called for sustained hypoxia to low 80s on max BiPAP settings. Upon arrival pt noted to be laying comfortably in bed satting 94% w/ adequate wave form and SBP to 120s. She was comfortable on current BiPAP settings w/ no signs of resp distress such as us of accessory muscles. Pt to remain on unit w/ current BiPAP settings. Primary team at bedside and agree w/ plan.

## 2021-01-27 NOTE — CHART NOTE - NSCHARTNOTEFT_GEN_A_CORE
Patient's O2 saturation was 86%. Patient has difficulty laying prone. Chest PT was performed while patient laid on left side. Her O2 sat improved to 90%.   Abg ordered    discussed with attending, Dr. Duque

## 2021-01-27 NOTE — PROGRESS NOTE ADULT - SUBJECTIVE AND OBJECTIVE BOX
[facility] Division of Hospital Medicine  Joselito Duque MD  Cell [phone]      Patient is a 65y old  Female who presents with a chief complaint of COVID (26 Jan 2021 20:02)      SUBJECTIVE / OVERNIGHT EVENTS: Pt was explained and updated on the plan which she understood. Asked if she could eat but pt is desaturating on ambulation with bipap on. RR called at 230pm because pt destaurated to 80% HR wnl while walking back from the bathroom aided by the RN and with the BIPAP on. Pt states she is having pressure on her chest. SaO2 went back >92% after a few mins of rest.  ADDITIONAL REVIEW OF SYSTEMS:    MEDICATIONS  (STANDING):  dexAMETHasone  Injectable 6 milliGRAM(s) IV Push daily  dextrose 5% + sodium chloride 0.9%. 1000 milliLiter(s) (80 mL/Hr) IV Continuous <Continuous>  enoxaparin Injectable 130 milliGRAM(s) SubCutaneous two times a day  melatonin 3 milliGRAM(s) Oral at bedtime  QUEtiapine 400 milliGRAM(s) Oral at bedtime  remdesivir  IVPB   IV Intermittent     MEDICATIONS  (PRN):  ALPRAZolam 0.5 milliGRAM(s) Oral two times a day PRN severe anxiety  zolpidem 5 milliGRAM(s) Oral at bedtime PRN Insomnia  zolpidem 5 milliGRAM(s) Oral at bedtime PRN Insomnia      CAPILLARY BLOOD GLUCOSE      POCT Blood Glucose.: 152 mg/dL (27 Jan 2021 11:44)  POCT Blood Glucose.: 153 mg/dL (27 Jan 2021 08:36)    I&O's Summary      PHYSICAL EXAM:  Vital Signs Last 24 Hrs  T(C): 36.5 (27 Jan 2021 11:48), Max: 36.6 (26 Jan 2021 23:44)  T(F): 97.7 (27 Jan 2021 11:48), Max: 97.9 (26 Jan 2021 23:44)  HR: 77 (27 Jan 2021 14:18) (77 - 899)  BP: 126/77 (27 Jan 2021 14:18) (116/73 - 142/85)  BP(mean): --  RR: 24 (27 Jan 2021 14:18) (22 - 40)  SpO2: 94% (27 Jan 2021 14:18) (71% - 97%)  CONSTITUTIONAL: NAD, obese  ENMT: on bipap  RESPIRATORY: wheezing and rhonchi in all lung fields  CARDIOVASCULAR: Regular rate and rhythm, normal S1 and S2, no murmur/rub/gallop; No lower extremity edema; Peripheral pulses are 2+ bilaterally  ABDOMEN: Nontender to palpation, normoactive bowel sounds, no rebound/guarding; No hepatosplenomegaly  MUSCULOSKELETAL:  Normal gait; no clubbing or cyanosis of digits; no joint swelling or tenderness to palpation  PSYCH: A+O to person, place, and time; affect appropriate  NEUROLOGY: no gross sensory deficits   SKIN: No rashes; no palpable lesions    LABS:                        14.4   11.04 )-----------( 151      ( 27 Jan 2021 03:34 )             45.4     01-27    141  |  107  |  21  ----------------------------<  137<H>  4.5   |  21<L>  |  0.77    Ca    8.8      27 Jan 2021 03:34  Mg     2.6     01-27    TPro  7.9  /  Alb  3.6  /  TBili  0.6  /  DBili  x   /  AST  47<H>  /  ALT  22  /  AlkPhos  105  01-27    PT/INR - ( 27 Jan 2021 03:34 )   PT: 15.1 sec;   INR: 1.33 ratio         PTT - ( 27 Jan 2021 03:34 )  PTT:31.4 sec            RADIOLOGY & ADDITIONAL TESTS:  Results Reviewed:   Imaging Personally Reviewed:  Electrocardiogram Personally Reviewed:    COORDINATION OF CARE:  Care Discussed with Consultants/Other Providers [Y/N]:  Prior or Outpatient Records Reviewed [Y/N]:

## 2021-01-27 NOTE — CHART NOTE - NSCHARTNOTEFT_GEN_A_CORE
Patient noted to be satting mid to upper 80s on max BIPAP settings. Provider called MICU regarding patient, per MICU patient is not a MICU candidate at this time. MICU recommended proning as tolerated and chest PT. Provider had an extensive conversation with patient regarding goals of care. Patient was explained the risks vs benefits of intubation and patient was in agreement to being FULL CODE. Patient would like to be intubated if necessary and she also agrees to CPR if her heart stops beating. Will continue to monitor.

## 2021-01-27 NOTE — PROGRESS NOTE ADULT - ASSESSMENT
66 yo F with history of HTN and LLE DVT not currently on AC, diagnosed COVID+ 15d ago, presenting for acutely worsening SOB.

## 2021-01-28 NOTE — CHART NOTE - NSCHARTNOTEFT_GEN_A_CORE
Patient c/o chest pain with breathing. Denies chest tightness or radiating pain. On exam there is TTP. Discussed with Dr. Duque. EKG will be ordered.

## 2021-01-28 NOTE — PROGRESS NOTE ADULT - SUBJECTIVE AND OBJECTIVE BOX
[facility] Division of Hospital Medicine  Joselito Duque MD  Cell [phone]      Patient is a 65y old  Female who presents with a chief complaint of COVID (26 Jan 2021 20:02)      SUBJECTIVE / OVERNIGHT EVENTS: Pt is uncomfortable with the bipap and wants to eat. Feels nauseous and has epigastric pain. Explained the risk of aspiration with food and drinks roberto with her high settings. Discussed the positive findings of the duplex and treatment which she understands. She had poor sleep with the bipap and the hospital noises. Her abg and labs have improved. Will continue to down titrate her bipap until she is stable enough to be switched to HFNC where she can eat.     ADDITIONAL REVIEW OF SYSTEMS:    MEDICATIONS  (STANDING):  dexAMETHasone  Injectable 6 milliGRAM(s) IV Push daily  dextrose 5% + sodium chloride 0.9%. 1000 milliLiter(s) (80 mL/Hr) IV Continuous <Continuous>  enoxaparin Injectable 130 milliGRAM(s) SubCutaneous two times a day  melatonin 3 milliGRAM(s) Oral at bedtime  QUEtiapine 400 milliGRAM(s) Oral at bedtime  remdesivir  IVPB   IV Intermittent     MEDICATIONS  (PRN):  ALPRAZolam 0.5 milliGRAM(s) Oral two times a day PRN severe anxiety  zolpidem 5 milliGRAM(s) Oral at bedtime PRN Insomnia  zolpidem 5 milliGRAM(s) Oral at bedtime PRN Insomnia      CAPILLARY BLOOD GLUCOSE      POCT Blood Glucose.: 152 mg/dL (27 Jan 2021 11:44)  POCT Blood Glucose.: 153 mg/dL (27 Jan 2021 08:36)    I&O's Summary      PHYSICAL EXAM:  Vital Signs Last 24 Hrs  T(C): 36.5 (27 Jan 2021 11:48), Max: 36.6 (26 Jan 2021 23:44)  T(F): 97.7 (27 Jan 2021 11:48), Max: 97.9 (26 Jan 2021 23:44)  HR: 77 (27 Jan 2021 14:18) (77 - 899)  BP: 126/77 (27 Jan 2021 14:18) (116/73 - 142/85)  BP(mean): --  RR: 24 (27 Jan 2021 14:18) (22 - 40)  SpO2: 94% (27 Jan 2021 14:18) (71% - 97%)  CONSTITUTIONAL: NAD, obese  ENMT: on bipap  RESPIRATORY: wheezing and rhonchi in all lung fields  CARDIOVASCULAR: Regular rate and rhythm, normal S1 and S2, no murmur/rub/gallop; No lower extremity edema; Peripheral pulses are 2+ bilaterally  ABDOMEN: Nontender to palpation, normoactive bowel sounds, no rebound/guarding; No hepatosplenomegaly  MUSCULOSKELETAL: no clubbing or cyanosis of digits; no joint swelling or tenderness to palpation  PSYCH: A+O to person, place, and time; affect appropriate  NEUROLOGY: no gross sensory deficits   SKIN: No rashes; no palpable lesions    LABS:                        14.4   11.04 )-----------( 151      ( 27 Jan 2021 03:34 )             45.4     01-27    141  |  107  |  21  ----------------------------<  137<H>  4.5   |  21<L>  |  0.77    Ca    8.8      27 Jan 2021 03:34  Mg     2.6     01-27    TPro  7.9  /  Alb  3.6  /  TBili  0.6  /  DBili  x   /  AST  47<H>  /  ALT  22  /  AlkPhos  105  01-27    PT/INR - ( 27 Jan 2021 03:34 )   PT: 15.1 sec;   INR: 1.33 ratio         PTT - ( 27 Jan 2021 03:34 )  PTT:31.4 sec            RADIOLOGY & ADDITIONAL TESTS:  Results Reviewed:   Imaging Personally Reviewed:  Electrocardiogram Personally Reviewed:    COORDINATION OF CARE:  Care Discussed with Consultants/Other Providers [Y/N]:  Prior or Outpatient Records Reviewed [Y/N]:

## 2021-01-29 NOTE — PROGRESS NOTE ADULT - PROBLEM SELECTOR PLAN 1
- Likely 2/2 covid pna and PE  - Currently on bipap. Will monitor resp status.  - see Covid management
- Likely 2/2 covid pna and PE  - Currently on bipap. Will monitor resp status.
- Likely 2/2 covid pna and PE  - Currently on bipap. Will monitor resp status.  - see Covid management  - pulm consulted

## 2021-01-29 NOTE — RAPID RESPONSE TEAM SUMMARY - NSSITUATIONBACKGROUNDRRT_GEN_ALL_CORE
65F with PMH of HTN and LLE DVT, dx w/ COVID 15d PTA p/w acutely worsening SOB, admitted for acute hypoxic resp failure 2/2 COVID. RRT called for hypoxia and tachypnea. On arrival, patient on BIPAP. Per RN, BIPAP was found disconnected. Pt appears uncomfortable, is tachypneic to the 40-50s pulling high tidal volumes, is mentating. Rectal temp 100.9, 1g IV tylenol given. Pt with h/o anxiety on xanax. 0.5mg IV ativan given for tachypnea. Primary team at bedside, to continue to monitor pt. Can consider additional 0.5mg IV ativan if needed. Pt should stay on BIPAP o/n.

## 2021-01-29 NOTE — PROGRESS NOTE ADULT - PROBLEM SELECTOR PLAN 2
- Isolation  - Dexamethasone (1/27- )  - Remdesivir (1/26- )  - Trend inflammatory markers

## 2021-01-29 NOTE — PROGRESS NOTE ADULT - PROBLEM SELECTOR PLAN 3
DVT or PE  - Ddimer >50k  - not stable for CTA  - empirical therapeutic lovenox dose  - TTE bedside if possible and b/l Le duplex  - Not on AC at home
b/l upper thigh DVTs noted on duplex; PE not ruled as too unstable for a CTA  - Ddimer >50k initially; currently downtrending  - therapeutic lovenox dose based on wt  - TTE bedside if possible  - Not on AC at home (h/o dvt?)
b/l upper thigh DVTs noted on duplex; PE not ruled as too unstable for a CTA  - Ddimer >50k initially; currently downtrending  - therapeutic lovenox dose based on wt  - Not on AC at home (h/o dvt?)

## 2021-01-29 NOTE — PROGRESS NOTE ADULT - SUBJECTIVE AND OBJECTIVE BOX
[facility] Division of Hospital Medicine  Joselito Duque MD  cell [phone]      Patient is a 65y old  Female who presents with a chief complaint of COVID (29 Jan 2021 14:48)      SUBJECTIVE / OVERNIGHT EVENTS: Pt today is more lethargic. Overnight her bipap was not attached to oxygen and she desaturated and a rapid response was activated. Once attached, her saturation was wnl. She also had a 100.8 fever likely 2/2 to covid. Pt's SaO2 was 91% on high BIPAP settings. Pulmonary consulted as she is high risk for intubation.    ADDITIONAL REVIEW OF SYSTEMS:    MEDICATIONS  (STANDING):  dexAMETHasone  Injectable 6 milliGRAM(s) IV Push daily  dextrose 5%. 1000 milliLiter(s) (80 mL/Hr) IV Continuous <Continuous>  enoxaparin Injectable 130 milliGRAM(s) SubCutaneous two times a day  insulin lispro (ADMELOG) corrective regimen sliding scale   SubCutaneous three times a day before meals  melatonin 3 milliGRAM(s) Oral at bedtime  ondansetron Injectable 4 milliGRAM(s) IV Push every 8 hours  pantoprazole  Injectable 40 milliGRAM(s) IV Push daily  QUEtiapine 400 milliGRAM(s) Oral at bedtime  remdesivir  IVPB   IV Intermittent   remdesivir  IVPB 100 milliGRAM(s) IV Intermittent every 24 hours    MEDICATIONS  (PRN):  ALPRAZolam 0.5 milliGRAM(s) Oral two times a day PRN severe anxiety  zolpidem 5 milliGRAM(s) Oral at bedtime PRN Insomnia  zolpidem 5 milliGRAM(s) Oral at bedtime PRN Insomnia      CAPILLARY BLOOD GLUCOSE      POCT Blood Glucose.: 134 mg/dL (29 Jan 2021 11:52)  POCT Blood Glucose.: 121 mg/dL (29 Jan 2021 07:15)  POCT Blood Glucose.: 141 mg/dL (28 Jan 2021 22:17)  POCT Blood Glucose.: 105 mg/dL (28 Jan 2021 21:36)  POCT Blood Glucose.: 113 mg/dL (28 Jan 2021 16:49)    I&O's Summary      PHYSICAL EXAM:  Vital Signs Last 24 Hrs  T(C): 36.7 (29 Jan 2021 10:45), Max: 38.2 (28 Jan 2021 22:15)  T(F): 98 (29 Jan 2021 10:45), Max: 100.8 (28 Jan 2021 22:15)  HR: 80 (29 Jan 2021 15:45) (66 - 98)  BP: 135/81 (29 Jan 2021 15:45) (110/57 - 135/81)  BP(mean): --  RR: 22 (29 Jan 2021 15:45) (18 - 22)  SpO2: 90% (29 Jan 2021 15:45) (90% - 99%)    CONSTITUTIONAL: NAD, obese  RESPIRATORY: wheezing and rhonchi in all lung fields  CARDIOVASCULAR: Regular rate and rhythm, normal S1 and S2, no murmur/rub/gallop; No lower extremity edema; Peripheral pulses are 2+ bilaterally  ABDOMEN: Nontender to palpation, normoactive bowel sounds, no rebound/guarding; No hepatosplenomegaly  MUSCULOSKELETAL: no clubbing or cyanosis of digits; no joint swelling or tenderness to palpation  PSYCH: A+O to person, place, and time; affect appropriate  NEUROLOGY: no gross sensory deficits   SKIN: No rashes; no palpable lesions    LABS:                        14.4   14.04 )-----------( 171      ( 28 Jan 2021 10:03 )             45.3     01-29    139  |  106  |  33<H>  ----------------------------<  120<H>  4.2   |  21<L>  |  0.85    Ca    8.3<L>      29 Jan 2021 05:32    TPro  7.5  /  Alb  3.3  /  TBili  0.7  /  DBili  x   /  AST  33<H>  /  ALT  19  /  AlkPhos  116  01-29                RADIOLOGY & ADDITIONAL TESTS:  Results Reviewed:   Imaging Personally Reviewed:  Electrocardiogram Personally Reviewed:    COORDINATION OF CARE:  Care Discussed with Consultants/Other Providers [Y/N]:  Prior or Outpatient Records Reviewed [Y/N]:

## 2021-01-29 NOTE — PROCEDURE NOTE - NSICDXPROCEDURE_GEN_ALL_CORE_FT
PROCEDURES:  Insertion of arterial line with imaging guidance 30-Jan-2021 06:36:04  Carlito Vanegas

## 2021-01-29 NOTE — PROGRESS NOTE ADULT - PROBLEM SELECTOR PLAN 5
Diet: strict NPO while on bipap  GI: ppi and zofran  VTE: lovenox therapeutic  Dispo: home
Diet: strict NPO while on bipap  GI: ppi and zofran  VTE: lovenox therapeutic  Dispo: home

## 2021-01-29 NOTE — RAPID RESPONSE TEAM SUMMARY - NSSITUATIONBACKGROUNDRRT_GEN_ALL_CORE
64 yo F with history of HTN and LLE DVT not currently on AC, diagnosed COVID+ 15d ago, presenting for acutely worsening SOB. RRT was called for hypoxia on BiPAP 64 yo F with history of HTN and LLE DVT not currently on AC, diagnosed COVID+ 15d ago, presenting for acutely worsening SOB. RRT was called for hypoxia on BiPAP. On examination, patient was AAOx3. She verbalized that she wants to be intubated if her saturation continues to decrease. MICU was called and decision was made to intubate the patient as she was persistently hypoxic to high 80s. Patient was sedated and paralyzed with succinylcholine, etomidate, and propofol. She was also given quyen and versed for additional sedation. She was started on levophed gtt as her BP downtreded to low 100s due to sedation. Patient was then transferred to [facility] by MICU fellow.

## 2021-01-29 NOTE — CHART NOTE - NSCHARTNOTEFT_GEN_A_CORE
MICU Accept Note    CHIEF COMPLAINT: Acute hypoxic respiratory failure    HPI / INTERVAL HISTORY:  66 yo F with history of HTN and LLE DVT not currently on AC, diagnosed COVID+ 15d ago, presenting for acutely worsening SOB. Patient's respiratory support was excalated to biPAP while on the floors. On 1/29 an RRT was called for hypoxia on BiPAP. On examination, patient was AAOx3. She verbalized that she wants to be intubated if her saturation continues to decrease. MICU was called and decision was made to intubate the patient as she was persistently hypoxic to high 80s. Patient was sedated and paralyzed with succinylcholine, etomidate, and propofol. She was also given quyen and versed for additional sedation. She was started on levophed gtt as her BP downtreded to low 100s due to sedation. Patient was then transferred to [facility] by MICU fellow.    PAST MEDICAL & SURGICAL HISTORY:  DVT (deep venous thrombosis)  HTN not on AC    No significant past surgical history    FAMILY HISTORY: noncontributory      SOCIAL HISTORY: Could not assess as patient is intubated      HOME MEDICATIONS:  Xanax  Seroquel  Ambien    Allergies/Intolerances  codeine (Unknown)    HOSPITAL MEDICATIONS:  MEDICATIONS  (STANDING):  chlorhexidine 0.12% Liquid 15 milliLiter(s) Oral Mucosa every 12 hours  chlorhexidine 4% Liquid 1 Application(s) Topical <User Schedule>  dexAMETHasone  Injectable 6 milliGRAM(s) IV Push daily  dextrose 5%. 1000 milliLiter(s) (80 mL/Hr) IV Continuous <Continuous>  enoxaparin Injectable 130 milliGRAM(s) SubCutaneous two times a day  fentaNYL   Infusion. 0.5 MICROgram(s)/kG/Hr (6.5 mL/Hr) IV Continuous <Continuous>  insulin lispro (ADMELOG) corrective regimen sliding scale   SubCutaneous three times a day before meals  melatonin 3 milliGRAM(s) Oral at bedtime  midazolam Infusion 0.02 mG/kG/Hr (2.6 mL/Hr) IV Continuous <Continuous>  norepinephrine Infusion 0.05 MICROgram(s)/kG/Min (12.2 mL/Hr) IV Continuous <Continuous>  ondansetron Injectable 4 milliGRAM(s) IV Push every 8 hours  pantoprazole  Injectable 40 milliGRAM(s) IV Push daily  propofol Infusion 50 MICROgram(s)/kG/Min (39 mL/Hr) IV Continuous <Continuous>  QUEtiapine 400 milliGRAM(s) Oral at bedtime  remdesivir  IVPB   IV Intermittent   remdesivir  IVPB 100 milliGRAM(s) IV Intermittent every 24 hours    MEDICATIONS  (PRN):  sodium chloride 0.9% lock flush 10 milliLiter(s) IV Push every 1 hour PRN Pre/post blood products, medications, blood draw, and to maintain line patency    REVIEW OF SYSTEMS:  Constitutional: +fevers  HEENT: +nasal congestion, +rhinorrhea  CV: +chest pain  Resp: +cough, +dyspnea, +hypoxia  GI: No nausea, vomiting, diarrhea, constipation, abdominal pain  : [ ] dysuria [ ] nocturia [ ] hematuria [ ] increased urinary frequency  Musculoskeletal: [ ] back pain [ ] myalgias [ ] arthralgias [ ] fracture  Skin: [ ] rash [ ] itch  Neurological: [ ] headache [ ] dizziness [ ] syncope [ ] weakness [ ] numbness  Psychiatric: [ ] anxiety [ ] depression  Endocrine: [ ] diabetes [ ] thyroid problem  Hematologic/Lymphatic: [ ] anemia [ ] bleeding problem  Allergic/Immunologic: [ ] itchy eyes [ ] nasal discharge [ ] hives [ ] angioedema  [ ] All other systems negative  [x ] Unable to assess ROS because patient intubated. ROS gathered from chart    OBJECTIVE:  ICU Vital Signs Last 24 Hrs  T(C): 36.6 (29 Jan 2021 20:00), Max: 36.8 (29 Jan 2021 18:28)  T(F): 97.9 (29 Jan 2021 20:00), Max: 98.2 (29 Jan 2021 18:28)  HR: 93 (29 Jan 2021 20:13) (66 - 95)  BP: 102/53 (29 Jan 2021 18:28) (102/53 - 145/96)  BP(mean): --  ABP: 148/68 (29 Jan 2021 20:00) (148/68 - 148/68)  ABP(mean): 91 (29 Jan 2021 20:00) (91 - 91)  RR: 31 (29 Jan 2021 20:00) (18 - 31)  SpO2: 96% (29 Jan 2021 20:13) (90% - 99%)    Mode: AC/ CMV (Assist Control/ Continuous Mandatory Ventilation), RR (machine): 24, TV (machine): 400, FiO2: 100, PEEP: 16, ITime: 0.75, MAP: 22, PIP: 36    01-29 @ 07:01  -  01-29 @ 22:59  --------------------------------------------------------  IN: 60.9 mL / OUT: 125 mL / NET: -64.1 mL      CAPILLARY BLOOD GLUCOSE      POCT Blood Glucose.: 115 mg/dL (29 Jan 2021 16:44)    PHYSICAL EXAM:  CONSTITUTIONAL: NAD, obese  RESPIRATORY: wheezing and rhonchi in all lung fields, intubated  CARDIOVASCULAR: Regular rate and rhythm, normal S1 and S2, no murmur/rub/gallop; No lower extremity edema; Peripheral pulses are 2+ bilaterally  ABDOMEN: Nontender to palpation, normoactive bowel sounds, no rebound/guarding; No hepatosplenomegaly  MUSCULOSKELETAL: no clubbing or cyanosis of digits; no joint swelling or tenderness to palpation  PSYCH: sedated  NEUROLOGY: no gross sensory deficits   SKIN: No rashes; no palpable lesions    LINES: L subclavian venous central line, R radial arterial        LABS:                        13.9   18.48 )-----------( 186      ( 29 Jan 2021 22:45 )             45.8     Hgb Trend: 13.9<--, 5.3<--, 14.4<--, 14.4<--, 14.9<--  01-29    139  |  106  |  33<H>  ----------------------------<  120<H>  4.2   |  21<L>  |  0.85    Ca    8.3<L>      29 Jan 2021 05:32    TPro  7.5  /  Alb  3.3  /  TBili  0.7  /  DBili  x   /  AST  33<H>  /  ALT  19  /  AlkPhos  116  01-29    Creatinine Trend: 0.85<--, 0.80<--, 0.77<--, 1.09<--      MICROBIOLOGY:   None    RADIOLOGY & ADDITIONAL TESTS:      ASSESSMENT AND PLAN:  66 yo F with history of HTN and LLE DVT not currently on AC, diagnosed COVID+ 15d ago, presenting for acutely worsening SOB, intubated 1/29 for acute hypoxic respiratory failure.    Acute Hypoxic Respiratory Distress  - Likely 2/2 covid pna and PE  - AC RR24  PEEP16 100% FiO2  - see Covid management  - pulm consulted.    COVID-19  - Isolation  - Dexamethasone (1/27 - )  - Remdesivir (1/26 - )  - Trend inflammatory markers    Heme: h/o DVT, b/l upper thigh DVTs; PE not ruled as too unstable for a CTA  - Ddimer >50k initially; currently downtrending  - therapeutic lovenox  - Not on AC at home (h/o dvt?).     Anxiety  - HOLD home Seroquel, Xanax, and melatonin    ARETHA:  - Diet: via OG  - D5 @ 80cc/hr  - PPI  - d/c zofran q8    Mariely Boone MD  [id number] MICU Accept Note    CHIEF COMPLAINT: Acute hypoxic respiratory failure    HPI / INTERVAL HISTORY:  66 yo F with history of HTN and LLE DVT not currently on AC, diagnosed COVID+ 15d ago, presenting for acutely worsening SOB. Patient's respiratory support was excalated to biPAP while on the floors. On 1/29 an RRT was called for hypoxia on BiPAP. On examination, patient was AAOx3. She verbalized that she wants to be intubated if her saturation continues to decrease. MICU was called and decision was made to intubate the patient as she was persistently hypoxic to high 80s. Patient was sedated and paralyzed with succinylcholine, etomidate, and propofol. She was also given quyen and versed for additional sedation. She was started on levophed gtt as her BP downtreded to low 100s due to sedation. Patient was then transferred to [facility] by MICU fellow.    PAST MEDICAL & SURGICAL HISTORY:  DVT (deep venous thrombosis)  HTN not on AC    No significant past surgical history    FAMILY HISTORY: noncontributory      SOCIAL HISTORY: Could not assess as patient is intubated      HOME MEDICATIONS:  Xanax  Seroquel  Ambien    Allergies/Intolerances  codeine (Unknown)    HOSPITAL MEDICATIONS:  MEDICATIONS  (STANDING):  chlorhexidine 0.12% Liquid 15 milliLiter(s) Oral Mucosa every 12 hours  chlorhexidine 4% Liquid 1 Application(s) Topical <User Schedule>  dexAMETHasone  Injectable 6 milliGRAM(s) IV Push daily  dextrose 5%. 1000 milliLiter(s) (80 mL/Hr) IV Continuous <Continuous>  enoxaparin Injectable 130 milliGRAM(s) SubCutaneous two times a day  fentaNYL   Infusion. 0.5 MICROgram(s)/kG/Hr (6.5 mL/Hr) IV Continuous <Continuous>  insulin lispro (ADMELOG) corrective regimen sliding scale   SubCutaneous three times a day before meals  melatonin 3 milliGRAM(s) Oral at bedtime  midazolam Infusion 0.02 mG/kG/Hr (2.6 mL/Hr) IV Continuous <Continuous>  norepinephrine Infusion 0.05 MICROgram(s)/kG/Min (12.2 mL/Hr) IV Continuous <Continuous>  ondansetron Injectable 4 milliGRAM(s) IV Push every 8 hours  pantoprazole  Injectable 40 milliGRAM(s) IV Push daily  propofol Infusion 50 MICROgram(s)/kG/Min (39 mL/Hr) IV Continuous <Continuous>  QUEtiapine 400 milliGRAM(s) Oral at bedtime  remdesivir  IVPB   IV Intermittent   remdesivir  IVPB 100 milliGRAM(s) IV Intermittent every 24 hours    MEDICATIONS  (PRN):  sodium chloride 0.9% lock flush 10 milliLiter(s) IV Push every 1 hour PRN Pre/post blood products, medications, blood draw, and to maintain line patency    REVIEW OF SYSTEMS:  Constitutional: +fevers  HEENT: +nasal congestion, +rhinorrhea  CV: +chest pain  Resp: +cough, +dyspnea, +hypoxia  GI: No nausea, vomiting, diarrhea, constipation, abdominal pain  : [ ] dysuria [ ] nocturia [ ] hematuria [ ] increased urinary frequency  Musculoskeletal: [ ] back pain [ ] myalgias [ ] arthralgias [ ] fracture  Skin: [ ] rash [ ] itch  Neurological: [ ] headache [ ] dizziness [ ] syncope [ ] weakness [ ] numbness  Psychiatric: [ ] anxiety [ ] depression  Endocrine: [ ] diabetes [ ] thyroid problem  Hematologic/Lymphatic: [ ] anemia [ ] bleeding problem  Allergic/Immunologic: [ ] itchy eyes [ ] nasal discharge [ ] hives [ ] angioedema  [ ] All other systems negative  [x ] Unable to assess ROS because patient intubated. ROS gathered from chart    OBJECTIVE:  ICU Vital Signs Last 24 Hrs  T(C): 36.6 (29 Jan 2021 20:00), Max: 36.8 (29 Jan 2021 18:28)  T(F): 97.9 (29 Jan 2021 20:00), Max: 98.2 (29 Jan 2021 18:28)  HR: 93 (29 Jan 2021 20:13) (66 - 95)  BP: 102/53 (29 Jan 2021 18:28) (102/53 - 145/96)  BP(mean): --  ABP: 148/68 (29 Jan 2021 20:00) (148/68 - 148/68)  ABP(mean): 91 (29 Jan 2021 20:00) (91 - 91)  RR: 31 (29 Jan 2021 20:00) (18 - 31)  SpO2: 96% (29 Jan 2021 20:13) (90% - 99%)    Mode: AC/ CMV (Assist Control/ Continuous Mandatory Ventilation), RR (machine): 24, TV (machine): 400, FiO2: 100, PEEP: 16, ITime: 0.75, MAP: 22, PIP: 36    01-29 @ 07:01  -  01-29 @ 22:59  --------------------------------------------------------  IN: 60.9 mL / OUT: 125 mL / NET: -64.1 mL      CAPILLARY BLOOD GLUCOSE      POCT Blood Glucose.: 115 mg/dL (29 Jan 2021 16:44)    PHYSICAL EXAM:  CONSTITUTIONAL: NAD, obese  RESPIRATORY: wheezing and rhonchi in all lung fields, intubated  CARDIOVASCULAR: Regular rate and rhythm, normal S1 and S2, no murmur/rub/gallop; No lower extremity edema; Peripheral pulses are 2+ bilaterally  ABDOMEN: Nontender to palpation, normoactive bowel sounds, no rebound/guarding; No hepatosplenomegaly  MUSCULOSKELETAL: no clubbing or cyanosis of digits; no joint swelling or tenderness to palpation  PSYCH: sedated  NEUROLOGY: no gross sensory deficits   SKIN: No rashes; no palpable lesions    LINES: L subclavian venous central line, R radial arterial        LABS:                        13.9   18.48 )-----------( 186      ( 29 Jan 2021 22:45 )             45.8     Hgb Trend: 13.9<--, 5.3<--, 14.4<--, 14.4<--, 14.9<--  01-29    139  |  106  |  33<H>  ----------------------------<  120<H>  4.2   |  21<L>  |  0.85    Ca    8.3<L>      29 Jan 2021 05:32    TPro  7.5  /  Alb  3.3  /  TBili  0.7  /  DBili  x   /  AST  33<H>  /  ALT  19  /  AlkPhos  116  01-29    Creatinine Trend: 0.85<--, 0.80<--, 0.77<--, 1.09<--      MICROBIOLOGY:   None    RADIOLOGY & ADDITIONAL TESTS:      ASSESSMENT AND PLAN:  66 yo F with history of HTN and LLE DVT not currently on AC, diagnosed COVID+ 15d ago, presenting for acutely worsening SOB, intubated 1/29 for acute hypoxic respiratory failure.    Acute Hypoxic Respiratory Distress  - Likely 2/2 covid pna and PE  - AC RR24  PEEP16 100% FiO2  - see Covid management  - pulm consulted.    COVID-19  - Isolation  - Dexamethasone (1/27 - )  - Remdesivir (1/26 - )  - Trend inflammatory markers    Heme: h/o DVT, b/l upper thigh DVTs; PE not ruled as too unstable for a CTA  - Ddimer >50k initially; currently downtrending  - therapeutic lovenox  - Not on AC at home (h/o dvt?).     Anxiety  - HOLD home Seroquel, Xanax, and melatonin    TOMMYI:  - Diet: via OG  - D5 @ 80cc/hr  - PPI  - d/c zofran q8    Mariely Boone MD  [id number]    MICU ATTENDING ADDENDUM:   Patient is seen and examined. Case discussed with MICU team. Total CC time >35min.    65 o woman with HTN, hx of LLE DVT (previously not on AC) who wa found to be COVID positive on 1/11; was hospitalized on 1/26 and has been maintained on NIV, now intubated due to worsening hypoxemic respiratory failure. Found to have bilateral DVTs  -- Will heavily sedate patient, depending on ABG will decide on paralytics  -- on low dose NE for vasoplegic shock in the setting of COVID and sedation, maintain MAP >65  -- currently on 24/400/16/100%; Ppl 30. Await ABG and adjust setting accordingly  -- start tube feeds  -- c/w Dexamethasone and Remdesivir for COVID infection  -- on therapeutic AC for known DVTs  Overall prognosis guarded.

## 2021-01-29 NOTE — PROGRESS NOTE ADULT - PROBLEM SELECTOR PLAN 4
- C/w home regimen Seroquel qhs.  - Xanax for severe agitation prn.

## 2021-01-30 NOTE — PROGRESS NOTE ADULT - ASSESSMENT
65yF with obesity, HTN, hx of LLE DVT not on AC,  COVID+ on 1/17, presenting for acutely worsening SOB, intubated 1/29 for acute hypoxic respiratory failure.    neuro:   - intubated and sedated     Respiratory:   Acute Hypoxic respiratory failure    - Likely 2/2 covid pna and PE  - concern for PE given elevated D-dimer in 7000s, on full AC Lovenox 160mg BID, will check factor 10a level   - AC RR34  PEEP14 70% FiO2  - f/u ABG during the day    - continue Dexamethasone x10 days (1/27 - )  - will d/c Remdesivir (1/26 -1/30 )  - Trend inflammatory markers    Cards:   - on 0.02mcg of levophed, likely 2/2 sedation     GI:   - start tube feeds   - Protonix 40mg qD      Renal:    - Cr. 0.89, no acute issue   - net + 0.5L since ICU transfer     Heme:   h/o DVT, b/l upper thigh DVTs; PE not ruled as too unstable for a CTA  - Duplex 1/27: b/l above knee DVT   - Ddimer >50k initially; currently downtrending  - full AC Lovenox 160mg BID, will check factor 10a l    ID  - afebrile, observe off abx      Ethics:   - full code

## 2021-01-30 NOTE — PROGRESS NOTE ADULT - SUBJECTIVE AND OBJECTIVE BOX
Ever Denis PGY2  Pager: [phone]       INTERVAL HPI/OVERNIGHT EVENTS:    SUBJECTIVE: Patient seen and examined at bedside.     OBJECTIVE:    VITAL SIGNS:  ICU Vital Signs Last 24 Hrs  T(C): 37.1 (30 Jan 2021 08:00), Max: 37.1 (30 Jan 2021 06:55)  T(F): 98.8 (30 Jan 2021 08:00), Max: 98.8 (30 Jan 2021 08:00)  HR: 58 (30 Jan 2021 08:00) (58 - 95)  BP: 102/53 (29 Jan 2021 18:28) (102/53 - 145/96)  BP(mean): --  ABP: 122/62 (30 Jan 2021 08:00) (102/92 - 148/68)  ABP(mean): 81 (30 Jan 2021 08:00) (77 - 91)  RR: 34 (30 Jan 2021 08:00) (22 - 34)  SpO2: 96% (30 Jan 2021 08:00) (90% - 98%)    Mode: AC/ CMV (Assist Control/ Continuous Mandatory Ventilation), RR (machine): 30, TV (machine): 410, FiO2: 100, PEEP: 16, ITime: 0.66, MAP: 23, PIP: 36    01-29 @ 07:01 - 01-30 @ 07:00  --------------------------------------------------------  IN: 1011.3 mL / OUT: 1275 mL / NET: -263.7 mL    01-30 @ 07:01 - 01-30 @ 08:46  --------------------------------------------------------  IN: 262 mL / OUT: 100 mL / NET: 162 mL      CAPILLARY BLOOD GLUCOSE      POCT Blood Glucose.: 115 mg/dL (29 Jan 2021 16:44)      PHYSICAL EXAM:    General: NAD  HEENT: NC/AT; PERRL, clear conjunctiva  Neck: supple  Respiratory: CTA b/l  Cardiovascular: +S1/S2; RRR  Abdomen: soft, NT/ND; +BS x4  Extremities: WWP, 2+ peripheral pulses b/l; no LE edema  Skin: normal color and turgor; no rash  Neurological:    MEDICATIONS:  MEDICATIONS  (STANDING):  chlorhexidine 0.12% Liquid 15 milliLiter(s) Oral Mucosa every 12 hours  chlorhexidine 4% Liquid 1 Application(s) Topical <User Schedule>  dexAMETHasone  Injectable 6 milliGRAM(s) IV Push daily  dextrose 5%. 1000 milliLiter(s) (80 mL/Hr) IV Continuous <Continuous>  enoxaparin Injectable 130 milliGRAM(s) SubCutaneous two times a day  fentaNYL   Infusion. 0.5 MICROgram(s)/kG/Hr (6.5 mL/Hr) IV Continuous <Continuous>  insulin lispro (ADMELOG) corrective regimen sliding scale   SubCutaneous three times a day before meals  midazolam Infusion 0.02 mG/kG/Hr (2.6 mL/Hr) IV Continuous <Continuous>  norepinephrine Infusion 0.05 MICROgram(s)/kG/Min (12.2 mL/Hr) IV Continuous <Continuous>  pantoprazole  Injectable 40 milliGRAM(s) IV Push daily  propofol Infusion 50 MICROgram(s)/kG/Min (39 mL/Hr) IV Continuous <Continuous>  remdesivir  IVPB   IV Intermittent   remdesivir  IVPB 100 milliGRAM(s) IV Intermittent every 24 hours    MEDICATIONS  (PRN):  sodium chloride 0.9% lock flush 10 milliLiter(s) IV Push every 1 hour PRN Pre/post blood products, medications, blood draw, and to maintain line patency      ALLERGIES:  Allergies    codeine (Unknown)    Intolerances        LABS:                        13.0   13.43 )-----------( 182      ( 30 Jan 2021 04:54 )             44.3     Hemoglobin: 13.0 g/dL (01-30 @ 04:54)  Hemoglobin: 13.5 g/dL (01-29 @ 23:35)  Hemoglobin: 13.9 g/dL (01-29 @ 22:45)  Hemoglobin: 5.3 g/dL (01-29 @ 15:29)  Hemoglobin: 14.4 g/dL (01-28 @ 10:03)    CBC Full  -  ( 30 Jan 2021 04:54 )  WBC Count : 13.43 K/uL  RBC Count : 4.56 M/uL  Hemoglobin : 13.0 g/dL  Hematocrit : 44.3 %  Platelet Count - Automated : 182 K/uL  Mean Cell Volume : 97.1 fL  Mean Cell Hemoglobin : 28.5 pg  Mean Cell Hemoglobin Concentration : 29.3 gm/dL  Auto Neutrophil # : x  Auto Lymphocyte # : x  Auto Monocyte # : x  Auto Eosinophil # : x  Auto Basophil # : x  Auto Neutrophil % : x  Auto Lymphocyte % : x  Auto Monocyte % : x  Auto Eosinophil % : x  Auto Basophil % : x    01-30    139  |  105  |  29<H>  ----------------------------<  143<H>  4.6   |  24  |  0.86    Ca    8.1<L>      30 Jan 2021 04:54  Phos  4.4     01-30  Mg     2.9     01-30    TPro  7.1  /  Alb  3.1<L>  /  TBili  0.7  /  DBili  x   /  AST  25  /  ALT  19  /  AlkPhos  114  01-30    Creatinine Trend: 0.86<--, 0.85<--, 0.80<--, 0.77<--, 1.09<--  LIVER FUNCTIONS - ( 30 Jan 2021 04:54 )  Alb: 3.1 g/dL / Pro: 7.1 g/dL / ALK PHOS: 114 U/L / ALT: 19 U/L / AST: 25 U/L / GGT: x           PT/INR - ( 30 Jan 2021 04:54 )   PT: 15.3 sec;   INR: 1.36 ratio         PTT - ( 30 Jan 2021 04:54 )  PTT:25.4 sec    hs Troponin:    ABG - ( 30 Jan 2021 08:13 )  pH, Arterial: 7.27  pH, Blood: x     /  pCO2: 55    /  pO2: 89    / HCO3: 22    / Base Excess: -1.6  /  SaO2: 95.6                08:13 - ABG - pH: 7.27  |  pCO2: 55    |  pO2: 89    | Lactate:       | BE: -1.6   04:58 - ABG - pH: 7.20  |  pCO2: 64    |  pO2: 106   | Lactate:       | BE: -3.0   03:10 - ABG - pH: 7.17  |  pCO2: 70    |  pO2: 90    | Lactate:       | BE: -3.0         CSF:                      EKG:   MICROBIOLOGY:    IMAGING:      Labs, imaging, EKG personally reviewed    RADIOLOGY & ADDITIONAL TESTS: Reviewed. Ever Denis PGY2  Pager: [phone]       INTERVAL HPI/ OVERNIGHT EVENTS:  intubated overnight      SUBJECTIVE: Patient seen and examined at bedside.     OBJECTIVE:    VITAL SIGNS:  ICU Vital Signs Last 24 Hrs  T(C): 37.1 (30 Jan 2021 08:00), Max: 37.1 (30 Jan 2021 06:55)  T(F): 98.8 (30 Jan 2021 08:00), Max: 98.8 (30 Jan 2021 08:00)  HR: 58 (30 Jan 2021 08:00) (58 - 95)  BP: 102/53 (29 Jan 2021 18:28) (102/53 - 145/96)  BP(mean): --  ABP: 122/62 (30 Jan 2021 08:00) (102/92 - 148/68)  ABP(mean): 81 (30 Jan 2021 08:00) (77 - 91)  RR: 34 (30 Jan 2021 08:00) (22 - 34)  SpO2: 96% (30 Jan 2021 08:00) (90% - 98%)    Mode: AC/ CMV (Assist Control/ Continuous Mandatory Ventilation), RR (machine): 30, TV (machine): 410, FiO2: 100, PEEP: 16, ITime: 0.66, MAP: 23, PIP: 36    01-29 @ 07:01 - 01-30 @ 07:00  --------------------------------------------------------  IN: 1011.3 mL / OUT: 1275 mL / NET: -263.7 mL    01-30 @ 07:01 - 01-30 @ 08:46  --------------------------------------------------------  IN: 262 mL / OUT: 100 mL / NET: 162 mL      CAPILLARY BLOOD GLUCOSE  POCT Blood Glucose.: 115 mg/dL (29 Jan 2021 16:44)      PHYSICAL EXAM:  General: obese, intubated and sedated      HEENT: PERRL, clear conjunctiva  Respiratory: b/l breath sound   Cardiovascular: +S1/S2; RRR  Abdomen: soft, NT/ND; +BS x4  Extremities: WWP, 2+ peripheral pulses b/l; no LE edema  Skin: normal color and turgor; no rash      MEDICATIONS:  MEDICATIONS  (STANDING):  chlorhexidine 0.12% Liquid 15 milliLiter(s) Oral Mucosa every 12 hours  chlorhexidine 4% Liquid 1 Application(s) Topical <User Schedule>  dexAMETHasone  Injectable 6 milliGRAM(s) IV Push daily  dextrose 5%. 1000 milliLiter(s) (80 mL/Hr) IV Continuous <Continuous>  enoxaparin Injectable 130 milliGRAM(s) SubCutaneous two times a day  fentaNYL   Infusion. 0.5 MICROgram(s)/kG/Hr (6.5 mL/Hr) IV Continuous <Continuous>  insulin lispro (ADMELOG) corrective regimen sliding scale   SubCutaneous three times a day before meals  midazolam Infusion 0.02 mG/kG/Hr (2.6 mL/Hr) IV Continuous <Continuous>  norepinephrine Infusion 0.05 MICROgram(s)/kG/Min (12.2 mL/Hr) IV Continuous <Continuous>  pantoprazole  Injectable 40 milliGRAM(s) IV Push daily  propofol Infusion 50 MICROgram(s)/kG/Min (39 mL/Hr) IV Continuous <Continuous>  remdesivir  IVPB   IV Intermittent   remdesivir  IVPB 100 milliGRAM(s) IV Intermittent every 24 hours    MEDICATIONS  (PRN):  sodium chloride 0.9% lock flush 10 milliLiter(s) IV Push every 1 hour PRN Pre/post blood products, medications, blood draw, and to maintain line patency    ALLERGIES:  Allergies  codeine (Unknown)  Intolerances      LABS:                        13.0   13.43 )-----------( 182      ( 30 Jan 2021 04:54 )             44.3     Hemoglobin: 13.0 g/dL (01-30 @ 04:54)  Hemoglobin: 13.5 g/dL (01-29 @ 23:35)  Hemoglobin: 13.9 g/dL (01-29 @ 22:45)  Hemoglobin: 5.3 g/dL (01-29 @ 15:29)  Hemoglobin: 14.4 g/dL (01-28 @ 10:03)    CBC Full  -  ( 30 Jan 2021 04:54 )  WBC Count : 13.43 K/uL  RBC Count : 4.56 M/uL  Hemoglobin : 13.0 g/dL  Hematocrit : 44.3 %  Platelet Count - Automated : 182 K/uL  Mean Cell Volume : 97.1 fL  Mean Cell Hemoglobin : 28.5 pg  Mean Cell Hemoglobin Concentration : 29.3 gm/dL  Auto Neutrophil # : x  Auto Lymphocyte # : x  Auto Monocyte # : x  Auto Eosinophil # : x  Auto Basophil # : x  Auto Neutrophil % : x  Auto Lymphocyte % : x  Auto Monocyte % : x  Auto Eosinophil % : x  Auto Basophil % : x    01-30    139  |  105  |  29<H>  ----------------------------<  143<H>  4.6   |  24  |  0.86    Ca    8.1<L>      30 Jan 2021 04:54  Phos  4.4     01-30  Mg     2.9     01-30    TPro  7.1  /  Alb  3.1<L>  /  TBili  0.7  /  DBili  x   /  AST  25  /  ALT  19  /  AlkPhos  114  01-30    Creatinine Trend: 0.86<--, 0.85<--, 0.80<--, 0.77<--, 1.09<--  LIVER FUNCTIONS - ( 30 Jan 2021 04:54 )  Alb: 3.1 g/dL / Pro: 7.1 g/dL / ALK PHOS: 114 U/L / ALT: 19 U/L / AST: 25 U/L / GGT: x           PT/INR - ( 30 Jan 2021 04:54 )   PT: 15.3 sec;   INR: 1.36 ratio    PTT - ( 30 Jan 2021 04:54 )  PTT:25.4 sec    hs Troponin:  ABG - ( 30 Jan 2021 08:13 )  pH, Arterial: 7.27  pH, Blood: x     /  pCO2: 55    /  pO2: 89    / HCO3: 22    / Base Excess: -1.6  /  SaO2: 95.6    08:13 - ABG - pH: 7.27  |  pCO2: 55    |  pO2: 89    | Lactate:       | BE: -1.6   04:58 - ABG - pH: 7.20  |  pCO2: 64    |  pO2: 106   | Lactate:       | BE: -3.0   03:10 - ABG - pH: 7.17  |  pCO2: 70    |  pO2: 90    | Lactate:       | BE: -3.0       RADIOLOGY & ADDITIONAL TESTS: Reviewed.  < from: US Duplex Venous Lower Ext Complete, Bilateral (01.27.21 @ 16:09) >    FINDINGS:    Acute deep venous thrombosis in the right popliteal vein. Acute deep venous thrombosis in the left femoral vein (proximally). There is normal compressibility of the bilateral common femoral veins.  Calf veins are not visualized.    IMPRESSION:  Acute deep venous thrombosis in both lower extremities: above the knee.    < end of copied text >

## 2021-01-30 NOTE — PROGRESS NOTE ADULT - ATTENDING COMMENTS
64 y/o F with PMH HTN, hx of LLE DVT (previously not on AC) who wa found to be COVID positive on 1/11; was admitted on 1/26 and has been maintained on NIV, ns/p itnuabtion 1/29 to worsening hypoxemic respiratory failure. Found to have bilateral DVTs on lovenox  Acute hypercapneic hypoxic respiratory failure 2/2 COVID PNA:  - pt s/p intubation requiring sedation w/ hypercapneic and high plateau pressures slowly improving  - c/w 30/410/12/70%  - on deacdron, off remdesivir for possible ANAHI   - afebrile will check procal   - monitoring off abx , f/u blood cultures  - may requiring proning pending blood gas    Encephalopathy: multifactorial from hypercapnea and sedation  - c/w fenatyl + propofol + midazolam     GI:  tube feeds+ PPI    dvt in LE: c/w loveneox BID full dose, xa level low will increase dose 20% to 160mg BID and check factor xa level after 4 hrs   Hyperglycemia:possibly 2/2 steroids  - ISS

## 2021-01-31 NOTE — PROGRESS NOTE ADULT - ASSESSMENT
65yF with obesity, HTN, hx of LLE DVT not on AC,  COVID+ on , presenting for acutely worsening SOB, intubated  for acute hypoxic respiratory failure.    neuro:   - intubated, sedated     Respiratory:   Acute Hypoxic respiratory failure    - Likely 2/2 covid pna and PE  - concern for PE given elevated D-dimer in 7000s, switched to heparin gtt overnight     COVID-19   - sedated and paralyzed, prone on , will supine today   - while proned, was able to decrease FIO2 from 80% to 45%, ventilation: 34/410/12/45, AB.32/54/120/25   - continue Dexamethasone x10 days ( - )  - will d/c Remdesivir ( - )  - Trend inflammatory markers    Cards:   - on 0.02mcg of levophed, likely 2/2 sedation   - sinus mady 50s-60s, continue to monitor     GI:   - start tube feeds   - Protonix 40mg qD      Renal:    - Cr. 0.89, no acute issue     Heme:   h/o DVT, b/l upper thigh DVTs; PE not ruled as too unstable for a CTA  - Duplex : b/l above knee DVT   - Ddimer >50k initially; currently downtrending  - continue heparin gtt     ID  - afebrile, observe off abx      Ethics:   - full code

## 2021-01-31 NOTE — DIETITIAN INITIAL EVALUATION ADULT. - ORAL INTAKE PTA/DIET HISTORY
Unable to obtain subjective information regarding PO intake/diet and weight history prior to admission at this time. Information obtained from extensive chart review.

## 2021-01-31 NOTE — DIETITIAN INITIAL EVALUATION ADULT. - OTHER INFO
Per chart review patient with medical history of obesity, HTN, LLE DVT, COVID+ on 1/17, presenting for acutely worsening SOB, intubated 1/29 for acute hypoxic respiratory failure. Patient remains intubated, sedated on Propofol, Fentanyl, and Versed paralyzed on Nimbex. Propofol: 39mL/hr at this time = 1029Kcal if to continue at this rate x24 hours. Patient noted to be proned overnight, plan to supine today.     Currently ordered for feeds of Jevity 1.2 and per I/O feeds at 25mL/hr. Goal rate 50mL/hr. Current TF regimen will provide excessive energy as patient receiving additional Kcal from propofol. Tube feeding recommendation to follow below. Noted HbA1c 6.1% - Finger sticks WDL at this time, ordered for SSI. Receiving steroid therapy (Decadron).

## 2021-01-31 NOTE — DIETITIAN INITIAL EVALUATION ADULT. - ENTERAL
1. Recommend Vital HP via OGT @ 10mL/hr and advance 5mL q12hr to goal rate of 18mL/hr x24 hours + No Carb Prosource 6x daily [at goal provides 432mL total volume, 432Kcal, 127g protein and 361mL free water daily]. *With propofol (1029Kcal), patient will receive ~1461Kcal, 127g protein (11.2Kcal/kg ABW, 1.86g protein/kg IBW)

## 2021-01-31 NOTE — PROGRESS NOTE ADULT - SUBJECTIVE AND OBJECTIVE BOX
Ever Barrios, PGY2  Pager: [phone]       INTERVAL HPI/OVERNIGHT EVENTS: remained proned overnight, sat well on 34/410/12/45,  Lovenox d/c, started on heparin gtt      SUBJECTIVE: Patient seen and examined at bedside.     OBJECTIVE:    VITAL SIGNS:  ICU Vital Signs Last 24 Hrs  T(C): 36.1 (31 Jan 2021 06:00), Max: 37.2 (30 Jan 2021 12:00)  T(F): 97 (31 Jan 2021 06:00), Max: 99 (30 Jan 2021 12:00)  HR: 52 (31 Jan 2021 07:05) (50 - 59)  BP: --  BP(mean): --  ABP: 136/66 (31 Jan 2021 06:00) (119/65 - 136/66)  ABP(mean): 87 (31 Jan 2021 06:00) (79 - 91)  RR: 30 (31 Jan 2021 06:00) (30 - 34)  SpO2: 96% (31 Jan 2021 07:05) (94% - 99%)    Mode: AC/ CMV (Assist Control/ Continuous Mandatory Ventilation), RR (machine): 34, TV (machine): 410, FiO2: 45, PEEP: 12, ITime: 0.69, MAP: 19, PIP: 31    01-30 @ 07:01  -  01-31 @ 07:00  --------------------------------------------------------  IN: 2687.8 mL / OUT: 2275 mL / NET: 412.8 mL      CAPILLARY BLOOD GLUCOSE      POCT Blood Glucose.: 142 mg/dL (31 Jan 2021 05:38)      PHYSICAL EXAM:    General: NAD  HEENT: NC/AT; PERRL, clear conjunctiva  Neck: supple  Respiratory: CTA b/l  Cardiovascular: +S1/S2; RRR  Abdomen: soft, NT/ND; +BS x4  Extremities: WWP, 2+ peripheral pulses b/l; no LE edema  Skin: normal color and turgor; no rash  Neurological:    MEDICATIONS:  MEDICATIONS  (STANDING):  chlorhexidine 0.12% Liquid 15 milliLiter(s) Oral Mucosa every 12 hours  chlorhexidine 4% Liquid 1 Application(s) Topical <User Schedule>  cisatracurium Infusion 3 MICROgram(s)/kG/Min (23.4 mL/Hr) IV Continuous <Continuous>  dexAMETHasone  Injectable 6 milliGRAM(s) IV Push daily  dextrose 40% Gel 15 Gram(s) Oral once  dextrose 5%. 1000 milliLiter(s) (50 mL/Hr) IV Continuous <Continuous>  dextrose 5%. 1000 milliLiter(s) (100 mL/Hr) IV Continuous <Continuous>  dextrose 50% Injectable 25 Gram(s) IV Push once  dextrose 50% Injectable 12.5 Gram(s) IV Push once  dextrose 50% Injectable 25 Gram(s) IV Push once  fentaNYL   Infusion..... 0.5 MICROgram(s)/kG/Hr (1.3 mL/Hr) IV Continuous <Continuous>  glucagon  Injectable 1 milliGRAM(s) IntraMuscular once  heparin  Infusion.  Unit(s)/Hr (23 mL/Hr) IV Continuous <Continuous>  insulin lispro (ADMELOG) corrective regimen sliding scale   SubCutaneous every 6 hours  midazolam Infusion 0.02 mG/kG/Hr (2.6 mL/Hr) IV Continuous <Continuous>  norepinephrine Infusion 0.05 MICROgram(s)/kG/Min (12.2 mL/Hr) IV Continuous <Continuous>  pantoprazole  Injectable 40 milliGRAM(s) IV Push daily  potassium phosphate / sodium phosphate Powder (PHOS-NaK) 1 Packet(s) Oral four times a day  propofol Infusion 50 MICROgram(s)/kG/Min (39 mL/Hr) IV Continuous <Continuous>    MEDICATIONS  (PRN):  heparin   Injectable 34375 Unit(s) IV Push every 6 hours PRN For aPTT less than 40  heparin   Injectable 5000 Unit(s) IV Push every 6 hours PRN For aPTT between 40 - 57  sodium chloride 0.9% lock flush 10 milliLiter(s) IV Push every 1 hour PRN Pre/post blood products, medications, blood draw, and to maintain line patency      ALLERGIES:  Allergies    codeine (Unknown)    Intolerances        LABS:                        12.3   10.21 )-----------( 174      ( 31 Jan 2021 04:17 )             40.6     Hemoglobin: 12.3 g/dL (01-31 @ 04:17)  Hemoglobin: 13.0 g/dL (01-30 @ 04:54)  Hemoglobin: 13.5 g/dL (01-29 @ 23:35)  Hemoglobin: 13.9 g/dL (01-29 @ 22:45)  Hemoglobin: 5.3 g/dL (01-29 @ 15:29)    CBC Full  -  ( 31 Jan 2021 04:17 )  WBC Count : 10.21 K/uL  RBC Count : 4.30 M/uL  Hemoglobin : 12.3 g/dL  Hematocrit : 40.6 %  Platelet Count - Automated : 174 K/uL  Mean Cell Volume : 94.4 fL  Mean Cell Hemoglobin : 28.6 pg  Mean Cell Hemoglobin Concentration : 30.3 gm/dL  Auto Neutrophil # : 8.42 K/uL  Auto Lymphocyte # : 0.80 K/uL  Auto Monocyte # : 0.49 K/uL  Auto Eosinophil # : 0.00 K/uL  Auto Basophil # : 0.04 K/uL  Auto Neutrophil % : 82.5 %  Auto Lymphocyte % : 7.8 %  Auto Monocyte % : 4.8 %  Auto Eosinophil % : 0.0 %  Auto Basophil % : 0.4 %    01-30    139  |  105  |  29<H>  ----------------------------<  143<H>  4.6   |  24  |  0.86    Ca    8.1<L>      30 Jan 2021 04:54  Phos  2.3     01-31  Mg     3.1     01-31    TPro  7.1  /  Alb  3.1<L>  /  TBili  0.7  /  DBili  x   /  AST  25  /  ALT  19  /  AlkPhos  114  01-30    Creatinine Trend: 0.86<--, 0.85<--, 0.80<--, 0.77<--, 1.09<--  LIVER FUNCTIONS - ( 30 Jan 2021 04:54 )  Alb: 3.1 g/dL / Pro: 7.1 g/dL / ALK PHOS: 114 U/L / ALT: 19 U/L / AST: 25 U/L / GGT: x           PT/INR - ( 30 Jan 2021 04:54 )   PT: 15.3 sec;   INR: 1.36 ratio         PTT - ( 31 Jan 2021 04:17 )  PTT:>200.0 sec    hs Troponin:    ABG - ( 31 Jan 2021 04:17 )  pH, Arterial: 7.32  pH, Blood: x     /  pCO2: 54    /  pO2: 120   / HCO3: 25    / Base Excess: 1.8   /  SaO2: 98.0    04:17 - ABG - pH: 7.32  |  pCO2: 54    |  pO2: 120   | Lactate:       | BE: 1.8    17:06 - ABG - pH: 7.30  |  pCO2: 51    |  pO2: 154   | Lactate:       | BE: -0.9   11:54 - ABG - pH: 7.30  |  pCO2: 50    |  pO2: 66    | Lactate:       | BE: -1.2     Labs, imaging, EKG personally reviewed    RADIOLOGY & ADDITIONAL TESTS: Reviewed. Ever Barrios, PGY2  Pager: [phone]       INTERVAL HPI/OVERNIGHT EVENTS: remained proned overnight, sat well on 34/410/12/45,  Lovenox d/c, started on heparin gtt      SUBJECTIVE: Patient seen and examined at bedside.     OBJECTIVE:    VITAL SIGNS:  ICU Vital Signs Last 24 Hrs  T(C): 36.1 (31 Jan 2021 06:00), Max: 37.2 (30 Jan 2021 12:00)  T(F): 97 (31 Jan 2021 06:00), Max: 99 (30 Jan 2021 12:00)  HR: 52 (31 Jan 2021 07:05) (50 - 59)  BP: --  BP(mean): --  ABP: 136/66 (31 Jan 2021 06:00) (119/65 - 136/66)  ABP(mean): 87 (31 Jan 2021 06:00) (79 - 91)  RR: 30 (31 Jan 2021 06:00) (30 - 34)  SpO2: 96% (31 Jan 2021 07:05) (94% - 99%)    Mode: AC/ CMV (Assist Control/ Continuous Mandatory Ventilation), RR (machine): 34, TV (machine): 410, FiO2: 45, PEEP: 12, ITime: 0.69, MAP: 19, PIP: 31    01-30 @ 07:01  -  01-31 @ 07:00  --------------------------------------------------------  IN: 2687.8 mL / OUT: 2275 mL / NET: 412.8 mL      CAPILLARY BLOOD GLUCOSE      POCT Blood Glucose.: 142 mg/dL (31 Jan 2021 05:38)      PHYSICAL EXAM:  General: intubated, sedated   HEENT: PERRL, clear conjunctiva  Neck: supple  Respiratory: b/l breath sound    Cardiovascular: +S1/S2; RRR  Abdomen: soft, NT/ND; +BS x4  Extremities: WWP, 2+ peripheral pulses b/l; mild LE edema  neuro: intubated, sedated     MEDICATIONS:  MEDICATIONS  (STANDING):  chlorhexidine 0.12% Liquid 15 milliLiter(s) Oral Mucosa every 12 hours  chlorhexidine 4% Liquid 1 Application(s) Topical <User Schedule>  cisatracurium Infusion 3 MICROgram(s)/kG/Min (23.4 mL/Hr) IV Continuous <Continuous>  dexAMETHasone  Injectable 6 milliGRAM(s) IV Push daily  dextrose 40% Gel 15 Gram(s) Oral once  dextrose 5%. 1000 milliLiter(s) (50 mL/Hr) IV Continuous <Continuous>  dextrose 5%. 1000 milliLiter(s) (100 mL/Hr) IV Continuous <Continuous>  dextrose 50% Injectable 25 Gram(s) IV Push once  dextrose 50% Injectable 12.5 Gram(s) IV Push once  dextrose 50% Injectable 25 Gram(s) IV Push once  fentaNYL   Infusion..... 0.5 MICROgram(s)/kG/Hr (1.3 mL/Hr) IV Continuous <Continuous>  glucagon  Injectable 1 milliGRAM(s) IntraMuscular once  heparin  Infusion.  Unit(s)/Hr (23 mL/Hr) IV Continuous <Continuous>  insulin lispro (ADMELOG) corrective regimen sliding scale   SubCutaneous every 6 hours  midazolam Infusion 0.02 mG/kG/Hr (2.6 mL/Hr) IV Continuous <Continuous>  norepinephrine Infusion 0.05 MICROgram(s)/kG/Min (12.2 mL/Hr) IV Continuous <Continuous>  pantoprazole  Injectable 40 milliGRAM(s) IV Push daily  potassium phosphate / sodium phosphate Powder (PHOS-NaK) 1 Packet(s) Oral four times a day  propofol Infusion 50 MICROgram(s)/kG/Min (39 mL/Hr) IV Continuous <Continuous>    MEDICATIONS  (PRN):  heparin   Injectable 65046 Unit(s) IV Push every 6 hours PRN For aPTT less than 40  heparin   Injectable 5000 Unit(s) IV Push every 6 hours PRN For aPTT between 40 - 57  sodium chloride 0.9% lock flush 10 milliLiter(s) IV Push every 1 hour PRN Pre/post blood products, medications, blood draw, and to maintain line patency      ALLERGIES:  Allergies  codeine (Unknown)  Intolerances    LABS:                        12.3   10.21 )-----------( 174      ( 31 Jan 2021 04:17 )             40.6     Hemoglobin: 12.3 g/dL (01-31 @ 04:17)  Hemoglobin: 13.0 g/dL (01-30 @ 04:54)  Hemoglobin: 13.5 g/dL (01-29 @ 23:35)  Hemoglobin: 13.9 g/dL (01-29 @ 22:45)  Hemoglobin: 5.3 g/dL (01-29 @ 15:29)    CBC Full  -  ( 31 Jan 2021 04:17 )  WBC Count : 10.21 K/uL  RBC Count : 4.30 M/uL  Hemoglobin : 12.3 g/dL  Hematocrit : 40.6 %  Platelet Count - Automated : 174 K/uL  Mean Cell Volume : 94.4 fL  Mean Cell Hemoglobin : 28.6 pg  Mean Cell Hemoglobin Concentration : 30.3 gm/dL  Auto Neutrophil # : 8.42 K/uL  Auto Lymphocyte # : 0.80 K/uL  Auto Monocyte # : 0.49 K/uL  Auto Eosinophil # : 0.00 K/uL  Auto Basophil # : 0.04 K/uL  Auto Neutrophil % : 82.5 %  Auto Lymphocyte % : 7.8 %  Auto Monocyte % : 4.8 %  Auto Eosinophil % : 0.0 %  Auto Basophil % : 0.4 %    01-30    139  |  105  |  29<H>  ----------------------------<  143<H>  4.6   |  24  |  0.86    Ca    8.1<L>      30 Jan 2021 04:54  Phos  2.3     01-31  Mg     3.1     01-31    TPro  7.1  /  Alb  3.1<L>  /  TBili  0.7  /  DBili  x   /  AST  25  /  ALT  19  /  AlkPhos  114  01-30    Creatinine Trend: 0.86<--, 0.85<--, 0.80<--, 0.77<--, 1.09<--  LIVER FUNCTIONS - ( 30 Jan 2021 04:54 )  Alb: 3.1 g/dL / Pro: 7.1 g/dL / ALK PHOS: 114 U/L / ALT: 19 U/L / AST: 25 U/L / GGT: x           PT/INR - ( 30 Jan 2021 04:54 )   PT: 15.3 sec;   INR: 1.36 ratio    PTT - ( 31 Jan 2021 04:17 )  PTT:>200.0 sec    hs Troponin:    ABG - ( 31 Jan 2021 04:17 )  pH, Arterial: 7.32  pH, Blood: x     /  pCO2: 54    /  pO2: 120   / HCO3: 25    / Base Excess: 1.8   /  SaO2: 98.0    04:17 - ABG - pH: 7.32  |  pCO2: 54    |  pO2: 120   | Lactate:       | BE: 1.8    17:06 - ABG - pH: 7.30  |  pCO2: 51    |  pO2: 154   | Lactate:       | BE: -0.9   11:54 - ABG - pH: 7.30  |  pCO2: 50    |  pO2: 66    | Lactate:       | BE: -1.2     Labs, imaging, EKG personally reviewed    RADIOLOGY & ADDITIONAL TESTS: Reviewed.

## 2021-01-31 NOTE — DIETITIAN INITIAL EVALUATION ADULT. - REASON
Unable to conduct a nutrition-focused physical exam due to limited contact restrictions related to Pt's medical condition and isolation precautions.

## 2021-01-31 NOTE — DIETITIAN INITIAL EVALUATION ADULT. - RD TO REMAIN AVAILABLE
2. Consider multivitamin daily for micronutrient coverage. 3. Bowel regimen prn. 4. If Prone, please continue to feed with HOB elevated 10-15 degrees. Consider promotility agent if necessary. **Several retrospective and small prospective trials have shown EN during prone positioning is ot associated with increased risk of gastrointestinal or pulmonary complications, thus we recommend the patient requiring prone positioning receive early EN.

## 2021-01-31 NOTE — DIETITIAN INITIAL EVALUATION ADULT. - OTHER CALCULATIONS
Using admit weight for energy needs and IBW for protein needs. Fluids deferred to medical team discretion.

## 2021-01-31 NOTE — PROGRESS NOTE ADULT - ATTENDING COMMENTS
66 y/o F with PMH HTN, hx of LLE DVT (previously not on AC) who wa found to be COVID positive on 1/11; was admitted on 1/26 and has been maintained on NIV, ns/p itnuabtion 1/29 to worsening hypoxemic respiratory failure. Found to have bilateral DVTs now on heparin   Acute hypercapneic hypoxic respiratory failure 2/2 COVID PNA:  - pt s/p intubation requiring sedation w/ hypercapneic and high plateau pressures slowly improving  - on deacdron, off remdesivir for possible ANAHI   - afebrile will check procal   - monitoring off abx , f/u blood cultures  - s/p proning with improvement of respiratory mechanics, will repeat blood gas when supine , may require further proning    Encephalopathy: multifactorial from hypercapnea and sedation  - c/w fenatyl + propofol + midazolam   - paralyic while prononing   GI:  tube feeds+ PPI    dvt in LE: started on heparin gtt overnight, PTT supratherapetic, will adjust per algorightm    Hyperglycemia:possibly 2/2 steroids  - ISS .  Bradycardia:  - possibly 2/2 sedation   - will check tsh, free t4

## 2021-01-31 NOTE — DIETITIAN INITIAL EVALUATION ADULT. - PERTINENT MEDS FT
cisatracurium Infusion  dexAMETHasone  Injectable  fentaNYL   Infusion.....  glucagon  Injectable  heparin   Injectable PRN  heparin  Infusion.  insulin lispro (ADMELOG) corrective regimen sliding scale  midazolam Infusion  norepinephrine Infusion  pantoprazole  Injectable  potassium phosphate / sodium phosphate Powder (PHOS-NaK)  propofol Infusion

## 2021-01-31 NOTE — DIETITIAN INITIAL EVALUATION ADULT. - PERTINENT LABORATORY DATA
01-31 Phos 2.3 mg/dL<L> Hgb 12.3 g/dL Hct 40.6 % 24Hr FS:155 mg/dL  142 mg/dL  150 mg/dL  159 mg/dL  150 mg/dL  1-29 HbA1c 6.1%

## 2021-02-01 NOTE — PROGRESS NOTE ADULT - SUBJECTIVE AND OBJECTIVE BOX
INTERVAL HPI/OVERNIGHT EVENTS:     SUBJECTIVE: Patient seen and examined at bedside.     OBJECTIVE:    ICU Vital Signs Last 24 Hrs  T(C): 36.9 (01 Feb 2021 06:00), Max: 37.2 (31 Jan 2021 10:00)  T(F): 98.5 (01 Feb 2021 06:00), Max: 99 (31 Jan 2021 10:00)  HR: 70 (01 Feb 2021 07:39) (45 - 93)  BP: --  BP(mean): --  ABP: 107/49 (01 Feb 2021 06:00) (87/69 - 152/71)  ABP(mean): 67 (01 Feb 2021 06:00) (66 - 99)  RR: 33 (01 Feb 2021 06:00) (32 - 34)  SpO2: 98% (01 Feb 2021 07:39) (91% - 98%)    LABS:                        12.3   13.04 )-----------( 228      ( 01 Feb 2021 03:21 )             40.4     02-01    145  |  110<H>  |  17  ----------------------------<  139<H>  4.9   |  25  |  0.63    Ca    8.2<L>      01 Feb 2021 03:21  Phos  2.9     02-01  Mg     2.9     02-01    TPro  6.8  /  Alb  2.5<L>  /  TBili  0.5  /  DBili  x   /  AST  57<H>  /  ALT  28  /  AlkPhos  94  02-01    PTT - ( 01 Feb 2021 00:27 )  PTT:91.5 sec    CAPILLARY BLOOD GLUCOSE      POCT Blood Glucose.: 124 mg/dL (01 Feb 2021 05:26)        RADIOLOGY & ADDITIONAL TESTS: Reviewed.  PHYSICAL EXAM:  General: intubated, sedated   HEENT: PERRL, clear conjunctiva  Neck: supple  Respiratory: b/l breath sound    Cardiovascular: +S1/S2; RRR  Abdomen: soft, NT/ND; +BS x4  Extremities: WWP, 2+ peripheral pulses b/l; mild LE edema  neuro: intubated, sedated     MEDICATIONS:  MEDICATIONS  (STANDING):  chlorhexidine 0.12% Liquid 15 milliLiter(s) Oral Mucosa every 12 hours  chlorhexidine 4% Liquid 1 Application(s) Topical <User Schedule>  cisatracurium Infusion 3 MICROgram(s)/kG/Min (23.4 mL/Hr) IV Continuous <Continuous>  dexAMETHasone  Injectable 6 milliGRAM(s) IV Push daily  dextrose 40% Gel 15 Gram(s) Oral once  dextrose 5%. 1000 milliLiter(s) (50 mL/Hr) IV Continuous <Continuous>  dextrose 5%. 1000 milliLiter(s) (100 mL/Hr) IV Continuous <Continuous>  dextrose 50% Injectable 25 Gram(s) IV Push once  dextrose 50% Injectable 12.5 Gram(s) IV Push once  dextrose 50% Injectable 25 Gram(s) IV Push once  fentaNYL   Infusion..... 0.5 MICROgram(s)/kG/Hr (1.3 mL/Hr) IV Continuous <Continuous>  glucagon  Injectable 1 milliGRAM(s) IntraMuscular once  heparin  Infusion.  Unit(s)/Hr (23 mL/Hr) IV Continuous <Continuous>  insulin lispro (ADMELOG) corrective regimen sliding scale   SubCutaneous every 6 hours  midazolam Infusion 0.02 mG/kG/Hr (2.6 mL/Hr) IV Continuous <Continuous>  norepinephrine Infusion 0.05 MICROgram(s)/kG/Min (12.2 mL/Hr) IV Continuous <Continuous>  pantoprazole  Injectable 40 milliGRAM(s) IV Push daily  potassium phosphate / sodium phosphate Powder (PHOS-NaK) 1 Packet(s) Oral four times a day  propofol Infusion 50 MICROgram(s)/kG/Min (39 mL/Hr) IV Continuous <Continuous>    MEDICATIONS  (PRN):  heparin   Injectable 64555 Unit(s) IV Push every 6 hours PRN For aPTT less than 40  heparin   Injectable 5000 Unit(s) IV Push every 6 hours PRN For aPTT between 40 - 57  sodium chloride 0.9% lock flush 10 milliLiter(s) IV Push every 1 hour PRN Pre/post blood products, medications, blood draw, and to maintain line patency      ALLERGIES:  Allergies  codeine (Unknown)  Intolerances           Labs, imaging, EKG personally reviewed    RADIOLOGY & ADDITIONAL TESTS: Reviewed.     INTERVAL HPI/OVERNIGHT EVENTS: Supined overnight,  was proned yesterday, remains hypoxic, will reprone today. Remains sedated and paralyzed.    SUBJECTIVE: Patient seen and examined at bedside.     OBJECTIVE:    ICU Vital Signs Last 24 Hrs  T(C): 36.9 (01 Feb 2021 06:00), Max: 37.2 (31 Jan 2021 10:00)  T(F): 98.5 (01 Feb 2021 06:00), Max: 99 (31 Jan 2021 10:00)  HR: 70 (01 Feb 2021 07:39) (45 - 93)  BP: --  BP(mean): --  ABP: 107/49 (01 Feb 2021 06:00) (87/69 - 152/71)  ABP(mean): 67 (01 Feb 2021 06:00) (66 - 99)  RR: 33 (01 Feb 2021 06:00) (32 - 34)  SpO2: 98% (01 Feb 2021 07:39) (91% - 98%)    LABS:                        12.3   13.04 )-----------( 228      ( 01 Feb 2021 03:21 )             40.4     02-01    145  |  110<H>  |  17  ----------------------------<  139<H>  4.9   |  25  |  0.63    Ca    8.2<L>      01 Feb 2021 03:21  Phos  2.9     02-01  Mg     2.9     02-01    TPro  6.8  /  Alb  2.5<L>  /  TBili  0.5  /  DBili  x   /  AST  57<H>  /  ALT  28  /  AlkPhos  94  02-01    PTT - ( 01 Feb 2021 00:27 )  PTT:91.5 sec    CAPILLARY BLOOD GLUCOSE      POCT Blood Glucose.: 124 mg/dL (01 Feb 2021 05:26)        RADIOLOGY & ADDITIONAL TESTS: Reviewed.  PHYSICAL EXAM:  General: intubated, sedated   HEENT: PERRL, clear conjunctiva  Neck: supple  Respiratory: b/l breath sound    Cardiovascular: +S1/S2; RRR  Abdomen: soft, NT/ND; +BS x4  Extremities: WWP, 2+ peripheral pulses b/l; mild LE edema  neuro: intubated, sedated     MEDICATIONS:  MEDICATIONS  (STANDING):  chlorhexidine 0.12% Liquid 15 milliLiter(s) Oral Mucosa every 12 hours  chlorhexidine 4% Liquid 1 Application(s) Topical <User Schedule>  cisatracurium Infusion 3 MICROgram(s)/kG/Min (23.4 mL/Hr) IV Continuous <Continuous>  dexAMETHasone  Injectable 6 milliGRAM(s) IV Push daily  dextrose 40% Gel 15 Gram(s) Oral once  dextrose 5%. 1000 milliLiter(s) (50 mL/Hr) IV Continuous <Continuous>  dextrose 5%. 1000 milliLiter(s) (100 mL/Hr) IV Continuous <Continuous>  dextrose 50% Injectable 25 Gram(s) IV Push once  dextrose 50% Injectable 12.5 Gram(s) IV Push once  dextrose 50% Injectable 25 Gram(s) IV Push once  fentaNYL   Infusion..... 0.5 MICROgram(s)/kG/Hr (1.3 mL/Hr) IV Continuous <Continuous>  glucagon  Injectable 1 milliGRAM(s) IntraMuscular once  heparin  Infusion.  Unit(s)/Hr (23 mL/Hr) IV Continuous <Continuous>  insulin lispro (ADMELOG) corrective regimen sliding scale   SubCutaneous every 6 hours  midazolam Infusion 0.02 mG/kG/Hr (2.6 mL/Hr) IV Continuous <Continuous>  norepinephrine Infusion 0.05 MICROgram(s)/kG/Min (12.2 mL/Hr) IV Continuous <Continuous>  pantoprazole  Injectable 40 milliGRAM(s) IV Push daily  potassium phosphate / sodium phosphate Powder (PHOS-NaK) 1 Packet(s) Oral four times a day  propofol Infusion 50 MICROgram(s)/kG/Min (39 mL/Hr) IV Continuous <Continuous>    MEDICATIONS  (PRN):  heparin   Injectable 41389 Unit(s) IV Push every 6 hours PRN For aPTT less than 40  heparin   Injectable 5000 Unit(s) IV Push every 6 hours PRN For aPTT between 40 - 57  sodium chloride 0.9% lock flush 10 milliLiter(s) IV Push every 1 hour PRN Pre/post blood products, medications, blood draw, and to maintain line patency      ALLERGIES:  Allergies  codeine (Unknown)  Intolerances           Labs, imaging, EKG personally reviewed    RADIOLOGY & ADDITIONAL TESTS: Reviewed.

## 2021-02-01 NOTE — PROGRESS NOTE ADULT - ASSESSMENT
65yF with obesity, HTN, hx of LLE DVT not on AC,  COVID+ on , presenting for acutely worsening SOB, intubated  for acute hypoxic respiratory failure.    neuro:   - intubated, sedated     Respiratory:   Acute Hypoxic respiratory failure    - Likely 2/2 covid pna and PE  - concern for PE given elevated D-dimer in 7000s, switched to heparin gtt overnight     COVID-19   - sedated and paralyzed, prone on , will supine today   - while proned, was able to decrease FIO2 from 80% to 45%, ventilation: 34/410/12/45, AB.32/54/120/25   - continue Dexamethasone x10 days ( - )  - will d/c Remdesivir ( - )  - Trend inflammatory markers    Cards:   - on 0.02mcg of levophed, likely 2/2 sedation   - sinus mady 50s-60s, continue to monitor     GI:   - start tube feeds   - Protonix 40mg qD      Renal:    - Cr. 0.89, no acute issue     Heme:   h/o DVT, b/l upper thigh DVTs; PE not ruled as too unstable for a CTA  - Duplex : b/l above knee DVT   - Ddimer >50k initially; currently downtrending  - continue heparin gtt     ID  - afebrile, observe off abx      Ethics:   - full code      65yF with obesity, HTN, hx of LLE DVT not on AC,  COVID+ on , presenting for acutely worsening SOB, intubated  for acute hypoxic respiratory failure.    neuro:   - intubated, sedated     Respiratory:   Acute Hypoxic respiratory failure    - Likely 2/2 covid pna and PE  - concern for PE given elevated D-dimer in 7000s, switched to heparin gtt overnight   -Proned  COVID-19   - sedated and paralyzed, prone on , will supine today   - while proned, was able to decrease FIO2 from 80% to 45%, ventilation: 34/410/12/45, AB.32/54/120/25   - continue Dexamethasone x10 days ( - )  - will d/c Remdesivir ( - )  - Trend inflammatory markers    Cards:   - on 0.02mcg of levophed, likely 2/2 sedation   - sinus mady 50s-60s, continue to monitor     GI:   - start tube feeds   - Protonix 40mg qD      Renal:    - Cr. 0.89, no acute issue     Heme:   h/o DVT, b/l upper thigh DVTs; PE not ruled as too unstable for a CTA  - Duplex : b/l above knee DVT   - Ddimer >50k initially; currently downtrending  - continue heparin gtt     ID  - afebrile, observe off abx      Ethics:   - full code

## 2021-02-01 NOTE — PROGRESS NOTE ADULT - ATTENDING COMMENTS
66 y/o F with PMH HTN, hx of LLE DVT (previously not on AC) who wa found to be COVID positive on 1/11; was admitted on 1/26 and has been maintained on NIV, ns/p itnuabtion 1/29 to worsening hypoxemic respiratory failure. Found to have bilateral DVTs now on heparin   Acute hypercapneic hypoxic respiratory failure 2/2 COVID PNA:  - pt s/p intubation requiring sedation w/ hypercapneic and high plateau pressures slowly improving  - on deacdron, off remdesivir for possible ANAHI   - afebrile c/w monitoring of abx   - monitoring off abx , f/u blood cultures  - s/p proning with improvement of respiratory mechanics,  requires further proning  -lasix 20mg x1 today   Encephalopathy: multifactorial from hypercapnea and sedation  - c/w fenatyl + propofol + ketamine  - paralyic while prononing   GI:  tube feeds+ PPI    dvt in LE: c/w heparin gtt , PTT within range    Hyperglycemia:possibly 2/2 steroids  - ISS .  Bradycardia:  - possibly 2/2 sedation

## 2021-02-02 NOTE — PROGRESS NOTE ADULT - ATTENDING COMMENTS
65 year old with covid respiratory failure.  Patient critically ill by virtue of needing ventilator support, intermittent use of vasopressors and frequent bedside reassessments and medication changes. on heparin for elevated ddimer. s/p proning. pa02 improved. continue to wean sedatives and vent as tolerated.

## 2021-02-02 NOTE — PROGRESS NOTE ADULT - ASSESSMENT
65yF with obesity, HTN, hx of LLE DVT not on AC,  COVID+ on 1/17, presenting for acutely worsening SOB, intubated 1/29 for acute hypoxic respiratory failure.    neuro:   - intubated, sedated     Respiratory:   Acute Hypoxic respiratory failure    - Likely 2/2 covid pna and PE  - concern for PE given elevated D-dimer in 7000s, on heparin gtt  - Proned  COVID-19   - sedated and paralyzed, prone on 1/30, will supine today   - while proned, was able to decrease FIO2 from 80% to 45%  - continue Dexamethasone x10 days (1/27 - )  - Remdesivir d/c'ed (1/26 -1/30 )  - Trend inflammatory markers    Cards:   - on 0.02mcg of levophed, likely 2/2 sedation   - sinus mady 50s-60s, continue to monitor     GI:   - c/w tube feeds   - Protonix 40mg qD      Renal:    - Cr. 0.89, no acute issue     Heme:   h/o DVT, b/l upper thigh DVTs; PE not ruled as too unstable for a CTA  - Duplex 1/27: b/l above knee DVT   - Ddimer >50k initially; currently downtrending  - continue heparin gtt     ID  - afebrile, observe off abx      Ethics:   - full code    65yF with obesity, HTN, hx of LLE DVT not on AC,  COVID+ on 1/17, presenting for acutely worsening SOB, intubated 1/29 for acute hypoxic respiratory failure.    neuro:   - intubated, sedated     Respiratory:   Acute Hypoxic respiratory failure    - Likely 2/2 covid pna and PE  - concern for PE given elevated D-dimer in 7000s, on heparin gtt  - Proned  COVID-19   - sedated and paralyzed, prone on 1/30, will supine today   - while proned, was able to decrease FIO2 from 80% to 45%  - continue Dexamethasone x10 days (1/27 - )  - Remdesivir d/c'ed (1/26 -1/30 )  - Trend inflammatory markers    Cards:   - Wean off levophed  - sinus mady 50s-60s, continue to monitor     GI:   - c/w tube feeds   - Protonix 40mg qD      Renal:    - Cr. 0.89, no acute issue     Heme:   h/o DVT, b/l upper thigh DVTs; PE not ruled as too unstable for a CTA  - Duplex 1/27: b/l above knee DVT   - Ddimer >50k initially; currently downtrending  - continue heparin gtt     ID  - afebrile, observe off abx      Ethics:   - full code

## 2021-02-02 NOTE — PROGRESS NOTE ADULT - SUBJECTIVE AND OBJECTIVE BOX
Admit Date: 01-26-21  Length of Stay: 7d    INTERVAL HPI/OVERNIGHT EVENTS:  -     MEDICATIONS  (STANDING):  chlorhexidine 0.12% Liquid 15 milliLiter(s) Oral Mucosa every 12 hours  chlorhexidine 4% Liquid 1 Application(s) Topical <User Schedule>  cisatracurium Infusion 3 MICROgram(s)/kG/Min (23.4 mL/Hr) IV Continuous <Continuous>  dexAMETHasone  Injectable 6 milliGRAM(s) IV Push daily  dextrose 40% Gel 15 Gram(s) Oral once  dextrose 5%. 1000 milliLiter(s) (50 mL/Hr) IV Continuous <Continuous>  dextrose 5%. 1000 milliLiter(s) (100 mL/Hr) IV Continuous <Continuous>  dextrose 50% Injectable 25 Gram(s) IV Push once  dextrose 50% Injectable 12.5 Gram(s) IV Push once  dextrose 50% Injectable 25 Gram(s) IV Push once  fentaNYL   Infusion..... 0.5 MICROgram(s)/kG/Hr (1.3 mL/Hr) IV Continuous <Continuous>  glucagon  Injectable 1 milliGRAM(s) IntraMuscular once  heparin  Infusion. 1400 Unit(s)/Hr (14 mL/Hr) IV Continuous <Continuous>  insulin lispro (ADMELOG) corrective regimen sliding scale   SubCutaneous every 6 hours  ketamine Infusion. 0.25 mG/kG/Hr (3.25 mL/Hr) IV Continuous <Continuous>  midazolam Infusion 0.02 mG/kG/Hr (2.6 mL/Hr) IV Continuous <Continuous>  norepinephrine Infusion 0.05 MICROgram(s)/kG/Min (12.2 mL/Hr) IV Continuous <Continuous>  pantoprazole  Injectable 40 milliGRAM(s) IV Push daily  propofol Infusion 50 MICROgram(s)/kG/Min (39 mL/Hr) IV Continuous <Continuous>    MEDICATIONS  (PRN):  heparin   Injectable 44197 Unit(s) IV Push every 6 hours PRN For aPTT less than 40  heparin   Injectable 5000 Unit(s) IV Push every 6 hours PRN For aPTT between 40 - 57  sodium chloride 0.9% lock flush 10 milliLiter(s) IV Push every 1 hour PRN Pre/post blood products, medications, blood draw, and to maintain line patency      Vitals:  Vital Signs Last 24 Hrs  T(C): 36.2 (02 Feb 2021 04:00), Max: 37.3 (01 Feb 2021 18:00)  T(F): 97.2 (02 Feb 2021 04:00), Max: 99.1 (01 Feb 2021 18:00)  HR: 51 (02 Feb 2021 07:25) (51 - 78)  BP: --  BP(mean): --  RR: 27 (02 Feb 2021 06:00) (20 - 35)  SpO2: 92% (02 Feb 2021 07:25) (92% - 100%)    Vitals (Invasive):  ABP: 130/75 (02-02-21 @ 06:00) (111/69 - 145/76)  CVP(mm Hg): --  CVP(cm H2O): --  CO: --  CI: --  PA: --  PA(mean): --  PCWP: --  LA: --  RA: --  SVR: --  SVRI: --  PVR: --  PVRI: --    I&O's Summary    01 Feb 2021 07:01  -  02 Feb 2021 07:00  --------------------------------------------------------  IN: 2437.5 mL / OUT: 2485 mL / NET: -47.5 mL        Physical Exam:  HEENT:     + NCAT  + EOMI  - Conjunctival edema   - Icterus   - Thrush   - ETT  - NGT/OGT  Neck:         + FROM    + JVD     - Nodes     - Masses    + Mid-line trachea   - Tracheostomy  Chest:         - Sternal click  - Sternal drainage  - Chest tubes  - SubQ emphysema  Lungs:          + CTA   - Rhonchi    - Rales    - Wheezing     - Decreased BS   - Dullness R L  Cardiac:       + S1 + S2    + RRR   - Irregular   - S3  - S4    - Murmurs   - Rub   - Hamman’s sign   Abdomen:    + BS     + Soft    + Non-tender     - Distended    - Organomegaly  - PEG  Extremities:   - Cyanosis U/L   - Clubbing  U/L  - LE/UE Edema   + Capillary refill    + Pulses   Neuro:        - Awake   -  Alert   - Confused   - Lethargic   + Sedated   + Paralyzed  - Generalized weakness  Skin:        - Rashes    - Erythema   + Normal incisions   + IV sites intact  - Sacral decubitus    Labs:  COVID:  COVID-19 PCR: Detected (01-26-21 @ 16:43)                            12.5   15.40 )-----------( 239      ( 02 Feb 2021 02:38 )             40.7     02-02    145  |  109<H>  |  20  ----------------------------<  118<H>  4.9   |  27  |  0.58    Ca    8.2<L>      02 Feb 2021 02:38  Phos  3.3     02-02  Mg     2.7     02-02    TPro  6.9  /  Alb  2.5<L>  /  TBili  0.6  /  DBili  x   /  AST  46<H>  /  ALT  32  /  AlkPhos  93  02-02    PTT - ( 01 Feb 2021 00:27 )  PTT:91.5 sec      COVID related labs:  02 Feb 2021 02:38  D-dimer:  675<H>  Calcitonin:  x  CRP:  x  LDH:  x  Lactate,Blood:  x  CK:  x  Troponin I:  x  Troponin T:  x  Troponin HS:  x  Ferritin, Serum: 1098<H>  BNP:  x      Blood Gases:  (ARTERIAL):  02-02-21 @ 02:38  pH 7.39 / pCO2 50 / pO2 68 / HCO3 28  Total CO2 --  FiO2 --  Oxygen Saturation 92.8  Total Hemoglobin, Calculated 12.9  Hematocrit, Calculated 39.6  Oxygen Content --  Blood Gas Arterial - Calcium, Ionized --  Blood Gas Arterial - Chloride --  Blood Gas Arterial - Glucose 128  Blood Gas Arterial - Potassium 4.5  Blood Gas Arterial - Sodium 146  Blood Gas Arterial - Creatinine --  Base Excess, Arterial 4.5     Admit Date: 01-26-21  Length of Stay: 7d    INTERVAL HPI/OVERNIGHT EVENTS:  - Proned overnight  - Afebrile, normotensive    MEDICATIONS  (STANDING):  chlorhexidine 0.12% Liquid 15 milliLiter(s) Oral Mucosa every 12 hours  chlorhexidine 4% Liquid 1 Application(s) Topical <User Schedule>  cisatracurium Infusion 3 MICROgram(s)/kG/Min (23.4 mL/Hr) IV Continuous <Continuous>  dexAMETHasone  Injectable 6 milliGRAM(s) IV Push daily  dextrose 40% Gel 15 Gram(s) Oral once  dextrose 5%. 1000 milliLiter(s) (50 mL/Hr) IV Continuous <Continuous>  dextrose 5%. 1000 milliLiter(s) (100 mL/Hr) IV Continuous <Continuous>  dextrose 50% Injectable 25 Gram(s) IV Push once  dextrose 50% Injectable 12.5 Gram(s) IV Push once  dextrose 50% Injectable 25 Gram(s) IV Push once  fentaNYL   Infusion..... 0.5 MICROgram(s)/kG/Hr (1.3 mL/Hr) IV Continuous <Continuous>  glucagon  Injectable 1 milliGRAM(s) IntraMuscular once  heparin  Infusion. 1400 Unit(s)/Hr (14 mL/Hr) IV Continuous <Continuous>  insulin lispro (ADMELOG) corrective regimen sliding scale   SubCutaneous every 6 hours  ketamine Infusion. 0.25 mG/kG/Hr (3.25 mL/Hr) IV Continuous <Continuous>  midazolam Infusion 0.02 mG/kG/Hr (2.6 mL/Hr) IV Continuous <Continuous>  norepinephrine Infusion 0.05 MICROgram(s)/kG/Min (12.2 mL/Hr) IV Continuous <Continuous>  pantoprazole  Injectable 40 milliGRAM(s) IV Push daily  propofol Infusion 50 MICROgram(s)/kG/Min (39 mL/Hr) IV Continuous <Continuous>    MEDICATIONS  (PRN):  heparin   Injectable 96026 Unit(s) IV Push every 6 hours PRN For aPTT less than 40  heparin   Injectable 5000 Unit(s) IV Push every 6 hours PRN For aPTT between 40 - 57  sodium chloride 0.9% lock flush 10 milliLiter(s) IV Push every 1 hour PRN Pre/post blood products, medications, blood draw, and to maintain line patency      Vitals:  Vital Signs Last 24 Hrs  T(C): 36.2 (02 Feb 2021 04:00), Max: 37.3 (01 Feb 2021 18:00)  T(F): 97.2 (02 Feb 2021 04:00), Max: 99.1 (01 Feb 2021 18:00)  HR: 51 (02 Feb 2021 07:25) (51 - 78)  BP: --  BP(mean): --  RR: 27 (02 Feb 2021 06:00) (20 - 35)  SpO2: 92% (02 Feb 2021 07:25) (92% - 100%)    Vitals (Invasive):  ABP: 130/75 (02-02-21 @ 06:00) (111/69 - 145/76)  CVP(mm Hg): --  CVP(cm H2O): --  CO: --  CI: --  PA: --  PA(mean): --  PCWP: --  LA: --  RA: --  SVR: --  SVRI: --  PVR: --  PVRI: --    I&O's Summary    01 Feb 2021 07:01  -  02 Feb 2021 07:00  --------------------------------------------------------  IN: 2437.5 mL / OUT: 2485 mL / NET: -47.5 mL        Physical Exam:  HEENT:     + NCAT - Icterus   - Thrush   Neck:        -  JVD     - Nodes     - Masses    + Mid-line trachea  Lungs:          Mechanical breath sounds  Cardiac:       + S1 + S2    + RRR   Abdomen:    + BS     + Soft    + Non-tender     - Distended   Extremities:   - Cyanosis U/L   - Clubbing  U/L  - LE/UE Edema   + Capillary refill    + Pulses   Neuro:         + Sedated   + Paralyzed  Skin:        - Rashes    - Erythema   + IV sites intact  - Sacral decubitus    Labs:  COVID:  COVID-19 PCR: Detected (01-26-21 @ 16:43)                            12.5   15.40 )-----------( 239      ( 02 Feb 2021 02:38 )             40.7     02-02    145  |  109<H>  |  20  ----------------------------<  118<H>  4.9   |  27  |  0.58    Ca    8.2<L>      02 Feb 2021 02:38  Phos  3.3     02-02  Mg     2.7     02-02    TPro  6.9  /  Alb  2.5<L>  /  TBili  0.6  /  DBili  x   /  AST  46<H>  /  ALT  32  /  AlkPhos  93  02-02    PTT - ( 01 Feb 2021 00:27 )  PTT:91.5 sec      COVID related labs:  02 Feb 2021 02:38  D-dimer:  675<H>  Calcitonin:  x  CRP:  x  LDH:  x  Lactate,Blood:  x  CK:  x  Troponin I:  x  Troponin T:  x  Troponin HS:  x  Ferritin, Serum: 1098<H>  BNP:  x      Blood Gases:  (ARTERIAL):  02-02-21 @ 02:38  pH 7.39 / pCO2 50 / pO2 68 / HCO3 28  Total CO2 --  FiO2 --  Oxygen Saturation 92.8  Total Hemoglobin, Calculated 12.9  Hematocrit, Calculated 39.6  Oxygen Content --  Blood Gas Arterial - Calcium, Ionized --  Blood Gas Arterial - Chloride --  Blood Gas Arterial - Glucose 128  Blood Gas Arterial - Potassium 4.5  Blood Gas Arterial - Sodium 146  Blood Gas Arterial - Creatinine --  Base Excess, Arterial 4.5

## 2021-02-03 NOTE — PROGRESS NOTE ADULT - SUBJECTIVE AND OBJECTIVE BOX
INTERVAL HPI/OVERNIGHT EVENTS:    Proned as of 9 PM.     SUBJECTIVE: Patient seen and examined at bedside.     OBJECTIVE:    VITAL SIGNS:  ICU Vital Signs Last 24 Hrs  T(C): 36.3 (03 Feb 2021 07:35), Max: 37.2 (02 Feb 2021 16:00)  T(F): 97.3 (03 Feb 2021 07:35), Max: 99 (02 Feb 2021 16:00)  HR: 71 (03 Feb 2021 09:16) (53 - 71)  BP: --  BP(mean): --  ABP: 128/65 (03 Feb 2021 09:16) (104/62 - 140/72)  ABP(mean): 84 (03 Feb 2021 09:16) (76 - 92)  RR: 34 (03 Feb 2021 09:16) (22 - 34)  SpO2: 94% (03 Feb 2021 09:16) (93% - 99%)    Mode: AC/ CMV (Assist Control/ Continuous Mandatory Ventilation), RR (machine): 34, TV (machine): 410, FiO2: 60, PEEP: 12, ITime: 0.6, MAP: 19, PIP: 31    02-02 @ 07:01  -  02-03 @ 07:00  --------------------------------------------------------  IN: 2101 mL / OUT: 1155 mL / NET: 946 mL    02-03 @ 07:01  -  02-03 @ 10:21  --------------------------------------------------------  IN: 0 mL / OUT: 230 mL / NET: -230 mL      CAPILLARY BLOOD GLUCOSE      POCT Blood Glucose.: 88 mg/dL (03 Feb 2021 05:10)      PHYSICAL EXAM:    General: NAD  HEENT: NC/AT; PERRL, clear conjunctiva  Neck: supple  Respiratory: CTA b/l  Cardiovascular: +S1/S2; RRR  Abdomen: soft, NT/ND; +BS x4  Extremities: WWP, 2+ peripheral pulses b/l; no LE edema  Skin: normal color and turgor; no rash  Neurological: sedated and paralyzed     MEDICATIONS:  MEDICATIONS  (STANDING):  chlorhexidine 0.12% Liquid 15 milliLiter(s) Oral Mucosa every 12 hours  chlorhexidine 4% Liquid 1 Application(s) Topical <User Schedule>  cisatracurium Infusion 3 MICROgram(s)/kG/Min (23.4 mL/Hr) IV Continuous <Continuous>  dexAMETHasone  Injectable 6 milliGRAM(s) IV Push daily  dextrose 40% Gel 15 Gram(s) Oral once  dextrose 5%. 1000 milliLiter(s) (50 mL/Hr) IV Continuous <Continuous>  dextrose 5%. 1000 milliLiter(s) (100 mL/Hr) IV Continuous <Continuous>  dextrose 50% Injectable 12.5 Gram(s) IV Push once  dextrose 50% Injectable 25 Gram(s) IV Push once  dextrose 50% Injectable 25 Gram(s) IV Push once  fentaNYL   Infusion..... 0.5 MICROgram(s)/kG/Hr (1.3 mL/Hr) IV Continuous <Continuous>  glucagon  Injectable 1 milliGRAM(s) IntraMuscular once  heparin  Infusion. 1400 Unit(s)/Hr (14 mL/Hr) IV Continuous <Continuous>  insulin lispro (ADMELOG) corrective regimen sliding scale   SubCutaneous every 6 hours  ketamine Infusion. 0.25 mG/kG/Hr (3.25 mL/Hr) IV Continuous <Continuous>  midazolam Infusion 0.02 mG/kG/Hr (2.6 mL/Hr) IV Continuous <Continuous>  norepinephrine Infusion 0.05 MICROgram(s)/kG/Min (12.2 mL/Hr) IV Continuous <Continuous>  pantoprazole  Injectable 40 milliGRAM(s) IV Push daily  propofol Infusion 50 MICROgram(s)/kG/Min (39 mL/Hr) IV Continuous <Continuous>    MEDICATIONS  (PRN):  heparin   Injectable 09177 Unit(s) IV Push every 6 hours PRN For aPTT less than 40  heparin   Injectable 5000 Unit(s) IV Push every 6 hours PRN For aPTT between 40 - 57  sodium chloride 0.9% lock flush 10 milliLiter(s) IV Push every 1 hour PRN Pre/post blood products, medications, blood draw, and to maintain line patency      ALLERGIES:  Allergies    codeine (Unknown)    Intolerances        LABS:                        11.5   12.73 )-----------( 218      ( 03 Feb 2021 04:25 )             38.7     Hemoglobin: 11.5 g/dL (02-03 @ 04:25)  Hemoglobin: 12.5 g/dL (02-02 @ 02:38)  Hemoglobin: 12.3 g/dL (02-01 @ 03:21)  Hemoglobin: 12.3 g/dL (01-31 @ 04:17)  Hemoglobin: 13.0 g/dL (01-30 @ 04:54)    CBC Full  -  ( 03 Feb 2021 04:25 )  WBC Count : 12.73 K/uL  RBC Count : 4.02 M/uL  Hemoglobin : 11.5 g/dL  Hematocrit : 38.7 %  Platelet Count - Automated : 218 K/uL  Mean Cell Volume : 96.3 fL  Mean Cell Hemoglobin : 28.6 pg  Mean Cell Hemoglobin Concentration : 29.7 gm/dL  Auto Neutrophil # : 8.63 K/uL  Auto Lymphocyte # : 1.60 K/uL  Auto Monocyte # : 1.03 K/uL  Auto Eosinophil # : 0.07 K/uL  Auto Basophil # : 0.09 K/uL  Auto Neutrophil % : 67.8 %  Auto Lymphocyte % : 12.6 %  Auto Monocyte % : 8.1 %  Auto Eosinophil % : 0.5 %  Auto Basophil % : 0.7 %    02-03    139  |  104  |  24<H>  ----------------------------<  103<H>  4.0   |  28  |  0.64    Ca    8.0<L>      03 Feb 2021 04:25  Phos  3.3     02-02  Mg     2.7     02-02    TPro  6.5  /  Alb  2.6<L>  /  TBili  0.7  /  DBili  x   /  AST  27  /  ALT  23  /  AlkPhos  84  02-03    Creatinine Trend: 0.64<--, 0.58<--, 0.63<--, 0.86<--, 0.85<--, 0.80<--  LIVER FUNCTIONS - ( 03 Feb 2021 04:25 )  Alb: 2.6 g/dL / Pro: 6.5 g/dL / ALK PHOS: 84 U/L / ALT: 23 U/L / AST: 27 U/L / GGT: x           PTT - ( 03 Feb 2021 07:09 )  PTT:57.6 sec    hs Troponin:    ABG - ( 02 Feb 2021 22:19 )  pH, Arterial: 7.40  pH, Blood: x     /  pCO2: 48    /  pO2: 78    / HCO3: 28    / Base Excess: 5.0   /  SaO2: 94.7                22:19 - ABG - pH: 7.40  |  pCO2: 48    |  pO2: 78    | Lactate:       | BE: 5.0          Labs, imaging, EKG personally reviewed    RADIOLOGY & ADDITIONAL TESTS: Reviewed.

## 2021-02-03 NOTE — PROGRESS NOTE ADULT - ASSESSMENT
65yF with obesity, HTN, hx of LLE DVT not on AC,  COVID+ on 1/17, presenting for acutely worsening SOB, intubated 1/29 for acute hypoxic respiratory failure.    neuro:   - intubated, sedated, paralyzed on nimbex, propofol, ketamine, fentanyl     Respiratory:   Acute Hypoxic respiratory failure    - Likely 2/2 covid pna and PE  - concern for PE given elevated D-dimer in 7000s, on heparin gtt  - Proned  COVID-19   - sedated and paralyzed, prone on 1/30, will supine today   - while proned, was able to decrease FIO2 from 80% to 45%  - continue Dexamethasone x10 days (1/27 - )  - Remdesivir d/c'ed (1/26 -1/30 )  - Trend inflammatory markers  - Will supinate at 3 PM and recheck blood gas 1 hour after     Cards:   - Wean off levophed    GI:   - c/w tube feeds   - Protonix 40mg qD      Renal:    - Cr. 0.89, no acute issue     Heme:   h/o DVT, b/l upper thigh DVTs; PE not ruled as too unstable for a CTA  - Duplex 1/27: b/l above knee DVT   - Ddimer >50k initially; currently downtrending  - continue heparin gtt     ID  - afebrile, observe off abx      Ethics:   - full code

## 2021-02-03 NOTE — PROGRESS NOTE ADULT - ATTENDING COMMENTS
65 year old female with covid respiratory failure.  Patient critically ill by virtue of needing ventilator support, intermittent use of vasopressors and frequent bedside reassessments and medication changes. remains proned. poor p/f unproned.  improved vent settings while proned.

## 2021-02-04 NOTE — PROGRESS NOTE ADULT - SUBJECTIVE AND OBJECTIVE BOX
INTERVAL HPI/OVERNIGHT EVENTS:    A-line removed due to malfunction. Remained supine.     SUBJECTIVE: Patient seen and examined at bedside.     OBJECTIVE:    VITAL SIGNS:  ICU Vital Signs Last 24 Hrs  T(C): 38.4 (04 Feb 2021 07:51), Max: 38.6 (04 Feb 2021 00:00)  T(F): 101.1 (04 Feb 2021 07:51), Max: 101.5 (04 Feb 2021 00:00)  HR: 88 (04 Feb 2021 07:52) (72 - 99)  BP: 95/54 (04 Feb 2021 07:51) (95/54 - 95/54)  BP(mean): 65 (04 Feb 2021 07:51) (65 - 65)  ABP: 105/60 (04 Feb 2021 04:00) (82/39 - 138/67)  ABP(mean): 74 (04 Feb 2021 04:00) (67 - 87)  RR: 34 (04 Feb 2021 07:51) (33 - 34)  SpO2: 94% (04 Feb 2021 07:52) (90% - 96%)    Mode: AC/ CMV (Assist Control/ Continuous Mandatory Ventilation), RR (machine): 34, TV (machine): 420, FiO2: 90, PEEP: 12, ITime: 0.71, MAP: 19, PIP: 31    02-03 @ 07:01 - 02-04 @ 07:00  --------------------------------------------------------  IN: 1119.8 mL / OUT: 1660 mL / NET: -540.2 mL    02-04 @ 07:01 - 02-04 @ 10:18  --------------------------------------------------------  IN: 0 mL / OUT: 125 mL / NET: -125 mL      CAPILLARY BLOOD GLUCOSE      POCT Blood Glucose.: 117 mg/dL (04 Feb 2021 05:58)      PHYSICAL EXAM:    General: NAD  HEENT: NC/AT; PERRL, clear conjunctiva  Neck: supple  Respiratory: CTA b/l  Cardiovascular: +S1/S2; RRR  Abdomen: soft, NT/ND; +BS x4  Extremities: WWP, 2+ peripheral pulses b/l; no LE edema  Skin: normal color and turgor; no rash  Neurological: sedated     MEDICATIONS:  MEDICATIONS  (STANDING):  chlorhexidine 0.12% Liquid 15 milliLiter(s) Oral Mucosa every 12 hours  chlorhexidine 4% Liquid 1 Application(s) Topical <User Schedule>  cisatracurium Infusion 3 MICROgram(s)/kG/Min (23.4 mL/Hr) IV Continuous <Continuous>  dextrose 40% Gel 15 Gram(s) Oral once  dextrose 5%. 1000 milliLiter(s) (50 mL/Hr) IV Continuous <Continuous>  dextrose 5%. 1000 milliLiter(s) (100 mL/Hr) IV Continuous <Continuous>  dextrose 50% Injectable 25 Gram(s) IV Push once  dextrose 50% Injectable 12.5 Gram(s) IV Push once  dextrose 50% Injectable 25 Gram(s) IV Push once  fentaNYL   Infusion..... 0.5 MICROgram(s)/kG/Hr (1.3 mL/Hr) IV Continuous <Continuous>  glucagon  Injectable 1 milliGRAM(s) IntraMuscular once  heparin  Infusion. 1400 Unit(s)/Hr (14 mL/Hr) IV Continuous <Continuous>  insulin lispro (ADMELOG) corrective regimen sliding scale   SubCutaneous every 6 hours  ketamine Infusion. 0.25 mG/kG/Hr (3.25 mL/Hr) IV Continuous <Continuous>  midazolam Infusion 0.02 mG/kG/Hr (2.6 mL/Hr) IV Continuous <Continuous>  norepinephrine Infusion 0.05 MICROgram(s)/kG/Min (12.2 mL/Hr) IV Continuous <Continuous>  pantoprazole  Injectable 40 milliGRAM(s) IV Push daily  propofol Infusion 50 MICROgram(s)/kG/Min (39 mL/Hr) IV Continuous <Continuous>    MEDICATIONS  (PRN):  heparin   Injectable 36720 Unit(s) IV Push every 6 hours PRN For aPTT less than 40  heparin   Injectable 5000 Unit(s) IV Push every 6 hours PRN For aPTT between 40 - 57  sodium chloride 0.9% lock flush 10 milliLiter(s) IV Push every 1 hour PRN Pre/post blood products, medications, blood draw, and to maintain line patency      ALLERGIES:  Allergies    codeine (Unknown)    Intolerances        LABS:                        12.3   16.53 )-----------( 222      ( 04 Feb 2021 05:10 )             40.7     Hemoglobin: 12.3 g/dL (02-04 @ 05:10)  Hemoglobin: 11.5 g/dL (02-03 @ 04:25)  Hemoglobin: 12.5 g/dL (02-02 @ 02:38)  Hemoglobin: 12.3 g/dL (02-01 @ 03:21)  Hemoglobin: 12.3 g/dL (01-31 @ 04:17)    CBC Full  -  ( 04 Feb 2021 05:10 )  WBC Count : 16.53 K/uL  RBC Count : 4.28 M/uL  Hemoglobin : 12.3 g/dL  Hematocrit : 40.7 %  Platelet Count - Automated : 222 K/uL  Mean Cell Volume : 95.1 fL  Mean Cell Hemoglobin : 28.7 pg  Mean Cell Hemoglobin Concentration : 30.2 gm/dL  Auto Neutrophil # : 13.60 K/uL  Auto Lymphocyte # : 0.94 K/uL  Auto Monocyte # : 0.75 K/uL  Auto Eosinophil # : 0.11 K/uL  Auto Basophil # : 0.08 K/uL  Auto Neutrophil % : 82.2 %  Auto Lymphocyte % : 5.7 %  Auto Monocyte % : 4.5 %  Auto Eosinophil % : 0.7 %  Auto Basophil % : 0.5 %    02-04    139  |  102  |  18  ----------------------------<  124<H>  4.1   |  27  |  0.74    Ca    8.2<L>      04 Feb 2021 05:10  Phos  4.1     02-04  Mg     2.4     02-04    TPro  6.9  /  Alb  2.5<L>  /  TBili  2.8<H>  /  DBili  x   /  AST  37<H>  /  ALT  23  /  AlkPhos  84  02-04    Creatinine Trend: 0.74<--, 0.64<--, 0.58<--, 0.63<--, 0.86<--, 0.85<--  LIVER FUNCTIONS - ( 04 Feb 2021 05:10 )  Alb: 2.5 g/dL / Pro: 6.9 g/dL / ALK PHOS: 84 U/L / ALT: 23 U/L / AST: 37 U/L / GGT: x           PTT - ( 04 Feb 2021 05:10 )  PTT:86.9 sec    hs Troponin:    ABG - ( 04 Feb 2021 05:10 )  pH, Arterial: 7.29  pH, Blood: x     /  pCO2: 57    /  pO2: 70    / HCO3: 24    / Base Excess: 0.8   /  SaO2: 91.5                05:10 - ABG - pH: 7.29  |  pCO2: 57    |  pO2: 70    | Lactate:       | BE: 0.8          Labs, imaging, EKG personally reviewed    RADIOLOGY & ADDITIONAL TESTS: Reviewed.

## 2021-02-04 NOTE — PROGRESS NOTE ADULT - ASSESSMENT
65yF with obesity, HTN, hx of LLE DVT not on AC,  COVID+ on 1/17, presenting for acutely worsening SOB, intubated 1/29 for acute hypoxic respiratory failure.    neuro:   - sedated on propofol, ketamine, fentanyl     Respiratory:   - intubated 2/2 covid PNA   - concern for PE given elevated D-dimer in 7000s, on heparin gtt  - continue Dexamethasone x10 days (1/27 - )  - Remdesivir d/c'ed (1/26 -1/30 )  - supinated since 1400 on 2/3     Cards:   - Wean off levophed    GI:   - c/w tube feeds   - Protonix 40mg qD      Renal:    - Cr. 0.89, no acute issue     Heme:   h/o DVT, b/l upper thigh DVTs; PE not ruled as too unstable for a CTA  - Duplex 1/27: b/l above knee DVT   - Ddimer >50k initially; currently downtrending  - continue heparin gtt     ID  - febrile since 2/3   - procal, blood/urine/sputum cultures sent on 2/4    Ethics:   - full code

## 2021-02-04 NOTE — PROGRESS NOTE ADULT - ATTENDING COMMENTS
65 year old with covid respiratory failure.  Patient critically ill by virtue of needing ventilator support, intermittent use of vasopressors and frequent bedside reassessments and medication changes. replace a line. may require proning.

## 2021-02-05 NOTE — PROGRESS NOTE ADULT - SUBJECTIVE AND OBJECTIVE BOX
65yF with obesity, HTN, hx of LLE DVT not on AC,  COVID+ on 1/17, presenting for acutely worsening SOB, intubated 1/29 for acute hypoxic respiratory failure.    neuro:   - sedated on propofol, ketamine, fentanyl     Respiratory:   - intubated 2/2 covid PNA   - concern for PE given elevated D-dimer in 7000s, on heparin gtt  - continue Dexamethasone x10 days (1/27 - )  - Remdesivir d/c'ed (1/26 -1/30 )  - proned as of 6 AM 2/5, plan to supinate at midnight     Cards:   - Wean off levophed    GI:   - c/w tube feeds   - Protonix 40mg qD      Renal:    - Cr. 0.89, no acute issue     Heme:   h/o DVT, b/l upper thigh DVTs; PE not ruled as too unstable for a CTA  - Duplex 1/27: b/l above knee DVT   - Ddimer >50k initially; currently downtrending  - continue heparin gtt     ID  - febrile since 2/3   - procal, blood/urine/sputum cultures sent on 2/4  -started on Ceftriaxone for UTI on 2/4     Ethics:   - full code

## 2021-02-05 NOTE — PROGRESS NOTE ADULT - ATTENDING COMMENTS
65 year old with covid respiratory failure.  Patient critically ill by virtue of needing ventilator support, intermittent use of vasopressors and frequent bedside reassessments and medication changes. on heparin drip. proned for poor p/f. awake improvement of pulmonary status before qeaning vent. Started on ABx for likely UTI

## 2021-02-06 NOTE — PROGRESS NOTE ADULT - ASSESSMENT
65yF with obesity, HTN, hx of LLE DVT not on AC,  COVID+ on 1/17, presenting for acutely worsening SOB, intubated 1/29 for acute hypoxic respiratory failure.    neuro:   - propofol, ketamine, fentanyl     Respiratory:   - intubated 2/2 covid PNA   - concern for PE given elevated D-dimer in 7000s, on heparin gtt  - continue Dexamethasone x10 days (1/27 - )  - Remdesivir d/c'ed (1/26 -1/30 )  - proned as of 6 AM 2/5, plan to supinate at midnight     Cards:   - Wean off levophed    GI:   - c/w tube feeds   - Protonix 40mg qD      Renal:    - Cr. 0.89, no acute issue     Heme:   h/o DVT, b/l upper thigh DVTs; PE not ruled as too unstable for a CTA  - Duplex 1/27: b/l above knee DVT   - Ddimer >50k initially; currently downtrending  - continue heparin gtt     ID  - febrile since 2/3   - procal, blood/urine/sputum cultures sent on 2/4  -started on Ceftriaxone for UTI on 2/4     Ethics:   - full code  65yF with obesity, HTN, hx of LLE DVT not on AC,  COVID+ on 1/17, presenting for acutely worsening SOB, intubated 1/29 for acute hypoxic respiratory failure.    neuro:   - propofol, ketamine, fentanyl   - Nimbex for paralysis      - Wean paralytics marisela if stable    Respiratory:   - PC 34/450/12/90%  - concern for PE given elevated D-dimer in 7000s, on heparin gtt  - s/p 10d course of Dexamethasone (1/27 - 2/5)  - Remdesivir d/c'ed (1/26 -1/30)  - Prone x18h    Cards:   - Wean off levophed AT    GI:   - c/w tube feeds  - Protonix 40mg qD      Renal:    - no acute issue     Heme:   h/o DVT, b/l upper thigh DVTs; PE not ruled as too unstable for a CTA  - Duplex 1/27: b/l above knee DVT   - Ddimer >50k initially; currently downtrending  - continue heparin gtt     ID  - Ceftriaxone 7d course for UTI on 2/4-2/11    Ethics:   - full code

## 2021-02-06 NOTE — PROGRESS NOTE ADULT - SUBJECTIVE AND OBJECTIVE BOX
Medicine Progress Note  Authored by Aminta Canas MD PGY3, pager [phone]    Patient is a 65y old  Female who presents with a chief complaint of COVID (06 Feb 2021 14:51)      SUBJECTIVE / OVERNIGHT EVENTS:    MEDICATIONS  (STANDING):  cefTRIAXone   IVPB      chlorhexidine 0.12% Liquid 15 milliLiter(s) Oral Mucosa every 12 hours  chlorhexidine 4% Liquid 1 Application(s) Topical <User Schedule>  cisatracurium Infusion 3 MICROgram(s)/kG/Min (23.4 mL/Hr) IV Continuous <Continuous>  dextrose 40% Gel 15 Gram(s) Oral once  dextrose 5%. 1000 milliLiter(s) (50 mL/Hr) IV Continuous <Continuous>  dextrose 5%. 1000 milliLiter(s) (100 mL/Hr) IV Continuous <Continuous>  dextrose 50% Injectable 25 Gram(s) IV Push once  dextrose 50% Injectable 12.5 Gram(s) IV Push once  dextrose 50% Injectable 25 Gram(s) IV Push once  fentaNYL   Infusion..... 0.5 MICROgram(s)/kG/Hr (1.3 mL/Hr) IV Continuous <Continuous>  glucagon  Injectable 1 milliGRAM(s) IntraMuscular once  heparin  Infusion 1800 Unit(s)/Hr (18 mL/Hr) IV Continuous <Continuous>  insulin lispro (ADMELOG) corrective regimen sliding scale   SubCutaneous every 6 hours  ketamine Infusion. 0.25 mG/kG/Hr (3.25 mL/Hr) IV Continuous <Continuous>  norepinephrine Infusion 0.05 MICROgram(s)/kG/Min (12.2 mL/Hr) IV Continuous <Continuous>  pantoprazole  Injectable 40 milliGRAM(s) IV Push daily  propofol Infusion 50 MICROgram(s)/kG/Min (39 mL/Hr) IV Continuous <Continuous>    MEDICATIONS  (PRN):  acetaminophen  Suppository .. 975 milliGRAM(s) Rectal every 6 hours PRN Temp greater or equal to 38C (100.4F)  heparin   Injectable 42416 Unit(s) IV Push every 6 hours PRN For aPTT less than 40  heparin   Injectable 5000 Unit(s) IV Push every 6 hours PRN For aPTT between 40 - 57  sodium chloride 0.9% lock flush 10 milliLiter(s) IV Push every 1 hour PRN Pre/post blood products, medications, blood draw, and to maintain line patency    CAPILLARY BLOOD GLUCOSE      POCT Blood Glucose.: 107 mg/dL (06 Feb 2021 10:44)  POCT Blood Glucose.: 114 mg/dL (05 Feb 2021 20:55)    I&O's Summary    05 Feb 2021 07:01  -  06 Feb 2021 07:00  --------------------------------------------------------  IN: 1342.1 mL / OUT: 1310 mL / NET: 32.1 mL    06 Feb 2021 07:01  -  06 Feb 2021 15:59  --------------------------------------------------------  IN: 0 mL / OUT: 1150 mL / NET: -1150 mL        PHYSICAL EXAM:  Vital Signs Last 24 Hrs  T(C): 38.3 (06 Feb 2021 15:38), Max: 38.4 (06 Feb 2021 04:00)  T(F): 100.9 (06 Feb 2021 15:38), Max: 101.1 (06 Feb 2021 04:00)  HR: 89 (06 Feb 2021 15:38) (78 - 91)  BP: --  BP(mean): --  RR: 32 (06 Feb 2021 15:38) (32 - 34)  SpO2: 95% (06 Feb 2021 15:38) (93% - 99%)    CONSTITUTIONAL: NAD  HEENT: EOMI, PERRL, normal sclera and conjunctiva; normal nasal and oral mucosa, no oral lesions  RESPIRATORY: Normal respiratory effort; lungs are clear to auscultation bilaterally  CARDIOVASCULAR: Regular rate and rhythm, normal S1 and S2, no murmur/rub/gallop; No lower extremity edema; Peripheral pulses are 2+ bilaterally  ABDOMEN: Nontender to palpation, normoactive bowel sounds, no rebound/guarding; No hepatosplenomegaly  NEUROLOGY: AOx3, CN 2-12 are intact and symmetric; no gross sensory deficits   SKIN: No rashes; no palpable lesions    LABS:                        10.3   16.19 )-----------( 204      ( 06 Feb 2021 03:32 )             34.0     02-06    137  |  100  |  17  ----------------------------<  100<H>  4.1   |  28  |  0.56    Ca    8.2<L>      06 Feb 2021 03:32  Phos  1.9     02-06  Mg     2.5     02-06    TPro  6.6  /  Alb  2.2<L>  /  TBili  2.0<H>  /  DBili  x   /  AST  22  /  ALT  15  /  AlkPhos  83  02-06    PTT - ( 06 Feb 2021 03:32 )  PTT:63.0 sec          Culture - Sputum (collected 04 Feb 2021 18:33)  Source: .Sputum Sputum  Gram Stain (04 Feb 2021 23:30):    No polymorphonuclear leukocytes per low power field    No Squamous epithelial cells per low power field    Moderate Gram Variable Rods per oil power field    Rare Yeast like cells per oil power field  Preliminary Report (05 Feb 2021 22:35):    Moderate Haemophilus influenzae "Susceptibilities not performed"    Normal Respiratory Pilar present    Culture - Blood (collected 04 Feb 2021 14:16)  Source: .Blood Blood-Peripheral  Preliminary Report (05 Feb 2021 15:01):    No growth to date.    Culture - Blood (collected 04 Feb 2021 14:16)  Source: .Blood Blood-Peripheral  Preliminary Report (05 Feb 2021 15:01):    No growth to date.    Culture - Urine (collected 04 Feb 2021 12:18)  Source: .Urine Clean Catch (Midstream)  Final Report (06 Feb 2021 15:53):    >100,000 CFU/ml Escherichia coli  Organism: Escherichia coli (06 Feb 2021 15:53)  Organism: Escherichia coli (06 Feb 2021 15:53)      COVID-19 PCR: Detected (26 Jan 2021 16:43)      RADIOLOGY & ADDITIONAL TESTS:  Imaging from Last 24 Hours:    Electrocardiogram/QTc Interval:    COORDINATION OF CARE:  Care Discussed with Consultants/Other Providers:   Medicine Progress Note  Authored by Aminta aCnas MD PGY3, pager [phone]    Patient is a 65y old  Female who presents with a chief complaint of COVID (06 Feb 2021 14:51)      SUBJECTIVE / OVERNIGHT EVENTS: Supine at 2359. Diagnosed with E Coli UTI, started on CTX. Labile BPs in last 24hr, levo titrated to maintain maps >65.     MEDICATIONS  (STANDING):  cefTRIAXone   IVPB      chlorhexidine 0.12% Liquid 15 milliLiter(s) Oral Mucosa every 12 hours  chlorhexidine 4% Liquid 1 Application(s) Topical <User Schedule>  cisatracurium Infusion 3 MICROgram(s)/kG/Min (23.4 mL/Hr) IV Continuous <Continuous>  dextrose 40% Gel 15 Gram(s) Oral once  dextrose 5%. 1000 milliLiter(s) (50 mL/Hr) IV Continuous <Continuous>  dextrose 5%. 1000 milliLiter(s) (100 mL/Hr) IV Continuous <Continuous>  dextrose 50% Injectable 25 Gram(s) IV Push once  dextrose 50% Injectable 12.5 Gram(s) IV Push once  dextrose 50% Injectable 25 Gram(s) IV Push once  fentaNYL   Infusion..... 0.5 MICROgram(s)/kG/Hr (1.3 mL/Hr) IV Continuous <Continuous>  glucagon  Injectable 1 milliGRAM(s) IntraMuscular once  heparin  Infusion 1800 Unit(s)/Hr (18 mL/Hr) IV Continuous <Continuous>  insulin lispro (ADMELOG) corrective regimen sliding scale   SubCutaneous every 6 hours  ketamine Infusion. 0.25 mG/kG/Hr (3.25 mL/Hr) IV Continuous <Continuous>  norepinephrine Infusion 0.05 MICROgram(s)/kG/Min (12.2 mL/Hr) IV Continuous <Continuous>  pantoprazole  Injectable 40 milliGRAM(s) IV Push daily  propofol Infusion 50 MICROgram(s)/kG/Min (39 mL/Hr) IV Continuous <Continuous>    MEDICATIONS  (PRN):  acetaminophen  Suppository .. 975 milliGRAM(s) Rectal every 6 hours PRN Temp greater or equal to 38C (100.4F)  heparin   Injectable 71652 Unit(s) IV Push every 6 hours PRN For aPTT less than 40  heparin   Injectable 5000 Unit(s) IV Push every 6 hours PRN For aPTT between 40 - 57  sodium chloride 0.9% lock flush 10 milliLiter(s) IV Push every 1 hour PRN Pre/post blood products, medications, blood draw, and to maintain line patency    CAPILLARY BLOOD GLUCOSE      POCT Blood Glucose.: 107 mg/dL (06 Feb 2021 10:44)  POCT Blood Glucose.: 114 mg/dL (05 Feb 2021 20:55)    I&O's Summary    05 Feb 2021 07:01  -  06 Feb 2021 07:00  --------------------------------------------------------  IN: 1342.1 mL / OUT: 1310 mL / NET: 32.1 mL    06 Feb 2021 07:01  -  06 Feb 2021 15:59  --------------------------------------------------------  IN: 0 mL / OUT: 1150 mL / NET: -1150 mL        PHYSICAL EXAM:  Vital Signs Last 24 Hrs  T(C): 38.3 (06 Feb 2021 15:38), Max: 38.4 (06 Feb 2021 04:00)  T(F): 100.9 (06 Feb 2021 15:38), Max: 101.1 (06 Feb 2021 04:00)  HR: 89 (06 Feb 2021 15:38) (78 - 91)  BP: --  BP(mean): --  RR: 32 (06 Feb 2021 15:38) (32 - 34)  SpO2: 95% (06 Feb 2021 15:38) (93% - 99%)    CONSTITUTIONAL: NAD  HEENT: PERRL, normal sclera and conjunctiva; ETT in place  RESPIRATORY: normal cw expansion  CARDIOVASCULAR: Regular rate and rhythm, extremities WWP  ABDOMEN: normoactive bowel sounds  NEUROLOGY: sedated, unresponsive  SKIN: No rashes; no palpable lesions    LABS:                        10.3   16.19 )-----------( 204      ( 06 Feb 2021 03:32 )             34.0     02-06    137  |  100  |  17  ----------------------------<  100<H>  4.1   |  28  |  0.56    Ca    8.2<L>      06 Feb 2021 03:32  Phos  1.9     02-06  Mg     2.5     02-06    TPro  6.6  /  Alb  2.2<L>  /  TBili  2.0<H>  /  DBili  x   /  AST  22  /  ALT  15  /  AlkPhos  83  02-06    PTT - ( 06 Feb 2021 03:32 )  PTT:63.0 sec          Culture - Sputum (collected 04 Feb 2021 18:33)  Source: .Sputum Sputum  Gram Stain (04 Feb 2021 23:30):    No polymorphonuclear leukocytes per low power field    No Squamous epithelial cells per low power field    Moderate Gram Variable Rods per oil power field    Rare Yeast like cells per oil power field  Preliminary Report (05 Feb 2021 22:35):    Moderate Haemophilus influenzae "Susceptibilities not performed"    Normal Respiratory Pilar present    Culture - Blood (collected 04 Feb 2021 14:16)  Source: .Blood Blood-Peripheral  Preliminary Report (05 Feb 2021 15:01):    No growth to date.    Culture - Blood (collected 04 Feb 2021 14:16)  Source: .Blood Blood-Peripheral  Preliminary Report (05 Feb 2021 15:01):    No growth to date.    Culture - Urine (collected 04 Feb 2021 12:18)  Source: .Urine Clean Catch (Midstream)  Final Report (06 Feb 2021 15:53):    >100,000 CFU/ml Escherichia coli  Organism: Escherichia coli (06 Feb 2021 15:53)  Organism: Escherichia coli (06 Feb 2021 15:53)      COVID-19 PCR: Detected (26 Jan 2021 16:43)      RADIOLOGY & ADDITIONAL TESTS:  Imaging from Last 24 Hours:    Electrocardiogram/QTc Interval:    COORDINATION OF CARE:  Care Discussed with Consultants/Other Providers:

## 2021-02-06 NOTE — PROGRESS NOTE ADULT - ATTENDING COMMENTS
64 y/o F with PMH HTN, hx of LLE DVT (previously not on AC) who wa found to be COVID positive on 1/11; was admitted on 1/26 and hs/p intubation 1/29 to worsening hypoxemic respiratory failure. Found to have bilateral DVTs now on heparin , requiring proning for ARDS w/ elevated platueu pressurs   Acute hypercapneic hypoxic respiratory failure 2/2 COVID PNA:  - pt s/p intubation requiring sedation w/ hypercapneic and high plateau pressures slowly improving  - s/p  deacdron, off remdesivir for possible ANAHI   - afebrile c/w monitoring of abx   - monitoring off abx , f/u blood cultures  - c/w proning      Encephalopathy: multifactorial from hypercapnea and sedation  - c/w fenatyl + propofol + ketamine  - paralytic while prononing   GI:  tube feeds+ PPI  UTI: c/w 5 day course of ceftriaxone    dvt in LE: c/w heparin gtt , PTT within range    Hyperglycemia: improved    GOC: full code 66 y/o F with PMH HTN, hx of LLE DVT (previously not on AC) who wa found to be COVID positive on 1/11; was admitted on 1/26 and hs/p intubation 1/29 to worsening hypoxemic respiratory failure. Found to have bilateral DVTs now on heparin , requiring proning for ARDS w/ elevated platueu pressurs   Acute hypercapneic hypoxic respiratory failure 2/2 COVID PNA:  - pt s/p intubation requiring sedation w/ hypercapneic and high plateau pressures slowly improving  - s/p  deacdron, off remdesivir for possible ANAHI    f/u blood cultures  - c/w proning      Encephalopathy: multifactorial from hypercapnea and sedation  - c/w fenatyl + propofol + ketamine  - paralytic while prononing   GI:  tube feeds+ PPI  UTI: c/w 5 day course of ceftriaxone    dvt in LE: c/w heparin gtt , PTT within range    Hyperglycemia: improved    GOC: full code

## 2021-02-07 NOTE — PROGRESS NOTE ADULT - ATTENDING COMMENTS
64 y/o F with PMH HTN, hx of LLE DVT (previously not on AC) who wa found to be COVID positive on 1/11; was admitted on 1/26 and hs/p intubation 1/29 to worsening hypoxemic respiratory failure. Found to have bilateral DVTs now on heparin , requiring proning for ARDS w/ elevated plateau pressures   Acute hypercapneic hypoxic respiratory failure 2/2 COVID PNA:  - pt s/p intubation requiring sedation w/ hypercapneic and high plateau pressures slowly improving  - s/p  deacdron, off remdesivir for possible ANAHI   - febrile overnight cultures sent   - on ceftriaxone for UTI , f/u blood cultures  - c/w proning 18/6 as needed for ARDS     Encephalopathy: multifactorial from hypercapnea and sedation  - c/w fenatyl + propofol + ketamine  - paralytic while proning   GI:  tube feeds+ PPI  UTI: c/w 5 day course of ceftriaxone    dvt in LE: c/w heparin gtt , PTT within range    Hyperglycemia: improved    GOC: full code.

## 2021-02-07 NOTE — PROGRESS NOTE ADULT - ASSESSMENT
65yF with obesity, HTN, hx of LLE DVT not on AC, COVID+ on 1/17, presenting for acutely worsening SOB, intubated 1/29 for acute hypoxic respiratory failure.    Neuro:  - propofol, ketamine, fentanyl  - Nimbex for paralysis      - Wean paralytics marisela if stable    Respiratory:  - PC 34/450/12/90%  - concern for PE given elevated D-dimer in 7000s, on heparin gtt  - s/p 10d course of Dexamethasone (1/27 - 2/5)  - Remdesivir d/c'ed (1/26 -1/30)  - Prone x18h    Cards:   - Wean off levophed AT    GI:   - c/w tube feeds  - Protonix 40mg qD      Renal:    - no acute issue     Heme:   h/o DVT, b/l upper thigh DVTs; PE not ruled as too unstable for a CTA  - Duplex 1/27: b/l above knee DVT   - Ddimer >50k initially; currently downtrending  - continue heparin gtt     ID  - Ceftriaxone 7d course for UTI on 2/4-2/11    Ethics:   - full code  65yF with obesity, HTN, hx of LLE DVT not on AC, COVID+ on 1/17, presenting for acutely worsening SOB, intubated 1/29 for acute hypoxic respiratory failure.    Neuro:  - propofol, ketamine, fentanyl, wean as tolerated  - Nimbex for paralysis for vent desynchrony    Respiratory:  - PC 34/450/12/90%  - concern for PE given elevated D-dimer in 7000s, on heparin gtt  - s/p 10d course of Dexamethasone (1/27 - 2/5)  - Remdesivir d/c'ed (1/26 -1/30)  - Prone x18h    Cards:  - off levophed    GI:  - c/w tube feeds  - Protonix 40mg qD    Renal:    - hypophos, will PhosNaK QID x4 doses    Heme:   h/o DVT, b/l upper thigh DVTs; PE suspected but too unstable for a CTA  - Duplex 1/27: b/l above knee DVT  - Ddimer >50k initially; currently downtrending  - continue heparin gtt, daily PTT    ID  - Ceftriaxone 7d course for E.coli UTI, CTX, Day 3/5 (Stop date 2/9)    Ethics:  - full code

## 2021-02-07 NOTE — PROGRESS NOTE ADULT - SUBJECTIVE AND OBJECTIVE BOX
Medicine Progress Note  Authored by Mariely Boone MD PGY-3 Gen Peds, [id number]    Patient is a 65y old  Female who presents with a chief complaint of COVID (06 Feb 2021 14:51)      SUBJECTIVE / OVERNIGHT EVENTS: Proned overnight until 10am.  Supine at 2359. Diagnosed with E Coli UTI, started on CTX. Labile BPs in last 24hr, levo titrated to maintain maps >65.       MEDICATIONS  (STANDING):  cefTRIAXone   IVPB      cefTRIAXone   IVPB 1000 milliGRAM(s) IV Intermittent every 24 hours  chlorhexidine 0.12% Liquid 15 milliLiter(s) Oral Mucosa every 12 hours  chlorhexidine 4% Liquid 1 Application(s) Topical <User Schedule>  cisatracurium Infusion 3 MICROgram(s)/kG/Min (23.4 mL/Hr) IV Continuous <Continuous>  dextrose 40% Gel 15 Gram(s) Oral once  dextrose 5%. 1000 milliLiter(s) (50 mL/Hr) IV Continuous <Continuous>  dextrose 5%. 1000 milliLiter(s) (100 mL/Hr) IV Continuous <Continuous>  dextrose 50% Injectable 25 Gram(s) IV Push once  dextrose 50% Injectable 12.5 Gram(s) IV Push once  dextrose 50% Injectable 25 Gram(s) IV Push once  fentaNYL   Infusion. 0.5 MICROgram(s)/kG/Hr (6.5 mL/Hr) IV Continuous <Continuous>  glucagon  Injectable 1 milliGRAM(s) IntraMuscular once  heparin  Infusion 1800 Unit(s)/Hr (18 mL/Hr) IV Continuous <Continuous>  insulin lispro (ADMELOG) corrective regimen sliding scale   SubCutaneous every 6 hours  ketamine Infusion. 0.25 mG/kG/Hr (3.25 mL/Hr) IV Continuous <Continuous>  norepinephrine Infusion 0.05 MICROgram(s)/kG/Min (12.2 mL/Hr) IV Continuous <Continuous>  pantoprazole  Injectable 40 milliGRAM(s) IV Push daily  potassium phosphate / sodium phosphate Powder (PHOS-NaK) 1 Packet(s) Oral four times a day  propofol Infusion 50 MICROgram(s)/kG/Min (39 mL/Hr) IV Continuous <Continuous>    MEDICATIONS  (PRN):  acetaminophen  Suppository .. 975 milliGRAM(s) Rectal every 6 hours PRN Temp greater or equal to 38C (100.4F)  heparin   Injectable 46093 Unit(s) IV Push every 6 hours PRN For aPTT less than 40  heparin   Injectable 5000 Unit(s) IV Push every 6 hours PRN For aPTT between 40 - 57  sodium chloride 0.9% lock flush 10 milliLiter(s) IV Push every 1 hour PRN Pre/post blood products, medications, blood draw, and to maintain line patency  POCT Blood Glucose.: 114 mg/dL (05 Feb 2021 20:55)    POCT Blood Glucose.: 117 mg/dL POCT Blood Glucose.: 95 mg/dL   POCT Blood Glucose.: 95 mg/dL     I&O's Summary    06 Feb 2021 07:01  -  07 Feb 2021 07:00  --------------------------------------------------------  IN: 0 mL / OUT: 1900 mL / NET: -1900 mL    07 Feb 2021 07:01  -  07 Feb 2021 11:42  --------------------------------------------------------  IN: 0 mL / OUT: 175 mL / NET: -175 mL      PHYSICAL EXAM:  ICU Vital Signs Last 24 Hrs  T(C): 37 (07 Feb 2021 08:00), Max: 38.4 (06 Feb 2021 18:21)  T(F): 98.6 (07 Feb 2021 08:00), Max: 101.1 (06 Feb 2021 18:21)  HR: 74 (07 Feb 2021 11:31) (70 - 91)  BP: --  BP(mean): --  ABP: 146/70 (07 Feb 2021 08:00) (122/61 - 148/71)  ABP(mean): 89 (07 Feb 2021 08:00) (78 - 91)  RR: 32 (07 Feb 2021 08:00) (32 - 32)  SpO2: 94% (07 Feb 2021 11:31) (94% - 100%)      CONSTITUTIONAL: NAD  HEENT: PERRL, normal sclera and conjunctiva; ETT in place  RESPIRATORY: normal cw expansion  CARDIOVASCULAR: Regular rate and rhythm, extremities WWP  ABDOMEN: normoactive bowel sounds  NEUROLOGY: sedated, unresponsive  SKIN: No rashes; no palpable lesions      LABS:                        9.6    17.44 )-----------( 185      ( 07 Feb 2021 04:00 )             31.4    02-07    136  |  99  |  14  ----------------------------<  98  3.9   |  30  |  0.43<L>    Ca    8.1<L>      07 Feb 2021 10:31  Phos  2.2     02-07  Mg     2.2     02-07    TPro  6.5  /  Alb  2.4<L>  /  TBili  1.5<H>  /  DBili  x   /  AST  26  /  ALT  14  /  AlkPhos  91  02-07      Cultures:  Bcx 2/6: Pending  Culture - Sputum . (02.04.21 @ 15:00):  Moderate Haemophilus influenzae "Susceptibilities not performed"  Normal Respiratory Pilar present     Culture - Blood (collected 04 Feb 2021 14:16)  Source: .Blood Blood-Peripheral  Preliminary Report (05 Feb 2021 15:01):    No growth to date.    Culture - Blood (collected 04 Feb 2021 14:16)  Source: .Blood Blood-Peripheral  Preliminary Report (05 Feb 2021 15:01):    No growth to date.    Culture - Urine (collected 04 Feb 2021 12:18)  Source: .Urine Clean Catch (Midstream)  Final Report (06 Feb 2021 15:53):    >100,000 CFU/ml Escherichia coli  Culture - Urine (02.04.21 @ 12:18)   - Amikacin: S <=16   - Amoxicillin/Clavulanic Acid: I 16/8   - Ampicillin: R >16 These ampicillin results predict results for amoxicillin   - Ampicillin/Sulbactam: R >16/8 Enterobacter, Citrobacter, and Serratia may develop resistance during prolonged therapy (3-4 days)   - Aztreonam: S <=4   - Cefazolin: S 4 ([email or web address]) For uncomplicated UTI with K. pneumoniae, E. coli, or P. mirablis: JONNIE <=16 is sensitive and JONNIE >=32 is resistant. This also predicts results for oral agents cefaclor, cefdinir, cefpodoxime, cefprozil, cefuroxime axetil, cephalexin and locarbef for uncomplicated UTI. Note that some isolates may be susceptible to these agents while testing resistant to cefazolin.   - Cefepime: S <=2   - Cefoxitin: S <=8   - Ceftriaxone: S <=1 Enterobacter, Citrobacter, and Serratia may develop resistance during prolonged therapy   - Ciprofloxacin: S <=0.25   - Ertapenem: S <=0.5   - Gentamicin: S <=2   - Imipenem: S <=1   - Levofloxacin: S <=0.5   - Meropenem: S <=1   - Nitrofurantoin: S <=32 Should not be used to treat pyelonephritis   - Piperacillin/Tazobactam: S <=8   - Tigecycline: S <=2   - Tobramycin: S <=2   - Trimethoprim/Sulfamethoxazole: R >2/38       COVID-19 PCR: Detected (26 Jan 2021 16:43)      RADIOLOGY & ADDITIONAL TESTS:  Imaging from Last 24 Hours:    Electrocardiogram/QTc Interval:    COORDINATION OF CARE:  Care Discussed with Consultants/Other Providers:   Medicine Progress Note  Authored by Mariely Boone MD PGY-3 Gen Peds, [id number]    Patient is a 65y old  Female who presents with a chief complaint of COVID (06 Feb 2021 14:51)    SUBJECTIVE / OVERNIGHT EVENTS: Proned overnight until 10am. Levophed discontinued overnight, bradycardia has resolved.      MEDICATIONS  (STANDING):  cefTRIAXone   IVPB      cefTRIAXone   IVPB 1000 milliGRAM(s) IV Intermittent every 24 hours  chlorhexidine 0.12% Liquid 15 milliLiter(s) Oral Mucosa every 12 hours  chlorhexidine 4% Liquid 1 Application(s) Topical <User Schedule>  cisatracurium Infusion 3 MICROgram(s)/kG/Min (23.4 mL/Hr) IV Continuous <Continuous>  dextrose 40% Gel 15 Gram(s) Oral once  dextrose 5%. 1000 milliLiter(s) (50 mL/Hr) IV Continuous <Continuous>  dextrose 5%. 1000 milliLiter(s) (100 mL/Hr) IV Continuous <Continuous>  dextrose 50% Injectable 25 Gram(s) IV Push once  dextrose 50% Injectable 12.5 Gram(s) IV Push once  dextrose 50% Injectable 25 Gram(s) IV Push once  fentaNYL   Infusion. 0.5 MICROgram(s)/kG/Hr (6.5 mL/Hr) IV Continuous <Continuous>  glucagon  Injectable 1 milliGRAM(s) IntraMuscular once  heparin  Infusion 1800 Unit(s)/Hr (18 mL/Hr) IV Continuous <Continuous>  insulin lispro (ADMELOG) corrective regimen sliding scale   SubCutaneous every 6 hours  ketamine Infusion. 0.25 mG/kG/Hr (3.25 mL/Hr) IV Continuous <Continuous>  norepinephrine Infusion 0.05 MICROgram(s)/kG/Min (12.2 mL/Hr) IV Continuous <Continuous>  pantoprazole  Injectable 40 milliGRAM(s) IV Push daily  potassium phosphate / sodium phosphate Powder (PHOS-NaK) 1 Packet(s) Oral four times a day  propofol Infusion 50 MICROgram(s)/kG/Min (39 mL/Hr) IV Continuous <Continuous>    MEDICATIONS  (PRN):  acetaminophen  Suppository .. 975 milliGRAM(s) Rectal every 6 hours PRN Temp greater or equal to 38C (100.4F)  heparin   Injectable 34281 Unit(s) IV Push every 6 hours PRN For aPTT less than 40  heparin   Injectable 5000 Unit(s) IV Push every 6 hours PRN For aPTT between 40 - 57  sodium chloride 0.9% lock flush 10 milliLiter(s) IV Push every 1 hour PRN Pre/post blood products, medications, blood draw, and to maintain line patency    POCT Blood Glucose.: 117 mg/dL POCT Blood Glucose.: 95 mg/dL   POCT Blood Glucose.: 95 mg/dL     I&O's Summary    06 Feb 2021 07:01  -  07 Feb 2021 07:00  --------------------------------------------------------  IN: 0 mL / OUT: 1900 mL / NET: -1900 mL    07 Feb 2021 07:01  -  07 Feb 2021 11:42  --------------------------------------------------------  IN: 0 mL / OUT: 175 mL / NET: -175 mL      PHYSICAL EXAM:  ICU Vital Signs Last 24 Hrs  T(C): 37 (07 Feb 2021 08:00), Max: 38.4 (06 Feb 2021 18:21)  T(F): 98.6 (07 Feb 2021 08:00), Max: 101.1 (06 Feb 2021 18:21)  HR: 74 (07 Feb 2021 11:31) (70 - 91)  BP: --  BP(mean): --  ABP: 146/70 (07 Feb 2021 08:00) (122/61 - 148/71)  ABP(mean): 89 (07 Feb 2021 08:00) (78 - 91)  RR: 32 (07 Feb 2021 08:00) (32 - 32)  SpO2: 94% (07 Feb 2021 11:31) (94% - 100%)      CONSTITUTIONAL: NAD  HEENT: PERRL, normal sclera and conjunctiva; ETT in place  RESPIRATORY: normal cw expansion  CARDIOVASCULAR: Regular rate and rhythm, extremities WWP  ABDOMEN: normoactive bowel sounds  NEUROLOGY: sedated, unresponsive  SKIN: No rashes; no palpable lesions      LABS:                        9.6    17.44 )-----------( 185      ( 07 Feb 2021 04:00 )             31.4    02-07    136  |  99  |  14  ----------------------------<  98  3.9   |  30  |  0.43<L>    Ca    8.1<L>      07 Feb 2021 10:31  Phos  2.2     02-07  Mg     2.2     02-07    TPro  6.5  /  Alb  2.4<L>  /  TBili  1.5<H>  /  DBili  x   /  AST  26  /  ALT  14  /  AlkPhos  91  02-07      Cultures:  Bcx 2/6: Pending  Culture - Sputum . (02.04.21 @ 15:00):  Moderate Haemophilus influenzae "Susceptibilities not performed"  Normal Respiratory Pilar present     Culture - Blood (collected 04 Feb 2021 14:16)  Source: .Blood Blood-Peripheral  Preliminary Report (05 Feb 2021 15:01):    No growth to date.    Culture - Blood (collected 04 Feb 2021 14:16)  Source: .Blood Blood-Peripheral  Preliminary Report (05 Feb 2021 15:01):    No growth to date.    Culture - Urine (collected 04 Feb 2021 12:18)  Source: .Urine Clean Catch (Midstream)  Final Report (06 Feb 2021 15:53):    >100,000 CFU/ml Escherichia coli  Culture - Urine (02.04.21 @ 12:18)   - Amikacin: S <=16   - Amoxicillin/Clavulanic Acid: I 16/8   - Ampicillin: R >16 These ampicillin results predict results for amoxicillin   - Ampicillin/Sulbactam: R >16/8 Enterobacter, Citrobacter, and Serratia may develop resistance during prolonged therapy (3-4 days)   - Aztreonam: S <=4   - Cefazolin: S 4 ([email or web address]) For uncomplicated UTI with K. pneumoniae, E. coli, or P. mirablis: JONNIE <=16 is sensitive and JONNIE >=32 is resistant. This also predicts results for oral agents cefaclor, cefdinir, cefpodoxime, cefprozil, cefuroxime axetil, cephalexin and locarbef for uncomplicated UTI. Note that some isolates may be susceptible to these agents while testing resistant to cefazolin.   - Cefepime: S <=2   - Cefoxitin: S <=8   - Ceftriaxone: S <=1 Enterobacter, Citrobacter, and Serratia may develop resistance during prolonged therapy   - Ciprofloxacin: S <=0.25   - Ertapenem: S <=0.5   - Gentamicin: S <=2   - Imipenem: S <=1   - Levofloxacin: S <=0.5   - Meropenem: S <=1   - Nitrofurantoin: S <=32 Should not be used to treat pyelonephritis   - Piperacillin/Tazobactam: S <=8   - Tigecycline: S <=2   - Tobramycin: S <=2   - Trimethoprim/Sulfamethoxazole: R >2/38       COVID-19 PCR: Detected (26 Jan 2021 16:43)      RADIOLOGY & ADDITIONAL TESTS:  Imaging from Last 24 Hours:    Electrocardiogram/QTc Interval:    COORDINATION OF CARE:  Care Discussed with Consultants/Other Providers:

## 2021-02-08 NOTE — PROGRESS NOTE ADULT - ATTENDING COMMENTS
66 y/o F with PMH HTN, hx of LLE DVT (previously not on AC) who wa found to be COVID positive on 1/11; was admitted on 1/26 and hs/p intubation 1/29 to worsening hypoxemic respiratory failure. Found to have bilateral DVTs now on heparin , requiring proning for ARDS w/ elevated plateau pressures   Acute hypercapneic hypoxic respiratory failure 2/2 COVID PNA:  - pt s/p intubation requiring sedation w/ hypercapneic and high plateau pressures slowly improving  - s/p  deacdron, off remdesivir for possible ANAHI   - febrile overnight cultures sent   - on ceftriaxone for UTI , f/u blood cultures  -  proning 18/6 as needed for ARDS  - decreased tidal volume w/ further improvement in plateau pressure will unprone at 1pm, check blood gas may not require further proning      Encephalopathy: multifactorial from hypercapnea and sedation  - c/w fenatyl + propofol + ketamine  - paralytic while proning   GI:  tube feeds+ PPI  UTI: s/p 5 day course of ceftriaxone    dvt in LE: c/w heparin gtt , PTT within range    Hyperglycemia: improved    GOC: full code. 64 y/o F with PMH HTN, hx of LLE DVT (previously not on AC) who wa found to be COVID positive on 1/11; was admitted on 1/26 and hs/p intubation 1/29 to worsening hypoxemic respiratory failure. Found to have bilateral DVTs now on heparin , requiring proning for ARDS w/ elevated plateau pressures   Acute hypercapneic hypoxic respiratory failure 2/2 COVID PNA:  - pt s/p intubation requiring sedation w/ hypercapneic and high plateau pressures slowly improving  - s/p  deacdron, off remdesivir for possible ANAHI   - febrile overnight cultures sent   - on ceftriaxone for UTI , f/u blood cultures  -  proning 18/6 as needed for ARDS  - decreased tidal volume w/ further improvement in plateau pressure will unprone at 1pm, check blood gas may not require further proning      Encephalopathy: multifactorial from hypercapnea and sedation  - c/w fenatyl + propofol + ketamine  - paralytic while proning   GI:  tube feeds+ PPI  UTI: 7 day course of ceftriaxone 1g  - f/u cultures , procal downtrending   dvt in LE: c/w heparin gtt , PTT within range    Hyperglycemia: improved    GOC: full code.

## 2021-02-08 NOTE — PROGRESS NOTE ADULT - SUBJECTIVE AND OBJECTIVE BOX
PROGRESS NOTE:   Authored by Marya You MD  Pager [phone] [facility] / [id number] [facility]    Patient is a 65y old  Female who presents with a chief complaint of COVID (07 Feb 2021 11:38)      SUBJECTIVE / OVERNIGHT EVENTS:    MEDICATIONS  (STANDING):  cefTRIAXone   IVPB      cefTRIAXone   IVPB 1000 milliGRAM(s) IV Intermittent every 24 hours  chlorhexidine 0.12% Liquid 15 milliLiter(s) Oral Mucosa every 12 hours  chlorhexidine 4% Liquid 1 Application(s) Topical <User Schedule>  cisatracurium Infusion 3 MICROgram(s)/kG/Min (23.4 mL/Hr) IV Continuous <Continuous>  dextrose 40% Gel 15 Gram(s) Oral once  dextrose 5%. 1000 milliLiter(s) (50 mL/Hr) IV Continuous <Continuous>  dextrose 5%. 1000 milliLiter(s) (100 mL/Hr) IV Continuous <Continuous>  dextrose 50% Injectable 25 Gram(s) IV Push once  dextrose 50% Injectable 25 Gram(s) IV Push once  dextrose 50% Injectable 12.5 Gram(s) IV Push once  fentaNYL   Infusion. 0.5 MICROgram(s)/kG/Hr (6.5 mL/Hr) IV Continuous <Continuous>  glucagon  Injectable 1 milliGRAM(s) IntraMuscular once  heparin  Infusion 1800 Unit(s)/Hr (18 mL/Hr) IV Continuous <Continuous>  insulin lispro (ADMELOG) corrective regimen sliding scale   SubCutaneous every 6 hours  norepinephrine Infusion 0.05 MICROgram(s)/kG/Min (12.2 mL/Hr) IV Continuous <Continuous>  pantoprazole  Injectable 40 milliGRAM(s) IV Push daily  propofol Infusion 50 MICROgram(s)/kG/Min (39 mL/Hr) IV Continuous <Continuous>    MEDICATIONS  (PRN):  acetaminophen  Suppository .. 975 milliGRAM(s) Rectal every 6 hours PRN Temp greater or equal to 38C (100.4F)  heparin   Injectable 34490 Unit(s) IV Push every 6 hours PRN For aPTT less than 40  heparin   Injectable 5000 Unit(s) IV Push every 6 hours PRN For aPTT between 40 - 57  sodium chloride 0.9% lock flush 10 milliLiter(s) IV Push every 1 hour PRN Pre/post blood products, medications, blood draw, and to maintain line patency      CAPILLARY BLOOD GLUCOSE      POCT Blood Glucose.: 102 mg/dL (08 Feb 2021 05:03)  POCT Blood Glucose.: 130 mg/dL (07 Feb 2021 22:20)  POCT Blood Glucose.: 87 mg/dL (07 Feb 2021 17:25)    I&O's Summary    07 Feb 2021 07:01  -  08 Feb 2021 07:00  --------------------------------------------------------  IN: 491.2 mL / OUT: 475 mL / NET: 16.2 mL        PHYSICAL EXAM:  Vital Signs Last 24 Hrs  T(C): 37.5 (08 Feb 2021 04:03), Max: 37.5 (08 Feb 2021 04:03)  T(F): 99.5 (08 Feb 2021 04:03), Max: 99.5 (08 Feb 2021 04:03)  HR: 92 (08 Feb 2021 07:34) (74 - 92)  BP: --  BP(mean): --  RR: 32 (08 Feb 2021 04:03) (32 - 32)  SpO2: 97% (08 Feb 2021 07:34) (94% - 100%)    CONSTITUTIONAL: NAD  HEENT: PERRL, normal sclera and conjunctiva; ETT in place  RESPIRATORY: normal cw expansion  CARDIOVASCULAR: Regular rate and rhythm, extremities WWP  ABDOMEN: normoactive bowel sounds  NEUROLOGY: sedated, unresponsive  SKIN: No rashes; no palpable lesions    LABS:                        9.4    17.70 )-----------( 196      ( 08 Feb 2021 04:46 )             31.7     02-08    132<L>  |  96<L>  |  14  ----------------------------<  101<H>  4.2   |  30  |  0.41<L>    Ca    8.2<L>      08 Feb 2021 04:46  Phos  2.2     02-07  Mg     2.2     02-08    TPro  6.4  /  Alb  2.0<L>  /  TBili  1.5<H>  /  DBili  x   /  AST  35<H>  /  ALT  17  /  AlkPhos  87  02-08    PTT - ( 08 Feb 2021 04:46 )  PTT:57.0 sec          Culture - Blood (collected 07 Feb 2021 06:46)  Source: .Blood Blood-Venous  Preliminary Report (08 Feb 2021 07:01):    No growth to date.    Culture - Blood (collected 07 Feb 2021 06:46)  Source: .Blood Blood-Peripheral  Preliminary Report (08 Feb 2021 07:01):    No growth to date.     PROGRESS NOTE:   Authored by Marya You MD  Pager [phone] [facility] / [id number] [facility]    Patient is a 65y old  Female who presents with a chief complaint of COVID (07 Feb 2021 11:38)      SUBJECTIVE / OVERNIGHT EVENTS:  No acute overnight events. Pt was proned at 6:45pm last night.   This AM, pt's ventilator settings changed to AC 34/390/10/60%.    MEDICATIONS  (STANDING):  cefTRIAXone   IVPB      cefTRIAXone   IVPB 1000 milliGRAM(s) IV Intermittent every 24 hours  chlorhexidine 0.12% Liquid 15 milliLiter(s) Oral Mucosa every 12 hours  chlorhexidine 4% Liquid 1 Application(s) Topical <User Schedule>  cisatracurium Infusion 3 MICROgram(s)/kG/Min (23.4 mL/Hr) IV Continuous <Continuous>  dextrose 40% Gel 15 Gram(s) Oral once  dextrose 5%. 1000 milliLiter(s) (50 mL/Hr) IV Continuous <Continuous>  dextrose 5%. 1000 milliLiter(s) (100 mL/Hr) IV Continuous <Continuous>  dextrose 50% Injectable 25 Gram(s) IV Push once  dextrose 50% Injectable 25 Gram(s) IV Push once  dextrose 50% Injectable 12.5 Gram(s) IV Push once  fentaNYL   Infusion. 0.5 MICROgram(s)/kG/Hr (6.5 mL/Hr) IV Continuous <Continuous>  glucagon  Injectable 1 milliGRAM(s) IntraMuscular once  heparin  Infusion 1800 Unit(s)/Hr (18 mL/Hr) IV Continuous <Continuous>  insulin lispro (ADMELOG) corrective regimen sliding scale   SubCutaneous every 6 hours  norepinephrine Infusion 0.05 MICROgram(s)/kG/Min (12.2 mL/Hr) IV Continuous <Continuous>  pantoprazole  Injectable 40 milliGRAM(s) IV Push daily  propofol Infusion 50 MICROgram(s)/kG/Min (39 mL/Hr) IV Continuous <Continuous>    MEDICATIONS  (PRN):  acetaminophen  Suppository .. 975 milliGRAM(s) Rectal every 6 hours PRN Temp greater or equal to 38C (100.4F)  heparin   Injectable 03143 Unit(s) IV Push every 6 hours PRN For aPTT less than 40  heparin   Injectable 5000 Unit(s) IV Push every 6 hours PRN For aPTT between 40 - 57  sodium chloride 0.9% lock flush 10 milliLiter(s) IV Push every 1 hour PRN Pre/post blood products, medications, blood draw, and to maintain line patency      CAPILLARY BLOOD GLUCOSE      POCT Blood Glucose.: 102 mg/dL (08 Feb 2021 05:03)  POCT Blood Glucose.: 130 mg/dL (07 Feb 2021 22:20)  POCT Blood Glucose.: 87 mg/dL (07 Feb 2021 17:25)    I&O's Summary    07 Feb 2021 07:01  -  08 Feb 2021 07:00  --------------------------------------------------------  IN: 491.2 mL / OUT: 475 mL / NET: 16.2 mL        PHYSICAL EXAM:  Vital Signs Last 24 Hrs  T(C): 37.5 (08 Feb 2021 04:03), Max: 37.5 (08 Feb 2021 04:03)  T(F): 99.5 (08 Feb 2021 04:03), Max: 99.5 (08 Feb 2021 04:03)  HR: 92 (08 Feb 2021 07:34) (74 - 92)  BP: --  BP(mean): --  RR: 32 (08 Feb 2021 04:03) (32 - 32)  SpO2: 97% (08 Feb 2021 07:34) (94% - 100%)    CONSTITUTIONAL: NAD  HEENT: PERRL, normal sclera and conjunctiva; ETT in place  RESPIRATORY: normal cw expansion  CARDIOVASCULAR: Regular rate and rhythm, extremities WWP  ABDOMEN: normoactive bowel sounds  NEUROLOGY: sedated, unresponsive  SKIN: No rashes; no palpable lesions    LABS:                        9.4    17.70 )-----------( 196      ( 08 Feb 2021 04:46 )             31.7     02-08    132<L>  |  96<L>  |  14  ----------------------------<  101<H>  4.2   |  30  |  0.41<L>    Ca    8.2<L>      08 Feb 2021 04:46  Phos  2.2     02-07  Mg     2.2     02-08    TPro  6.4  /  Alb  2.0<L>  /  TBili  1.5<H>  /  DBili  x   /  AST  35<H>  /  ALT  17  /  AlkPhos  87  02-08    PTT - ( 08 Feb 2021 04:46 )  PTT:57.0 sec          Culture - Blood (collected 07 Feb 2021 06:46)  Source: .Blood Blood-Venous  Preliminary Report (08 Feb 2021 07:01):    No growth to date.    Culture - Blood (collected 07 Feb 2021 06:46)  Source: .Blood Blood-Peripheral  Preliminary Report (08 Feb 2021 07:01):    No growth to date.     PROGRESS NOTE:   Authored by Marya You MD  Pager [phone] [facility] / [id number] [facility]    Patient is a 65y old  Female who presents with a chief complaint of COVID (07 Feb 2021 11:38)      SUBJECTIVE / OVERNIGHT EVENTS:  No acute overnight events. Pt was proned at 6:45pm last night.   This AM, pt's ventilator settings changed to AC 34/390/10/70%.    MEDICATIONS  (STANDING):  cefTRIAXone   IVPB      cefTRIAXone   IVPB 1000 milliGRAM(s) IV Intermittent every 24 hours  chlorhexidine 0.12% Liquid 15 milliLiter(s) Oral Mucosa every 12 hours  chlorhexidine 4% Liquid 1 Application(s) Topical <User Schedule>  cisatracurium Infusion 3 MICROgram(s)/kG/Min (23.4 mL/Hr) IV Continuous <Continuous>  dextrose 40% Gel 15 Gram(s) Oral once  dextrose 5%. 1000 milliLiter(s) (50 mL/Hr) IV Continuous <Continuous>  dextrose 5%. 1000 milliLiter(s) (100 mL/Hr) IV Continuous <Continuous>  dextrose 50% Injectable 25 Gram(s) IV Push once  dextrose 50% Injectable 25 Gram(s) IV Push once  dextrose 50% Injectable 12.5 Gram(s) IV Push once  fentaNYL   Infusion. 0.5 MICROgram(s)/kG/Hr (6.5 mL/Hr) IV Continuous <Continuous>  glucagon  Injectable 1 milliGRAM(s) IntraMuscular once  heparin  Infusion 1800 Unit(s)/Hr (18 mL/Hr) IV Continuous <Continuous>  insulin lispro (ADMELOG) corrective regimen sliding scale   SubCutaneous every 6 hours  norepinephrine Infusion 0.05 MICROgram(s)/kG/Min (12.2 mL/Hr) IV Continuous <Continuous>  pantoprazole  Injectable 40 milliGRAM(s) IV Push daily  propofol Infusion 50 MICROgram(s)/kG/Min (39 mL/Hr) IV Continuous <Continuous>    MEDICATIONS  (PRN):  acetaminophen  Suppository .. 975 milliGRAM(s) Rectal every 6 hours PRN Temp greater or equal to 38C (100.4F)  heparin   Injectable 08701 Unit(s) IV Push every 6 hours PRN For aPTT less than 40  heparin   Injectable 5000 Unit(s) IV Push every 6 hours PRN For aPTT between 40 - 57  sodium chloride 0.9% lock flush 10 milliLiter(s) IV Push every 1 hour PRN Pre/post blood products, medications, blood draw, and to maintain line patency      CAPILLARY BLOOD GLUCOSE      POCT Blood Glucose.: 102 mg/dL (08 Feb 2021 05:03)  POCT Blood Glucose.: 130 mg/dL (07 Feb 2021 22:20)  POCT Blood Glucose.: 87 mg/dL (07 Feb 2021 17:25)    I&O's Summary    07 Feb 2021 07:01  -  08 Feb 2021 07:00  --------------------------------------------------------  IN: 491.2 mL / OUT: 475 mL / NET: 16.2 mL        PHYSICAL EXAM:  Vital Signs Last 24 Hrs  T(C): 37.5 (08 Feb 2021 04:03), Max: 37.5 (08 Feb 2021 04:03)  T(F): 99.5 (08 Feb 2021 04:03), Max: 99.5 (08 Feb 2021 04:03)  HR: 92 (08 Feb 2021 07:34) (74 - 92)  BP: --  BP(mean): --  RR: 32 (08 Feb 2021 04:03) (32 - 32)  SpO2: 97% (08 Feb 2021 07:34) (94% - 100%)    CONSTITUTIONAL: NAD  HEENT: PERRL, normal sclera and conjunctiva; ETT in place  RESPIRATORY: normal cw expansion  CARDIOVASCULAR: Regular rate and rhythm, extremities WWP  ABDOMEN: normoactive bowel sounds  NEUROLOGY: sedated, unresponsive  SKIN: No rashes; no palpable lesions    LABS:                        9.4    17.70 )-----------( 196      ( 08 Feb 2021 04:46 )             31.7     02-08    132<L>  |  96<L>  |  14  ----------------------------<  101<H>  4.2   |  30  |  0.41<L>    Ca    8.2<L>      08 Feb 2021 04:46  Phos  2.2     02-07  Mg     2.2     02-08    TPro  6.4  /  Alb  2.0<L>  /  TBili  1.5<H>  /  DBili  x   /  AST  35<H>  /  ALT  17  /  AlkPhos  87  02-08    PTT - ( 08 Feb 2021 04:46 )  PTT:57.0 sec          Culture - Blood (collected 07 Feb 2021 06:46)  Source: .Blood Blood-Venous  Preliminary Report (08 Feb 2021 07:01):    No growth to date.    Culture - Blood (collected 07 Feb 2021 06:46)  Source: .Blood Blood-Peripheral  Preliminary Report (08 Feb 2021 07:01):    No growth to date.

## 2021-02-08 NOTE — PROGRESS NOTE ADULT - ASSESSMENT
65yF with obesity, HTN, hx of LLE DVT not on AC, COVID+ on 1/17, presenting for acutely worsening SOB, intubated 1/29 for acute hypoxic respiratory failure.    Neuro:  - propofol, ketamine, fentanyl, wean as tolerated  - Nimbex for paralysis for vent desynchrony    Respiratory:  - PC 34/450/12/90%  - concern for PE given elevated D-dimer in 7000s, on heparin gtt  - s/p 10d course of Dexamethasone (1/27 - 2/5)  - Remdesivir d/c'ed (1/26 -1/30)  - Prone x18h    Cards:  - off levophed    GI:  - c/w tube feeds  - Protonix 40mg qD    Renal:    -     Heme:   h/o DVT, b/l upper thigh DVTs; PE suspected but too unstable for a CTA  - Duplex 1/27: b/l above knee DVT  - Ddimer >50k initially; currently downtrending  - continue heparin gtt, daily PTT    ID  - Ceftriaxone 7d course for E.coli UTI, CTX, Day 4/5 (Stop date 2/9)    Ethics:  - full code 65yF with obesity, HTN, hx of LLE DVT not on AC, COVID+ on 1/17, presenting for acutely worsening SOB, intubated 1/29 for acute hypoxic respiratory failure.    Neuro:  - propofol, ketamine, fentanyl, wean as tolerated  - Nimbex for paralysis for vent desynchrony    Respiratory:  - AC 34/390/10/60%, intubated (1/29 - )  - concern for PE given elevated D-dimer in 7000s, on heparin gtt  - s/p 10d course of Dexamethasone (1/27 - 2/5)  - Remdesivir d/c'ed (1/26 -1/30)  - Prone x18h    Cards:  - off levophed    GI:  - c/w tube feeds  - Protonix 40mg qD    Renal:    - hyponatremia: CTM    Heme:   h/o DVT, b/l upper thigh DVTs; PE suspected but too unstable for a CTA  - Duplex 1/27: b/l above knee DVT  - Ddimer >50k initially; currently downtrending  - continue heparin gtt, daily PTT    ID  - Ceftriaxone 7d course for E.coli UTI, CTX, Day 4/5 (Stop date 2/9)    Ethics:  - full code 65yF with obesity, HTN, hx of LLE DVT not on AC, COVID+ on 1/17, presenting for acutely worsening SOB, intubated 1/29 for acute hypoxic respiratory failure.    Neuro:  - propofol, ketamine, fentanyl, wean as tolerated  - Nimbex for paralysis for vent desynchrony    Respiratory:  - AC 34/390/10/70%, intubated (1/29 - )  - concern for PE given elevated D-dimer in 7000s, on heparin gtt  - s/p 10d course of Dexamethasone (1/27 - 2/5)  - Remdesivir d/c'ed (1/26 -1/30)  - Prone x18h    Cards:  - off levophed    GI:  - c/w tube feeds  - Protonix 40mg qD    Renal:    - hyponatremia: CTM    Heme:   h/o DVT, b/l upper thigh DVTs; PE suspected but too unstable for a CTA  - Duplex 1/27: b/l above knee DVT  - Ddimer >50k initially; currently downtrending  - continue heparin gtt, daily PTT    ID  - Ceftriaxone 7d course for E.coli UTI, CTX, Day 4/5 (Stop date 2/9)    Ethics:  - full code

## 2021-02-09 NOTE — CHART NOTE - NSCHARTNOTEFT_GEN_A_CORE
Pre-Bronchoscopy Tuberculosis Risk Screening Tool  To reduce the risk for airborne transmission of Mycobacterium tuberculosis, this assessment form must be completed prior to bronchoscopy procedures being performed outside of a negative pressure environment.    Procedure Date & Time: 2/9/2021 1400  Health Care Provider Name: Dr. Bridges and Dr. Negrete   Reason for the Bronchoscopy: Acute hypoxic respiratory failure     Planned Location for the Procedure:  [ ] Emergency Department     [x] Intensive Care Unit     [ ] Operating Room     Other: ___________________    Risk Assessment  I. I have personally evaluated this patient for Mycobacterium tuberculosis and I determined the following risk:  [x ] Low risk for TB     [ ] Significant risk for TB    II. Additional Findings: _________________________    III. Based on the Determined Risk for TB the following Action(s) are Suggested:  If there is a significant risk for tuberculosis infection, the following recommendations should be followed:            a. Perform the procedure in a negative pressure room, with N95 respirator.            b. If not feasible to move the patient or defer the procedure:                    i. Use a single-bedded room low traffic area to perform the bronchoscopy procedure.                   ii. Place a portable high-efficiency particulate air (HEPA) filter in the space prior to starting the procedure and keep closed.                       Refer to the INF.1132 titled “Tuberculosis Control Strategy Plan” for additional information.                  iii. All Health Care Providers within the procedure room shall wear an N95 respirator.            c. Documentation of the tuberculosis risk assessment should be included within the patient’s medical record.      Bronchoscopy Procedure Note:  Indication: Acute hypoxic respiratory failure    Operators: Dr. Bridges and Dr. Negrete     Pre-op Dx: COVID-19 infection     History:     Preop medications:    Xray/CT Findings:    Findings:  Bronchoscope inserted through ETT. ETT noted to be in good position. Airway evaluation revealed thick white mucus secretions in the right and left airways. BAL was performed of the right and left lung.  Bronchoscope then withdrawn from ETT.    Specimens: Culture and fungal culture Pre-Bronchoscopy Tuberculosis Risk Screening Tool  To reduce the risk for airborne transmission of Mycobacterium tuberculosis, this assessment form must be completed prior to bronchoscopy procedures being performed outside of a negative pressure environment.    Procedure Date & Time: 2/9/2021 1400  Health Care Provider Name: Dr. Bridges and Dr. Negrete   Reason for the Bronchoscopy: Acute hypoxic respiratory failure     Planned Location for the Procedure:  [ ] Emergency Department     [x] Intensive Care Unit     [ ] Operating Room     Other: ___________________    Risk Assessment  I. I have personally evaluated this patient for Mycobacterium tuberculosis and I determined the following risk:  [x ] Low risk for TB     [ ] Significant risk for TB    II. Additional Findings: _________________________    III. Based on the Determined Risk for TB the following Action(s) are Suggested:  If there is a significant risk for tuberculosis infection, the following recommendations should be followed:            a. Perform the procedure in a negative pressure room, with N95 respirator.            b. If not feasible to move the patient or defer the procedure:                    i. Use a single-bedded room low traffic area to perform the bronchoscopy procedure.                   ii. Place a portable high-efficiency particulate air (HEPA) filter in the space prior to starting the procedure and keep closed.                       Refer to the INF.1132 titled “Tuberculosis Control Strategy Plan” for additional information.                  iii. All Health Care Providers within the procedure room shall wear an N95 respirator.            c. Documentation of the tuberculosis risk assessment should be included within the patient’s medical record.      Bronchoscopy Procedure Note:  Indication: Acute hypoxic respiratory failure    Operators: Dr. Bridges and Dr. Negrete     Pre-op Dx: COVID-19 infection     History:     Preop medications:    Xray/CT Findings:    Findings:  Bronchoscope inserted through ETT. ETT noted to be in good position. Airway evaluation revealed thick white mucus secretions in the right and left airways. BAL was performed of the right and left lung.  Bronchoscope then withdrawn from ETT.    Specimens: Culture and fungal culture  jose maria: I have seen and examined the patient face to face.  I was present during the above procedure and  have reviewed and addended the procedure note.

## 2021-02-09 NOTE — PROGRESS NOTE ADULT - SUBJECTIVE AND OBJECTIVE BOX
comfortable appearance/cooperative/resting/sleeping/smiling/interactive PROGRESS NOTE:   Authored by Marya You MD  Pager [phone] [facility] / [id number] [facility]    Patient is a 65y old  Female who presents with a chief complaint of COVID (08 Feb 2021 07:57)      SUBJECTIVE / OVERNIGHT EVENTS:  No acute events overnight.  This AM, pt supine, still intubated and sedated.    MEDICATIONS  (STANDING):  cefTRIAXone   IVPB      cefTRIAXone   IVPB 1000 milliGRAM(s) IV Intermittent every 24 hours  chlorhexidine 0.12% Liquid 15 milliLiter(s) Oral Mucosa every 12 hours  chlorhexidine 4% Liquid 1 Application(s) Topical <User Schedule>  cisatracurium Infusion 3 MICROgram(s)/kG/Min (23.4 mL/Hr) IV Continuous <Continuous>  dextrose 40% Gel 15 Gram(s) Oral once  dextrose 5%. 1000 milliLiter(s) (50 mL/Hr) IV Continuous <Continuous>  dextrose 5%. 1000 milliLiter(s) (100 mL/Hr) IV Continuous <Continuous>  dextrose 50% Injectable 25 Gram(s) IV Push once  dextrose 50% Injectable 12.5 Gram(s) IV Push once  dextrose 50% Injectable 25 Gram(s) IV Push once  fentaNYL   Infusion. 0.5 MICROgram(s)/kG/Hr (6.5 mL/Hr) IV Continuous <Continuous>  glucagon  Injectable 1 milliGRAM(s) IntraMuscular once  heparin  Infusion 1800 Unit(s)/Hr (18 mL/Hr) IV Continuous <Continuous>  insulin lispro (ADMELOG) corrective regimen sliding scale   SubCutaneous every 6 hours  ketamine Infusion. 0.25 mG/kG/Hr (3.25 mL/Hr) IV Continuous <Continuous>  norepinephrine Infusion 0.05 MICROgram(s)/kG/Min (12.2 mL/Hr) IV Continuous <Continuous>  pantoprazole  Injectable 40 milliGRAM(s) IV Push daily  propofol Infusion 50 MICROgram(s)/kG/Min (39 mL/Hr) IV Continuous <Continuous>  sodium phosphate IVPB 30 milliMole(s) IV Intermittent once    MEDICATIONS  (PRN):  acetaminophen  Suppository .. 975 milliGRAM(s) Rectal every 6 hours PRN Temp greater or equal to 38C (100.4F)  heparin   Injectable 02400 Unit(s) IV Push every 6 hours PRN For aPTT less than 40  heparin   Injectable 5000 Unit(s) IV Push every 6 hours PRN For aPTT between 40 - 57  sodium chloride 0.9% lock flush 10 milliLiter(s) IV Push every 1 hour PRN Pre/post blood products, medications, blood draw, and to maintain line patency      CAPILLARY BLOOD GLUCOSE      POCT Blood Glucose.: 107 mg/dL (09 Feb 2021 05:15)  POCT Blood Glucose.: 104 mg/dL (08 Feb 2021 23:26)  POCT Blood Glucose.: 98 mg/dL (08 Feb 2021 17:38)  POCT Blood Glucose.: 109 mg/dL (08 Feb 2021 11:55)    I&O's Summary    08 Feb 2021 07:01  -  09 Feb 2021 07:00  --------------------------------------------------------  IN: 1288 mL / OUT: 1273 mL / NET: 15 mL        PHYSICAL EXAM:  Vital Signs Last 24 Hrs  T(C): 38.3 (09 Feb 2021 04:00), Max: 38.3 (09 Feb 2021 04:00)  T(F): 100.9 (09 Feb 2021 04:00), Max: 100.9 (09 Feb 2021 04:00)  HR: 84 (09 Feb 2021 07:28) (74 - 94)  BP: --  BP(mean): --  RR: 34 (09 Feb 2021 07:28) (27 - 34)  SpO2: 93% (09 Feb 2021 07:28) (90% - 98%)        LABS:                        9.3    18.17 )-----------( 213      ( 09 Feb 2021 02:42 )             30.8     02-08    132<L>  |  96<L>  |  14  ----------------------------<  101<H>  4.2   |  30  |  0.41<L>    Ca    8.2<L>      08 Feb 2021 04:46  Phos  2.1     02-09  Mg     2.1     02-09    TPro  6.4  /  Alb  2.0<L>  /  TBili  1.5<H>  /  DBili  x   /  AST  35<H>  /  ALT  17  /  AlkPhos  87  02-08    PTT - ( 09 Feb 2021 02:42 )  PTT:48.1 sec          Culture - Blood (collected 07 Feb 2021 06:46)  Source: .Blood Blood-Venous  Preliminary Report (08 Feb 2021 07:01):    No growth to date.    Culture - Blood (collected 07 Feb 2021 06:46)  Source: .Blood Blood-Peripheral  Preliminary Report (08 Feb 2021 07:01):    No growth to date.        RADIOLOGY & ADDITIONAL TESTS:  Results Reviewed:   Imaging Personally Reviewed:  Electrocardiogram Personally Reviewed:   PROGRESS NOTE:   Authored by Marya You MD  Pager [phone] [facility] / [id number] [facility]    Patient is a 65y old  Female who presents with a chief complaint of COVID (08 Feb 2021 07:57)      SUBJECTIVE / OVERNIGHT EVENTS:  No acute events overnight.  This AM, pt supine, still intubated and sedated. Plan for bronchoscopy today.    MEDICATIONS  (STANDING):  cefTRIAXone   IVPB      cefTRIAXone   IVPB 1000 milliGRAM(s) IV Intermittent every 24 hours  chlorhexidine 0.12% Liquid 15 milliLiter(s) Oral Mucosa every 12 hours  chlorhexidine 4% Liquid 1 Application(s) Topical <User Schedule>  cisatracurium Infusion 3 MICROgram(s)/kG/Min (23.4 mL/Hr) IV Continuous <Continuous>  dextrose 40% Gel 15 Gram(s) Oral once  dextrose 5%. 1000 milliLiter(s) (50 mL/Hr) IV Continuous <Continuous>  dextrose 5%. 1000 milliLiter(s) (100 mL/Hr) IV Continuous <Continuous>  dextrose 50% Injectable 25 Gram(s) IV Push once  dextrose 50% Injectable 12.5 Gram(s) IV Push once  dextrose 50% Injectable 25 Gram(s) IV Push once  fentaNYL   Infusion. 0.5 MICROgram(s)/kG/Hr (6.5 mL/Hr) IV Continuous <Continuous>  glucagon  Injectable 1 milliGRAM(s) IntraMuscular once  heparin  Infusion 1800 Unit(s)/Hr (18 mL/Hr) IV Continuous <Continuous>  insulin lispro (ADMELOG) corrective regimen sliding scale   SubCutaneous every 6 hours  ketamine Infusion. 0.25 mG/kG/Hr (3.25 mL/Hr) IV Continuous <Continuous>  norepinephrine Infusion 0.05 MICROgram(s)/kG/Min (12.2 mL/Hr) IV Continuous <Continuous>  pantoprazole  Injectable 40 milliGRAM(s) IV Push daily  propofol Infusion 50 MICROgram(s)/kG/Min (39 mL/Hr) IV Continuous <Continuous>  sodium phosphate IVPB 30 milliMole(s) IV Intermittent once    MEDICATIONS  (PRN):  acetaminophen  Suppository .. 975 milliGRAM(s) Rectal every 6 hours PRN Temp greater or equal to 38C (100.4F)  heparin   Injectable 63773 Unit(s) IV Push every 6 hours PRN For aPTT less than 40  heparin   Injectable 5000 Unit(s) IV Push every 6 hours PRN For aPTT between 40 - 57  sodium chloride 0.9% lock flush 10 milliLiter(s) IV Push every 1 hour PRN Pre/post blood products, medications, blood draw, and to maintain line patency      CAPILLARY BLOOD GLUCOSE      POCT Blood Glucose.: 107 mg/dL (09 Feb 2021 05:15)  POCT Blood Glucose.: 104 mg/dL (08 Feb 2021 23:26)  POCT Blood Glucose.: 98 mg/dL (08 Feb 2021 17:38)  POCT Blood Glucose.: 109 mg/dL (08 Feb 2021 11:55)    I&O's Summary    08 Feb 2021 07:01  -  09 Feb 2021 07:00  --------------------------------------------------------  IN: 1288 mL / OUT: 1273 mL / NET: 15 mL        PHYSICAL EXAM:  Vital Signs Last 24 Hrs  T(C): 38.3 (09 Feb 2021 04:00), Max: 38.3 (09 Feb 2021 04:00)  T(F): 100.9 (09 Feb 2021 04:00), Max: 100.9 (09 Feb 2021 04:00)  HR: 84 (09 Feb 2021 07:28) (74 - 94)  BP: --  BP(mean): --  RR: 34 (09 Feb 2021 07:28) (27 - 34)  SpO2: 93% (09 Feb 2021 07:28) (90% - 98%)        LABS:                        9.3    18.17 )-----------( 213      ( 09 Feb 2021 02:42 )             30.8     02-08    132<L>  |  96<L>  |  14  ----------------------------<  101<H>  4.2   |  30  |  0.41<L>    Ca    8.2<L>      08 Feb 2021 04:46  Phos  2.1     02-09  Mg     2.1     02-09    TPro  6.4  /  Alb  2.0<L>  /  TBili  1.5<H>  /  DBili  x   /  AST  35<H>  /  ALT  17  /  AlkPhos  87  02-08    PTT - ( 09 Feb 2021 02:42 )  PTT:48.1 sec          Culture - Blood (collected 07 Feb 2021 06:46)  Source: .Blood Blood-Venous  Preliminary Report (08 Feb 2021 07:01):    No growth to date.    Culture - Blood (collected 07 Feb 2021 06:46)  Source: .Blood Blood-Peripheral  Preliminary Report (08 Feb 2021 07:01):    No growth to date.

## 2021-02-09 NOTE — CHART NOTE - NSCHARTNOTEFT_GEN_A_CORE
Source: Patient [ ]    Family [ ]     other [X ] Review of the patient's medical chart   Unable to conduct a face to face interview or nutrition-focused physical exam due to limited contact restrictions related to patient's medical condition and isolation precautions. Obtained subjective information from extensive chart review.     Current Diet : Diet, NPO with Tube Feed:   Tube Feeding Modality: Orogastric  Jevity 1.2 Stan (JEVITY1.2RTH)  Continuous  Starting Tube Feed Rate {mL per Hour}: 10  Tube Feed Duration (in Hours): 24  Tube Feed Start Time: 12:00 (02-06-21 @ 11:52)    As ordered, EN provides a total volume of 240ml, 288kcal, 13.3g protein.      Current Weight and trends: 130kg 1/29  No new weight to assess  Edema: 4+ face; left and right arm; left and right leg.     Patient Hx:  Patient is a 65F with obesity, HTN, hx of LLE DVT not on AC, COVID+ on 1/17, presenting for acutely worsening SOB, intubated 1/29 for acute hypoxic respiratory failure.    Interval Nutrition Hx:  Pt remains intubated, sedated on propofol, ketamine, fentanyl, Nimbex for paralysis.  Propofol infusion provided a total 405 non-nutritive kcal in the past 24h.  Pt proning 18/6 as needed for ARDS.  Jevity running at 10mL/hr, current protein-energy needs not met with EN as ordered.        __________________ Pertinent Medications__________________   chlorhexidine 0.12% Liquid  chlorhexidine 4% Liquid  cisatracurium Infusion  dextrose 40% Gel  dextrose 5%.  dextrose 5%.  dextrose 50% Injectable  dextrose 50% Injectable  dextrose 50% Injectable  fentaNYL   Infusion.  glucagon  Injectable  heparin  Infusion  insulin lispro (ADMELOG) corrective regimen sliding scale  ketamine Infusion.  meropenem  IVPB  norepinephrine Infusion  pantoprazole  Injectable  propofol Infusion  vancomycin  IVPB      __________________ Pertinent Labs__________________   02-09 Na131 mmol/L<L> Glu 105 mg/dL<H> K+ 4.9 mmol/L Cr  0.36 mg/dL<L> BUN 12 mg/dL 02-09 Phos 2.1 mg/dL<L> 02-09 Alb 2.5 g/dL<L> 01-29 Chol 163 mg/dL LDL --    HDL 44 mg/dL<L> Trig 117 mg/dL        Estimated Needs:   [X ] no change since previous assessment  [ ] recalculated:       Nutrition Recommendations:  1- Suggest the provision of Vital HP at 42mL/hr + No Carb Prosource (15 grams protein/ 30 mL packet) to provide a total of 1,008mL, 1,008 KCal, 133g protein, and 842ml free water.   Additional KCal from propofol infusion.      2- Monitor weights, labs, BM's, skin integrity, EN tolerance Monitor Propofol needs and monitor triglyceride levels.     3- If Prone, please continue to feed with HOB elevated 10-15 degrees. Consider promotility agent if necessary. **Several retrospective and small prospective trials have shown EN during prone positioning is ot associated with increased risk of gastrointestinal or pulmonary complications, thus we recommend the patient requiring prone positioning receive early EN.     4- RD remains available, re-consult as needed. Jomar Harrison, MS, RDN Pager #[phone]

## 2021-02-09 NOTE — PROGRESS NOTE ADULT - ASSESSMENT
65yF with obesity, HTN, hx of LLE DVT not on AC, COVID+ on 1/17, presenting for acutely worsening SOB, intubated 1/29 for acute hypoxic respiratory failure.    Neuro:  - propofol, ketamine, fentanyl, wean as tolerated  - Nimbex for paralysis for vent desynchrony    Respiratory:  - AC 34/390/10/80%, intubated (1/29 - )  - bronchoscopy today  - on heparin gtt, concern for PE given elevated D-dimer in 7000s  - s/p 10d course of Dexamethasone (1/27 - 2/5)  - Remdesivir d/c'ed (1/26 -1/30)  - Prone x18h    Cards:  - off levophed    GI:  - c/w tube feeds, resume after bronchoscopy  - Protonix 40mg qD    Renal:    - hyponatremia: CTM    Heme:   h/o DVT, b/l upper thigh DVTs; PE suspected but too unstable for a CTA  - Duplex 1/27: b/l above knee DVT  - Ddimer >50k initially; currently downtrending  - continue heparin gtt, daily PTT  Anemia, likely 2/2 blood draws  - CTM    ID  Fever:  - Ceftriaxone 7d course for E.coli UTI, CTX, Day 5/5 (Stop date 2/9)  - repeat blood, urine, sputum cultures  - BAL culture w/ bronch  - MRSA swab    Ethics:  - full code

## 2021-02-09 NOTE — PROGRESS NOTE ADULT - ATTENDING COMMENTS
64 y/o F with PMH HTN, hx of LLE DVT (previously not on AC) who wa found to be COVID positive on 1/11; was admitted on 1/26 and hs/p intubation 1/29 to worsening hypoxemic respiratory failure. Found to have bilateral DVTs now on heparin , requiring proning for ARDS w/ elevated plateau pressures   Acute hypercapneic hypoxic respiratory failure 2/2 COVID PNA:  - pt s/p intubation requiring sedation w/ hypercapneic and high plateau pressures elevated w/ persistent hypoxia   - s/p  deacdron, off remdesivir for possible ANAHI   - febrile overnight cultures sent   - on ceftriaxone for UTI , f/u blood cultures  -  proning 18/6 as needed for ARDS  - will due bedside Bronchoscopy as pt not improve will obtain cultures, will likely need increased abx coverage varun + vanco      Encephalopathy: multifactorial from hypercapnea and sedation  - c/w fenatyl + propofol + ketamine  - paralytic while proning   GI:  tube feeds+ PPI  UTI: 7 day course of ceftriaxone 1g  - f/u cultures , procal downtrending   - will check blood sputum and urine cultures   dvt in LE: c/w heparin gtt , PTT within range    Hyperglycemia: improved    GOC: full code.

## 2021-02-10 NOTE — PROGRESS NOTE ADULT - SUBJECTIVE AND OBJECTIVE BOX
PROGRESS NOTE:   Authored by Marya You MD  Pager [phone] [facility] / [id number] [facility]    Patient is a 65y old  Female who presents with a chief complaint of COVID (09 Feb 2021 08:26)      SUBJECTIVE / OVERNIGHT EVENTS:  Yesterday, bronchscopy done with bacterial and fungal cultures sent.  Overnight, pt proned and ABG 7.14/96/97. Pt placed back supine, ABG mildly improved 7.23/79/101.    MEDICATIONS  (STANDING):  chlorhexidine 0.12% Liquid 15 milliLiter(s) Oral Mucosa every 12 hours  chlorhexidine 4% Liquid 1 Application(s) Topical <User Schedule>  cisatracurium Infusion 3 MICROgram(s)/kG/Min (23.4 mL/Hr) IV Continuous <Continuous>  dextrose 40% Gel 15 Gram(s) Oral once  dextrose 5%. 1000 milliLiter(s) (50 mL/Hr) IV Continuous <Continuous>  dextrose 5%. 1000 milliLiter(s) (100 mL/Hr) IV Continuous <Continuous>  dextrose 50% Injectable 25 Gram(s) IV Push once  dextrose 50% Injectable 12.5 Gram(s) IV Push once  dextrose 50% Injectable 25 Gram(s) IV Push once  fentaNYL   Infusion. 0.5 MICROgram(s)/kG/Hr (6.5 mL/Hr) IV Continuous <Continuous>  glucagon  Injectable 1 milliGRAM(s) IntraMuscular once  heparin  Infusion 1800 Unit(s)/Hr (18 mL/Hr) IV Continuous <Continuous>  insulin lispro (ADMELOG) corrective regimen sliding scale   SubCutaneous every 6 hours  ketamine Infusion. 0.25 mG/kG/Hr (3.25 mL/Hr) IV Continuous <Continuous>  meropenem  IVPB      meropenem  IVPB 1000 milliGRAM(s) IV Intermittent every 8 hours  norepinephrine Infusion 0.05 MICROgram(s)/kG/Min (12.2 mL/Hr) IV Continuous <Continuous>  pantoprazole  Injectable 40 milliGRAM(s) IV Push daily  propofol Infusion 50 MICROgram(s)/kG/Min (39 mL/Hr) IV Continuous <Continuous>  vancomycin  IVPB 1500 milliGRAM(s) IV Intermittent every 12 hours    MEDICATIONS  (PRN):  acetaminophen  Suppository .. 975 milliGRAM(s) Rectal every 6 hours PRN Temp greater or equal to 38C (100.4F)  heparin   Injectable 85124 Unit(s) IV Push every 6 hours PRN For aPTT less than 40  heparin   Injectable 5000 Unit(s) IV Push every 6 hours PRN For aPTT between 40 - 57  sodium chloride 0.9% lock flush 10 milliLiter(s) IV Push every 1 hour PRN Pre/post blood products, medications, blood draw, and to maintain line patency      CAPILLARY BLOOD GLUCOSE      POCT Blood Glucose.: 104 mg/dL (10 Feb 2021 05:29)  POCT Blood Glucose.: 154 mg/dL (09 Feb 2021 23:32)  POCT Blood Glucose.: 143 mg/dL (09 Feb 2021 17:00)  POCT Blood Glucose.: 144 mg/dL (09 Feb 2021 11:16)  POCT Blood Glucose.: 145 mg/dL (09 Feb 2021 11:00)    I&O's Summary    09 Feb 2021 07:01  -  10 Feb 2021 07:00  --------------------------------------------------------  IN: 700 mL / OUT: 2170 mL / NET: -1470 mL        PHYSICAL EXAM:  Vital Signs Last 24 Hrs  T(C): 37.1 (10 Feb 2021 07:22), Max: 38.2 (09 Feb 2021 18:44)  T(F): 98.8 (10 Feb 2021 07:22), Max: 100.8 (09 Feb 2021 18:44)  HR: 81 (10 Feb 2021 07:22) (78 - 118)  BP: --  BP(mean): --  RR: 34 (10 Feb 2021 07:22) (34 - 34)  SpO2: 97% (10 Feb 2021 07:22) (87% - 97%)    CONSTITUTIONAL: NAD  HEENT: PERRL, normal sclera and conjunctiva; ETT in place  RESPIRATORY: normal cw expansion  CARDIOVASCULAR: Regular rate and rhythm, extremities WWP  ABDOMEN: normoactive bowel sounds  NEUROLOGY: sedated, unresponsive  SKIN: No rashes; no palpable lesions    LABS:                        9.4    19.19 )-----------( 229      ( 10 Feb 2021 02:15 )             32.0     02-10    130<L>  |  92<L>  |  12  ----------------------------<  124<H>  4.4   |  30  |  0.48<L>    Ca    8.4      10 Feb 2021 02:15  Phos  3.2     02-10  Mg     2.2     02-10    TPro  6.7  /  Alb  2.2<L>  /  TBili  1.3<H>  /  DBili  x   /  AST  33<H>  /  ALT  19  /  AlkPhos  82  02-10    PTT - ( 10 Feb 2021 02:15 )  PTT:58.1 sec      Culture - Fungal, Bronchial (collected 09 Feb 2021 18:48)  Source: BAL Bronchoalveolar Lavage  Preliminary Report (10 Feb 2021 06:37):    Testing in progress    Culture - Bronchial (collected 09 Feb 2021 18:48)  Source: Bronch Wash Bronchoalveolar Lavage  Gram Stain (09 Feb 2021 23:47):    Numerous polymorphonuclear leukocytes per low power field    No Squamous epithelial cells per low power field    No organisms seen per oil power field    Culture - Fungal, Bronchial (collected 09 Feb 2021 18:47)  Source: BAL Bronchoalveolar Lavage  Preliminary Report (10 Feb 2021 06:37):    Testing in progress    Culture - Bronchial (collected 09 Feb 2021 18:47)  Source: Bronch Wash Bronchoalveolar Lavage  Gram Stain (09 Feb 2021 22:31):    Numerous polymorphonuclear leukocytes per low power field    No squamous epithelial cells per low power field    No organisms seen per oil power field    Culture - Sputum (collected 09 Feb 2021 12:07)  Source: .Sputum Sputum  Gram Stain (09 Feb 2021 20:27):    Few polymorphonuclear leukocytes per low power field    Moderate Squamous epithelial cells per low power field    Rare Yeast per oil power field    Few Gram Positive Rods per oil power field    Few Gram Negative Rods per oil power field     PROGRESS NOTE:   Authored by Marya You MD  Pager [phone] [facility] / [id number] [facility]    Patient is a 65y old  Female who presents with a chief complaint of COVID (09 Feb 2021 08:26)      SUBJECTIVE / OVERNIGHT EVENTS:  Yesterday, bronchscopy done with bacterial and fungal cultures sent.  Overnight, pt proned and ABG 7.14/96/97. Pt placed back supine, ABG mildly improved 7.23/79/101. Pt with large cuff leak, reinflated this AM.    MEDICATIONS  (STANDING):  chlorhexidine 0.12% Liquid 15 milliLiter(s) Oral Mucosa every 12 hours  chlorhexidine 4% Liquid 1 Application(s) Topical <User Schedule>  cisatracurium Infusion 3 MICROgram(s)/kG/Min (23.4 mL/Hr) IV Continuous <Continuous>  dextrose 40% Gel 15 Gram(s) Oral once  dextrose 5%. 1000 milliLiter(s) (50 mL/Hr) IV Continuous <Continuous>  dextrose 5%. 1000 milliLiter(s) (100 mL/Hr) IV Continuous <Continuous>  dextrose 50% Injectable 25 Gram(s) IV Push once  dextrose 50% Injectable 12.5 Gram(s) IV Push once  dextrose 50% Injectable 25 Gram(s) IV Push once  fentaNYL   Infusion. 0.5 MICROgram(s)/kG/Hr (6.5 mL/Hr) IV Continuous <Continuous>  glucagon  Injectable 1 milliGRAM(s) IntraMuscular once  heparin  Infusion 1800 Unit(s)/Hr (18 mL/Hr) IV Continuous <Continuous>  insulin lispro (ADMELOG) corrective regimen sliding scale   SubCutaneous every 6 hours  ketamine Infusion. 0.25 mG/kG/Hr (3.25 mL/Hr) IV Continuous <Continuous>  meropenem  IVPB      meropenem  IVPB 1000 milliGRAM(s) IV Intermittent every 8 hours  norepinephrine Infusion 0.05 MICROgram(s)/kG/Min (12.2 mL/Hr) IV Continuous <Continuous>  pantoprazole  Injectable 40 milliGRAM(s) IV Push daily  propofol Infusion 50 MICROgram(s)/kG/Min (39 mL/Hr) IV Continuous <Continuous>  vancomycin  IVPB 1500 milliGRAM(s) IV Intermittent every 12 hours    MEDICATIONS  (PRN):  acetaminophen  Suppository .. 975 milliGRAM(s) Rectal every 6 hours PRN Temp greater or equal to 38C (100.4F)  heparin   Injectable 24439 Unit(s) IV Push every 6 hours PRN For aPTT less than 40  heparin   Injectable 5000 Unit(s) IV Push every 6 hours PRN For aPTT between 40 - 57  sodium chloride 0.9% lock flush 10 milliLiter(s) IV Push every 1 hour PRN Pre/post blood products, medications, blood draw, and to maintain line patency      CAPILLARY BLOOD GLUCOSE      POCT Blood Glucose.: 104 mg/dL (10 Feb 2021 05:29)  POCT Blood Glucose.: 154 mg/dL (09 Feb 2021 23:32)  POCT Blood Glucose.: 143 mg/dL (09 Feb 2021 17:00)  POCT Blood Glucose.: 144 mg/dL (09 Feb 2021 11:16)  POCT Blood Glucose.: 145 mg/dL (09 Feb 2021 11:00)    I&O's Summary    09 Feb 2021 07:01  -  10 Feb 2021 07:00  --------------------------------------------------------  IN: 700 mL / OUT: 2170 mL / NET: -1470 mL        PHYSICAL EXAM:  Vital Signs Last 24 Hrs  T(C): 37.1 (10 Feb 2021 07:22), Max: 38.2 (09 Feb 2021 18:44)  T(F): 98.8 (10 Feb 2021 07:22), Max: 100.8 (09 Feb 2021 18:44)  HR: 81 (10 Feb 2021 07:22) (78 - 118)  BP: --  BP(mean): --  RR: 34 (10 Feb 2021 07:22) (34 - 34)  SpO2: 97% (10 Feb 2021 07:22) (87% - 97%)    CONSTITUTIONAL: NAD  HEENT: PERRL, normal sclera and conjunctiva; ETT in place  RESPIRATORY: normal cw expansion  CARDIOVASCULAR: Regular rate and rhythm, extremities WWP  ABDOMEN: normoactive bowel sounds  NEUROLOGY: sedated, unresponsive  SKIN: No rashes; no palpable lesions    LABS:                        9.4    19.19 )-----------( 229      ( 10 Feb 2021 02:15 )             32.0     02-10    130<L>  |  92<L>  |  12  ----------------------------<  124<H>  4.4   |  30  |  0.48<L>    Ca    8.4      10 Feb 2021 02:15  Phos  3.2     02-10  Mg     2.2     02-10    TPro  6.7  /  Alb  2.2<L>  /  TBili  1.3<H>  /  DBili  x   /  AST  33<H>  /  ALT  19  /  AlkPhos  82  02-10    PTT - ( 10 Feb 2021 02:15 )  PTT:58.1 sec      Culture - Fungal, Bronchial (collected 09 Feb 2021 18:48)  Source: BAL Bronchoalveolar Lavage  Preliminary Report (10 Feb 2021 06:37):    Testing in progress    Culture - Bronchial (collected 09 Feb 2021 18:48)  Source: Bronch Wash Bronchoalveolar Lavage  Gram Stain (09 Feb 2021 23:47):    Numerous polymorphonuclear leukocytes per low power field    No Squamous epithelial cells per low power field    No organisms seen per oil power field    Culture - Fungal, Bronchial (collected 09 Feb 2021 18:47)  Source: BAL Bronchoalveolar Lavage  Preliminary Report (10 Feb 2021 06:37):    Testing in progress    Culture - Bronchial (collected 09 Feb 2021 18:47)  Source: Bronch [place] Bronchoalveolar Lavage  Gram Stain (09 Feb 2021 22:31):    Numerous polymorphonuclear leukocytes per low power field    No squamous epithelial cells per low power field    No organisms seen per oil power field    Culture - Sputum (collected 09 Feb 2021 12:07)  Source: .Sputum Sputum  Gram Stain (09 Feb 2021 20:27):    Few polymorphonuclear leukocytes per low power field    Moderate Squamous epithelial cells per low power field    Rare Yeast per oil power field    Few Gram Positive Rods per oil power field    Few Gram Negative Rods per oil power field

## 2021-02-10 NOTE — PROGRESS NOTE ADULT - ATTENDING COMMENTS
64 y/o F with PMH HTN, hx of LLE DVT (previously not on AC) who wa found to be COVID positive on 1/11; was admitted on 1/26 and hs/p intubation 1/29 to worsening hypoxemic respiratory failure. Found to have bilateral DVTs now on heparin , requiring proning for ARDS w/ elevated plateau pressures     Acute hypercapneic hypoxic respiratory failure 2/2 COVID PNA:  - pt s/p intubation requiring sedation w/ hypercapneic and high plateau pressures elevated w/ persistent hypoxia   - s/p  decadron, off remdesivir for possible ANAHI   - febrile overnight cultures sent   -worsening respiratory acidosis, bronch mild mucous plugging , not tolerating proning. vent adjustment for respiratory acidosis, clinical deterioration  abx broadened to varun + vanco pending culturs     Encephalopathy: multifactorial from hypercapnea and sedation  - c/w fent +propofol + ketamine  - paralytic for vent synchrony    GI:  tube feeds+ PPI  - will increase tube feeds     fevers::  - f/u cultures , procal downtrending   - vanco+ meropenam   dvt in LE: c/w heparin gtt , PTT within range  renal: net negative will give albumin 250 x 2 5%   Hyperglycemia: improved    GOC: full code.  poor prognosis

## 2021-02-10 NOTE — PROGRESS NOTE ADULT - ASSESSMENT
65yF with obesity, HTN, hx of LLE DVT not on AC, COVID+ on 1/17, presenting for acutely worsening SOB, intubated 1/29 for acute hypoxic respiratory failure.    Neuro:  - propofol, ketamine, fentanyl, wean as tolerated  - Nimbex for paralysis for vent desynchrony    Respiratory:  - AC 34/390/10/100%, intubated (1/29 - )  - s/p bronchoscopy 2/9  - on heparin gtt, concern for PE given elevated D-dimer in 7000s  - s/p 10d course of Dexamethasone (1/27 - 2/5), Remdesivir d/c'ed (1/26 -1/30)  - Prone 18/6 as needed for ARDS    Cards:  - hemodynamically stable, off pressors    GI:  - c/w tube feeds  - Protonix 40mg qD    Renal:    - hyponatremia: CTM    Heme:  h/o DVT, b/l upper thigh DVTs; PE suspected but too unstable for a CTA, Ddimer >50k initially  - Duplex 1/27: b/l above knee DVT  - continue heparin gtt, daily PTT  Anemia, likely 2/2 blood draws  - CTM    ID  Fever:  - Ceftriaxone 7d course for E.coli UTI, CTX, Day 5/5 (Stop date 2/9)  - started on varun and vanc (2/9 - )  - f/u repeat blood, urine, sputum cultures 2/9  - f/u BAL cultures and fungal cultures 2/9  - MRSA swab positive    Ethics:  - full code 65yF with obesity, HTN, hx of LLE DVT not on AC, COVID+ on 1/17, presenting for acutely worsening SOB, intubated 1/29 for acute hypoxic respiratory failure.    Neuro:  - propofol, ketamine, fentanyl wean as tolerated  - Nimbex for paralysis for vent desynchrony    Respiratory:  - AC 34/340/12/100%, intubated (1/29 - ) - f/u ABG after cuff leak fixed  - s/p bronchoscopy 2/9  - on heparin gtt, concern for PE given elevated D-dimer in 7000s  - s/p 10d course of Dexamethasone (1/27 - 2/5), Remdesivir d/c'ed (1/26 -1/30)  - Prone 18/6 as needed for ARDS    Cards:  - hemodynamically stable, off pressors    GI:  - c/w tube feeds - increase rate to goal 35/hr  - Protonix 40mg qD    Renal:    - hyponatremia: CTM  - albumin 5% 250ml x2    Heme:  h/o DVT, b/l upper thigh DVTs; PE suspected but too unstable for a CTA, Ddimer >50k initially  - Duplex 1/27: b/l above knee DVT  - continue heparin gtt, daily PTT  Anemia, likely 2/2 blood draws  - CTM    ID  Fever:  - Ceftriaxone 7d course for E.coli UTI, CTX (2/5- 2/8)  - started on empiric varun and vanc (2/9 - ) for 7day course  - f/u repeat blood, urine, sputum cultures 2/9  - f/u BAL cultures and fungal cultures 2/9  - MRSA swab positive    Ethics:  - full code

## 2021-02-11 NOTE — PROGRESS NOTE ADULT - SUBJECTIVE AND OBJECTIVE BOX
INTERVAL HPI/OVERNIGHT EVENTS:    SUBJECTIVE: Patient seen and examined at bedside.   Overnight events:   ABG completed this AM status quo.   Febrile last night 8 pm 100.8 F    VITAL SIGNS:  ICU Vital Signs Last 24 Hrs  T(C): 37.6 (11 Feb 2021 04:00), Max: 37.9 (10 Feb 2021 18:17)  T(F): 99.7 (11 Feb 2021 04:00), Max: 100.2 (10 Feb 2021 18:17)  HR: 84 (11 Feb 2021 07:03) (78 - 102)  BP: 154/66 (11 Feb 2021 04:00) (154/66 - 154/66)  BP(mean): 90 (11 Feb 2021 04:00) (90 - 90)  ABP: 128/65 (11 Feb 2021 00:00) (122/46 - 161/58)  ABP(mean): 83 (11 Feb 2021 00:00) (69 - 90)  RR: 34 (11 Feb 2021 04:00) (34 - 34)  SpO2: 97% (11 Feb 2021 07:03) (90% - 98%)    Mode: AC/ CMV (Assist Control/ Continuous Mandatory Ventilation), RR (machine): 34, TV (machine): 350, FiO2: 100, PEEP: 12, ITime: 0.63, MAP: 21, PIP: 37  Plateau pressure:   P/F ratio:     02-10 @ 07:01  -  02-11 @ 07:00  --------------------------------------------------------  IN: 2557.8 mL / OUT: 2280 mL / NET: 277.8 mL      CAPILLARY BLOOD GLUCOSE      POCT Blood Glucose.: 150 mg/dL (11 Feb 2021 06:01)    ECG:    PHYSICAL EXAM:    General:   HEENT:   Neck:   Respiratory:   Cardiovascular:   Abdomen:   Extremities:  Neurological:    MEDICATIONS:  MEDICATIONS  (STANDING):  chlorhexidine 0.12% Liquid 15 milliLiter(s) Oral Mucosa every 12 hours  chlorhexidine 4% Liquid 1 Application(s) Topical <User Schedule>  cisatracurium Infusion 3 MICROgram(s)/kG/Min (23.4 mL/Hr) IV Continuous <Continuous>  dextrose 40% Gel 15 Gram(s) Oral once  dextrose 5%. 1000 milliLiter(s) (50 mL/Hr) IV Continuous <Continuous>  dextrose 5%. 1000 milliLiter(s) (100 mL/Hr) IV Continuous <Continuous>  dextrose 50% Injectable 25 Gram(s) IV Push once  dextrose 50% Injectable 12.5 Gram(s) IV Push once  dextrose 50% Injectable 25 Gram(s) IV Push once  fentaNYL   Infusion. 0.5 MICROgram(s)/kG/Hr (6.5 mL/Hr) IV Continuous <Continuous>  glucagon  Injectable 1 milliGRAM(s) IntraMuscular once  heparin  Infusion 1800 Unit(s)/Hr (18 mL/Hr) IV Continuous <Continuous>  insulin lispro (ADMELOG) corrective regimen sliding scale   SubCutaneous every 6 hours  ketamine Infusion. 0.25 mG/kG/Hr (3.25 mL/Hr) IV Continuous <Continuous>  meropenem  IVPB      meropenem  IVPB 1000 milliGRAM(s) IV Intermittent every 8 hours  mupirocin 2% Ointment 1 Application(s) Topical two times a day  norepinephrine Infusion 0.05 MICROgram(s)/kG/Min (12.2 mL/Hr) IV Continuous <Continuous>  pantoprazole  Injectable 40 milliGRAM(s) IV Push daily  propofol Infusion 50 MICROgram(s)/kG/Min (39 mL/Hr) IV Continuous <Continuous>  vancomycin  IVPB 1750 milliGRAM(s) IV Intermittent every 12 hours    MEDICATIONS  (PRN):  acetaminophen    Suspension .. 650 milliGRAM(s) Oral every 6 hours PRN Temp greater or equal to 38C (100.4F)  heparin   Injectable 87971 Unit(s) IV Push every 6 hours PRN For aPTT less than 40  heparin   Injectable 5000 Unit(s) IV Push every 6 hours PRN For aPTT between 40 - 57  sodium chloride 0.9% lock flush 10 milliLiter(s) IV Push every 1 hour PRN Pre/post blood products, medications, blood draw, and to maintain line patency      ALLERGIES:  Allergies    codeine (Unknown)    Intolerances        LABS:                        9.4    19.19 )-----------( 229      ( 10 Feb 2021 02:15 )             32.0     02-11    132<L>  |  93<L>  |  9   ----------------------------<  111<H>  4.3   |  32<H>  |  0.41<L>    Ca    8.6      11 Feb 2021 03:47  Phos  1.8     02-11  Mg     2.3     02-11    TPro  6.8  /  Alb  2.4<L>  /  TBili  1.0  /  DBili  x   /  AST  30  /  ALT  15  /  AlkPhos  74  02-11    PT/INR - ( 11 Feb 2021 03:47 )   PT: 13.0 sec;   INR: 1.15 ratio         PTT - ( 10 Feb 2021 02:15 )  PTT:58.1 sec      RADIOLOGY & ADDITIONAL TESTS: Reviewed.           INTERVAL HPI/OVERNIGHT EVENTS:    SUBJECTIVE: Patient seen and examined at bedside.   Overnight events:   ABG completed this AM status quo.   Febrile last night 8 pm 100.8 F    VITAL SIGNS:  ICU Vital Signs Last 24 Hrs  T(C): 37.6 (11 Feb 2021 04:00), Max: 37.9 (10 Feb 2021 18:17)  T(F): 99.7 (11 Feb 2021 04:00), Max: 100.2 (10 Feb 2021 18:17)  HR: 84 (11 Feb 2021 07:03) (78 - 102)  BP: 154/66 (11 Feb 2021 04:00) (154/66 - 154/66)  BP(mean): 90 (11 Feb 2021 04:00) (90 - 90)  ABP: 128/65 (11 Feb 2021 00:00) (122/46 - 161/58)  ABP(mean): 83 (11 Feb 2021 00:00) (69 - 90)  RR: 34 (11 Feb 2021 04:00) (34 - 34)  SpO2: 97% (11 Feb 2021 07:03) (90% - 98%)    Mode: AC/ CMV (Assist Control/ Continuous Mandatory Ventilation), RR (machine): 34, TV (machine): 350, FiO2: 100, PEEP: 12, ITime: 0.63, MAP: 21, PIP: 37  Plateau pressure:   P/F ratio:     02-10 @ 07:01  -  02-11 @ 07:00  --------------------------------------------------------  IN: 2557.8 mL / OUT: 2280 mL / NET: 277.8 mL      CAPILLARY BLOOD GLUCOSE      POCT Blood Glucose.: 150 mg/dL (11 Feb 2021 06:01)    ECG:    PHYSICAL EXAM:    General: intubated, sedated   Respiratory: vent settings: RR 34  PEEP 12 FIO2 100 %  Cardiovascular: hemodynamically stable off pressors   Abdomen: soft  Neurological: sedated     MEDICATIONS:  MEDICATIONS  (STANDING):  chlorhexidine 0.12% Liquid 15 milliLiter(s) Oral Mucosa every 12 hours  chlorhexidine 4% Liquid 1 Application(s) Topical <User Schedule>  cisatracurium Infusion 3 MICROgram(s)/kG/Min (23.4 mL/Hr) IV Continuous <Continuous>  dextrose 40% Gel 15 Gram(s) Oral once  dextrose 5%. 1000 milliLiter(s) (50 mL/Hr) IV Continuous <Continuous>  dextrose 5%. 1000 milliLiter(s) (100 mL/Hr) IV Continuous <Continuous>  dextrose 50% Injectable 25 Gram(s) IV Push once  dextrose 50% Injectable 12.5 Gram(s) IV Push once  dextrose 50% Injectable 25 Gram(s) IV Push once  fentaNYL   Infusion. 0.5 MICROgram(s)/kG/Hr (6.5 mL/Hr) IV Continuous <Continuous>  glucagon  Injectable 1 milliGRAM(s) IntraMuscular once  heparin  Infusion 1800 Unit(s)/Hr (18 mL/Hr) IV Continuous <Continuous>  insulin lispro (ADMELOG) corrective regimen sliding scale   SubCutaneous every 6 hours  ketamine Infusion. 0.25 mG/kG/Hr (3.25 mL/Hr) IV Continuous <Continuous>  meropenem  IVPB      meropenem  IVPB 1000 milliGRAM(s) IV Intermittent every 8 hours  mupirocin 2% Ointment 1 Application(s) Topical two times a day  norepinephrine Infusion 0.05 MICROgram(s)/kG/Min (12.2 mL/Hr) IV Continuous <Continuous>  pantoprazole  Injectable 40 milliGRAM(s) IV Push daily  propofol Infusion 50 MICROgram(s)/kG/Min (39 mL/Hr) IV Continuous <Continuous>  vancomycin  IVPB 1750 milliGRAM(s) IV Intermittent every 12 hours    MEDICATIONS  (PRN):  acetaminophen    Suspension .. 650 milliGRAM(s) Oral every 6 hours PRN Temp greater or equal to 38C (100.4F)  heparin   Injectable 63755 Unit(s) IV Push every 6 hours PRN For aPTT less than 40  heparin   Injectable 5000 Unit(s) IV Push every 6 hours PRN For aPTT between 40 - 57  sodium chloride 0.9% lock flush 10 milliLiter(s) IV Push every 1 hour PRN Pre/post blood products, medications, blood draw, and to maintain line patency      ALLERGIES:  Allergies    codeine (Unknown)    Intolerances        LABS:                        9.4    19.19 )-----------( 229      ( 10 Feb 2021 02:15 )             32.0     02-11    132<L>  |  93<L>  |  9   ----------------------------<  111<H>  4.3   |  32<H>  |  0.41<L>    Ca    8.6      11 Feb 2021 03:47  Phos  1.8     02-11  Mg     2.3     02-11    TPro  6.8  /  Alb  2.4<L>  /  TBili  1.0  /  DBili  x   /  AST  30  /  ALT  15  /  AlkPhos  74  02-11    PT/INR - ( 11 Feb 2021 03:47 )   PT: 13.0 sec;   INR: 1.15 ratio         PTT - ( 10 Feb 2021 02:15 )  PTT:58.1 sec      RADIOLOGY & ADDITIONAL TESTS: Reviewed.           INTERVAL HPI/OVERNIGHT EVENTS:    SUBJECTIVE: Patient seen and examined at bedside.   Overnight events:   ABG completed this AM status quo.   Febrile last night 8 pm 100.8 F    VITAL SIGNS:  ICU Vital Signs Last 24 Hrs  T(C): 37.6 (11 Feb 2021 04:00), Max: 37.9 (10 Feb 2021 18:17)  T(F): 99.7 (11 Feb 2021 04:00), Max: 100.2 (10 Feb 2021 18:17)  HR: 84 (11 Feb 2021 07:03) (78 - 102)  BP: 154/66 (11 Feb 2021 04:00) (154/66 - 154/66)  BP(mean): 90 (11 Feb 2021 04:00) (90 - 90)  ABP: 128/65 (11 Feb 2021 00:00) (122/46 - 161/58)  ABP(mean): 83 (11 Feb 2021 00:00) (69 - 90)  RR: 34 (11 Feb 2021 04:00) (34 - 34)  SpO2: 97% (11 Feb 2021 07:03) (90% - 98%)    Mode: AC/ CMV (Assist Control/ Continuous Mandatory Ventilation), RR (machine): 34, TV (machine): 350, FiO2: 100, PEEP: 12, ITime: 0.63, MAP: 21, PIP: 37  Plateau pressure:   P/F ratio:     02-10 @ 07:01  -  02-11 @ 07:00  --------------------------------------------------------  IN: 2557.8 mL / OUT: 2280 mL / NET: 277.8 mL      CAPILLARY BLOOD GLUCOSE      POCT Blood Glucose.: 150 mg/dL (11 Feb 2021 06:01)    PHYSICAL EXAM:    General: intubated, sedated   Respiratory: vent settings: RR 34  PEEP 12 FIO2 100 %  Cardiovascular: hemodynamically stable off pressors   Abdomen: soft  Neurological: sedated     MEDICATIONS:  MEDICATIONS  (STANDING):  chlorhexidine 0.12% Liquid 15 milliLiter(s) Oral Mucosa every 12 hours  chlorhexidine 4% Liquid 1 Application(s) Topical <User Schedule>  cisatracurium Infusion 3 MICROgram(s)/kG/Min (23.4 mL/Hr) IV Continuous <Continuous>  dextrose 40% Gel 15 Gram(s) Oral once  dextrose 5%. 1000 milliLiter(s) (50 mL/Hr) IV Continuous <Continuous>  dextrose 5%. 1000 milliLiter(s) (100 mL/Hr) IV Continuous <Continuous>  dextrose 50% Injectable 25 Gram(s) IV Push once  dextrose 50% Injectable 12.5 Gram(s) IV Push once  dextrose 50% Injectable 25 Gram(s) IV Push once  fentaNYL   Infusion. 0.5 MICROgram(s)/kG/Hr (6.5 mL/Hr) IV Continuous <Continuous>  glucagon  Injectable 1 milliGRAM(s) IntraMuscular once  heparin  Infusion 1800 Unit(s)/Hr (18 mL/Hr) IV Continuous <Continuous>  insulin lispro (ADMELOG) corrective regimen sliding scale   SubCutaneous every 6 hours  ketamine Infusion. 0.25 mG/kG/Hr (3.25 mL/Hr) IV Continuous <Continuous>  meropenem  IVPB      meropenem  IVPB 1000 milliGRAM(s) IV Intermittent every 8 hours  mupirocin 2% Ointment 1 Application(s) Topical two times a day  norepinephrine Infusion 0.05 MICROgram(s)/kG/Min (12.2 mL/Hr) IV Continuous <Continuous>  pantoprazole  Injectable 40 milliGRAM(s) IV Push daily  propofol Infusion 50 MICROgram(s)/kG/Min (39 mL/Hr) IV Continuous <Continuous>  vancomycin  IVPB 1750 milliGRAM(s) IV Intermittent every 12 hours    MEDICATIONS  (PRN):  acetaminophen    Suspension .. 650 milliGRAM(s) Oral every 6 hours PRN Temp greater or equal to 38C (100.4F)  heparin   Injectable 10750 Unit(s) IV Push every 6 hours PRN For aPTT less than 40  heparin   Injectable 5000 Unit(s) IV Push every 6 hours PRN For aPTT between 40 - 57  sodium chloride 0.9% lock flush 10 milliLiter(s) IV Push every 1 hour PRN Pre/post blood products, medications, blood draw, and to maintain line patency      ALLERGIES:  Allergies    codeine (Unknown)    Intolerances        LABS:                        9.4    19.19 )-----------( 229      ( 10 Feb 2021 02:15 )             32.0     02-11    132<L>  |  93<L>  |  9   ----------------------------<  111<H>  4.3   |  32<H>  |  0.41<L>    Ca    8.6      11 Feb 2021 03:47  Phos  1.8     02-11  Mg     2.3     02-11    TPro  6.8  /  Alb  2.4<L>  /  TBili  1.0  /  DBili  x   /  AST  30  /  ALT  15  /  AlkPhos  74  02-11    PT/INR - ( 11 Feb 2021 03:47 )   PT: 13.0 sec;   INR: 1.15 ratio         PTT - ( 10 Feb 2021 02:15 )  PTT:58.1 sec      RADIOLOGY & ADDITIONAL TESTS: Reviewed.

## 2021-02-11 NOTE — PROGRESS NOTE ADULT - ASSESSMENT
65yF with obesity, HTN, hx of LLE DVT not on AC, COVID+ on 1/17, presenting for acutely worsening SOB, intubated 1/29 for acute hypoxic respiratory failure.    Neuro:  - propofol, ketamine, fentanyl wean as tolerated  - Nimbex for paralysis for vent desynchrony    Respiratory:  - AC 34/340/12/100%, intubated (1/29 - ) - f/u ABG after cuff leak fixed  - s/p bronchoscopy 2/9  - on heparin gtt, concern for PE given elevated D-dimer in 7000s  - s/p 10d course of Dexamethasone (1/27 - 2/5), Remdesivir d/c'ed (1/26 -1/30)  - Prone 18/6 as needed for ARDS, does not tolerate proning, becomes hypoxic     Cards:  - hemodynamically stable, off pressors    GI:  - c/w tube feeds - increase rate to goal 35/hr  - Protonix 40mg qD    Renal:    - hyponatremia: CTM  - albumin 5% 250ml x2    Heme:  h/o DVT, b/l upper thigh DVTs; PE suspected but too unstable for a CTA, Ddimer >50k initially  - Duplex 1/27: b/l above knee DVT  - continue heparin gtt, daily PTT  Anemia, likely 2/2 blood draws  - CTM    ID  Fever:  - Ceftriaxone 7d course for E.coli UTI, CTX (2/5- 2/8)  - started on empiric varun and vanc (2/9 - 2/15) for 7day course  - f/u repeat blood, urine, sputum cultures 2/9- NG thus far   - f/u BAL cultures and fungal cultures 2/9- NG thus far  - MRSA swab positive    Ethics:  - full code 65yF with obesity, HTN, hx of LLE DVT not on AC, COVID+ on 1/17, presenting for acutely worsening SOB, intubated 1/29 for acute hypoxic respiratory failure.    Neuro:  - propofol, ketamine, fentanyl wean as tolerated  - Nimbex for paralysis for vent desynchrony    Respiratory:  - AC 34/390/12/100%, intubated (1/29 - ) - f/u ABG   - s/p bronchoscopy 2/9- NG thus far   - on heparin gtt, concern for PE given elevated D-dimer in 7000s  - s/p 10d course of Dexamethasone (1/27 - 2/5), Remdesivir d/c'ed (1/26 -1/30)  - Prone 18/6 as needed for ARDS, does not tolerate proning, becomes hypoxic     Cards:  - hemodynamically stable, off pressors    GI:  - c/w tube feeds - increase rate to goal 35/hr  - Protonix 40mg qD    Renal:    - hyponatremia: CTM  - albumin 5% 250ml x2    Heme:  h/o DVT, b/l upper thigh DVTs; PE suspected but too unstable for a CTA, Ddimer >50k initially  - Duplex 1/27: b/l above knee DVT  - continue heparin gtt, daily PTT  Anemia, likely 2/2 blood draws  - CTM    ID  Fever:  - Ceftriaxone 7d course for E.coli UTI, CTX (2/5- 2/8)  - started on empiric varun and vanc (2/9 - 2/15) for 7day course  - f/u repeat blood, urine cx, sputum cultures 2/9- NG thus far, urine cx negative   - f/u BAL cultures and fungal cultures 2/9- NG thus far  - MRSA swab positive    Ethics:  - full code  - Will speak to family to reassess GOC, poor prognosis

## 2021-02-11 NOTE — PROGRESS NOTE ADULT - ATTENDING COMMENTS
64 y/o F with PMH HTN, hx of LLE DVT (previously not on AC) who wa found to be COVID positive on 1/11; was admitted on 1/26 and hs/p intubation 1/29 to worsening hypoxemic respiratory failure. Found to have bilateral DVTs now on heparin , requiring proning for ARDS w/ elevated plateau pressures  now w/ worsened hypercapneia not tolerating proning  Acute hypercapneic hypoxic respiratory failure 2/2 COVID PNA:  - pt s/p intubation requiring sedation w/ hypercapneic and high plateau pressures elevated w/ persistent hypoxia   - s/p  decadron, off remdesivir for possible ANAHI   - febrile overnight cultures sent   -worsening respiratory acidosis, bronch mild mucous plugging , not tolerating proning. vent adjustment for respiratory acidosis, clinical deterioration  abx broadened to varun + vanco pending cultures  - will add caspofungin     Encephalopathy: multifactorial from hypercapnea and sedation  - c/w fent +propofol + ketamine  - paralytic for vent synchrony    GI:  tube feeds+ PPI  - will increase tube feeds     fevers::  - f/u cultures , procal uptrending  - vanco+ meropenam will add caspo  dvt in LE: c/w heparin gtt , PTT within range  renal: s/p  albumin 250 x 2 5% yesterday  Hyperglycemia: improved    GOC: full code.  poor prognosis

## 2021-02-12 NOTE — PROGRESS NOTE ADULT - ASSESSMENT
65yF with obesity, HTN, hx of LLE DVT not on AC, COVID+ on 1/17, presenting for acutely worsening SOB, intubated 1/29 for acute hypoxic respiratory failure.    Neuro:  - propofol, ketamine, fentanyl wean as tolerated  - Nimbex for paralysis for vent desynchrony    Respiratory:  - AC 34/390/12/100%, intubated (1/29 - )   - s/p bronchoscopy 2/9- NG thus far   - on heparin gtt, concern for PE given elevated D-dimer in 7000s  - s/p 10d course of Dexamethasone (1/27 - 2/5), Remdesivir d/c'ed (1/26 -1/30)  - Prone 18/6 as needed for ARDS, does not tolerate proning, becomes hypoxic     Cards:  - hemodynamically stable, off pressors    GI:  - c/w tube feeds - increase rate to goal 35/hr  - Protonix 40mg qD    Renal:    - hyponatremia: CTM  - albumin 5% 250ml x2    Heme:  h/o DVT, b/l upper thigh DVTs; PE suspected but too unstable for a CTA, Ddimer >50k initially  - Duplex 1/27: b/l above knee DVT  - continue heparin gtt, daily PTT  Anemia, likely 2/2 blood draws  - CTM    ID  Fever:  - Ceftriaxone 7d course for E.coli UTI, CTX (2/5- 2/8)  - started on empiric varun and vanc (2/9 - 2/15) for 7day course  - f/u repeat blood, urine cx,  2/9- NG thus far, urine cx negative   - sputum cx 2/9/21- N resp kalpesh with some E Coli- pan-sensitive   - f/u BAL cultures and fungal cultures 2/9- NG thus far  - MRSA swab positive    Ethics:  - full code   65yF with obesity, HTN, hx of LLE DVT not on AC, COVID+ on 1/17, presenting for acutely worsening SOB, intubated 1/29 for acute hypoxic respiratory failure.    Neuro:  - propofol, ketamine, fentanyl wean as tolerated  - Nimbex for paralysis for vent desynchrony    Respiratory:  - AC 34/390/12/90%, intubated (1/29 - )   - s/p bronchoscopy 2/9- NG thus far   - on heparin gtt, concern for PE given elevated D-dimer in 7000s  - s/p 10d course of Dexamethasone (1/27 - 2/5), Remdesivir d/c'ed (1/26 -1/30)  - Prone 18/6 as needed for ARDS, does not tolerate proning, becomes hypoxic     Cards:  - hemodynamically stable, off pressors    GI:  - c/w tube feeds - increase rate to goal 35/hr  - Protonix 40mg qD  - senna    Transaminitis 2/12/21  AST 70 ALT 41, CTM     Renal:    - hyponatremia: CTM    Heme:  h/o DVT, b/l upper thigh DVTs; PE suspected but too unstable for a CTA, Ddimer >50k initially  - Duplex 1/27: b/l above knee DVT  - continue heparin gtt, daily PTT  Anemia, likely 2/2 blood draws  - CTM    ID  Fever:  - Ceftriaxone 7d course for E.coli UTI, CTX (2/5- 2/8)  - started on empiric varun and vanc (2/9 - 2/15) for 7day course  - Caspofungin 2/11/21-   - f/u repeat blood, urine cx,  2/9- NG thus far, urine cx negative   - sputum cx 2/9/21- N resp kalpesh with some E Coli- pan-sensitive   - f/u BAL cultures and fungal cultures 2/9- NG thus far  - MRSA swab positive  - febrile overnight 2/11/21- send blood cx and procalcitonin    Ethics:  - full code   65yF with obesity, HTN, hx of LLE DVT not on AC, COVID+ on 1/17, presenting for acutely worsening SOB, intubated 1/29 for acute hypoxic respiratory failure.    Neuro:  - propofol, ketamine, fentanyl wean as tolerated  - Nimbex for paralysis for vent desynchrony    Respiratory:  - AC 34/390/12/90%, intubated (1/29 - )   - s/p bronchoscopy 2/9- NG thus far   - on heparin gtt, concern for PE given elevated D-dimer in 7000s  - s/p 10d course of Dexamethasone (1/27 - 2/5), Remdesivir d/c'ed (1/26 -1/30)  - Prone 18/6 as needed for ARDS, does not tolerate proning, becomes hypoxic     Cards:  - hemodynamically stable, off pressors    GI:  - c/w tube feeds - increase rate to goal 35/hr  - Protonix 40mg qD  - senna    Transaminitis 2/12/21  AST 70 ALT 41, CTM     Renal:    - hyponatremia: CTM    Heme:  h/o DVT, b/l upper thigh DVTs; PE suspected but too unstable for a CTA, Ddimer >50k initially  - Duplex 1/27: b/l above knee DVT  - continue heparin gtt, daily PTT  Anemia, likely 2/2 blood draws  - CTM    ID  Fever:  - Ceftriaxone 7d course for E.coli UTI, CTX (2/5- 2/8)  - started on empiric varun and vanc (2/9 - 2/15) for 7day course  - Caspofungin 2/11/21-   - f/u repeat blood, urine cx,  2/9- NG thus far, urine cx negative   - sputum cx 2/9/21- N resp kalpesh with some E Coli- pansensitive (sensitive to Meropenem)   - f/u BAL cultures and fungal cultures 2/9- NG thus far  - MRSA swab positive  - febrile overnight 2/11/21- send blood cx and procalcitonin    Ethics:  - full code

## 2021-02-12 NOTE — PROGRESS NOTE ADULT - ATTENDING COMMENTS
65 year old with covid respiratory failure.  Patient critically ill by virtue of needing ventilator support, intermittent use of vasopressors and frequent bedside reassessments and medication changes. on varun/vanc caspo for yeast in sputum.  patient prone multiple times with no real overall improvement.  High vent requirements.  no real clinical improvement over stay.  trach when stable per family.  prognosis poor

## 2021-02-12 NOTE — PROGRESS NOTE ADULT - SUBJECTIVE AND OBJECTIVE BOX
INTERVAL HPI/OVERNIGHT EVENTS:    SUBJECTIVE: Patient seen and examined at bedside.   Overnight events: Increasing LFTs. Family came to visit patient yesterday, understanding that mother very ill, however hopeful if she improves, can get to stage of being assessed for trach and PEG.       VITAL SIGNS:  ICU Vital Signs Last 24 Hrs  T(C): 37.3 (12 Feb 2021 04:00), Max: 39 (11 Feb 2021 20:00)  T(F): 99.1 (12 Feb 2021 04:00), Max: 102.2 (11 Feb 2021 20:00)  HR: 90 (12 Feb 2021 04:43) (84 - 113)  BP: 108/64 (12 Feb 2021 04:00) (108/64 - 135/62)  BP(mean): 78 (12 Feb 2021 04:00) (78 - 78)  ABP: 108/64 (12 Feb 2021 04:00) (108/64 - 138/63)  ABP(mean): 78 (12 Feb 2021 04:00) (78 - 85)  RR: 34 (12 Feb 2021 04:00) (34 - 35)  SpO2: 95% (12 Feb 2021 04:43) (90% - 98%)    Mode: AC/ CMV (Assist Control/ Continuous Mandatory Ventilation), RR (machine): 34, TV (machine): 380, FiO2: 90, PEEP: 12, ITime: 0.75, MAP: 21, PIP: 38  Plateau pressure:   P/F ratio:     02-11 @ 07:01  -  02-12 @ 07:00  --------------------------------------------------------  IN: 4483.2 mL / OUT: 2850 mL / NET: 1633.2 mL      CAPILLARY BLOOD GLUCOSE      POCT Blood Glucose.: 137 mg/dL (12 Feb 2021 05:40)    ECG:    PHYSICAL EXAM:    General:   HEENT:   Neck:   Respiratory:   Cardiovascular:   Abdomen:   Extremities:  Neurological:    MEDICATIONS:  MEDICATIONS  (STANDING):  caspofungin IVPB      caspofungin IVPB 50 milliGRAM(s) IV Intermittent every 24 hours  chlorhexidine 0.12% Liquid 15 milliLiter(s) Oral Mucosa every 12 hours  chlorhexidine 4% Liquid 1 Application(s) Topical <User Schedule>  cisatracurium Infusion 3 MICROgram(s)/kG/Min (23.4 mL/Hr) IV Continuous <Continuous>  dextrose 40% Gel 15 Gram(s) Oral once  dextrose 5%. 1000 milliLiter(s) (50 mL/Hr) IV Continuous <Continuous>  dextrose 5%. 1000 milliLiter(s) (100 mL/Hr) IV Continuous <Continuous>  dextrose 50% Injectable 25 Gram(s) IV Push once  dextrose 50% Injectable 12.5 Gram(s) IV Push once  dextrose 50% Injectable 25 Gram(s) IV Push once  fentaNYL   Infusion..... 0.5 MICROgram(s)/kG/Hr (1.3 mL/Hr) IV Continuous <Continuous>  glucagon  Injectable 1 milliGRAM(s) IntraMuscular once  heparin  Infusion 1800 Unit(s)/Hr (18 mL/Hr) IV Continuous <Continuous>  insulin lispro (ADMELOG) corrective regimen sliding scale   SubCutaneous every 6 hours  ketamine Infusion. 0.25 mG/kG/Hr (3.25 mL/Hr) IV Continuous <Continuous>  meropenem  IVPB      meropenem  IVPB 1000 milliGRAM(s) IV Intermittent every 8 hours  mupirocin 2% Ointment 1 Application(s) Topical two times a day  pantoprazole  Injectable 40 milliGRAM(s) IV Push daily  polyethylene glycol 3350 17 Gram(s) Oral daily  propofol Infusion 50 MICROgram(s)/kG/Min (39 mL/Hr) IV Continuous <Continuous>  senna Syrup 10 milliLiter(s) Oral at bedtime  vancomycin  IVPB 1750 milliGRAM(s) IV Intermittent every 12 hours    MEDICATIONS  (PRN):  acetaminophen    Suspension .. 650 milliGRAM(s) Oral every 6 hours PRN Temp greater or equal to 38C (100.4F)  heparin   Injectable 19810 Unit(s) IV Push every 6 hours PRN For aPTT less than 40  heparin   Injectable 5000 Unit(s) IV Push every 6 hours PRN For aPTT between 40 - 57  sodium chloride 0.9% lock flush 10 milliLiter(s) IV Push every 1 hour PRN Pre/post blood products, medications, blood draw, and to maintain line patency      ALLERGIES:  Allergies    codeine (Unknown)    Intolerances        LABS:                        9.2    13.84 )-----------( 256      ( 12 Feb 2021 04:52 )             31.9     02-12    134<L>  |  95<L>  |  10  ----------------------------<  141<H>  4.0   |  32<H>  |  0.41<L>    Ca    8.4      12 Feb 2021 04:52  Phos  2.2     02-12  Mg     2.3     02-12    TPro  6.9  /  Alb  2.4<L>  /  TBili  1.0  /  DBili  x   /  AST  70<H>  /  ALT  41<H>  /  AlkPhos  89  02-12    PT/INR - ( 12 Feb 2021 04:52 )   PT: 13.0 sec;   INR: 1.14 ratio         PTT - ( 12 Feb 2021 04:52 )  PTT:73.6 sec      RADIOLOGY & ADDITIONAL TESTS: Reviewed.           INTERVAL HPI/OVERNIGHT EVENTS:    SUBJECTIVE: Patient seen and examined at bedside.   Overnight events: Increasing LFTs. Family came to visit patient yesterday, understanding that mother very ill, however hopeful if she improves, can get to stage of being assessed for trach and PEG.   Febrile overnight 101.8-102.2 F from 6 pm to midnight       VITAL SIGNS:  ICU Vital Signs Last 24 Hrs  T(C): 37.3 (12 Feb 2021 04:00), Max: 39 (11 Feb 2021 20:00)  T(F): 99.1 (12 Feb 2021 04:00), Max: 102.2 (11 Feb 2021 20:00)  HR: 90 (12 Feb 2021 04:43) (84 - 113)  BP: 108/64 (12 Feb 2021 04:00) (108/64 - 135/62)  BP(mean): 78 (12 Feb 2021 04:00) (78 - 78)  ABP: 108/64 (12 Feb 2021 04:00) (108/64 - 138/63)  ABP(mean): 78 (12 Feb 2021 04:00) (78 - 85)  RR: 34 (12 Feb 2021 04:00) (34 - 35)  SpO2: 95% (12 Feb 2021 04:43) (90% - 98%)    Mode: AC/ CMV (Assist Control/ Continuous Mandatory Ventilation), RR (machine): 34, TV (machine): 380, FiO2: 90, PEEP: 12, ITime: 0.75, MAP: 21, PIP: 38  Plateau pressure:   P/F ratio:     02-11 @ 07:01  -  02-12 @ 07:00  --------------------------------------------------------  IN: 4483.2 mL / OUT: 2850 mL / NET: 1633.2 mL      CAPILLARY BLOOD GLUCOSE      POCT Blood Glucose.: 137 mg/dL (12 Feb 2021 05:40)    ECG:    PHYSICAL EXAM:    General:   HEENT:   Neck:   Respiratory:   Cardiovascular:   Abdomen:   Extremities:  Neurological:    MEDICATIONS:  MEDICATIONS  (STANDING):  caspofungin IVPB      caspofungin IVPB 50 milliGRAM(s) IV Intermittent every 24 hours  chlorhexidine 0.12% Liquid 15 milliLiter(s) Oral Mucosa every 12 hours  chlorhexidine 4% Liquid 1 Application(s) Topical <User Schedule>  cisatracurium Infusion 3 MICROgram(s)/kG/Min (23.4 mL/Hr) IV Continuous <Continuous>  dextrose 40% Gel 15 Gram(s) Oral once  dextrose 5%. 1000 milliLiter(s) (50 mL/Hr) IV Continuous <Continuous>  dextrose 5%. 1000 milliLiter(s) (100 mL/Hr) IV Continuous <Continuous>  dextrose 50% Injectable 25 Gram(s) IV Push once  dextrose 50% Injectable 12.5 Gram(s) IV Push once  dextrose 50% Injectable 25 Gram(s) IV Push once  fentaNYL   Infusion..... 0.5 MICROgram(s)/kG/Hr (1.3 mL/Hr) IV Continuous <Continuous>  glucagon  Injectable 1 milliGRAM(s) IntraMuscular once  heparin  Infusion 1800 Unit(s)/Hr (18 mL/Hr) IV Continuous <Continuous>  insulin lispro (ADMELOG) corrective regimen sliding scale   SubCutaneous every 6 hours  ketamine Infusion. 0.25 mG/kG/Hr (3.25 mL/Hr) IV Continuous <Continuous>  meropenem  IVPB      meropenem  IVPB 1000 milliGRAM(s) IV Intermittent every 8 hours  mupirocin 2% Ointment 1 Application(s) Topical two times a day  pantoprazole  Injectable 40 milliGRAM(s) IV Push daily  polyethylene glycol 3350 17 Gram(s) Oral daily  propofol Infusion 50 MICROgram(s)/kG/Min (39 mL/Hr) IV Continuous <Continuous>  senna Syrup 10 milliLiter(s) Oral at bedtime  vancomycin  IVPB 1750 milliGRAM(s) IV Intermittent every 12 hours    MEDICATIONS  (PRN):  acetaminophen    Suspension .. 650 milliGRAM(s) Oral every 6 hours PRN Temp greater or equal to 38C (100.4F)  heparin   Injectable 95552 Unit(s) IV Push every 6 hours PRN For aPTT less than 40  heparin   Injectable 5000 Unit(s) IV Push every 6 hours PRN For aPTT between 40 - 57  sodium chloride 0.9% lock flush 10 milliLiter(s) IV Push every 1 hour PRN Pre/post blood products, medications, blood draw, and to maintain line patency      ALLERGIES:  Allergies    codeine (Unknown)    Intolerances        LABS:                        9.2    13.84 )-----------( 256      ( 12 Feb 2021 04:52 )             31.9     02-12    134<L>  |  95<L>  |  10  ----------------------------<  141<H>  4.0   |  32<H>  |  0.41<L>    Ca    8.4      12 Feb 2021 04:52  Phos  2.2     02-12  Mg     2.3     02-12    TPro  6.9  /  Alb  2.4<L>  /  TBili  1.0  /  DBili  x   /  AST  70<H>  /  ALT  41<H>  /  AlkPhos  89  02-12    PT/INR - ( 12 Feb 2021 04:52 )   PT: 13.0 sec;   INR: 1.14 ratio         PTT - ( 12 Feb 2021 04:52 )  PTT:73.6 sec      RADIOLOGY & ADDITIONAL TESTS: Reviewed.           INTERVAL HPI/OVERNIGHT EVENTS:    SUBJECTIVE: Patient seen and examined at bedside.   Overnight events: Increasing LFTs. Family came to visit patient yesterday, understanding that mother very ill, however hopeful if she improves, can get to stage of being assessed for trach and PEG.   Febrile overnight 101.8-102.2 F from 6 pm to midnight       VITAL SIGNS:  ICU Vital Signs Last 24 Hrs  T(C): 37.3 (12 Feb 2021 04:00), Max: 39 (11 Feb 2021 20:00)  T(F): 99.1 (12 Feb 2021 04:00), Max: 102.2 (11 Feb 2021 20:00)  HR: 90 (12 Feb 2021 04:43) (84 - 113)  BP: 108/64 (12 Feb 2021 04:00) (108/64 - 135/62)  BP(mean): 78 (12 Feb 2021 04:00) (78 - 78)  ABP: 108/64 (12 Feb 2021 04:00) (108/64 - 138/63)  ABP(mean): 78 (12 Feb 2021 04:00) (78 - 85)  RR: 34 (12 Feb 2021 04:00) (34 - 35)  SpO2: 95% (12 Feb 2021 04:43) (90% - 98%)    Mode: AC/ CMV (Assist Control/ Continuous Mandatory Ventilation), RR (machine): 34, TV (machine): 380, FiO2: 90, PEEP: 12, ITime: 0.75, MAP: 21, PIP: 38  Plateau pressure:   P/F ratio:     02-11 @ 07:01  -  02-12 @ 07:00  --------------------------------------------------------  IN: 4483.2 mL / OUT: 2850 mL / NET: 1633.2 mL      CAPILLARY BLOOD GLUCOSE      POCT Blood Glucose.: 137 mg/dL (12 Feb 2021 05:40)    ECG:    PHYSICAL EXAM:    General: obese female, intubated, sedated, sitting up   Respiratory: on vent   Cardiovascular: hemodynamically stable, no pressor requirements   Abdomen: soft, nontender   Neurological: sedated     MEDICATIONS:  MEDICATIONS  (STANDING):  caspofungin IVPB      caspofungin IVPB 50 milliGRAM(s) IV Intermittent every 24 hours  chlorhexidine 0.12% Liquid 15 milliLiter(s) Oral Mucosa every 12 hours  chlorhexidine 4% Liquid 1 Application(s) Topical <User Schedule>  cisatracurium Infusion 3 MICROgram(s)/kG/Min (23.4 mL/Hr) IV Continuous <Continuous>  dextrose 40% Gel 15 Gram(s) Oral once  dextrose 5%. 1000 milliLiter(s) (50 mL/Hr) IV Continuous <Continuous>  dextrose 5%. 1000 milliLiter(s) (100 mL/Hr) IV Continuous <Continuous>  dextrose 50% Injectable 25 Gram(s) IV Push once  dextrose 50% Injectable 12.5 Gram(s) IV Push once  dextrose 50% Injectable 25 Gram(s) IV Push once  fentaNYL   Infusion..... 0.5 MICROgram(s)/kG/Hr (1.3 mL/Hr) IV Continuous <Continuous>  glucagon  Injectable 1 milliGRAM(s) IntraMuscular once  heparin  Infusion 1800 Unit(s)/Hr (18 mL/Hr) IV Continuous <Continuous>  insulin lispro (ADMELOG) corrective regimen sliding scale   SubCutaneous every 6 hours  ketamine Infusion. 0.25 mG/kG/Hr (3.25 mL/Hr) IV Continuous <Continuous>  meropenem  IVPB      meropenem  IVPB 1000 milliGRAM(s) IV Intermittent every 8 hours  mupirocin 2% Ointment 1 Application(s) Topical two times a day  pantoprazole  Injectable 40 milliGRAM(s) IV Push daily  polyethylene glycol 3350 17 Gram(s) Oral daily  propofol Infusion 50 MICROgram(s)/kG/Min (39 mL/Hr) IV Continuous <Continuous>  senna Syrup 10 milliLiter(s) Oral at bedtime  vancomycin  IVPB 1750 milliGRAM(s) IV Intermittent every 12 hours    MEDICATIONS  (PRN):  acetaminophen    Suspension .. 650 milliGRAM(s) Oral every 6 hours PRN Temp greater or equal to 38C (100.4F)  heparin   Injectable 95632 Unit(s) IV Push every 6 hours PRN For aPTT less than 40  heparin   Injectable 5000 Unit(s) IV Push every 6 hours PRN For aPTT between 40 - 57  sodium chloride 0.9% lock flush 10 milliLiter(s) IV Push every 1 hour PRN Pre/post blood products, medications, blood draw, and to maintain line patency      ALLERGIES:  Allergies    codeine (Unknown)    Intolerances        LABS:                        9.2    13.84 )-----------( 256      ( 12 Feb 2021 04:52 )             31.9     02-12    134<L>  |  95<L>  |  10  ----------------------------<  141<H>  4.0   |  32<H>  |  0.41<L>    Ca    8.4      12 Feb 2021 04:52  Phos  2.2     02-12  Mg     2.3     02-12    TPro  6.9  /  Alb  2.4<L>  /  TBili  1.0  /  DBili  x   /  AST  70<H>  /  ALT  41<H>  /  AlkPhos  89  02-12    PT/INR - ( 12 Feb 2021 04:52 )   PT: 13.0 sec;   INR: 1.14 ratio         PTT - ( 12 Feb 2021 04:52 )  PTT:73.6 sec      RADIOLOGY & ADDITIONAL TESTS: Reviewed.

## 2021-02-13 NOTE — PROGRESS NOTE ADULT - SUBJECTIVE AND OBJECTIVE BOX
INTERVAL HPI/OVERNIGHT EVENTS:    SUBJECTIVE: Patient seen and examined at bedside.   Overnight events: Patient desaturated to 40 % oxygen saturation when moved and then improved slowly. Leukocytosis and d-dimer up-trending.       VITAL SIGNS:  ICU Vital Signs Last 24 Hrs  T(C): 38 (13 Feb 2021 06:00), Max: 38 (13 Feb 2021 06:00)  T(F): 100.4 (13 Feb 2021 06:00), Max: 100.4 (13 Feb 2021 06:00)  HR: 115 (13 Feb 2021 06:00) (67 - 126)  BP: --  BP(mean): --  ABP: 103/56 (13 Feb 2021 06:00) (102/52 - 144/65)  ABP(mean): --  RR: 34 (13 Feb 2021 06:00) (34 - 34)  SpO2: 92% (13 Feb 2021 06:00) (84% - 98%)    Mode: AC/ CMV (Assist Control/ Continuous Mandatory Ventilation), RR (machine): 34, TV (machine): 390, FiO2: 100, PEEP: 12, ITime: 0.7, MAP: 21, PIP: 38  Plateau pressure:   P/F ratio:     02-12 @ 07:01  -  02-13 @ 07:00  --------------------------------------------------------  IN: 4765.2 mL / OUT: 3850 mL / NET: 915.2 mL      CAPILLARY BLOOD GLUCOSE      POCT Blood Glucose.: 128 mg/dL (13 Feb 2021 04:15)    ECG:    PHYSICAL EXAM:    General:   HEENT:   Neck:   Respiratory:   Cardiovascular:   Abdomen:   Extremities:  Neurological:    MEDICATIONS:  MEDICATIONS  (STANDING):  caspofungin IVPB      caspofungin IVPB 50 milliGRAM(s) IV Intermittent every 24 hours  chlorhexidine 0.12% Liquid 15 milliLiter(s) Oral Mucosa every 12 hours  chlorhexidine 4% Liquid 1 Application(s) Topical <User Schedule>  cisatracurium Infusion 3 MICROgram(s)/kG/Min (23.4 mL/Hr) IV Continuous <Continuous>  dextrose 40% Gel 15 Gram(s) Oral once  dextrose 5%. 1000 milliLiter(s) (50 mL/Hr) IV Continuous <Continuous>  dextrose 5%. 1000 milliLiter(s) (100 mL/Hr) IV Continuous <Continuous>  dextrose 50% Injectable 25 Gram(s) IV Push once  dextrose 50% Injectable 12.5 Gram(s) IV Push once  dextrose 50% Injectable 25 Gram(s) IV Push once  fentaNYL   Infusion..... 0.5 MICROgram(s)/kG/Hr (1.3 mL/Hr) IV Continuous <Continuous>  glucagon  Injectable 1 milliGRAM(s) IntraMuscular once  heparin  Infusion 1800 Unit(s)/Hr (18 mL/Hr) IV Continuous <Continuous>  insulin lispro (ADMELOG) corrective regimen sliding scale   SubCutaneous every 6 hours  ketamine Infusion. 0.25 mG/kG/Hr (3.25 mL/Hr) IV Continuous <Continuous>  meropenem  IVPB      meropenem  IVPB 1000 milliGRAM(s) IV Intermittent every 8 hours  mupirocin 2% Ointment 1 Application(s) Topical two times a day  pantoprazole  Injectable 40 milliGRAM(s) IV Push daily  polyethylene glycol 3350 17 Gram(s) Oral daily  propofol Infusion 50 MICROgram(s)/kG/Min (39 mL/Hr) IV Continuous <Continuous>  senna Syrup 10 milliLiter(s) Oral at bedtime  vancomycin  IVPB 1750 milliGRAM(s) IV Intermittent every 12 hours    MEDICATIONS  (PRN):  acetaminophen    Suspension .. 650 milliGRAM(s) Oral every 6 hours PRN Temp greater or equal to 38C (100.4F)  heparin   Injectable 5000 Unit(s) IV Push every 6 hours PRN For aPTT between 40 - 57  heparin   Injectable 03795 Unit(s) IV Push every 6 hours PRN For aPTT less than 40  sodium chloride 0.9% lock flush 10 milliLiter(s) IV Push every 1 hour PRN Pre/post blood products, medications, blood draw, and to maintain line patency      ALLERGIES:  Allergies    codeine (Unknown)    Intolerances        LABS:                        8.8    16.80 )-----------( 241      ( 13 Feb 2021 02:00 )             30.3     02-13    127<L>  |  91<L>  |  10  ----------------------------<  141<H>  4.6   |  34<H>  |  0.41<L>    Ca    8.2<L>      13 Feb 2021 02:00  Phos  2.9     02-13  Mg     2.2     02-13    TPro  6.9  /  Alb  2.5<L>  /  TBili  0.9  /  DBili  x   /  AST  47<H>  /  ALT  40<H>  /  AlkPhos  88  02-13    PT/INR - ( 13 Feb 2021 02:00 )   PT: 13.0 sec;   INR: 1.14 ratio         PTT - ( 13 Feb 2021 02:00 )  PTT:74.0 sec      RADIOLOGY & ADDITIONAL TESTS: Reviewed.           INTERVAL HPI/OVERNIGHT EVENTS:    SUBJECTIVE: Patient seen and examined at bedside.   Overnight events: Patient desaturated to 40 % oxygen saturation when moved and then improved slowly. Leukocytosis and d-dimer up-trending. Temp 100.4 at 6 am.       VITAL SIGNS:  ICU Vital Signs Last 24 Hrs  T(C): 38 (13 Feb 2021 06:00), Max: 38 (13 Feb 2021 06:00)  T(F): 100.4 (13 Feb 2021 06:00), Max: 100.4 (13 Feb 2021 06:00)  HR: 115 (13 Feb 2021 06:00) (67 - 126)  BP: --  BP(mean): --  ABP: 103/56 (13 Feb 2021 06:00) (102/52 - 144/65)  ABP(mean): --  RR: 34 (13 Feb 2021 06:00) (34 - 34)  SpO2: 92% (13 Feb 2021 06:00) (84% - 98%)    Mode: AC/ CMV (Assist Control/ Continuous Mandatory Ventilation), RR (machine): 34, TV (machine): 390, FiO2: 100, PEEP: 12, ITime: 0.7, MAP: 21, PIP: 38  Plateau pressure:   P/F ratio:     02-12 @ 07:01  -  02-13 @ 07:00  --------------------------------------------------------  IN: 4765.2 mL / OUT: 3850 mL / NET: 915.2 mL      CAPILLARY BLOOD GLUCOSE      POCT Blood Glucose.: 128 mg/dL (13 Feb 2021 04:15)    ECG:    PHYSICAL EXAM:    General:   HEENT:   Neck:   Respiratory:   Cardiovascular:   Abdomen:   Extremities:  Neurological:    MEDICATIONS:  MEDICATIONS  (STANDING):  caspofungin IVPB      caspofungin IVPB 50 milliGRAM(s) IV Intermittent every 24 hours  chlorhexidine 0.12% Liquid 15 milliLiter(s) Oral Mucosa every 12 hours  chlorhexidine 4% Liquid 1 Application(s) Topical <User Schedule>  cisatracurium Infusion 3 MICROgram(s)/kG/Min (23.4 mL/Hr) IV Continuous <Continuous>  dextrose 40% Gel 15 Gram(s) Oral once  dextrose 5%. 1000 milliLiter(s) (50 mL/Hr) IV Continuous <Continuous>  dextrose 5%. 1000 milliLiter(s) (100 mL/Hr) IV Continuous <Continuous>  dextrose 50% Injectable 25 Gram(s) IV Push once  dextrose 50% Injectable 12.5 Gram(s) IV Push once  dextrose 50% Injectable 25 Gram(s) IV Push once  fentaNYL   Infusion..... 0.5 MICROgram(s)/kG/Hr (1.3 mL/Hr) IV Continuous <Continuous>  glucagon  Injectable 1 milliGRAM(s) IntraMuscular once  heparin  Infusion 1800 Unit(s)/Hr (18 mL/Hr) IV Continuous <Continuous>  insulin lispro (ADMELOG) corrective regimen sliding scale   SubCutaneous every 6 hours  ketamine Infusion. 0.25 mG/kG/Hr (3.25 mL/Hr) IV Continuous <Continuous>  meropenem  IVPB      meropenem  IVPB 1000 milliGRAM(s) IV Intermittent every 8 hours  mupirocin 2% Ointment 1 Application(s) Topical two times a day  pantoprazole  Injectable 40 milliGRAM(s) IV Push daily  polyethylene glycol 3350 17 Gram(s) Oral daily  propofol Infusion 50 MICROgram(s)/kG/Min (39 mL/Hr) IV Continuous <Continuous>  senna Syrup 10 milliLiter(s) Oral at bedtime  vancomycin  IVPB 1750 milliGRAM(s) IV Intermittent every 12 hours    MEDICATIONS  (PRN):  acetaminophen    Suspension .. 650 milliGRAM(s) Oral every 6 hours PRN Temp greater or equal to 38C (100.4F)  heparin   Injectable 5000 Unit(s) IV Push every 6 hours PRN For aPTT between 40 - 57  heparin   Injectable 57567 Unit(s) IV Push every 6 hours PRN For aPTT less than 40  sodium chloride 0.9% lock flush 10 milliLiter(s) IV Push every 1 hour PRN Pre/post blood products, medications, blood draw, and to maintain line patency      ALLERGIES:  Allergies    codeine (Unknown)    Intolerances        LABS:                        8.8    16.80 )-----------( 241      ( 13 Feb 2021 02:00 )             30.3     02-13    127<L>  |  91<L>  |  10  ----------------------------<  141<H>  4.6   |  34<H>  |  0.41<L>    Ca    8.2<L>      13 Feb 2021 02:00  Phos  2.9     02-13  Mg     2.2     02-13    TPro  6.9  /  Alb  2.5<L>  /  TBili  0.9  /  DBili  x   /  AST  47<H>  /  ALT  40<H>  /  AlkPhos  88  02-13    PT/INR - ( 13 Feb 2021 02:00 )   PT: 13.0 sec;   INR: 1.14 ratio         PTT - ( 13 Feb 2021 02:00 )  PTT:74.0 sec      RADIOLOGY & ADDITIONAL TESTS: Reviewed.           INTERVAL HPI/OVERNIGHT EVENTS:    SUBJECTIVE: Patient seen and examined at bedside. Heavily sedated.   Overnight events: Patient desaturated to 40 % oxygen saturation when moved and then improved slowly. Leukocytosis and d-dimer up-trending. Temp 100.4 at 6 am.       VITAL SIGNS:  ICU Vital Signs Last 24 Hrs  T(C): 38 (13 Feb 2021 06:00), Max: 38 (13 Feb 2021 06:00)  T(F): 100.4 (13 Feb 2021 06:00), Max: 100.4 (13 Feb 2021 06:00)  HR: 115 (13 Feb 2021 06:00) (67 - 126)  BP: --  BP(mean): --  ABP: 103/56 (13 Feb 2021 06:00) (102/52 - 144/65)  ABP(mean): --  RR: 34 (13 Feb 2021 06:00) (34 - 34)  SpO2: 92% (13 Feb 2021 06:00) (84% - 98%)    Mode: AC/ CMV (Assist Control/ Continuous Mandatory Ventilation), RR (machine): 34, TV (machine): 390, FiO2: 100, PEEP: 12, ITime: 0.7, MAP: 21, PIP: 38  Plateau pressure:   P/F ratio:     02-12 @ 07:01  -  02-13 @ 07:00  --------------------------------------------------------  IN: 4765.2 mL / OUT: 3850 mL / NET: 915.2 mL      CAPILLARY BLOOD GLUCOSE      POCT Blood Glucose.: 128 mg/dL (13 Feb 2021 04:15)    ECG:    PHYSICAL EXAM:    General: obese woman laying in bed   HEENT: atraumatic, normocephalic   Respiratory: on vent: RR 34  PEEP 12 FIO2 100 % SatO2 90 %   Cardiovascular: hemodynamically stable, no pressors   Abdomen: obese, soft   Neurological: unable to follow commands, heavily sedated and paralyzed on Nimbex     MEDICATIONS:  MEDICATIONS  (STANDING):  caspofungin IVPB      caspofungin IVPB 50 milliGRAM(s) IV Intermittent every 24 hours  chlorhexidine 0.12% Liquid 15 milliLiter(s) Oral Mucosa every 12 hours  chlorhexidine 4% Liquid 1 Application(s) Topical <User Schedule>  cisatracurium Infusion 3 MICROgram(s)/kG/Min (23.4 mL/Hr) IV Continuous <Continuous>  dextrose 40% Gel 15 Gram(s) Oral once  dextrose 5%. 1000 milliLiter(s) (50 mL/Hr) IV Continuous <Continuous>  dextrose 5%. 1000 milliLiter(s) (100 mL/Hr) IV Continuous <Continuous>  dextrose 50% Injectable 25 Gram(s) IV Push once  dextrose 50% Injectable 12.5 Gram(s) IV Push once  dextrose 50% Injectable 25 Gram(s) IV Push once  fentaNYL   Infusion..... 0.5 MICROgram(s)/kG/Hr (1.3 mL/Hr) IV Continuous <Continuous>  glucagon  Injectable 1 milliGRAM(s) IntraMuscular once  heparin  Infusion 1800 Unit(s)/Hr (18 mL/Hr) IV Continuous <Continuous>  insulin lispro (ADMELOG) corrective regimen sliding scale   SubCutaneous every 6 hours  ketamine Infusion. 0.25 mG/kG/Hr (3.25 mL/Hr) IV Continuous <Continuous>  meropenem  IVPB      meropenem  IVPB 1000 milliGRAM(s) IV Intermittent every 8 hours  mupirocin 2% Ointment 1 Application(s) Topical two times a day  pantoprazole  Injectable 40 milliGRAM(s) IV Push daily  polyethylene glycol 3350 17 Gram(s) Oral daily  propofol Infusion 50 MICROgram(s)/kG/Min (39 mL/Hr) IV Continuous <Continuous>  senna Syrup 10 milliLiter(s) Oral at bedtime  vancomycin  IVPB 1750 milliGRAM(s) IV Intermittent every 12 hours    MEDICATIONS  (PRN):  acetaminophen    Suspension .. 650 milliGRAM(s) Oral every 6 hours PRN Temp greater or equal to 38C (100.4F)  heparin   Injectable 5000 Unit(s) IV Push every 6 hours PRN For aPTT between 40 - 57  heparin   Injectable 08101 Unit(s) IV Push every 6 hours PRN For aPTT less than 40  sodium chloride 0.9% lock flush 10 milliLiter(s) IV Push every 1 hour PRN Pre/post blood products, medications, blood draw, and to maintain line patency      ALLERGIES:  Allergies    codeine (Unknown)    Intolerances        LABS:                        8.8    16.80 )-----------( 241      ( 13 Feb 2021 02:00 )             30.3     02-13    127<L>  |  91<L>  |  10  ----------------------------<  141<H>  4.6   |  34<H>  |  0.41<L>    Ca    8.2<L>      13 Feb 2021 02:00  Phos  2.9     02-13  Mg     2.2     02-13    TPro  6.9  /  Alb  2.5<L>  /  TBili  0.9  /  DBili  x   /  AST  47<H>  /  ALT  40<H>  /  AlkPhos  88  02-13    PT/INR - ( 13 Feb 2021 02:00 )   PT: 13.0 sec;   INR: 1.14 ratio         PTT - ( 13 Feb 2021 02:00 )  PTT:74.0 sec      RADIOLOGY & ADDITIONAL TESTS: Reviewed.           INTERVAL HPI/OVERNIGHT EVENTS:    SUBJECTIVE: Patient seen and examined at bedside. Heavily sedated.   Overnight events: Patient desaturated to 40 % oxygen saturation when moved and then improved slowly. Leukocytosis and d-dimer, CRP, LDH all uptrending. Temp 100.4 at 6 am.       VITAL SIGNS:  ICU Vital Signs Last 24 Hrs  T(C): 38 (13 Feb 2021 06:00), Max: 38 (13 Feb 2021 06:00)  T(F): 100.4 (13 Feb 2021 06:00), Max: 100.4 (13 Feb 2021 06:00)  HR: 115 (13 Feb 2021 06:00) (67 - 126)  BP: --  BP(mean): --  ABP: 103/56 (13 Feb 2021 06:00) (102/52 - 144/65)  ABP(mean): --  RR: 34 (13 Feb 2021 06:00) (34 - 34)  SpO2: 92% (13 Feb 2021 06:00) (84% - 98%)    Mode: AC/ CMV (Assist Control/ Continuous Mandatory Ventilation), RR (machine): 34, TV (machine): 390, FiO2: 100, PEEP: 12, ITime: 0.7, MAP: 21, PIP: 38  Plateau pressure:   P/F ratio:     02-12 @ 07:01  -  02-13 @ 07:00  --------------------------------------------------------  IN: 4765.2 mL / OUT: 3850 mL / NET: 915.2 mL      CAPILLARY BLOOD GLUCOSE      POCT Blood Glucose.: 128 mg/dL (13 Feb 2021 04:15)    ECG:    PHYSICAL EXAM:    General: obese woman laying in bed   HEENT: atraumatic, normocephalic   Respiratory: on vent: RR 34  PEEP 12 FIO2 100 % SatO2 90 %   Cardiovascular: hemodynamically stable, no pressors   Abdomen: obese, soft   Neurological: unable to follow commands, heavily sedated and paralyzed on Nimbex     MEDICATIONS:  MEDICATIONS  (STANDING):  caspofungin IVPB      caspofungin IVPB 50 milliGRAM(s) IV Intermittent every 24 hours  chlorhexidine 0.12% Liquid 15 milliLiter(s) Oral Mucosa every 12 hours  chlorhexidine 4% Liquid 1 Application(s) Topical <User Schedule>  cisatracurium Infusion 3 MICROgram(s)/kG/Min (23.4 mL/Hr) IV Continuous <Continuous>  dextrose 40% Gel 15 Gram(s) Oral once  dextrose 5%. 1000 milliLiter(s) (50 mL/Hr) IV Continuous <Continuous>  dextrose 5%. 1000 milliLiter(s) (100 mL/Hr) IV Continuous <Continuous>  dextrose 50% Injectable 25 Gram(s) IV Push once  dextrose 50% Injectable 12.5 Gram(s) IV Push once  dextrose 50% Injectable 25 Gram(s) IV Push once  fentaNYL   Infusion..... 0.5 MICROgram(s)/kG/Hr (1.3 mL/Hr) IV Continuous <Continuous>  glucagon  Injectable 1 milliGRAM(s) IntraMuscular once  heparin  Infusion 1800 Unit(s)/Hr (18 mL/Hr) IV Continuous <Continuous>  insulin lispro (ADMELOG) corrective regimen sliding scale   SubCutaneous every 6 hours  ketamine Infusion. 0.25 mG/kG/Hr (3.25 mL/Hr) IV Continuous <Continuous>  meropenem  IVPB      meropenem  IVPB 1000 milliGRAM(s) IV Intermittent every 8 hours  mupirocin 2% Ointment 1 Application(s) Topical two times a day  pantoprazole  Injectable 40 milliGRAM(s) IV Push daily  polyethylene glycol 3350 17 Gram(s) Oral daily  propofol Infusion 50 MICROgram(s)/kG/Min (39 mL/Hr) IV Continuous <Continuous>  senna Syrup 10 milliLiter(s) Oral at bedtime  vancomycin  IVPB 1750 milliGRAM(s) IV Intermittent every 12 hours    MEDICATIONS  (PRN):  acetaminophen    Suspension .. 650 milliGRAM(s) Oral every 6 hours PRN Temp greater or equal to 38C (100.4F)  heparin   Injectable 5000 Unit(s) IV Push every 6 hours PRN For aPTT between 40 - 57  heparin   Injectable 50076 Unit(s) IV Push every 6 hours PRN For aPTT less than 40  sodium chloride 0.9% lock flush 10 milliLiter(s) IV Push every 1 hour PRN Pre/post blood products, medications, blood draw, and to maintain line patency      ALLERGIES:  Allergies    codeine (Unknown)    Intolerances        LABS:                        8.8    16.80 )-----------( 241      ( 13 Feb 2021 02:00 )             30.3     02-13    127<L>  |  91<L>  |  10  ----------------------------<  141<H>  4.6   |  34<H>  |  0.41<L>    Ca    8.2<L>      13 Feb 2021 02:00  Phos  2.9     02-13  Mg     2.2     02-13    TPro  6.9  /  Alb  2.5<L>  /  TBili  0.9  /  DBili  x   /  AST  47<H>  /  ALT  40<H>  /  AlkPhos  88  02-13    PT/INR - ( 13 Feb 2021 02:00 )   PT: 13.0 sec;   INR: 1.14 ratio         PTT - ( 13 Feb 2021 02:00 )  PTT:74.0 sec      RADIOLOGY & ADDITIONAL TESTS: Reviewed.

## 2021-02-13 NOTE — PROGRESS NOTE ADULT - ATTENDING COMMENTS
65 year old female with covid respiratory failure.  Patient critically ill by virtue of needing ventilator support, intermittent use of vasopressors and frequent bedside reassessments and medication changes. No improvement. vent setting remain high. attempted to try aprv with poor results.  switched back to ac/vc.  will not attempt proning. will require trach if vent setting improve.  pall care consult

## 2021-02-13 NOTE — PROGRESS NOTE ADULT - ASSESSMENT
65yF with obesity, HTN, hx of LLE DVT not on AC, COVID+ on 1/17, presenting for acutely worsening SOB, intubated 1/29 for acute hypoxic respiratory failure.    Neuro:  - propofol, ketamine, fentanyl wean as tolerated  - Nimbex for paralysis for vent desynchrony    Respiratory:  - AC 34/390/12/90%, intubated (1/29 - ) - ABG pH 7.25 pCO2 83 pO2 70  - s/p bronchoscopy 2/9- NG thus far   - on heparin gtt, concern for PE given elevated D-dimer in 7000s  - s/p 10d course of Dexamethasone (1/27 - 2/5), Remdesivir d/c'ed (1/26 -1/30)  - Prone 18/6 as needed for ARDS, does not tolerate proning, becomes hypoxic     Cards:  - hemodynamically stable, off pressors    GI:  - c/w tube feeds - increase rate to goal 35/hr  - Protonix 40mg qD  - senna    Transaminitis 2/12/21  Improving     Renal:    - hyponatremia: CTM    Heme:  h/o DVT, b/l upper thigh DVTs; PE suspected but too unstable for a CTA, Ddimer >50k initially  - Duplex 1/27: b/l above knee DVT  - continue heparin gtt, daily PTT  Anemia, likely 2/2 blood draws  - CTM    ID  Fever:  - Ceftriaxone 7d course for E.coli UTI, CTX (2/5- 2/8)  - started on empiric varun and vanc (2/9 - 2/15) for 7day course  - Caspofungin 2/11/21-   - f/u repeat blood, urine cx,  2/9- NG thus far, urine cx negative   - sputum cx 2/9/21- N resp kalpesh with some E Coli- pansensitive (sensitive to Meropenem)   - f/u BAL cultures and fungal cultures 2/9- NG thus far  - MRSA swab positive  - febrile 2/11/21- send blood cx   - up-trending leukocytosis, LDH, D-dimer, CRP. Downtrending procalcitonin.     Ethics:  - full code   65yF with obesity, HTN, hx of LLE DVT not on AC, COVID+ on 1/17, presenting for acutely worsening SOB, intubated 1/29 for acute hypoxic respiratory failure.    Neuro:  - propofol, ketamine, fentanyl wean as tolerated  - Nimbex for paralysis for vent desynchrony    Respiratory:  - AC 34/390/12/100%, intubated (1/29 - ) - ABG pH 7.25 pCO2 83 pO2 70  - Trial of APRV today, recheck ABG in half hour  - s/p bronchoscopy 2/9- NG thus far   - on heparin gtt, concern for PE given elevated D-dimer in 7000s  - s/p 10d course of Dexamethasone (1/27 - 2/5), Remdesivir d/c'ed (1/26 -1/30)  - Prone 18/6 as needed for ARDS, does not tolerate proning, becomes hypoxic   - 2/13: Uptrending D-dimer, leukocytosis, CRP, ferritin, LDH. Procalcitonin downtrending. Will try dexamethasone 6mg daily     Cards:  - hemodynamically stable, off pressors    GI:  - c/w tube feeds - increase rate to goal 35/hr  - Protonix 40mg qD  - senna    Transaminitis 2/12/21  Improving     Renal:    - hyponatremia: CTM    Heme:  h/o DVT, b/l upper thigh DVTs; PE suspected but too unstable for a CTA, Ddimer >50k initially  - Duplex 1/27: b/l above knee DVT  - continue heparin gtt, daily PTT  Anemia, likely 2/2 blood draws  - CTM    ID  Fever:  - Ceftriaxone 7d course for E.coli UTI, CTX (2/5- 2/8)  - started on empiric varun and vanc (2/9 - 2/15) for 7day course  - Caspofungin 2/11/21-   - f/u repeat blood, urine cx,  2/9- NG thus far, urine cx negative   - sputum cx 2/9/21- N resp kalpesh with some E Coli- pansensitive (sensitive to Meropenem)   - f/u BAL cultures and fungal cultures 2/9- NG thus far  - MRSA swab positive  - febrile 2/11/21- send blood cx   - 2/13/21 up-trending leukocytosis, LDH, D-dimer, CRP. Downtrending procalcitonin with downtrending procalcitonin- will try dexamethasone 6mg daily     Ethics:  - full code  - family aware of poor prognosis

## 2021-02-14 NOTE — PROGRESS NOTE ADULT - SUBJECTIVE AND OBJECTIVE BOX
INTERVAL HPI/OVERNIGHT EVENTS:    SUBJECTIVE:   Overnight events: Remained afebrile. Received a NaPhos bolus x 1 for hyponatremia and hypophosphatemia (improving). Urine lytes and urine osmolarity sent and not concerning.  Still without stool, now > 2 wks. Continues to have high plateau pressures in the 40s-50s and desaturates easily with movement. Received nimbex x 1 for ? vent desynchrony, without adequate sedation. No evidence of vent desynchrony on exam this AM. ABG with respiratory acidosis for which TV was increased to 430 on rounds this AM.     ICU Vital Signs Last 24 Hrs  T(C): 35.6 (14 Feb 2021 08:00), Max: 37.9 (13 Feb 2021 12:00)  T(F): 96.1 (14 Feb 2021 08:00), Max: 100.3 (13 Feb 2021 12:00)  HR: 73 (14 Feb 2021 09:21) (73 - 108)  BP: --  BP(mean): --  ABP: 92/51 (14 Feb 2021 08:00) (92/51 - 126/66)  ABP(mean): --  RR: 33 (14 Feb 2021 08:00) (30 - 34)  SpO2: 94% (14 Feb 2021 09:21) (93% - 100%)    Mode: AC/ CMV (Assist Control/ Continuous Mandatory Ventilation)  RR (machine): 34  TV (machine): 430  FiO2: 90  PEEP: 14  ITime: 0.85  MAP: 25  PIP: 46    I&O's Summary    13 Feb 2021 07:01  -  14 Feb 2021 07:00  --------------------------------------------------------  IN: 4853.8 mL / OUT: 3605 mL / NET: 1248.8 mL      CAPILLARY BLOOD GLUCOSE      POCT Blood Glucose.: 137 mg/dL (14 Feb 2021 11:08)  POCT Blood Glucose.: 158 mg/dL (14 Feb 2021 05:10)  POCT Blood Glucose.: 131 mg/dL (13 Feb 2021 22:22)  POCT Blood Glucose.: 109 mg/dL (13 Feb 2021 17:38)  POCT Blood Glucose.: 128 mg/dL (13 Feb 2021 12:05)      ECG:    PHYSICAL EXAM:    General: obese body habitus, intubated and sedated on a vent  HEENT: atraumatic, normocephalic   Respiratory: on vent: RR 34  PEEP 12 FIO2 90 %   Cardiovascular: hemodynamically stable, no pressors   Abdomen: obese, soft   Neurological: unable to follow commands     MEDICATIONS  (STANDING):  caspofungin IVPB      caspofungin IVPB 50 milliGRAM(s) IV Intermittent every 24 hours  chlorhexidine 0.12% Liquid 15 milliLiter(s) Oral Mucosa every 12 hours  chlorhexidine 4% Liquid 1 Application(s) Topical <User Schedule>  cisatracurium Infusion 3 MICROgram(s)/kG/Min (23.4 mL/Hr) IV Continuous <Continuous>  dexAMETHasone  Injectable 6 milliGRAM(s) IV Push daily  dextrose 40% Gel 15 Gram(s) Oral once  dextrose 5%. 1000 milliLiter(s) (50 mL/Hr) IV Continuous <Continuous>  dextrose 5%. 1000 milliLiter(s) (100 mL/Hr) IV Continuous <Continuous>  dextrose 50% Injectable 25 Gram(s) IV Push once  dextrose 50% Injectable 12.5 Gram(s) IV Push once  dextrose 50% Injectable 25 Gram(s) IV Push once  fentaNYL   Infusion..... 0.5 MICROgram(s)/kG/Hr (1.3 mL/Hr) IV Continuous <Continuous>  glucagon  Injectable 1 milliGRAM(s) IntraMuscular once  heparin  Infusion 1800 Unit(s)/Hr (18 mL/Hr) IV Continuous <Continuous>  insulin lispro (ADMELOG) corrective regimen sliding scale   SubCutaneous every 6 hours  ketamine Infusion. 0.25 mG/kG/Hr (3.25 mL/Hr) IV Continuous <Continuous>  meropenem  IVPB 1000 milliGRAM(s) IV Intermittent every 8 hours  meropenem  IVPB      mupirocin 2% Ointment 1 Application(s) Topical two times a day  pantoprazole  Injectable 40 milliGRAM(s) IV Push daily  polyethylene glycol 3350 17 Gram(s) Oral two times a day  propofol Infusion 50 MICROgram(s)/kG/Min (39 mL/Hr) IV Continuous <Continuous>  senna Syrup 10 milliLiter(s) Oral at bedtime  vancomycin  IVPB 1750 milliGRAM(s) IV Intermittent every 8 hours    MEDICATIONS  (PRN):  acetaminophen    Suspension .. 650 milliGRAM(s) Oral every 6 hours PRN Temp greater or equal to 38C (100.4F)  heparin   Injectable 5000 Unit(s) IV Push every 6 hours PRN For aPTT between 40 - 57  heparin   Injectable 11279 Unit(s) IV Push every 6 hours PRN For aPTT less than 40  sodium chloride 0.9% lock flush 10 milliLiter(s) IV Push every 1 hour PRN Pre/post blood products, medications, blood draw, and to maintain line patency    ALLERGIES:  Allergies    codeine (Unknown)    Intolerances        LABS:                                   8.1    13.24 )-----------( 224      ( 14 Feb 2021 03:35 )             27.5     02-14    133<L>  |  92<L>  |  9   ----------------------------<  139<H>  5.2   |  35<H>  |  0.41<L>    Ca    8.2<L>      14 Feb 2021 03:35  Phos  2.2     02-14  Mg     2.2     02-14    TPro  6.8  /  Alb  2.2<L>  /  TBili  0.7  /  DBili  x   /  AST  34<H>  /  ALT  26  /  AlkPhos  77  02-14    LIVER FUNCTIONS - ( 14 Feb 2021 03:35 )  Alb: 2.2 g/dL / Pro: 6.8 g/dL / ALK PHOS: 77 U/L / ALT: 26 U/L / AST: 34 U/L / GGT: x           PT/INR - ( 14 Feb 2021 03:35 )   PT: 13.2 sec;   INR: 1.16 ratio         PTT - ( 14 Feb 2021 03:35 )  PTT:78.7 sec    D-Dimer Assay, Quantitative (02.14.21 @ 03:35)   D-Dimer Assay, Quantitative: 1199  Procalcitonin, Serum (02.14.21 @ 03:35)   Procalcitonin, Serum: 0.39  Blood Gas Profile - Arterial (02.14.21 @ 03:35)   pH, Arterial: 7.30   pCO2, Arterial: 78 mmHg   pO2, Arterial: 80 mmHg   HCO3, Arterial: 33 mmol/L   Base Excess, Arterial: 10.5 mmol/L   Oxygen Saturation, Arterial: 95.7 %       RADIOLOGY & ADDITIONAL TESTS: Reviewed.           INTERVAL HPI/OVERNIGHT EVENTS:    SUBJECTIVE:   Overnight events: Remained afebrile. Received a NaPhos bolus x 1 for hyponatremia and hypophosphatemia (improving). Urine lytes and urine osmolarity sent and not concerning.  Still without stool, now > 2 wks. Continues to have high plateau pressures in the 40s-50s and desaturates easily with movement. Received nimbex x 1 for ? vent desynchrony, without adequate sedation. No evidence of vent desynchrony on exam this AM. ABG with respiratory acidosis for which TV was increased to 430 on rounds this AM. + new light pink hematuria this AM.     ICU Vital Signs Last 24 Hrs  T(C): 35.6 (14 Feb 2021 08:00), Max: 37.9 (13 Feb 2021 12:00)  T(F): 96.1 (14 Feb 2021 08:00), Max: 100.3 (13 Feb 2021 12:00)  HR: 73 (14 Feb 2021 09:21) (73 - 108)  BP: --  BP(mean): --  ABP: 92/51 (14 Feb 2021 08:00) (92/51 - 126/66)  ABP(mean): --  RR: 33 (14 Feb 2021 08:00) (30 - 34)  SpO2: 94% (14 Feb 2021 09:21) (93% - 100%)    Mode: AC/ CMV (Assist Control/ Continuous Mandatory Ventilation)  RR (machine): 34  TV (machine): 430  FiO2: 90  PEEP: 14  ITime: 0.85  MAP: 25  PIP: 46    I&O's Summary    13 Feb 2021 07:01  -  14 Feb 2021 07:00  --------------------------------------------------------  IN: 4853.8 mL / OUT: 3605 mL / NET: 1248.8 mL      CAPILLARY BLOOD GLUCOSE      POCT Blood Glucose.: 137 mg/dL (14 Feb 2021 11:08)  POCT Blood Glucose.: 158 mg/dL (14 Feb 2021 05:10)  POCT Blood Glucose.: 131 mg/dL (13 Feb 2021 22:22)  POCT Blood Glucose.: 109 mg/dL (13 Feb 2021 17:38)  POCT Blood Glucose.: 128 mg/dL (13 Feb 2021 12:05)      ECG:    PHYSICAL EXAM:    General: obese body habitus, intubated and sedated on a vent  HEENT: atraumatic, normocephalic   Respiratory: on vent: RR 34  PEEP 12 FIO2 90 %   Cardiovascular: hemodynamically stable, no pressors   Abdomen: obese, soft   Neurological: unable to follow commands     MEDICATIONS  (STANDING):  caspofungin IVPB      caspofungin IVPB 50 milliGRAM(s) IV Intermittent every 24 hours  chlorhexidine 0.12% Liquid 15 milliLiter(s) Oral Mucosa every 12 hours  chlorhexidine 4% Liquid 1 Application(s) Topical <User Schedule>  cisatracurium Infusion 3 MICROgram(s)/kG/Min (23.4 mL/Hr) IV Continuous <Continuous>  dexAMETHasone  Injectable 6 milliGRAM(s) IV Push daily  dextrose 40% Gel 15 Gram(s) Oral once  dextrose 5%. 1000 milliLiter(s) (50 mL/Hr) IV Continuous <Continuous>  dextrose 5%. 1000 milliLiter(s) (100 mL/Hr) IV Continuous <Continuous>  dextrose 50% Injectable 25 Gram(s) IV Push once  dextrose 50% Injectable 12.5 Gram(s) IV Push once  dextrose 50% Injectable 25 Gram(s) IV Push once  fentaNYL   Infusion..... 0.5 MICROgram(s)/kG/Hr (1.3 mL/Hr) IV Continuous <Continuous>  glucagon  Injectable 1 milliGRAM(s) IntraMuscular once  heparin  Infusion 1800 Unit(s)/Hr (18 mL/Hr) IV Continuous <Continuous>  insulin lispro (ADMELOG) corrective regimen sliding scale   SubCutaneous every 6 hours  ketamine Infusion. 0.25 mG/kG/Hr (3.25 mL/Hr) IV Continuous <Continuous>  meropenem  IVPB 1000 milliGRAM(s) IV Intermittent every 8 hours  meropenem  IVPB      mupirocin 2% Ointment 1 Application(s) Topical two times a day  pantoprazole  Injectable 40 milliGRAM(s) IV Push daily  polyethylene glycol 3350 17 Gram(s) Oral two times a day  propofol Infusion 50 MICROgram(s)/kG/Min (39 mL/Hr) IV Continuous <Continuous>  senna Syrup 10 milliLiter(s) Oral at bedtime  vancomycin  IVPB 1750 milliGRAM(s) IV Intermittent every 8 hours    MEDICATIONS  (PRN):  acetaminophen    Suspension .. 650 milliGRAM(s) Oral every 6 hours PRN Temp greater or equal to 38C (100.4F)  heparin   Injectable 5000 Unit(s) IV Push every 6 hours PRN For aPTT between 40 - 57  heparin   Injectable 48740 Unit(s) IV Push every 6 hours PRN For aPTT less than 40  sodium chloride 0.9% lock flush 10 milliLiter(s) IV Push every 1 hour PRN Pre/post blood products, medications, blood draw, and to maintain line patency    ALLERGIES:  Allergies    codeine (Unknown)    Intolerances        LABS:                                   8.1    13.24 )-----------( 224      ( 14 Feb 2021 03:35 )             27.5     02-14    133<L>  |  92<L>  |  9   ----------------------------<  139<H>  5.2   |  35<H>  |  0.41<L>    Ca    8.2<L>      14 Feb 2021 03:35  Phos  2.2     02-14  Mg     2.2     02-14    TPro  6.8  /  Alb  2.2<L>  /  TBili  0.7  /  DBili  x   /  AST  34<H>  /  ALT  26  /  AlkPhos  77  02-14    LIVER FUNCTIONS - ( 14 Feb 2021 03:35 )  Alb: 2.2 g/dL / Pro: 6.8 g/dL / ALK PHOS: 77 U/L / ALT: 26 U/L / AST: 34 U/L / GGT: x           PT/INR - ( 14 Feb 2021 03:35 )   PT: 13.2 sec;   INR: 1.16 ratio         PTT - ( 14 Feb 2021 03:35 )  PTT:78.7 sec    D-Dimer Assay, Quantitative (02.14.21 @ 03:35)   D-Dimer Assay, Quantitative: 1199  Procalcitonin, Serum (02.14.21 @ 03:35)   Procalcitonin, Serum: 0.39  Blood Gas Profile - Arterial (02.14.21 @ 03:35)   pH, Arterial: 7.30   pCO2, Arterial: 78 mmHg   pO2, Arterial: 80 mmHg   HCO3, Arterial: 33 mmol/L   Base Excess, Arterial: 10.5 mmol/L   Oxygen Saturation, Arterial: 95.7 %       RADIOLOGY & ADDITIONAL TESTS: Reviewed.

## 2021-02-14 NOTE — PROGRESS NOTE ADULT - ASSESSMENT
65yF with obesity, HTN, hx of LLE DVT not on AC, COVID+ on 1/17, presenting for acutely worsening SOB, intubated 1/29 for acute hypoxic respiratory failure. Still requiring high vent settings with high plateau pressures requiring ongoing ICU level care. No evidence of dysfunction in other organ systems at present.     Neuro:  - propofol, ketamine, fentanyl wean as tolerated  -s/p nimbex gtts    Respiratory:  - AC 34/430/14/90%, intubated (1/29 - )   - Failed trial of APRV yesterday  - s/p bronchoscopy 2/9- NG thus far   - on heparin gtt, concern for PE given elevated D-dimer in 7000s - downtrending and currently in the 1000s   - s/p 10d course of Dexamethasone (1/27 - 2/5), Remdesivir d/c'ed (1/26 -1/30)  - restarted dexamethasone 6 mg IV (2/13 - ) in light of uptrending d-dimer, leukocytosis, CRP, ferritin, LDH  - Prone 18/6 as needed for ARDS, does not tolerate proning, becomes hypoxic     Cards:  - hemodynamically stable, off pressors    GI:  - c/w tube feeds - increase rate to goal 35/hr  - Protonix 40mg qD  - senna  - relastor today for longstanding constipation     Transaminitis 2/12/21  Improving     Renal:    - hyponatremia, improving: CTM    Heme:  h/o DVT, b/l upper thigh DVTs; PE suspected but too unstable for a CTA, Ddimer >50k initially  - Duplex 1/27: b/l above knee DVT  - continue heparin gtt, daily PTT  Anemia, likely 2/2 blood draws  - CTM    ID  Fever:  - s/p Ceftriaxone 7d course for E.coli UTI, CTX (2/5- 2/8)  - empiric varun and vanc (2/9 - 2/15) for 7day course  - Caspofungin 2/11/21- for ? evidence of fungi on BAL  - f/u repeat blood, urine cx,  2/9- NG thus far, urine cx negative   - sputum cx 2/9/21- N resp kalpesh with some E Coli- pansensitive (sensitive to Meropenem)   - f/u BAL cultures and fungal cultures 2/9- NG thus far  - MRSA swab positive  - febrile 2/11/21- send blood cx   - 2/13/21 up-trending leukocytosis, LDH, D-dimer, CRP. Downtrending procalcitonin with downtrending procalcitonin- will try dexamethasone 6mg daily     Ethics:  - full code  - family aware of poor prognosis    65yF with obesity, HTN, hx of LLE DVT not on AC, COVID+ on 1/17, presenting for acutely worsening SOB, intubated 1/29 for acute hypoxic respiratory failure. Still requiring high vent settings with high plateau pressures requiring ongoing ICU level care. No evidence of dysfunction in other organ systems at present.     Neuro:  - propofol, ketamine, fentanyl wean as tolerated  -s/p nimbex gtts    Respiratory:  - AC 34/430/14/90%, intubated (1/29 - )   - Failed trial of APRV yesterday  - s/p bronchoscopy 2/9- NG thus far   - on heparin gtt, concern for PE given elevated D-dimer in 7000s - downtrending and currently in the 1000s   - s/p 10d course of Dexamethasone (1/27 - 2/5), Remdesivir d/c'ed (1/26 -1/30)  - restarted dexamethasone 6 mg IV (2/13 - ) in light of uptrending d-dimer, leukocytosis, CRP, ferritin, LDH  - Prone 18/6 as needed for ARDS, does not tolerate proning, becomes hypoxic     Cards:  - hemodynamically stable, off pressors    GI:  - c/w tube feeds - increase rate to goal 35/hr  - Protonix 40mg qD  - senna  - relastor today for longstanding constipation     Transaminitis 2/12/21  Improving     Renal:    - hyponatremia, improving: CTM  - mild hematuria - monitor hemoglobin    Heme:  h/o DVT, b/l upper thigh DVTs; PE suspected but too unstable for a CTA, Ddimer >50k initially  - Duplex 1/27: b/l above knee DVT  - continue heparin gtt, daily PTT  Anemia, likely 2/2 blood draws  - CTM    ID  Fever:  - s/p Ceftriaxone 7d course for E.coli UTI, CTX (2/5- 2/8)  - empiric varun and vanc (2/9 - 2/15) for 7day course  - Caspofungin 2/11/21- for ? evidence of fungi on BAL  - f/u repeat blood, urine cx,  2/9- NG thus far, urine cx negative   - sputum cx 2/9/21- N resp kalpesh with some E Coli- pansensitive (sensitive to Meropenem)   - f/u BAL cultures and fungal cultures 2/9- NG thus far  - MRSA swab positive  - febrile 2/11/21- send blood cx   - 2/13/21 up-trending leukocytosis, LDH, D-dimer, CRP. Downtrending procalcitonin with downtrending procalcitonin- will try dexamethasone 6mg daily     Ethics:  - full code  - family aware of poor prognosis    65yF with obesity, HTN, hx of LLE DVT not on AC, COVID+ on 1/17, presenting for acutely worsening SOB, intubated 1/29 for acute hypoxic respiratory failure. Still requiring high vent settings with high plateau pressures requiring ongoing ICU level care. No evidence of dysfunction in other organ systems at present.     Neuro:  - propofol, ketamine, fentanyl wean as tolerated  -s/p nimbex gtts    Respiratory:  - AC 34/430/14/90%, intubated (1/29 - )   - Failed trial of APRV yesterday  - s/p bronchoscopy 2/9- NG thus far   - on heparin gtt, concern for PE given elevated D-dimer in 7000s - downtrending and currently in the 1000s   - s/p 10d course of Dexamethasone (1/27 - 2/5), Remdesivir d/c'ed (1/26 -1/30)  - restarted dexamethasone 6 mg IV (2/13 - ) in light of uptrending d-dimer, leukocytosis, CRP, ferritin, LDH  - Prone 18/6 as needed for ARDS, does not tolerate proning, becomes hypoxic     Cards:  - hemodynamically stable, off pressors    GI:  - c/w tube feeds - increase rate to goal 35/hr  - Protonix 40mg qD  - senna  - relastor today for longstanding constipation     Transaminitis 2/12/21  Improving     Renal:    - hyponatremia, improving. urine lytes, osmolarity not concerning  - mild hematuria - monitor hemoglobin    Heme:  h/o DVT, b/l upper thigh DVTs; PE suspected but too unstable for a CTA, Ddimer >50k initially  - Duplex 1/27: b/l above knee DVT  - continue heparin gtt, daily PTT  Anemia, likely 2/2 blood draws  - CTM    ID  Fever:  - s/p Ceftriaxone 7d course for E.coli UTI, CTX (2/5- 2/8)  - empiric varun and vanc (2/9 - 2/15) for 7day course  - Caspofungin 2/11/21- for ? evidence of fungi on BAL  - f/u repeat blood, urine cx,  2/9- NG thus far, urine cx negative   - sputum cx 2/9/21- N resp kalpesh with some E Coli- pansensitive (sensitive to Meropenem)   - f/u BAL cultures and fungal cultures 2/9- NG thus far  - MRSA swab positive  - febrile 2/11/21- send blood cx   - 2/13/21 up-trending leukocytosis, LDH, D-dimer, CRP. Downtrending procalcitonin with downtrending procalcitonin- will try dexamethasone 6mg daily     Ethics:  - full code  - family aware of poor prognosis

## 2021-02-14 NOTE — PROGRESS NOTE ADULT - ATTENDING COMMENTS
65 year old female with covid respiratory failure.  Patient critically ill by virtue of needing ventilator support, intermittent use of vasopressors and frequent bedside reassessments and medication changes. remains on high vent settings. unable to be proned. no other organ systems failing. trach for long term wean if able to reduce vent settings.

## 2021-02-15 NOTE — PROGRESS NOTE ADULT - ASSESSMENT
65yF with obesity, HTN, hx of LLE DVT not on AC, COVID+ on 1/17, presenting for acutely worsening SOB, intubated 1/29 for acute hypoxic respiratory failure. Still requiring high vent settings with high plateau pressures requiring ongoing ICU level care. No evidence of dysfunction in other organ systems at present.     Neuro:  - propofol, ketamine, fentanyl wean as tolerated  -s/p nimbex gtts    Respiratory:  - AC 34/430/14/90%, intubated (1/29 - )   - Failed trial of APRV yesterday  - s/p bronchoscopy 2/9- NG thus far   - on heparin gtt, concern for PE given elevated D-dimer in 7000s - downtrending and currently in the 1000s   - s/p 10d course of Dexamethasone (1/27 - 2/5), Remdesivir d/c'ed (1/26 -1/30)  - restarted dexamethasone 6 mg IV (2/13 - ) in light of uptrending d-dimer, leukocytosis, CRP, ferritin, LDH  - Prone 18/6 as needed for ARDS, does not tolerate proning, becomes hypoxic     Cards:  - hemodynamically stable, off pressors    GI:  - c/w tube feeds - increase rate to goal 35/hr  - Protonix 40mg qD  - senna  - relastor today for longstanding constipation     Transaminitis 2/12/21  Improving     Renal:    - hyponatremia, improving. urine lytes, osmolarity not concerning  - mild hematuria - monitor hemoglobin    Heme:  h/o DVT, b/l upper thigh DVTs; PE suspected but too unstable for a CTA, Ddimer >50k initially  - Duplex 1/27: b/l above knee DVT  - continue heparin gtt, daily PTT  Anemia, likely 2/2 blood draws  - CTM    ID  Fever:  - s/p Ceftriaxone 7d course for E.coli UTI, CTX (2/5- 2/8)  - empiric varun and vanc (2/9 - 2/15) for 7day course  - Caspofungin 2/11/21- for ? evidence of fungi on BAL  - f/u repeat blood, urine cx,  2/9- NG thus far, urine cx negative   - sputum cx 2/9/21- N resp kalpesh with some E Coli- pansensitive (sensitive to Meropenem)   - f/u BAL cultures and fungal cultures 2/9- NG thus far  - MRSA swab positive  - febrile 2/11/21- send blood cx   - 2/13/21 up-trending leukocytosis, LDH, D-dimer, CRP. Downtrending procalcitonin with downtrending procalcitonin- will try dexamethasone 6mg daily     Ethics:  - full code  - family aware of poor prognosis    65yF with obesity, HTN, hx of LLE DVT not on AC, COVID+ on 1/17, presenting for acutely worsening SOB, intubated 1/29 for acute hypoxic respiratory failure. Still requiring high vent settings with high plateau pressures requiring ongoing ICU level care. No evidence of dysfunction in other organ systems at present.     Neuro:  - propofol, ketamine, fentanyl wean as tolerated  - s/p nimbex gtts    Respiratory:  - AC 34/430/14/90%, intubated (1/29 - )   - Failed trial of APRV  - s/p bronchoscopy 2/9- NG thus far   - on heparin gtt, concern for PE given elevated D-dimer in 7000s - downtrending and currently in the 1000s   - s/p 10d course of Dexamethasone (1/27 - 2/5), Remdesivir d/c'ed (1/26 -1/30). restarted dexamethasone 6 mg IV (2/13 - ) in light of uptrending d-dimer, leukocytosis, CRP, ferritin, LDH  - Prone 18/6 as needed for ARDS, does not tolerate proning, becomes hypoxic    Cards:  - hemodynamically stable, off pressors    GI:  - c/w tube feeds  - Protonix 40mg qD  - senna  - relastor today for longstanding constipation, now with 1 large bowel movement.    Transaminitis 2/12/21  Improving     Renal:    - hyponatremia, improving. urine lytes, osmolarity not concerning  - mild hematuria - monitor hemoglobin. Improving hematuria. Currently on heparin gtt, will c/t monitor H/H and urine color.  - Lasix 40 mg IV x1. Goal net negative >1L.    Heme:  h/o DVT, b/l upper thigh DVTs; PE suspected but too unstable for a CTA, Ddimer >50k initially  - Duplex 1/27: b/l above knee DVT  - continue heparin gtt, daily PTT  Anemia, likely 2/2 blood draws  - CTM    ID  Fever:  - s/p Ceftriaxone 7d course for E.coli UTI, CTX (2/5- 2/8)  - empiric varun and vanc (2/9 - 2/15) for 7day course  - Caspofungin 2/11/21- for ? evidence of fungi on BAL  - f/u repeat blood, urine cx,  2/9- NG thus far, urine cx negative   - sputum cx 2/9/21- N resp kalpesh with some E Coli- pansensitive (sensitive to Meropenem)   - f/u BAL cultures and fungal cultures 2/9- NG thus far  - MRSA swab positive  - febrile 2/11/21- send blood cx   - 2/13/21 up-trending leukocytosis, LDH, D-dimer, CRP. Downtrending procalcitonin with downtrending procalcitonin- will try dexamethasone 6mg daily     Ethics:  - full code  - family aware of poor prognosis   - Spoke with daughter (Sarah) today and updated of poor prognosis. Open to speaking to palliative care team. Consult placed.

## 2021-02-15 NOTE — PROGRESS NOTE ADULT - SUBJECTIVE AND OBJECTIVE BOX
*** INCOMPLETE NOTE ***    INTERVAL HPI/OVERNIGHT EVENTS:    SUBJECTIVE: Patient seen and examined at bedside.     CONSTITUTIONAL: No weakness, fevers or chills  EYES/ENT: No visual changes;  No vertigo or throat pain   NECK: No pain or stiffness  RESPIRATORY: No cough, wheezing, hemoptysis; No shortness of breath  CARDIOVASCULAR: No chest pain or palpitations  GASTROINTESTINAL: No abdominal or epigastric pain. No nausea, vomiting, or hematemesis; No diarrhea or constipation. No melena or hematochezia.  GENITOURINARY: No dysuria, frequency or hematuria  NEUROLOGICAL: No numbness or weakness  SKIN: No itching, rashes    OBJECTIVE:    VITAL SIGNS:  ICU Vital Signs Last 24 Hrs  T(C): 36.3 (15 Feb 2021 04:00), Max: 36.6 (15 Feb 2021 00:00)  T(F): 97.3 (15 Feb 2021 04:00), Max: 97.9 (15 Feb 2021 00:00)  HR: 97 (15 Feb 2021 07:02) (68 - 97)  BP: --  BP(mean): --  ABP: 122/67 (15 Feb 2021 04:00) (92/51 - 126/59)  ABP(mean): 81 (15 Feb 2021 04:00) (65 - 81)  RR: 34 (15 Feb 2021 04:00) (32 - 34)  SpO2: 97% (15 Feb 2021 07:02) (94% - 99%)    Mode: AC/ CMV (Assist Control/ Continuous Mandatory Ventilation), RR (machine): 34, TV (machine): 430, FiO2: 90, PEEP: 14, ITime: 0.69, MAP: 29, PIP: 41    02-14 @ 07:01  -  02-15 @ 07:00  --------------------------------------------------------  IN: 3192.6 mL / OUT: 2600 mL / NET: 592.6 mL      CAPILLARY BLOOD GLUCOSE      POCT Blood Glucose.: 133 mg/dL (15 Feb 2021 04:35)      PHYSICAL EXAM:    General: NAD  HEENT: NC/AT; PERRL, clear conjunctiva  Neck: supple  Respiratory: CTA b/l  Cardiovascular: +S1/S2; RRR  Abdomen: soft, NT/ND; +BS x4  Extremities: WWP, 2+ peripheral pulses b/l; no LE edema  Skin: normal color and turgor; no rash  Neurological:    MEDICATIONS:  MEDICATIONS  (STANDING):  caspofungin IVPB      caspofungin IVPB 50 milliGRAM(s) IV Intermittent every 24 hours  chlorhexidine 0.12% Liquid 15 milliLiter(s) Oral Mucosa every 12 hours  chlorhexidine 4% Liquid 1 Application(s) Topical <User Schedule>  cisatracurium Infusion 3 MICROgram(s)/kG/Min (23.4 mL/Hr) IV Continuous <Continuous>  dexAMETHasone  Injectable 6 milliGRAM(s) IV Push daily  dextrose 40% Gel 15 Gram(s) Oral once  dextrose 5%. 1000 milliLiter(s) (50 mL/Hr) IV Continuous <Continuous>  dextrose 5%. 1000 milliLiter(s) (100 mL/Hr) IV Continuous <Continuous>  dextrose 50% Injectable 25 Gram(s) IV Push once  dextrose 50% Injectable 12.5 Gram(s) IV Push once  dextrose 50% Injectable 25 Gram(s) IV Push once  fentaNYL   Infusion..... 0.5 MICROgram(s)/kG/Hr (1.3 mL/Hr) IV Continuous <Continuous>  glucagon  Injectable 1 milliGRAM(s) IntraMuscular once  heparin  Infusion 1800 Unit(s)/Hr (18 mL/Hr) IV Continuous <Continuous>  insulin lispro (ADMELOG) corrective regimen sliding scale   SubCutaneous every 6 hours  ketamine Infusion. 0.25 mG/kG/Hr (3.25 mL/Hr) IV Continuous <Continuous>  meropenem  IVPB 1000 milliGRAM(s) IV Intermittent every 8 hours  meropenem  IVPB      pantoprazole  Injectable 40 milliGRAM(s) IV Push daily  polyethylene glycol 3350 17 Gram(s) Oral two times a day  propofol Infusion 50 MICROgram(s)/kG/Min (39 mL/Hr) IV Continuous <Continuous>  senna Syrup 10 milliLiter(s) Oral at bedtime  vancomycin  IVPB 1750 milliGRAM(s) IV Intermittent every 8 hours    MEDICATIONS  (PRN):  acetaminophen    Suspension .. 650 milliGRAM(s) Oral every 6 hours PRN Temp greater or equal to 38C (100.4F)  heparin   Injectable 39464 Unit(s) IV Push every 6 hours PRN For aPTT less than 40  heparin   Injectable 5000 Unit(s) IV Push every 6 hours PRN For aPTT between 40 - 57  sodium chloride 0.9% lock flush 10 milliLiter(s) IV Push every 1 hour PRN Pre/post blood products, medications, blood draw, and to maintain line patency      ALLERGIES:  Allergies    codeine (Unknown)    Intolerances        LABS:                        8.3    14.81 )-----------( 254      ( 15 Feb 2021 02:50 )             27.5     02-15    134<L>  |  93<L>  |  15  ----------------------------<  137<H>  5.0   |  35<H>  |  0.59    Ca    8.4      15 Feb 2021 02:50  Phos  2.7     02-15  Mg     2.3     02-15    TPro  6.8  /  Alb  2.2<L>  /  TBili  0.7  /  DBili  x   /  AST  35<H>  /  ALT  25  /  AlkPhos  79  02-15    PT/INR - ( 15 Feb 2021 03:03 )   PT: 13.0 sec;   INR: 1.15 ratio         PTT - ( 14 Feb 2021 03:35 )  PTT:78.7 sec      RADIOLOGY & ADDITIONAL TESTS: Reviewed. INTERVAL HPI/OVERNIGHT EVENTS:  A large bowel movement s/p methylnaltrexone x1 yesterday. No acute overnight episodes per night team. Remained hypercarbic on ABG with PCO2 at 74.    SUBJECTIVE: Patient seen and examined at bedside. Intubated and sedated. Unable to obtain ROS.    OBJECTIVE:    VITAL SIGNS:  ICU Vital Signs Last 24 Hrs  T(C): 36.3 (15 Feb 2021 04:00), Max: 36.6 (15 Feb 2021 00:00)  T(F): 97.3 (15 Feb 2021 04:00), Max: 97.9 (15 Feb 2021 00:00)  HR: 97 (15 Feb 2021 07:02) (68 - 97)  BP: --  BP(mean): --  ABP: 122/67 (15 Feb 2021 04:00) (92/51 - 126/59)  ABP(mean): 81 (15 Feb 2021 04:00) (65 - 81)  RR: 34 (15 Feb 2021 04:00) (32 - 34)  SpO2: 97% (15 Feb 2021 07:02) (94% - 99%)    Mode: AC/ CMV (Assist Control/ Continuous Mandatory Ventilation), RR (machine): 34, TV (machine): 430, FiO2: 90, PEEP: 14, ITime: 0.69, MAP: 29, PIP: 41    02-14 @ 07:01  -  02-15 @ 07:00  --------------------------------------------------------  IN: 3192.6 mL / OUT: 2600 mL / NET: 592.6 mL      CAPILLARY BLOOD GLUCOSE      POCT Blood Glucose.: 133 mg/dL (15 Feb 2021 04:35)      PHYSICAL EXAM:    General: NAD  HEENT: NC/AT; PERRL, clear conjunctiva  Neck: supple  Respiratory: CTA b/l  Cardiovascular: +S1/S2; RRR  Abdomen: soft, NT/ND; +BS x4  Extremities: WWP, 2+ peripheral pulses b/l; no LE edema  Skin: normal color and turgor; no rash  Neurological:    MEDICATIONS:  MEDICATIONS  (STANDING):  caspofungin IVPB      caspofungin IVPB 50 milliGRAM(s) IV Intermittent every 24 hours  chlorhexidine 0.12% Liquid 15 milliLiter(s) Oral Mucosa every 12 hours  chlorhexidine 4% Liquid 1 Application(s) Topical <User Schedule>  cisatracurium Infusion 3 MICROgram(s)/kG/Min (23.4 mL/Hr) IV Continuous <Continuous>  dexAMETHasone  Injectable 6 milliGRAM(s) IV Push daily  dextrose 40% Gel 15 Gram(s) Oral once  dextrose 5%. 1000 milliLiter(s) (50 mL/Hr) IV Continuous <Continuous>  dextrose 5%. 1000 milliLiter(s) (100 mL/Hr) IV Continuous <Continuous>  dextrose 50% Injectable 25 Gram(s) IV Push once  dextrose 50% Injectable 12.5 Gram(s) IV Push once  dextrose 50% Injectable 25 Gram(s) IV Push once  fentaNYL   Infusion..... 0.5 MICROgram(s)/kG/Hr (1.3 mL/Hr) IV Continuous <Continuous>  glucagon  Injectable 1 milliGRAM(s) IntraMuscular once  heparin  Infusion 1800 Unit(s)/Hr (18 mL/Hr) IV Continuous <Continuous>  insulin lispro (ADMELOG) corrective regimen sliding scale   SubCutaneous every 6 hours  ketamine Infusion. 0.25 mG/kG/Hr (3.25 mL/Hr) IV Continuous <Continuous>  meropenem  IVPB 1000 milliGRAM(s) IV Intermittent every 8 hours  meropenem  IVPB      pantoprazole  Injectable 40 milliGRAM(s) IV Push daily  polyethylene glycol 3350 17 Gram(s) Oral two times a day  propofol Infusion 50 MICROgram(s)/kG/Min (39 mL/Hr) IV Continuous <Continuous>  senna Syrup 10 milliLiter(s) Oral at bedtime  vancomycin  IVPB 1750 milliGRAM(s) IV Intermittent every 8 hours    MEDICATIONS  (PRN):  acetaminophen    Suspension .. 650 milliGRAM(s) Oral every 6 hours PRN Temp greater or equal to 38C (100.4F)  heparin   Injectable 20959 Unit(s) IV Push every 6 hours PRN For aPTT less than 40  heparin   Injectable 5000 Unit(s) IV Push every 6 hours PRN For aPTT between 40 - 57  sodium chloride 0.9% lock flush 10 milliLiter(s) IV Push every 1 hour PRN Pre/post blood products, medications, blood draw, and to maintain line patency      ALLERGIES:  Allergies    codeine (Unknown)    Intolerances        LABS:                        8.3    14.81 )-----------( 254      ( 15 Feb 2021 02:50 )             27.5     02-15    134<L>  |  93<L>  |  15  ----------------------------<  137<H>  5.0   |  35<H>  |  0.59    Ca    8.4      15 Feb 2021 02:50  Phos  2.7     02-15  Mg     2.3     02-15    TPro  6.8  /  Alb  2.2<L>  /  TBili  0.7  /  DBili  x   /  AST  35<H>  /  ALT  25  /  AlkPhos  79  02-15    PT/INR - ( 15 Feb 2021 03:03 )   PT: 13.0 sec;   INR: 1.15 ratio         PTT - ( 14 Feb 2021 03:35 )  PTT:78.7 sec      RADIOLOGY & ADDITIONAL TESTS: Reviewed.

## 2021-02-16 NOTE — PROGRESS NOTE ADULT - ATTENDING COMMENTS
65yF with obesity, HTN, hx of LLE DVT not on AC, COVID+ on 1/17, presenting for acutely worsening SOB, intubated 1/29 for acute hypoxic respiratory failure. Still requiring high vent settings with high plateau pressures requiring ongoing ICU level care.  Noted to have high peak pressures to the 55-60 range, improved after suctioning.      Neuro: Intubated and sedated on Propofol, Ketamine and Fentanyl; s/p paralytics IV x 1 today..    CV: HD stable, maintain MAP>65; s/p vasopressors  Pulm: COVID 19 Pneumonia and ARDS - remains on AC 34/430/14/90; with persistent elevated plateau pressures. Required proning for ARDS, though become hypoxic.  Noted to have high peak pressures this AM; required paralytics; pending repeat ABG.    GI: s/p relastor with large bowel movement; c/w protonix 40mg;   Renal/Metabolic: Hyponatremia - resolving; overall fluid overloaded - will start lasix 40mg IVP with goal net negative >1L.   ID: Fever - uptrending leukocytosis with procalcitonin downtrending; possible fungal pneumonia on BAL on caspofungin continue for 7 days through 2/18/21; s/p empiric varun and vanco completes today 2/15/021;   Heme: h/o Dvt unable to obtain CTA; c/w heparin gtt; Duplex 1/27: b/l above knee DVT; Hgb down trended to 7.1 with active hematuria; will continue with 1unit prbc.    Endo: ISS  Dispo: Remains full code; overall prognosis poor; will consult palliative.

## 2021-02-16 NOTE — PROGRESS NOTE ADULT - ASSESSMENT
65yF with obesity, HTN, hx of LLE DVT not on AC, COVID+ on 1/17, presenting for acutely worsening SOB, intubated 1/29 for acute hypoxic respiratory failure. Still requiring high vent settings with high plateau pressures requiring ongoing ICU level care. No evidence of dysfunction in other organ systems at present.     Neuro:  - propofol, ketamine, fentanyl wean as tolerated  - s/p nimbex gtts    Respiratory:  - AC 34/430/14/90%, intubated (1/29 - )   - Failed trial of APRV  - s/p bronchoscopy 2/9- NG thus far   - on heparin gtt, concern for PE given elevated D-dimer in 7000s - downtrending and currently in the 1000s   - s/p 10d course of Dexamethasone (1/27 - 2/5), Remdesivir d/c'ed (1/26 -1/30). restarted dexamethasone 6 mg IV (2/13 - ) in light of uptrending d-dimer, leukocytosis, CRP, ferritin, LDH  - Prone 18/6 as needed for ARDS, does not tolerate proning, becomes hypoxic    Cards:  - hemodynamically stable, off pressors    GI:  - c/w tube feeds  - Protonix 40mg qD  - senna  - relastor today for longstanding constipation, now with 1 large bowel movement.    Transaminitis 2/12/21  Improving     Renal:    - hyponatremia, improving. urine lytes, osmolarity not concerning  - mild hematuria - monitor hemoglobin. Improving hematuria. Currently on heparin gtt, will c/t monitor H/H and urine color.  - Lasix 40 mg IV x1. Goal net negative >1L.    Heme:  h/o DVT, b/l upper thigh DVTs; PE suspected but too unstable for a CTA, Ddimer >50k initially  - Duplex 1/27: b/l above knee DVT  - continue heparin gtt, daily PTT  Anemia, likely 2/2 blood draws  - CTM    ID  Fever:  - s/p Ceftriaxone 7d course for E.coli UTI, CTX (2/5- 2/8)  - empiric varun and vanc (2/9 - 2/15) for 7day course  - Caspofungin 2/11/21- for ? evidence of fungi on BAL  - f/u repeat blood, urine cx,  2/9- NG thus far, urine cx negative   - sputum cx 2/9/21- N resp kalpesh with some E Coli- pansensitive (sensitive to Meropenem)   - f/u BAL cultures and fungal cultures 2/9- NG thus far  - MRSA swab positive  - febrile 2/11/21- send blood cx   - 2/13/21 up-trending leukocytosis, LDH, D-dimer, CRP. Downtrending procalcitonin with downtrending procalcitonin- will try dexamethasone 6mg daily     Ethics:  - full code  - family aware of poor prognosis   - Spoke with daughter (Sarah) today and updated of poor prognosis. Open to speaking to palliative care team. Consult placed.   65yF with obesity, HTN, hx of LLE DVT not on AC, COVID+ on 1/17, presenting for acutely worsening SOB, intubated 1/29 for acute hypoxic respiratory failure. Still requiring high vent settings with high plateau pressures requiring ongoing ICU level care. No evidence of dysfunction in other organ systems at present.     Neuro:  - propofol, ketamine, fentanyl wean as tolerated  - s/p nimbex gtts    Respiratory:  - AC 34/430/14/90%, intubated (1/29 - )   - Failed trial of APRV  - s/p bronchoscopy 2/9- NG thus far   - on heparin gtt, concern for PE given elevated D-dimer in 7000s - downtrending and currently in the 1000s   - s/p 10d course of Dexamethasone (1/27 - 2/5), Remdesivir d/c'ed (1/26 -1/30). restarted dexamethasone 6 mg IV (2/13 - ) in light of uptrending d-dimer, leukocytosis, CRP, ferritin, LDH  - Prone 18/6 as needed for ARDS, does not tolerate proning, becomes hypoxic  - Nimbex 20 mg IVP x1. Will f/u ABG after 1 hour    Cards:  - Currently off pressors  - Will provide 1u pRBC given hematuria.    GI:  - c/w tube feeds  - Protonix 40mg qD  - senna  - S/p Relitor for longstanding constipation, with 1 large bowel movement.    Transaminitis 2/12/21  Improving     Renal:    - hyponatremia, improving. urine lytes, osmolarity not concerning  - mild hematuria - monitor hemoglobin. Improving hematuria. Currently on heparin gtt, will c/t monitor H/H.  - Lasix 40 mg IV x1. Goal net negative >1L.    Heme:  h/o DVT, b/l upper thigh DVTs; PE suspected but too unstable for a CTA, Ddimer >50k initially  - Duplex 1/27: b/l above knee DVT  - continue heparin gtt, daily PTT  Anemia, likely 2/2 blood draws  - CTM  - Hgb 8.3 -> 7.7 today. Will transfuse in the setting of hematuria.    ID  Fever:  - s/p Ceftriaxone 7d course for E.coli UTI, CTX (2/5- 2/8)  - empiric varun and vanc (2/9 - ). Continuing at this time.  - Caspofungin 2/11/21- for ? evidence of fungi on BAL  - f/u repeat blood, urine cx,  2/9- NG thus far, urine cx negative   - sputum cx 2/9/21- N resp kalpesh with some E Coli- pansensitive (sensitive to Meropenem)   - f/u BAL cultures and fungal cultures 2/9- NG thus far  - MRSA swab positive  - febrile 2/11/21- send blood cx   - 2/13/21 up-trending leukocytosis, LDH, D-dimer, CRP. Downtrending procalcitonin with downtrending procalcitonin- will try dexamethasone 6mg daily     Ethics:  - full code  - family aware of poor prognosis   - Palliative consult placed.   65yF with obesity, HTN, hx of LLE DVT not on AC, COVID+ on 1/17, presenting for acutely worsening SOB, intubated 1/29 for acute hypoxic respiratory failure. Still requiring high vent settings with high plateau pressures requiring ongoing ICU level care. No evidence of dysfunction in other organ systems at present.     Neuro:  - propofol, ketamine, fentanyl wean as tolerated  - s/p nimbex gtts    Respiratory:  - AC 34/430/14/90%, intubated (1/29 - )   - Failed trial of APRV  - s/p bronchoscopy 2/9- NG thus far   - on heparin gtt, concern for PE given elevated D-dimer in 7000s - downtrending and currently in the 1000s   - s/p 10d course of Dexamethasone (1/27 - 2/5), Remdesivir d/c'ed (1/26 -1/30). restarted dexamethasone 6 mg IV (2/13 - ) in light of uptrending d-dimer, leukocytosis, CRP, ferritin, LDH  - Prone 18/6 as needed for ARDS, does not tolerate proning, becomes hypoxic  - Nimbex 20 mg IVP x1. Will f/u ABG after 1 hour    Cards:  - Currently off pressors  - Will provide 1u pRBC given hematuria.    GI:  - c/w tube feeds  - Protonix 40mg qD  - senna  - S/p Relitor for longstanding constipation, with 1 large bowel movement.    Transaminitis 2/12/21  Improving     Renal:    - hyponatremia, improving. urine lytes, osmolarity not concerning  - mild hematuria - monitor hemoglobin. Improving hematuria. Currently on heparin gtt, will c/t monitor H/H.  - Lasix 40 mg IV x1. Goal net negative >1L.    Heme:  h/o DVT, b/l upper thigh DVTs; PE suspected but too unstable for a CTA, Ddimer >50k initially  - Duplex 1/27: b/l above knee DVT  - continue heparin gtt, daily PTT  Anemia, likely 2/2 blood draws  - CTM  - Hgb 8.3 -> 7.7 today. Will transfuse in the setting of hematuria.    ID  Fever:  - s/p Ceftriaxone 7d course for E.coli UTI, CTX (2/5- 2/8)  - empiric varun and vanc (2/9 - ). Continuing at this time.  - Caspofungin 2/11/21- for ? evidence of fungi on BAL  - f/u repeat blood, urine cx,  2/9- NG thus far, urine cx negative   - sputum cx 2/9/21- N resp kalpesh with some E Coli- pansensitive (sensitive to Meropenem)   - f/u BAL cultures and fungal cultures 2/9- NG thus far  - MRSA swab positive  - febrile 2/11/21- send blood cx   - 2/13/21 up-trending leukocytosis, LDH, D-dimer, CRP. Downtrending procalcitonin with downtrending procalcitonin- will try dexamethasone 6mg daily   - Vanc level 55 overnight. Held at this time. Unclear if the level was checked after vanc dosing. Will check vanc level tomorrow AM and redose as necessary.    Ethics:  - full code  - family aware of poor prognosis   - Palliative consult placed.

## 2021-02-17 NOTE — PROGRESS NOTE ADULT - ASSESSMENT
65yF with obesity, HTN, hx of LLE DVT not on AC, COVID+ on 1/17, presenting for acutely worsening SOB, intubated 1/29 for acute hypoxic respiratory failure. Still requiring high vent settings with high plateau pressures requiring ongoing ICU level care    Neuro:  - propofol, ketamine, fentanyl wean as tolerated  - s/p nimbex gtts    Respiratory:  - AC 34/430/14/90%, intubated (1/29 - )   - Failed trial of APRV  - s/p bronchoscopy 2/9- NG thus far   - on heparin gtt, concern for PE given elevated D-dimer in 7000s - downtrending and currently in the 1000s   - s/p 10d course of Dexamethasone (1/27 - 2/5), Remdesivir d/c'ed (1/26 -1/30). restarted dexamethasone 6 mg IV (2/13 - ) in light of uptrending d-dimer, leukocytosis, CRP, ferritin, LDH  - Prone 18/6 as needed for ARDS, does not tolerate proning, becomes hypoxic  - Nimbex 20 mg IVP x1. Will f/u ABG after 1 hour    Cards:  - Currently off pressors  - Will provide 1u pRBC given hematuria.    GI:  - c/w tube feeds  - Protonix 40mg qD  - senna  - S/p Relitor for longstanding constipation, with 1 large bowel movement.    Transaminitis 2/12/21  Improving     Renal:    - hyponatremia, improving. urine lytes, osmolarity not concerning  - mild hematuria - monitor hemoglobin. Improving hematuria. Currently on heparin gtt, will c/t monitor H/H.  - Lasix 40 mg IV x1. Goal net negative >1L.    Heme:  h/o DVT, b/l upper thigh DVTs; PE suspected but too unstable for a CTA, Ddimer >50k initially  - Duplex 1/27: b/l above knee DVT  - continue heparin gtt, daily PTT  Anemia, likely 2/2 blood draws  - CTM  - Hgb 8.3 -> 7.7 today. Will transfuse in the setting of hematuria.    ID  Fever:  - s/p Ceftriaxone 7d course for E.coli UTI, CTX (2/5- 2/8)  - empiric varun and vanc (2/9 - ). Continuing at this time.  - Caspofungin 2/11/21- for ? evidence of fungi on BAL  - f/u repeat blood, urine cx,  2/9- NG thus far, urine cx negative   - sputum cx 2/9/21- N resp kalpesh with some E Coli- pansensitive (sensitive to Meropenem)   - f/u BAL cultures and fungal cultures 2/9- NG thus far  - MRSA swab positive  - febrile 2/11/21- send blood cx   - 2/13/21 up-trending leukocytosis, LDH, D-dimer, CRP. Downtrending procalcitonin with downtrending procalcitonin- will try dexamethasone 6mg daily   - Vanc level 55 overnight. Held at this time. Unclear if the level was checked after vanc dosing. Will check vanc level tomorrow AM and redose as necessary.    Ethics:  - full code  - family aware of poor prognosis   - Palliative consult placed.   65yF with obesity, HTN, hx of LLE DVT not on AC, COVID+ on 1/17, presenting for acutely worsening SOB, intubated 1/29 for acute hypoxic respiratory failure. Still requiring high vent settings with high plateau pressures requiring ongoing ICU level care    Neuro:  - propofol, ketamine, fentanyl wean as tolerated  - s/p nimbex gtts    Respiratory:  - AC 34/430/14/85%, intubated (1/29 - )   - Failed trial of APRV  - s/p bronchoscopy 2/9- NG thus far   - on heparin gtt, concern for PE given elevated D-dimer in 7000s - downtrending and currently in the 1000s   - s/p 10d course of Dexamethasone (1/27 - 2/5), Remdesivir d/c'ed (1/26 -1/30). restarted dexamethasone 6 mg IV (2/13 - ) in light of uptrending d-dimer, leukocytosis, CRP, ferritin, LDH  - Prone 18/6 as needed for ARDS, does not tolerate proning, becomes hypoxic  - Daily ABG. Currently attempting daily diuresis as below.    Cards:  - Currently off pressors  - S/p 1u pRBC yesterday given hematuria, now hgb 8.6. Will hold off further transfusion at this time.  - Hep gtt ongoing with modified goal (60 - 80) given hematuria.    GI:  - c/w tube feeds  - Protonix 40mg qD  - senna  - S/p Relitor for longstanding constipation, with 1 large bowel movement.    Transaminitis 2/12/21  Improving     Renal:    - hyponatremia, improving. urine lytes, osmolarity not concerning  - Currently with hematuria on heparin gtt. monitor hemoglobin.  - Lasix 40 mg IV BID at this time. Goal net negative >1L.    Heme:  h/o DVT, b/l upper thigh DVTs; PE suspected but too unstable for a CTA, Ddimer >50k initially  - Duplex 1/27: b/l above knee DVT  - continue heparin gtt, daily PTT  Anemia, likely 2/2 blood draws  - CTM  - Hgb corrected from 7.7 to 9.2 s/p 1u pRBC transfusion, decreased to 8.6 overnight.  - With hematuria now. Given the need for heparin gtt, will use lowered PTT goal. Monitor CBC tonight.    ID  Fever:  - s/p Ceftriaxone 7d course for E.coli UTI, CTX (2/5- 2/8)  - empiric varun and vanc (2/9 - ). Continuing at this time.  - Caspofungin 2/11/21- for ? evidence of fungi on BAL  - f/u repeat blood, urine cx,  2/9- NG thus far, urine cx negative   - sputum cx 2/9/21- N resp kalpesh with some E Coli- pansensitive (sensitive to Meropenem)   - f/u BAL cultures and fungal cultures 2/9- NG thus far  - MRSA swab positive  - febrile 2/11/21- send blood cx   - 2/13/21 up-trending leukocytosis, LDH, D-dimer, CRP. Downtrending procalcitonin with downtrending procalcitonin- will try dexamethasone 6mg daily   - Vanc 41.8 today. Will continue to hold. Recheck tomorrow AM.    Ethics:  - full code  - family aware of poor prognosis   - Palliative consult placed.

## 2021-02-17 NOTE — PROGRESS NOTE ADULT - ATTENDING COMMENTS
65yF with obesity, HTN, hx of LLE DVT not on AC, COVID+ on 1/17, presenting for acutely worsening SOB, intubated 1/29 for acute hypoxic respiratory failure. Still requiring high vent settings with high plateau pressures requiring ongoing ICU level care.  Noted to have high peak pressures to the 55-60 range, improved after suctioning.      Neuro: Intubated and sedated on Propofol, Ketamine and Fentanyl; s/p paralytics IV x 1 today..    CV: HD stable, maintain MAP>65; s/p vasopressors  Pulm: COVID 19 Pneumonia and ARDS - remains on AC 34/430/14/90; with persistent elevated plateau pressures. Required proning for ARDS, though become hypoxic.  Noted to have high peak pressures this AM; required paralytics; pending repeat ABG.    GI: s/p relastor with large bowel movement; c/w protonix 40mg;   Renal/Metabolic: Hyponatremia - resolving; overall fluid overloaded - will start lasix 40mg IVP with goal net negative >1L.   ID: Fever - uptrending leukocytosis with procalcitonin downtrending; possible fungal pneumonia on BAL on caspofungin continue for 7 days through 2/18/21; s/p empiric varun complete through 2/19/21 for 10 day course; Vanco held 2/2 elevated level.   Heme: h/o Dvt unable to obtain CTA; c/w heparin gtt; Duplex 1/27: b/l above knee DVT; Hgb maintain >7; lower PTT to 60-80 goal given hematuria .    Endo: ISS  Dispo: Remains full code; overall prognosis poor; will consult palliative.

## 2021-02-17 NOTE — CONSULT NOTE ADULT - PROBLEM SELECTOR RECOMMENDATION 9
.  > Intubated and sedated  > Pls continue clear communication with daughter, Sarah as she will be sharing decision-making with her brother

## 2021-02-17 NOTE — PROGRESS NOTE ADULT - SUBJECTIVE AND OBJECTIVE BOX
*** INCOMPLETE NOTE ***      INTERVAL HPI/OVERNIGHT EVENTS:  No UOP noted per night team. Changed Mckeon, with >800 cc urine collected. Hematuria noted. 1u pRBC given yesterday.    SUBJECTIVE: Patient seen and examined at bedside. Intubated and sedated. Unable to assess ROS.    OBJECTIVE:    VITAL SIGNS:  ICU Vital Signs Last 24 Hrs  T(C): 37.7 (17 Feb 2021 07:00), Max: 37.8 (16 Feb 2021 22:00)  T(F): 99.9 (17 Feb 2021 07:00), Max: 100 (16 Feb 2021 22:00)  HR: 72 (17 Feb 2021 07:00) (60 - 84)  BP: --  BP(mean): --  ABP: 112/54 (17 Feb 2021 07:00) (90/54 - 131/62)  ABP(mean): 70 (17 Feb 2021 07:00) (57 - 84)  RR: 34 (17 Feb 2021 07:00) (33 - 34)  SpO2: 98% (17 Feb 2021 07:00) (96% - 98%)    Mode: AC/ CMV (Assist Control/ Continuous Mandatory Ventilation), RR (machine): 34, TV (machine): 430, FiO2: 90, PEEP: 14, ITime: 0.84, MAP: 27, PIP: 47    02-16 @ 07:01  -  02-17 @ 07:00  --------------------------------------------------------  IN: 1862.4 mL / OUT: 2925 mL / NET: -1062.6 mL      CAPILLARY BLOOD GLUCOSE      POCT Blood Glucose.: 151 mg/dL (17 Feb 2021 03:35)      PHYSICAL EXAM:    General: intubated, sedated.  HEENT: Clear conjunctiva  Neck: supple  Respiratory: Coarse breath sounds bilaterally  Cardiovascular: +S1/S2; RRR  Abdomen: Soft  Extremities: No LE edema  : +Mckeon, collecting red urine.  Skin: normal color and turgor; no rash    MEDICATIONS:  MEDICATIONS  (STANDING):  caspofungin IVPB      caspofungin IVPB 50 milliGRAM(s) IV Intermittent every 24 hours  chlorhexidine 0.12% Liquid 15 milliLiter(s) Oral Mucosa every 12 hours  chlorhexidine 4% Liquid 1 Application(s) Topical <User Schedule>  cisatracurium Infusion 3 MICROgram(s)/kG/Min (23.4 mL/Hr) IV Continuous <Continuous>  dexAMETHasone  Injectable 6 milliGRAM(s) IV Push daily  dextrose 40% Gel 15 Gram(s) Oral once  dextrose 5%. 1000 milliLiter(s) (50 mL/Hr) IV Continuous <Continuous>  dextrose 5%. 1000 milliLiter(s) (100 mL/Hr) IV Continuous <Continuous>  dextrose 50% Injectable 25 Gram(s) IV Push once  dextrose 50% Injectable 12.5 Gram(s) IV Push once  dextrose 50% Injectable 25 Gram(s) IV Push once  fentaNYL   Infusion..... 0.5 MICROgram(s)/kG/Hr (1.3 mL/Hr) IV Continuous <Continuous>  glucagon  Injectable 1 milliGRAM(s) IntraMuscular once  heparin  Infusion 1800 Unit(s)/Hr (18 mL/Hr) IV Continuous <Continuous>  insulin lispro (ADMELOG) corrective regimen sliding scale   SubCutaneous every 6 hours  ketamine Infusion. 0.25 mG/kG/Hr (3.25 mL/Hr) IV Continuous <Continuous>  meropenem  IVPB      meropenem  IVPB 1000 milliGRAM(s) IV Intermittent every 8 hours  norepinephrine Infusion 0.05 MICROgram(s)/kG/Min (6.09 mL/Hr) IV Continuous <Continuous>  pantoprazole  Injectable 40 milliGRAM(s) IV Push daily  polyethylene glycol 3350 17 Gram(s) Oral two times a day  propofol Infusion 50 MICROgram(s)/kG/Min (39 mL/Hr) IV Continuous <Continuous>  senna Syrup 10 milliLiter(s) Oral at bedtime    MEDICATIONS  (PRN):  acetaminophen    Suspension .. 650 milliGRAM(s) Oral every 6 hours PRN Temp greater or equal to 38C (100.4F)  heparin   Injectable 65874 Unit(s) IV Push every 6 hours PRN For aPTT less than 40  heparin   Injectable 5000 Unit(s) IV Push every 6 hours PRN For aPTT between 40 - 57  sodium chloride 0.9% lock flush 10 milliLiter(s) IV Push every 1 hour PRN Pre/post blood products, medications, blood draw, and to maintain line patency      ALLERGIES:  Allergies    codeine (Unknown)    Intolerances        LABS:                        8.6    12.44 )-----------( 254      ( 17 Feb 2021 02:55 )             28.6     02-17    137  |  92<L>  |  22  ----------------------------<  142<H>  4.6   |  37<H>  |  0.81    Ca    8.4      17 Feb 2021 02:55  Phos  3.4     02-17  Mg     2.2     02-17    TPro  6.8  /  Alb  2.4<L>  /  TBili  0.8  /  DBili  x   /  AST  29  /  ALT  20  /  AlkPhos  69  02-17    PT/INR - ( 17 Feb 2021 02:55 )   PT: 14.4 sec;   INR: 1.28 ratio         PTT - ( 17 Feb 2021 02:55 )  PTT:97.4 sec      RADIOLOGY & ADDITIONAL TESTS: Reviewed.   INTERVAL HPI/OVERNIGHT EVENTS:  No UOP noted per night team. Changed Mckeon, with >800 cc urine collected. Hematuria noted. 1u pRBC given yesterday.    SUBJECTIVE: Patient seen and examined at bedside. Intubated and sedated. Unable to assess ROS.    OBJECTIVE:    VITAL SIGNS:  ICU Vital Signs Last 24 Hrs  T(C): 37.7 (17 Feb 2021 07:00), Max: 37.8 (16 Feb 2021 22:00)  T(F): 99.9 (17 Feb 2021 07:00), Max: 100 (16 Feb 2021 22:00)  HR: 72 (17 Feb 2021 07:00) (60 - 84)  BP: --  BP(mean): --  ABP: 112/54 (17 Feb 2021 07:00) (90/54 - 131/62)  ABP(mean): 70 (17 Feb 2021 07:00) (57 - 84)  RR: 34 (17 Feb 2021 07:00) (33 - 34)  SpO2: 98% (17 Feb 2021 07:00) (96% - 98%)    Mode: AC/ CMV (Assist Control/ Continuous Mandatory Ventilation), RR (machine): 34, TV (machine): 430, FiO2: 90, PEEP: 14, ITime: 0.84, MAP: 27, PIP: 47    02-16 @ 07:01  -  02-17 @ 07:00  --------------------------------------------------------  IN: 1862.4 mL / OUT: 2925 mL / NET: -1062.6 mL      CAPILLARY BLOOD GLUCOSE      POCT Blood Glucose.: 151 mg/dL (17 Feb 2021 03:35)      PHYSICAL EXAM:    General: intubated, sedated.  HEENT: Clear conjunctiva  Neck: supple  Respiratory: Coarse breath sounds bilaterally  Cardiovascular: +S1/S2; RRR  Abdomen: Soft  Extremities: No LE edema  : +Mckeon, collecting red urine.  Skin: normal color and turgor; no rash    MEDICATIONS:  MEDICATIONS  (STANDING):  caspofungin IVPB      caspofungin IVPB 50 milliGRAM(s) IV Intermittent every 24 hours  chlorhexidine 0.12% Liquid 15 milliLiter(s) Oral Mucosa every 12 hours  chlorhexidine 4% Liquid 1 Application(s) Topical <User Schedule>  cisatracurium Infusion 3 MICROgram(s)/kG/Min (23.4 mL/Hr) IV Continuous <Continuous>  dexAMETHasone  Injectable 6 milliGRAM(s) IV Push daily  dextrose 40% Gel 15 Gram(s) Oral once  dextrose 5%. 1000 milliLiter(s) (50 mL/Hr) IV Continuous <Continuous>  dextrose 5%. 1000 milliLiter(s) (100 mL/Hr) IV Continuous <Continuous>  dextrose 50% Injectable 25 Gram(s) IV Push once  dextrose 50% Injectable 12.5 Gram(s) IV Push once  dextrose 50% Injectable 25 Gram(s) IV Push once  fentaNYL   Infusion..... 0.5 MICROgram(s)/kG/Hr (1.3 mL/Hr) IV Continuous <Continuous>  glucagon  Injectable 1 milliGRAM(s) IntraMuscular once  heparin  Infusion 1800 Unit(s)/Hr (18 mL/Hr) IV Continuous <Continuous>  insulin lispro (ADMELOG) corrective regimen sliding scale   SubCutaneous every 6 hours  ketamine Infusion. 0.25 mG/kG/Hr (3.25 mL/Hr) IV Continuous <Continuous>  meropenem  IVPB      meropenem  IVPB 1000 milliGRAM(s) IV Intermittent every 8 hours  norepinephrine Infusion 0.05 MICROgram(s)/kG/Min (6.09 mL/Hr) IV Continuous <Continuous>  pantoprazole  Injectable 40 milliGRAM(s) IV Push daily  polyethylene glycol 3350 17 Gram(s) Oral two times a day  propofol Infusion 50 MICROgram(s)/kG/Min (39 mL/Hr) IV Continuous <Continuous>  senna Syrup 10 milliLiter(s) Oral at bedtime    MEDICATIONS  (PRN):  acetaminophen    Suspension .. 650 milliGRAM(s) Oral every 6 hours PRN Temp greater or equal to 38C (100.4F)  heparin   Injectable 60422 Unit(s) IV Push every 6 hours PRN For aPTT less than 40  heparin   Injectable 5000 Unit(s) IV Push every 6 hours PRN For aPTT between 40 - 57  sodium chloride 0.9% lock flush 10 milliLiter(s) IV Push every 1 hour PRN Pre/post blood products, medications, blood draw, and to maintain line patency      ALLERGIES:  Allergies    codeine (Unknown)    Intolerances        LABS:                        8.6    12.44 )-----------( 254      ( 17 Feb 2021 02:55 )             28.6     02-17    137  |  92<L>  |  22  ----------------------------<  142<H>  4.6   |  37<H>  |  0.81    Ca    8.4      17 Feb 2021 02:55  Phos  3.4     02-17  Mg     2.2     02-17    TPro  6.8  /  Alb  2.4<L>  /  TBili  0.8  /  DBili  x   /  AST  29  /  ALT  20  /  AlkPhos  69  02-17    PT/INR - ( 17 Feb 2021 02:55 )   PT: 14.4 sec;   INR: 1.28 ratio         PTT - ( 17 Feb 2021 02:55 )  PTT:97.4 sec      RADIOLOGY & ADDITIONAL TESTS: Reviewed.

## 2021-02-17 NOTE — CONSULT NOTE ADULT - PROBLEM SELECTOR RECOMMENDATION 5
.  # Code status: FULL   # HCP/ Surrogate: Sarah (daughter)  # Estimated prognosis: TBD  # Hospice eligible: Family wants all interventions    12/17  > DaughterSarah declined to speak with Palliative Care  > Discussed with Surg B    We will sign off as unfortunately, family does not want to speak with us. Pls let us know if family changes their mind and is open to a more Palliative approach to the patient's care

## 2021-02-17 NOTE — CHART NOTE - NSCHARTNOTEFT_GEN_A_CORE
Nutrition Follow-Up Note   Reason for follow-up: Critical care length of stay follow- up. Medical record reviewed.     Information obtained from extensive chart review. Unable to conduct face-to-face interview due to current contact/isolation precautions in setting of COVID-19.    Clinical Course history: Medical history of obesity, HTN, hx of LLE DVT not on AC, COVID+ on 1/17, presenting for acutely worsening SOB, intubated 1/29 for acute hypoxic respiratory failure. Still requiring high vent settings with high plateau pressures requiring ongoing ICU level care. Intubated, sedated on propofol, ketamine, fentanyl.    Propofol 39mL/hr provides  = 1029 additional Kcal if sedation continues at this rate x24 hours.    Diet Order: Diet, NPO with Tube Feed:   Tube Feeding Modality: Orogastric  Jevity 1.2 Stan (JEVITY1.2RTH)  Total Volume for 24 Hours (mL): 840  Continuous  Starting Tube Feed Rate {mL per Hour}: 10  Increase Tube Feed Rate by (mL): 6     Every 6 hours  Until Goal Tube Feed Rate (mL per Hour): 35  Tube Feed Duration (in Hours): 24  Tube Feed Start Time: 12:00 (02-10-21 @ 10:01)    CURRENT EN ORDER PROVIDES:  Total Volume: 840mL/day  Energy: 1008Kcal/day  Protein: 46g protein/day  Free Water: 677mL/day    Nutrition Related Information: - Patient receiving Jevity 1.2 via OGT @ current goal rate 35mL/hr x24 hours.   - No tube feeding intolerances reported.   - Blood glucose levels WDL - ordered for SSI.  - Current tube feeding regimen in addition to additional energy from propofol infusion providing excessive calories. Current EN also providing inadequate protein.   - Patient would benefit from formula change to Vital HP (high protein content).    GI: - Per EMR, +constipation, s/p relistor with 1 large BM noted.  - No BM noted today.   - No vomiting reported.  - Bowel regimen: Senna, Miralax.     Anthropometric Measurements:   Height (cm): 177.8 (01-26-21 @ 15:14)  Weight (kg): 135.8 (2/14), 136 (2/13), 130 (1/29)  **noted weight changes, ?possibly scale discrepancies versus 2/2 fluid shifts.  BMI (kg/m2): 41.1 (1/29)  IBW: 68.2kg +/-10%  Appearance: Unable to conduct nutrition-focused physical exam at this time due to limited contact in setting of isolation precautions related to COVID-19.    Medications: MEDICATIONS  (STANDING):  caspofungin IVPB      caspofungin IVPB 50 milliGRAM(s) IV Intermittent every 24 hours  cisatracurium Infusion 3 MICROgram(s)/kG/Min (23.4 mL/Hr) IV Continuous   dexAMETHasone  Injectable 6 milliGRAM(s) IV Push daily  fentaNYL   Infusion..... 0.5 MICROgram(s)/kG/Hr (1.3 mL/Hr) IV Continuous   furosemide   Injectable 40 milliGRAM(s) IV Push once  furosemide   Injectable 40 milliGRAM(s) IV Push once  glucagon  Injectable 1 milliGRAM(s) IntraMuscular once  heparin  Infusion 1600 Unit(s)/Hr (16 mL/Hr) IV Continuous <Continuous>  insulin lispro (ADMELOG) corrective regimen sliding scale   SubCutaneous every 6 hours  ketamine Infusion. 0.25 mG/kG/Hr (3.25 mL/Hr) IV Continuous   meropenem  IVPB      meropenem  IVPB 1000 milliGRAM(s) IV Intermittent every 8 hours  norepinephrine Infusion 0.05 MICROgram(s)/kG/Min (6.09 mL/Hr) IV   pantoprazole  Injectable 40 milliGRAM(s) IV Push daily  polyethylene glycol 3350 17 Gram(s) Oral two times a day  propofol Infusion 50 MICROgram(s)/kG/Min (39 mL/Hr) IV Continuous   senna Syrup 10 milliLiter(s) Oral at bedtime    Labs: 02-17 @ 02:55: Sodium 137, Potassium 4.6, Calcium 8.4, Magnesium 2.2, Phosphorus 3.4, BUN 22, Creatinine 0.81, Glucose 142<H>, Alk Phos 69, ALT/SGPT 20, AST/SGOT 29, Albumin 2.4<L>, Prealbumin --, Total Bilirubin 0.8, Hemoglobin 8.6<L>, Hematocrit 28.6<L>, Ferritin 1523<H>, C-Reactive Protein 137.0<H>, Creatine Kinase <<27>  02-16 @ 18:24: Sodium 139, Potassium 4.5, Calcium 8.2<L>, Magnesium 2.2, Phosphorus --, BUN 19, Creatinine 0.66, Glucose 124<H>, Alk Phos 71, ALT/SGPT 22, AST/SGOT 41<H>, Albumin 2.7<L>, Prealbumin --, Total Bilirubin 0.9, Hemoglobin 9.2<L>, Hematocrit 31.1<L>, Ferritin --, C-Reactive Protein 147.5<H>, Creatine Kinase <<27>    Triglycerides, Serum: 117 mg/dL (01-29-21 @ 05:32)    POCT Blood Glucose.: 122 mg/dL (02-17-21 @ 10:34)  POCT Blood Glucose.: 151 mg/dL (02-17-21 @ 03:35)  POCT Blood Glucose.: 167 mg/dL (02-16-21 @ 23:12)  POCT Blood Glucose.: 131 mg/dL (02-16-21 @ 17:12)    Skin: No pressure injuries/DTIs noted in flowsheets.  Edema: 2+ generalized, left/right arm, legs    Estimated Nutrition Needs: calculated using admit weight 130kg for energy needs and IBW for protein needs  Estimated Energy Needs (11-14Kcal/kg): 1430-1820Kcal/day  Estimated Protein Needs (2g/kg): 136 g protein/day    New Nutrition diagnosis: Excessive energy intake related to enteral nutrition regimen As evidenced by patient receiving excessive Kcals at this time in setting of enteral nutrition and propofol infusion.     Nutrition Interventions/Recommendations:   1. Recommend Vital HP @ 34mL/hr x24 hours + No Carb Prosource 4x daily [provides 816mL total volume, 816Kcal, 131g protein, 682mL free water]. With propofol (~1029Kcal), pt will receive 1845Kcal,  131g protein (14Kcal/kg ABW, 1.9g/kg IBW).  2. Additional free water per physician discretion.   3. Consider multivitamin daily for micronutrient coverage.  4. Bowel regimen prn.   5. Trend glucose, triglycerides when on propofol and electrolytes.      Monitoring and Evaluation:   Monitor nutrition provision, tolerance to diet, weights, labs, skin integrity and GI function.   RD remains available, please consult as needed. Will follow up per protocol.  Kavya Goodwin, MS, RD, CDN, [facility] Pager #[phone]

## 2021-02-17 NOTE — CHART NOTE - NSCHARTNOTEFT_GEN_A_CORE
Indication:  High peak pressures  Pre-op Dx:  ARDS  History: COVID 19 ARDS    Preop medication:    Xray Findings:    Findings:  Bronchoscope inserted through ETT. ETT noted to be in good position. Airway evaluation revealed Sharp Carleen. KRISHNA and LLL evaluation revealed clear secretions. RUL, RML, RLL revealed thick copious secretions; BAL from RLL obtained. . Bronchoscope then withdrawn from ETT. Minimal bleeding noted. Friable mucosa noted in RML.     Specimens: BAL for culture;    Repeat chest xray showed no pneumothorax.

## 2021-02-17 NOTE — CONSULT NOTE ADULT - CONVERSATION DETAILS
1) She says she knows why I'm calling  2) She says she understands her mother's condition. Medical team updates her daily. She has also been speaking with her brother. "I'm not in denial."  3) Does not want to speak to Palliative Care. "I don't want to talk about those things"  4) I told her I would respect her space for the time being but to call if she had any questions or concerns.

## 2021-02-17 NOTE — CONSULT NOTE ADULT - PROBLEM SELECTOR RECOMMENDATION 6
Signing off! Please page [phone] or contact us via [employer] Teams for any q's or c's.     Emir Alejandro  Palliative Medicine

## 2021-02-18 NOTE — PROGRESS NOTE ADULT - SUBJECTIVE AND OBJECTIVE BOX
**** INCOMPLETE NOTE ****      INTERVAL HPI/OVERNIGHT EVENTS:  Hematuria continued. Heparin gtt rate lowered from 16 to 14 overnight. No change in vent settings or drip rates. S/p Lasix 40 mg IV x2, total 5L output, net negative 3L.    SUBJECTIVE: Patient seen and examined at bedside. Intubated and sedated. Unable to assess ROS.    OBJECTIVE:    VITAL SIGNS:  ICU Vital Signs Last 24 Hrs  T(C): 37.6 (18 Feb 2021 04:00), Max: 38 (17 Feb 2021 11:00)  T(F): 99.7 (18 Feb 2021 04:00), Max: 100.4 (17 Feb 2021 11:00)  HR: 63 (18 Feb 2021 04:22) (48 - 79)  BP: --  BP(mean): --  ABP: 138/59 (18 Feb 2021 04:00) (34/55 - 138/59)  ABP(mean): 83 (18 Feb 2021 04:00) (60 - 83)  RR: 33 (18 Feb 2021 04:00) (33 - 34)  SpO2: 99% (18 Feb 2021 04:22) (94% - 99%)    Mode: AC/ CMV (Assist Control/ Continuous Mandatory Ventilation), RR (machine): 34, TV (machine): 430, FiO2: 85, PEEP: 14, ITime: 0.7, MAP: 25, PIP: 45    02-17 @ 07:01  -  02-18 @ 07:00  --------------------------------------------------------  IN: 2040.1 mL / OUT: 5130 mL / NET: -3089.9 mL      CAPILLARY BLOOD GLUCOSE      POCT Blood Glucose.: 147 mg/dL (18 Feb 2021 04:46)      PHYSICAL EXAM:    General: intubated and sedated  HEENT: PERRL, clear conjunctiva  Neck: supple  Respiratory: CTA b/l  Cardiovascular: +S1/S2; RRR  Abdomen: soft, NT/ND; +BS x4  Extremities: BLE edema  Skin: normal color and turgor; no rash    MEDICATIONS:  MEDICATIONS  (STANDING):  caspofungin IVPB      caspofungin IVPB 50 milliGRAM(s) IV Intermittent every 24 hours  chlorhexidine 0.12% Liquid 15 milliLiter(s) Oral Mucosa every 12 hours  chlorhexidine 4% Liquid 1 Application(s) Topical <User Schedule>  cisatracurium Infusion 3 MICROgram(s)/kG/Min (23.4 mL/Hr) IV Continuous <Continuous>  dexAMETHasone  Injectable 6 milliGRAM(s) IV Push daily  dextrose 40% Gel 15 Gram(s) Oral once  dextrose 5%. 1000 milliLiter(s) (50 mL/Hr) IV Continuous <Continuous>  dextrose 5%. 1000 milliLiter(s) (100 mL/Hr) IV Continuous <Continuous>  dextrose 50% Injectable 25 Gram(s) IV Push once  dextrose 50% Injectable 12.5 Gram(s) IV Push once  dextrose 50% Injectable 25 Gram(s) IV Push once  fentaNYL   Infusion..... 0.5 MICROgram(s)/kG/Hr (1.3 mL/Hr) IV Continuous <Continuous>  glucagon  Injectable 1 milliGRAM(s) IntraMuscular once  heparin  Infusion 1400 Unit(s)/Hr (14 mL/Hr) IV Continuous <Continuous>  insulin lispro (ADMELOG) corrective regimen sliding scale   SubCutaneous every 6 hours  ketamine Infusion. 0.25 mG/kG/Hr (3.25 mL/Hr) IV Continuous <Continuous>  meropenem  IVPB 1000 milliGRAM(s) IV Intermittent every 8 hours  meropenem  IVPB      norepinephrine Infusion 0.05 MICROgram(s)/kG/Min (6.09 mL/Hr) IV Continuous <Continuous>  pantoprazole  Injectable 40 milliGRAM(s) IV Push daily  polyethylene glycol 3350 17 Gram(s) Oral two times a day  propofol Infusion 50 MICROgram(s)/kG/Min (39 mL/Hr) IV Continuous <Continuous>  senna Syrup 10 milliLiter(s) Oral at bedtime    MEDICATIONS  (PRN):  acetaminophen    Suspension .. 650 milliGRAM(s) Oral every 6 hours PRN Temp greater or equal to 38C (100.4F)  sodium chloride 0.9% lock flush 10 milliLiter(s) IV Push every 1 hour PRN Pre/post blood products, medications, blood draw, and to maintain line patency      ALLERGIES:  Allergies    codeine (Unknown)    Intolerances        LABS:                        8.7    10.35 )-----------( 283      ( 18 Feb 2021 04:03 )             30.5     02-18    141  |  92<L>  |  23  ----------------------------<  168<H>  4.0   |  43<H>  |  0.72    Ca    8.4      18 Feb 2021 04:03  Phos  4.0     02-18  Mg     2.1     02-18    TPro  7.0  /  Alb  2.5<L>  /  TBili  0.9  /  DBili  x   /  AST  34<H>  /  ALT  19  /  AlkPhos  70  02-18    PT/INR - ( 17 Feb 2021 02:55 )   PT: 14.4 sec;   INR: 1.28 ratio         PTT - ( 18 Feb 2021 04:03 )  PTT:71.7 sec      RADIOLOGY & ADDITIONAL TESTS: Reviewed. **** INCOMPLETE NOTE ****      INTERVAL HPI/OVERNIGHT EVENTS:  Hematuria continued. Heparin gtt rate lowered from 16 to 14 overnight. Hgb stable at 8.7. No change in vent settings or drip rates. S/p Lasix 40 mg IV x2, total 5L output, net negative 3L.    SUBJECTIVE: Patient seen and examined at bedside. Intubated and sedated. Unable to assess ROS.    OBJECTIVE:    VITAL SIGNS:  ICU Vital Signs Last 24 Hrs  T(C): 37.6 (18 Feb 2021 04:00), Max: 38 (17 Feb 2021 11:00)  T(F): 99.7 (18 Feb 2021 04:00), Max: 100.4 (17 Feb 2021 11:00)  HR: 63 (18 Feb 2021 04:22) (48 - 79)  BP: --  BP(mean): --  ABP: 138/59 (18 Feb 2021 04:00) (34/55 - 138/59)  ABP(mean): 83 (18 Feb 2021 04:00) (60 - 83)  RR: 33 (18 Feb 2021 04:00) (33 - 34)  SpO2: 99% (18 Feb 2021 04:22) (94% - 99%)    Mode: AC/ CMV (Assist Control/ Continuous Mandatory Ventilation), RR (machine): 34, TV (machine): 430, FiO2: 85, PEEP: 14, ITime: 0.7, MAP: 25, PIP: 45    02-17 @ 07:01  -  02-18 @ 07:00  --------------------------------------------------------  IN: 2040.1 mL / OUT: 5130 mL / NET: -3089.9 mL      CAPILLARY BLOOD GLUCOSE      POCT Blood Glucose.: 147 mg/dL (18 Feb 2021 04:46)      PHYSICAL EXAM:    General: intubated and sedated  HEENT: PERRL, clear conjunctiva  Neck: supple  Respiratory: CTA b/l  Cardiovascular: +S1/S2; RRR  Abdomen: soft, NT/ND; +BS x4  Extremities: BLE edema  Skin: normal color and turgor; no rash    MEDICATIONS:  MEDICATIONS  (STANDING):  caspofungin IVPB      caspofungin IVPB 50 milliGRAM(s) IV Intermittent every 24 hours  chlorhexidine 0.12% Liquid 15 milliLiter(s) Oral Mucosa every 12 hours  chlorhexidine 4% Liquid 1 Application(s) Topical <User Schedule>  cisatracurium Infusion 3 MICROgram(s)/kG/Min (23.4 mL/Hr) IV Continuous <Continuous>  dexAMETHasone  Injectable 6 milliGRAM(s) IV Push daily  dextrose 40% Gel 15 Gram(s) Oral once  dextrose 5%. 1000 milliLiter(s) (50 mL/Hr) IV Continuous <Continuous>  dextrose 5%. 1000 milliLiter(s) (100 mL/Hr) IV Continuous <Continuous>  dextrose 50% Injectable 25 Gram(s) IV Push once  dextrose 50% Injectable 12.5 Gram(s) IV Push once  dextrose 50% Injectable 25 Gram(s) IV Push once  fentaNYL   Infusion..... 0.5 MICROgram(s)/kG/Hr (1.3 mL/Hr) IV Continuous <Continuous>  glucagon  Injectable 1 milliGRAM(s) IntraMuscular once  heparin  Infusion 1400 Unit(s)/Hr (14 mL/Hr) IV Continuous <Continuous>  insulin lispro (ADMELOG) corrective regimen sliding scale   SubCutaneous every 6 hours  ketamine Infusion. 0.25 mG/kG/Hr (3.25 mL/Hr) IV Continuous <Continuous>  meropenem  IVPB 1000 milliGRAM(s) IV Intermittent every 8 hours  meropenem  IVPB      norepinephrine Infusion 0.05 MICROgram(s)/kG/Min (6.09 mL/Hr) IV Continuous <Continuous>  pantoprazole  Injectable 40 milliGRAM(s) IV Push daily  polyethylene glycol 3350 17 Gram(s) Oral two times a day  propofol Infusion 50 MICROgram(s)/kG/Min (39 mL/Hr) IV Continuous <Continuous>  senna Syrup 10 milliLiter(s) Oral at bedtime    MEDICATIONS  (PRN):  acetaminophen    Suspension .. 650 milliGRAM(s) Oral every 6 hours PRN Temp greater or equal to 38C (100.4F)  sodium chloride 0.9% lock flush 10 milliLiter(s) IV Push every 1 hour PRN Pre/post blood products, medications, blood draw, and to maintain line patency      ALLERGIES:  Allergies    codeine (Unknown)    Intolerances        LABS:                        8.7    10.35 )-----------( 283      ( 18 Feb 2021 04:03 )             30.5     02-18    141  |  92<L>  |  23  ----------------------------<  168<H>  4.0   |  43<H>  |  0.72    Ca    8.4      18 Feb 2021 04:03  Phos  4.0     02-18  Mg     2.1     02-18    TPro  7.0  /  Alb  2.5<L>  /  TBili  0.9  /  DBili  x   /  AST  34<H>  /  ALT  19  /  AlkPhos  70  02-18    PT/INR - ( 17 Feb 2021 02:55 )   PT: 14.4 sec;   INR: 1.28 ratio         PTT - ( 18 Feb 2021 04:03 )  PTT:71.7 sec      RADIOLOGY & ADDITIONAL TESTS: Reviewed. **** INCOMPLETE NOTE ****      INTERVAL HPI/OVERNIGHT EVENTS:  Hematuria continued. Heparin gtt rate lowered from 16 to 14 overnight. Hgb stable at 8.7. No change in vent settings or drip rates. S/p Lasix 40 mg IV x2, total 5L output, net negative 3L. Febrile to 100.4 overnight, currently defervesced s/p Tylenol.    SUBJECTIVE: Patient seen and examined at bedside. Intubated and sedated. Unable to assess ROS.    OBJECTIVE:    VITAL SIGNS:  ICU Vital Signs Last 24 Hrs  T(C): 37.6 (18 Feb 2021 04:00), Max: 38 (17 Feb 2021 11:00)  T(F): 99.7 (18 Feb 2021 04:00), Max: 100.4 (17 Feb 2021 11:00)  HR: 63 (18 Feb 2021 04:22) (48 - 79)  BP: --  BP(mean): --  ABP: 138/59 (18 Feb 2021 04:00) (34/55 - 138/59)  ABP(mean): 83 (18 Feb 2021 04:00) (60 - 83)  RR: 33 (18 Feb 2021 04:00) (33 - 34)  SpO2: 99% (18 Feb 2021 04:22) (94% - 99%)    Mode: AC/ CMV (Assist Control/ Continuous Mandatory Ventilation), RR (machine): 34, TV (machine): 430, FiO2: 85, PEEP: 14, ITime: 0.7, MAP: 25, PIP: 45    02-17 @ 07:01  -  02-18 @ 07:00  --------------------------------------------------------  IN: 2040.1 mL / OUT: 5130 mL / NET: -3089.9 mL      CAPILLARY BLOOD GLUCOSE      POCT Blood Glucose.: 147 mg/dL (18 Feb 2021 04:46)      PHYSICAL EXAM:    General: intubated and sedated  HEENT: PERRL, clear conjunctiva  Neck: supple  Respiratory: CTA b/l  Cardiovascular: +S1/S2; RRR  Abdomen: soft, NT/ND; +BS x4  Extremities: BLE edema  Skin: normal color and turgor; no rash    MEDICATIONS:  MEDICATIONS  (STANDING):  caspofungin IVPB      caspofungin IVPB 50 milliGRAM(s) IV Intermittent every 24 hours  chlorhexidine 0.12% Liquid 15 milliLiter(s) Oral Mucosa every 12 hours  chlorhexidine 4% Liquid 1 Application(s) Topical <User Schedule>  cisatracurium Infusion 3 MICROgram(s)/kG/Min (23.4 mL/Hr) IV Continuous <Continuous>  dexAMETHasone  Injectable 6 milliGRAM(s) IV Push daily  dextrose 40% Gel 15 Gram(s) Oral once  dextrose 5%. 1000 milliLiter(s) (50 mL/Hr) IV Continuous <Continuous>  dextrose 5%. 1000 milliLiter(s) (100 mL/Hr) IV Continuous <Continuous>  dextrose 50% Injectable 25 Gram(s) IV Push once  dextrose 50% Injectable 12.5 Gram(s) IV Push once  dextrose 50% Injectable 25 Gram(s) IV Push once  fentaNYL   Infusion..... 0.5 MICROgram(s)/kG/Hr (1.3 mL/Hr) IV Continuous <Continuous>  glucagon  Injectable 1 milliGRAM(s) IntraMuscular once  heparin  Infusion 1400 Unit(s)/Hr (14 mL/Hr) IV Continuous <Continuous>  insulin lispro (ADMELOG) corrective regimen sliding scale   SubCutaneous every 6 hours  ketamine Infusion. 0.25 mG/kG/Hr (3.25 mL/Hr) IV Continuous <Continuous>  meropenem  IVPB 1000 milliGRAM(s) IV Intermittent every 8 hours  meropenem  IVPB      norepinephrine Infusion 0.05 MICROgram(s)/kG/Min (6.09 mL/Hr) IV Continuous <Continuous>  pantoprazole  Injectable 40 milliGRAM(s) IV Push daily  polyethylene glycol 3350 17 Gram(s) Oral two times a day  propofol Infusion 50 MICROgram(s)/kG/Min (39 mL/Hr) IV Continuous <Continuous>  senna Syrup 10 milliLiter(s) Oral at bedtime    MEDICATIONS  (PRN):  acetaminophen    Suspension .. 650 milliGRAM(s) Oral every 6 hours PRN Temp greater or equal to 38C (100.4F)  sodium chloride 0.9% lock flush 10 milliLiter(s) IV Push every 1 hour PRN Pre/post blood products, medications, blood draw, and to maintain line patency      ALLERGIES:  Allergies    codeine (Unknown)    Intolerances        LABS:                        8.7    10.35 )-----------( 283      ( 18 Feb 2021 04:03 )             30.5     02-18    141  |  92<L>  |  23  ----------------------------<  168<H>  4.0   |  43<H>  |  0.72    Ca    8.4      18 Feb 2021 04:03  Phos  4.0     02-18  Mg     2.1     02-18    TPro  7.0  /  Alb  2.5<L>  /  TBili  0.9  /  DBili  x   /  AST  34<H>  /  ALT  19  /  AlkPhos  70  02-18    PT/INR - ( 17 Feb 2021 02:55 )   PT: 14.4 sec;   INR: 1.28 ratio         PTT - ( 18 Feb 2021 04:03 )  PTT:71.7 sec      RADIOLOGY & ADDITIONAL TESTS: Reviewed. INTERVAL HPI/OVERNIGHT EVENTS:  Hematuria continued. Heparin gtt rate lowered from 16 to 14 overnight. Hgb stable at 8.7. No change in vent settings or drip rates. S/p Lasix 40 mg IV x2, total 5L output, net negative 3L. Febrile to 100.4 overnight, currently defervesced s/p Tylenol.    SUBJECTIVE: Patient seen and examined at bedside. Intubated and sedated. Unable to assess ROS.    OBJECTIVE:    VITAL SIGNS:  ICU Vital Signs Last 24 Hrs  T(C): 37.6 (18 Feb 2021 04:00), Max: 38 (17 Feb 2021 11:00)  T(F): 99.7 (18 Feb 2021 04:00), Max: 100.4 (17 Feb 2021 11:00)  HR: 63 (18 Feb 2021 04:22) (48 - 79)  BP: --  BP(mean): --  ABP: 138/59 (18 Feb 2021 04:00) (34/55 - 138/59)  ABP(mean): 83 (18 Feb 2021 04:00) (60 - 83)  RR: 33 (18 Feb 2021 04:00) (33 - 34)  SpO2: 99% (18 Feb 2021 04:22) (94% - 99%)    Mode: AC/ CMV (Assist Control/ Continuous Mandatory Ventilation), RR (machine): 34, TV (machine): 430, FiO2: 85, PEEP: 14, ITime: 0.7, MAP: 25, PIP: 45    02-17 @ 07:01  -  02-18 @ 07:00  --------------------------------------------------------  IN: 2040.1 mL / OUT: 5130 mL / NET: -3089.9 mL      CAPILLARY BLOOD GLUCOSE      POCT Blood Glucose.: 147 mg/dL (18 Feb 2021 04:46)      PHYSICAL EXAM:    General: intubated and sedated  HEENT: PERRL, clear conjunctiva  Neck: supple  Respiratory: CTA b/l  Cardiovascular: +S1/S2; RRR  Abdomen: soft, NT/ND; +BS x4  Extremities: BLE edema  Skin: normal color and turgor; no rash    MEDICATIONS:  MEDICATIONS  (STANDING):  caspofungin IVPB      caspofungin IVPB 50 milliGRAM(s) IV Intermittent every 24 hours  chlorhexidine 0.12% Liquid 15 milliLiter(s) Oral Mucosa every 12 hours  chlorhexidine 4% Liquid 1 Application(s) Topical <User Schedule>  cisatracurium Infusion 3 MICROgram(s)/kG/Min (23.4 mL/Hr) IV Continuous <Continuous>  dexAMETHasone  Injectable 6 milliGRAM(s) IV Push daily  dextrose 40% Gel 15 Gram(s) Oral once  dextrose 5%. 1000 milliLiter(s) (50 mL/Hr) IV Continuous <Continuous>  dextrose 5%. 1000 milliLiter(s) (100 mL/Hr) IV Continuous <Continuous>  dextrose 50% Injectable 25 Gram(s) IV Push once  dextrose 50% Injectable 12.5 Gram(s) IV Push once  dextrose 50% Injectable 25 Gram(s) IV Push once  fentaNYL   Infusion..... 0.5 MICROgram(s)/kG/Hr (1.3 mL/Hr) IV Continuous <Continuous>  glucagon  Injectable 1 milliGRAM(s) IntraMuscular once  heparin  Infusion 1400 Unit(s)/Hr (14 mL/Hr) IV Continuous <Continuous>  insulin lispro (ADMELOG) corrective regimen sliding scale   SubCutaneous every 6 hours  ketamine Infusion. 0.25 mG/kG/Hr (3.25 mL/Hr) IV Continuous <Continuous>  meropenem  IVPB 1000 milliGRAM(s) IV Intermittent every 8 hours  meropenem  IVPB      norepinephrine Infusion 0.05 MICROgram(s)/kG/Min (6.09 mL/Hr) IV Continuous <Continuous>  pantoprazole  Injectable 40 milliGRAM(s) IV Push daily  polyethylene glycol 3350 17 Gram(s) Oral two times a day  propofol Infusion 50 MICROgram(s)/kG/Min (39 mL/Hr) IV Continuous <Continuous>  senna Syrup 10 milliLiter(s) Oral at bedtime    MEDICATIONS  (PRN):  acetaminophen    Suspension .. 650 milliGRAM(s) Oral every 6 hours PRN Temp greater or equal to 38C (100.4F)  sodium chloride 0.9% lock flush 10 milliLiter(s) IV Push every 1 hour PRN Pre/post blood products, medications, blood draw, and to maintain line patency      ALLERGIES:  Allergies    codeine (Unknown)    Intolerances        LABS:                        8.7    10.35 )-----------( 283      ( 18 Feb 2021 04:03 )             30.5     02-18    141  |  92<L>  |  23  ----------------------------<  168<H>  4.0   |  43<H>  |  0.72    Ca    8.4      18 Feb 2021 04:03  Phos  4.0     02-18  Mg     2.1     02-18    TPro  7.0  /  Alb  2.5<L>  /  TBili  0.9  /  DBili  x   /  AST  34<H>  /  ALT  19  /  AlkPhos  70  02-18    PT/INR - ( 17 Feb 2021 02:55 )   PT: 14.4 sec;   INR: 1.28 ratio         PTT - ( 18 Feb 2021 04:03 )  PTT:71.7 sec      RADIOLOGY & ADDITIONAL TESTS: Reviewed.

## 2021-02-18 NOTE — PROGRESS NOTE ADULT - ATTENDING COMMENTS
Gong: I have seen and examined the patient face to face, have reviewed and addended the HPI, PE and a/p as necessary.     65yF with obesity, HTN, hx of LLE DVT not on AC, COVID+ on 1/17, presenting for acutely worsening SOB, intubated 1/29 for acute hypoxic respiratory failure. Still requiring high vent settings with high plateau pressures requiring ongoing ICU level care.  s/p bronch on 2/17 for high peak pressures and difficulty suctioning.    Neuro: Intubated and sedated on Propofol, Ketamine and Fentanyl; s/p paralytics IV x 1 today..    CV: HD stable, maintain MAP>65; wean vasopressors  Pulm: COVID 19 Pneumonia and ARDS - remains on AC 34/430/14/90; with persistent elevated plateau pressures. Required proning for ARDS, though become hypoxic.  Noted to have high peak pressures this AM; required paralytics; pending repeat ABG.   - Dexamethasone 6mg daily since 2/13; start weaning steroids slowly tomorrow given 2nd round of steroids.    GI: s/p relastor with large bowel movement; c/w protonix 40mg;    Renal/Metabolic: Hyponatremia - resolving; overall fluid overloaded - continue with Lasix 40mg IVP BID with goal net negative >1L.   ID: Fever - uptrending leukocytosis with procalcitonin downtrending; possible fungal pneumonia on BAL on caspofungin continue for 7 days through 2/18/21; s/p empiric varun complete through 2/19/21 for 10 day course; Vanco held 2/2 elevated level.   Heme: h/o Dvt unable to obtain CTA; c/w heparin gtt; Duplex 1/27: b/l above knee DVT; Hgb maintain >7; lower PTT to 60-80 goal given hematuria.    Endo: ISS  Dispo: Remains full code; overall prognosis poor; will consult palliative.

## 2021-02-18 NOTE — PROGRESS NOTE ADULT - ASSESSMENT
65yF with obesity, HTN, hx of LLE DVT not on AC, COVID+ on 1/17, presenting for acutely worsening SOB, intubated 1/29 for acute hypoxic respiratory failure. Still requiring high vent settings with high plateau pressures requiring ongoing ICU level care    Neuro:  - propofol, ketamine, fentanyl wean as tolerated  - s/p nimbex gtts    Respiratory:  - AC 34/430/14/85%, intubated (1/29 - )   - Failed trial of APRV  - s/p bronchoscopy 2/9- NG thus far   - on heparin gtt, concern for PE given elevated D-dimer in 7000s - downtrending and currently in the 1000s   - s/p 10d course of Dexamethasone (1/27 - 2/5), Remdesivir d/c'ed (1/26 -1/30). restarted dexamethasone 6 mg IV (2/13 - ) in light of uptrending d-dimer, leukocytosis, CRP, ferritin, LDH  - Prone 18/6 as needed for ARDS, does not tolerate proning, becomes hypoxic  - Daily ABG. Currently attempting daily diuresis as below.    Cards:  - Currently off pressors  - S/p 1u pRBC yesterday given hematuria, now hgb 8.6. Will hold off further transfusion at this time.  - Hep gtt ongoing with modified goal (60 - 80) given hematuria.    GI:  - c/w tube feeds  - Protonix 40mg qD  - senna  - S/p Relitor for longstanding constipation, with 1 large bowel movement.    Transaminitis 2/12/21  Improving     Renal:    - hyponatremia, improving. urine lytes, osmolarity not concerning  - Currently with hematuria on heparin gtt. monitor hemoglobin.  - Lasix 40 mg IV BID at this time. Goal net negative >1L.    Heme:  h/o DVT, b/l upper thigh DVTs; PE suspected but too unstable for a CTA, Ddimer >50k initially  - Duplex 1/27: b/l above knee DVT  - continue heparin gtt, daily PTT  Anemia, likely 2/2 blood draws  - CTM  - Hgb corrected from 7.7 to 9.2 s/p 1u pRBC transfusion, decreased to 8.6 overnight.  - With hematuria now. Given the need for heparin gtt, will use lowered PTT goal. Monitor CBC tonight.    ID  Fever:  - s/p Ceftriaxone 7d course for E.coli UTI, CTX (2/5- 2/8)  - empiric varun and vanc (2/9 - ). Continuing at this time.  - Caspofungin 2/11/21- for ? evidence of fungi on BAL  - f/u repeat blood, urine cx,  2/9- NG thus far, urine cx negative   - sputum cx 2/9/21- N resp kalpesh with some E Coli- pansensitive (sensitive to Meropenem)   - f/u BAL cultures and fungal cultures 2/9- NG thus far  - MRSA swab positive  - febrile 2/11/21- send blood cx   - 2/13/21 up-trending leukocytosis, LDH, D-dimer, CRP. Downtrending procalcitonin with downtrending procalcitonin- will try dexamethasone 6mg daily   - Vanc 41.8 today. Will continue to hold. Recheck tomorrow AM.    Ethics:  - full code  - family aware of poor prognosis   - Palliative consult placed.   65yF with obesity, HTN, hx of LLE DVT not on AC, COVID+ on 1/17, presenting for acutely worsening SOB, intubated 1/29 for acute hypoxic respiratory failure. Still requiring high vent settings with high plateau pressures requiring ongoing ICU level care    Neuro:  - propofol, ketamine, fentanyl wean as tolerated  - s/p nimbex gtts  - Unstable for transfer to CT    Respiratory:  - AC 34/430/14/80%, intubated (1/29 - )   - Failed trial of APRV  - s/p bronchoscopy 2/9- NG thus far   - on heparin gtt, concern for PE given elevated D-dimer in 7000s - downtrending and currently in the 1000s   - s/p 10d course of Dexamethasone (1/27 - 2/5), Remdesivir d/c'ed (1/26 -1/30). restarted dexamethasone 6 mg IV (2/13 - ) in light of uptrending d-dimer, leukocytosis, CRP, ferritin, LDH  - Prone 18/6 as needed for ARDS, does not tolerate proning, becomes hypoxic  - Daily ABG. Currently attempting daily diuresis as below.    Cards:  - Currently off pressors  - S/p 1u pRBC yesterday given hematuria, now hgb 8.6. Will hold off further transfusion at this time.  - Hep gtt ongoing with modified goal (60 - 80) given hematuria, currently at 14.    GI:  - c/w tube feeds  - Protonix 40mg qD  - senna  - S/p Relistor for longstanding constipation, with 1 large bowel movement.    Transaminitis 2/12/21  Improving     Renal:    - hyponatremia, improving. urine lytes, osmolarity not concerning  - Currently with hematuria on heparin gtt. monitor hemoglobin.  - Lasix 40 mg IV BID at this time. Goal net negative >1L.  - Net negative 4L yesterday, however patient is with frequent Mckeon flush and I/O may not adequately reflect correct output.    Heme:  h/o DVT, b/l upper thigh DVTs; PE suspected but too unstable for a CTA, Ddimer >50k initially  - Duplex 1/27: b/l above knee DVT  - continue heparin gtt, daily PTT  Anemia, likely 2/2 blood draws  - [facility]  - Hgb corrected from 7.7 to 9.2 s/p 1u pRBC transfusion, decreased to 8.6 overnight.  - With hematuria now. Given the need for heparin gtt, will use lowered PTT goal. Monitor CBC tonight.    ID  Fever:  - s/p Ceftriaxone 7d course for E.coli UTI, CTX (2/5- 2/8)  - On empiric meropenem (2/9 - ). Will d/c tomorrow for 10-day course  - Vancomycin supratherapeutic today at 22. Will d/c with empiric varun  - Caspofungin 2/11/21- for ? evidence of fungi on BAL. Last dose today (2/18), then d/c.  - f/u repeat blood, urine cx,  2/9- NG thus far, urine cx negative   - sputum cx 2/9/21- N resp kalpesh with some E Coli- pansensitive (sensitive to Meropenem)   - f/u BAL cultures and fungal cultures 2/9- NG thus far  - MRSA swab positive  - febrile 2/11/21- send blood cx   - 2/13/21 up-trending leukocytosis, LDH, D-dimer, CRP. Downtrending procalcitonin with downtrending procalcitonin- will try dexamethasone 6mg daily   - Central line to be changed today (inserted 1/29).    Ethics:  - full code  - family aware of poor prognosis

## 2021-02-19 NOTE — PROGRESS NOTE ADULT - ASSESSMENT
65yF with obesity, HTN, hx of LLE DVT not on AC, COVID+ on 1/17, presenting for acutely worsening SOB, intubated 1/29 for acute hypoxic respiratory failure. Still requiring high vent settings with high plateau pressures requiring ongoing ICU level care    Neuro:  - propofol, ketamine, fentanyl wean as tolerated  - s/p nimbex gtts  - Unstable for transfer to CT    Respiratory:  - AC 34/430/14/80%, intubated (1/29 - )   - Failed trial of APRV  - s/p bronchoscopy 2/9- NG thus far   - on heparin gtt, concern for PE given elevated D-dimer in 7000s - downtrending and currently in the 1000s   - s/p 10d course of Dexamethasone (1/27 - 2/5), Remdesivir d/c'ed (1/26 -1/30). restarted dexamethasone 6 mg IV (2/13 - ) in light of uptrending d-dimer, leukocytosis, CRP, ferritin, LDH  - Prone 18/6 as needed for ARDS, does not tolerate proning, becomes hypoxic  - Daily ABG. Currently attempting daily diuresis as below.    Cards:  - Currently off pressors  - S/p 1u pRBC yesterday given hematuria, now hgb 8.6. Will hold off further transfusion at this time.  - Hep gtt ongoing with modified goal (60 - 80) given hematuria, currently at 14.    GI:  - c/w tube feeds  - Protonix 40mg qD  - senna  - S/p Relistor for longstanding constipation, with 1 large bowel movement.    Transaminitis 2/12/21  Improving     Renal:    - hyponatremia, improving. urine lytes, osmolarity not concerning  - Currently with hematuria on heparin gtt. monitor hemoglobin.  - Lasix 40 mg IV BID at this time. Goal net negative >1L.  - Net negative 4L yesterday, however patient is with frequent Mckeon flush and I/O may not adequately reflect correct output.    Heme:  h/o DVT, b/l upper thigh DVTs; PE suspected but too unstable for a CTA, Ddimer >50k initially  - Duplex 1/27: b/l above knee DVT  - continue heparin gtt, daily PTT  Anemia, likely 2/2 blood draws  - [facility]  - Hgb corrected from 7.7 to 9.2 s/p 1u pRBC transfusion, decreased to 8.6 overnight.  - With hematuria now. Given the need for heparin gtt, will use lowered PTT goal. Monitor CBC tonight.    ID  Fever:  - s/p Ceftriaxone 7d course for E.coli UTI, CTX (2/5- 2/8)  - On empiric meropenem (2/9 - ). Will d/c tomorrow for 10-day course  - Vancomycin supratherapeutic today at 22. Will d/c with empiric varun  - Caspofungin 2/11/21- for ? evidence of fungi on BAL. Last dose today (2/18), then d/c.  - f/u repeat blood, urine cx,  2/9- NG thus far, urine cx negative   - sputum cx 2/9/21- N resp kalpesh with some E Coli- pansensitive (sensitive to Meropenem)   - f/u BAL cultures and fungal cultures 2/9- NG thus far  - MRSA swab positive  - febrile 2/11/21- send blood cx   - 2/13/21 up-trending leukocytosis, LDH, D-dimer, CRP. Downtrending procalcitonin with downtrending procalcitonin- will try dexamethasone 6mg daily   - Central line to be changed today (inserted 1/29).    Ethics:  - full code  - family aware of poor prognosis    65yF with obesity, HTN, hx of LLE DVT not on AC, COVID+ on 1/17, presenting for acutely worsening SOB, intubated 1/29 for acute hypoxic respiratory failure. Still requiring high vent settings with high plateau pressures requiring ongoing ICU level care    Neuro:  - propofol, ketamine, fentanyl wean as tolerated  - s/p nimbex gtts  - Unstable for transfer to CT    Respiratory:  - AC 34/430/14/70%, intubated (1/29 - )   - Failed trial of APRV  - s/p bronchoscopy 2/9- NG thus far   - on heparin gtt, concern for PE given elevated D-dimer in 7000s - downtrending   - s/p 10d course of Dexamethasone (1/27 - 2/5), Remdesivir d/c'ed (1/26 -1/30). restarted dexamethasone 6 mg IV (2/13 - ) in light of uptrending d-dimer, leukocytosis, CRP, ferritin, LDH  - Slowly tapering dexamethasone starting today (4 mg toay)  - Prone 18/6 as needed for ARDS, does not tolerate proning, becomes hypoxic  - Daily ABG. Currently attempting daily diuresis as below.    Cards:  - Currently off pressors  - With hematuria while on heparin, currently with improvement. S/p 1u pRBC 2/17. Hgb stable at mid-8, today 8.5.  - Hep gtt ongoing with modified goal (60 - 80) given hematuria    GI:  - c/w tube feeds  - Protonix 40mg qD  - senna  - S/p Relistor for longstanding constipation, with 1 large bowel movement.    Transaminitis 2/12/21  Improving     Renal:    - hyponatremia, improving. urine lytes, osmolarity not concerning  - Currently with hematuria on heparin gtt. monitor hemoglobin.  - Lasix 40 mg IV BID at this time. Goal net negative >1L.  - Net negative 1.3L yesterday.    Heme:  h/o DVT, b/l upper thigh DVTs; PE suspected but too unstable for a CTA, Ddimer >50k initially  - Duplex 1/27: b/l above knee DVT  - continue heparin gtt, daily PTT  Anemia, likely 2/2 blood draws  - [facility]  - Hgb corrected from 7.7 to 9.2 s/p 1u pRBC transfusion, decreased to 8.6 overnight.  - With hematuria now. Given the need for heparin gtt, will use lowered PTT goal (60 - 80).  -     ID  Fever:  - s/p Ceftriaxone 7d course for E.coli UTI, CTX (2/5- 2/8)  - On empiric meropenem (2/9 - ). Will d/c tomorrow for 10-day course  - Vancomycin supratherapeutic today at 22. Will d/c with empiric varun  - Caspofungin 2/11/21- for ? evidence of fungi on BAL. Last dose today (2/18), then d/c.  - f/u repeat blood, urine cx,  2/9- NG thus far, urine cx negative   - sputum cx 2/9/21- N resp kalpesh with some E Coli- pansensitive (sensitive to Meropenem)   - f/u BAL cultures and fungal cultures 2/9- NG thus far  - MRSA swab positive  - febrile 2/11/21- send blood cx   - 2/13/21 up-trending leukocytosis, LDH, D-dimer, CRP. Downtrending procalcitonin with downtrending procalcitonin- will try dexamethasone 6mg daily   - Central line to be changed today (inserted 1/29).    Ethics:  - full code  - family aware of poor prognosis    65yF with obesity, HTN, hx of LLE DVT not on AC, COVID+ on 1/17, presenting for acutely worsening SOB, intubated 1/29 for acute hypoxic respiratory failure. Still requiring high vent settings with high plateau pressures requiring ongoing ICU level care    Neuro:  - propofol, ketamine, fentanyl wean as tolerated  - s/p nimbex gtts  - Unstable for transfer to CT    Respiratory:  - AC 34/430/14/70%, intubated (1/29 - )   - Failed trial of APRV  - s/p bronchoscopy 2/9- NG thus far   - on heparin gtt, concern for PE given elevated D-dimer in 7000s - downtrending   - s/p 10d course of Dexamethasone (1/27 - 2/5), Remdesivir d/c'ed (1/26 -1/30). restarted dexamethasone 6 mg IV (2/13 - ) in light of uptrending d-dimer, leukocytosis, CRP, ferritin, LDH with a plan of slow tapering.  - Slowly tapering dexamethasone starting today (4 mg toay)  - Prone 18/6 as needed for ARDS, does not tolerate proning, becomes hypoxic  - Daily ABG. Currently attempting daily diuresis as below.  - Will recheck ET tube with CXR    Cards:  - Currently off pressors  - With hematuria while on heparin, currently with improvement. S/p 1u pRBC 2/17. Hgb stable at mid-8, today 8.5.  - Hep gtt ongoing with modified goal (60 - 80) given hematuria    GI:  - c/w tube feeds  - Protonix 40mg qD  - senna  - S/p Relistor for longstanding constipation, with 1 large bowel movement.    Transaminitis 2/12/21  Improving     Renal:    - hyponatremia, improving. urine lytes, osmolarity not concerning  - Currently with hematuria on heparin gtt. monitor hemoglobin.  - Lasix 40 mg IV BID at this time. Goal net negative >1L.  - Net negative 1.3L yesterday.    Heme:  h/o DVT, b/l upper thigh DVTs; PE suspected but too unstable for a CTA, Ddimer >50k initially  - Duplex 1/27: b/l above knee DVT  - continue heparin gtt, daily PTT  Anemia, likely 2/2 blood draws  - [facility]  - Hgb corrected from 7.7 to 9.2 s/p 1u pRBC transfusion, decreased to 8.6 overnight.  - With hematuria now. Given the need for heparin gtt, will use lowered PTT goal (60 - 80).  -     ID  Fever:  - s/p Ceftriaxone 7d course for E.coli UTI, CTX (2/5- 2/8)  - S/p empiric varun and vanc (2/9 - 2/19). D/c today  - S/p Caspofungin 2/11 - 2/18 for ? evidence of fungi on BAL  - f/u repeat blood, urine cx,  2/9- NG thus far, urine cx negative   - sputum cx 2/9/21- N resp kalpesh with some E Coli- pansensitive (sensitive to Meropenem)   - f/u BAL cultures and fungal cultures 2/9- NG thus far  - MRSA swab positive  - febrile 2/11/21- send blood cx   - 2/13/21 up-trending leukocytosis, LDH, D-dimer, CRP. Downtrending procalcitonin with downtrending procalcitonin- will try dexamethasone 6mg daily, with a plan for slow taper (started 2/19)   - L subclavian central line to be removed today (1/29 - 2/19); [facility] inserted 2/18.    Ethics:  - full code  - family aware of poor prognosis

## 2021-02-19 NOTE — PROGRESS NOTE ADULT - ATTENDING COMMENTS
65F h/o obesity, HTN, previous LLE DVT not on AC, COVID+ on 1/17 a/w hypoxic respiratory failure, intubated 1/29 now in ARDS      Neuro: sedated on propofol, ketamine and fentanyl. no improvement in respiratory dynamics s/p nimbex IVP x1 yesterday     CV: vasoplegic shock on low dose levophed  - total body overloaded with ongoing diuresis, goal net negative >1L next 24hrs  Pulm: COVID 19 Pneumonia and ARDS - remains on AC 34/430/14/90 with PPlat 28-30 today, increased RR to 36 with no change in pressures but improvement in ABG; not tolerant of prone positioning 2/2 desaturations    - despite receiving set volume of 430, returned expiratory volume 50-70cc lower with no evidence of significant leak, no significant auto-peep, no clear vent mechanical malfunction, CXR confirms ETT in place, not improved with paralysis and s/p bronch yesterday without clear abnormality  - Dexamethasone 6mg daily since 2/13, weaned to 4mg today for slow taper given prolonged course (received 2 rounds of steroids)  GI: continue enteral feeds; s/p relastor with large bowel movement; c/w protonix ppx   Renal: hyponatremia improved with diuresis   - elevated bicarb compensation for persistent hypercarbia  ID: stable leukocytosis s/p emperic caspofungin, meropenem and vanc (held 2/2 supratherapeutic level)   Heme: h/o Dvt unable to obtain CTA but duplex 1/27 +b/l above the knee DVTs - c/w heparin gtt with lower PTT goal 60-80 given hematuria  Endo: FS at goal, continue ISS coverage    Overall prognosis poor. Full code.

## 2021-02-20 NOTE — PROGRESS NOTE ADULT - ATTENDING COMMENTS
65F h/o obesity, HTN, previous LLE DVT not on AC, COVID+ on 1/17 a/w hypoxic respiratory failure, intubated 1/29 now in ARDS      Neuro: sedated on propofol, ketamine and fentanyl    CV: vasoplegic shock on low dose levophed  - total body overloaded with ongoing diuresis, net negative 441cc previous 24hrs, may require lasix gtt if unable to obtain goal neg >1L   Pulm: COVID 19 Pneumonia and ARDS - remains on AC 36/430/14/90 with PPlat 35; not tolerant of prone positioning 2/2 desaturations    - Dexamethasone weaned to 4mg 2/18, wean to 2mg starting tomorrow   GI: continue enteral feeds; s/p relastor with large bowel movement; c/w protonix ppx   Renal: hyponatremia improved with diuresis; elevated bicarb 2/2 renal compensation respiratory acidosis  ID: leukocytosis resolved s/p emperic caspofungin, meropenem and vanc (held 2/2 supratherapeutic level)   Heme: h/o Dvt unable to obtain CTA but duplex 1/27 +b/l above the knee DVTs - c/w heparin gtt with lower PTT goal 60-80 given hematuria  Endo: FS at goal, continue ISS coverage    Overall prognosis poor. Full code.

## 2021-02-20 NOTE — PROGRESS NOTE ADULT - ASSESSMENT
65yF with obesity, HTN, hx of LLE DVT not on AC, COVID+ on 1/17, presenting for acutely worsening SOB, intubated 1/29 for acute hypoxic respiratory failure. Still requiring high vent settings with high plateau pressures requiring ongoing ICU level care    Neuro:  - propofol, ketamine, fentanyl wean as tolerated  - s/p nimbex gtts  - Unstable for transfer to CT    Respiratory:  - AC 36/430/14/85%, intubated (1/29 - )   - Failed trial of APRV  - s/p bronchoscopy 2/9- NG thus far   - on heparin gtt, concern for PE given elevated D-dimer in 7000s - downtrending   - s/p 10d course of Dexamethasone (1/27 - 2/5), Remdesivir d/c'ed (1/26 -1/30). restarted dexamethasone 6 mg IV (2/13 - ) in light of uptrending inflammatory markers  - Slowly tapering dexamethasone starting today (4 mg 2/20)  - ideally would Prone 18/6 as needed for ARDS, but does not tolerate proning, becomes hypoxic    Cards:  #Hypotension  - likely vasoplegia from sedation meds  - c/w levophed, wean as tolerated     GI:  - c/w tube feeds  - Protonix 40mg qD  - senna  - S/p Relistor for longstanding constipation, with 1 large bowel movement.    Transaminitis 2/12/21  Improving     Renal:    - hyponatremia, improving. urine lytes, osmolarity not concerning  - Lasix 40 mg IV BID at this time, up titrate as needed. Goal net negative >1L.  - received 60 IV lasix 2/19 overnight  - Net negative 400cc today    Heme:  h/o DVT, b/l upper thigh DVTs; PE suspected but too unstable for a CTA, Ddimer >50k initially  - Duplex 1/27: b/l above knee DVT  - heparin gtt, with modified ptt goal 60-80  Anemia, likely 2/2 blood draws  - With hematuria while on heparin, currently with improvement. S/p 1u pRBC 2/17. Hgb stable at mid-8, today 8.2    ID  Fever:  - s/p Ceftriaxone 7d course for E.coli UTI, CTX (2/5- 2/8)  - S/p empiric varun and vanc (2/9 - 2/19). D/c today  - S/p Caspofungin 2/11 - 2/18 for ? evidence of fungi on BAL  - f/u repeat blood, urine cx,  2/9- NG thus far, urine cx negative   - sputum cx 2/9/21- N resp kalpesh with some E Coli- pansensitive (sensitive to Meropenem)   - f/u BAL cultures and fungal cultures 2/9- NG thus far  - MRSA swab positive  - febrile 2/11/21- send blood cx   - 2/13/21 up-trending leukocytosis, LDH, D-dimer, CRP. will try dexamethasone 6mg daily, with a plan for slow taper (started 2/19)   - L subclavian central line to be removed today (1/29 - 2/19); [facility] inserted 2/18.    Ethics:  - full code  - family aware of poor prognosis      65yF with obesity, HTN, hx of LLE DVT not on AC, COVID+ on 1/17, presenting for acutely worsening SOB, intubated 1/29 for acute hypoxic respiratory failure. Still requiring high vent settings with high plateau pressures requiring ongoing ICU level care    Neuro:  - propofol, ketamine, fentanyl wean as tolerated  - s/p nimbex gtts  - Unstable for transfer to CT    Respiratory:  - AC 36/430/14/85%, intubated (1/29 - )   - Failed trial of APRV  - s/p bronchoscopy 2/9- NG thus far   - on heparin gtt, concern for PE given elevated D-dimer in 7000s - downtrending   - s/p 10d course of Dexamethasone (1/27 - 2/5), Remdesivir d/c'ed (1/26 -1/30). restarted dexamethasone 6 mg IV (2/13 - ) in light of uptrending inflammatory markers  - Slowly tapering dexamethasone starting today (4 mg 2/20)  - taper to dexamethasone 2mg 2/21 for 3 days  - ideally would Prone 18/6 as needed for ARDS, but does not tolerate proning, becomes hypoxic    Cards:  #Hypotension  - likely vasoplegia from sedation meds  - c/w levophed, wean as tolerated     GI:  - c/w tube feeds  - Protonix 40mg qD  - senna  - S/p Relistor for longstanding constipation, with 1 large bowel movement.    Transaminitis 2/12/21  Improving     Renal:    - hyponatremia, improving. urine lytes, osmolarity not concerning  - Lasix 40 mg IV BID at this time, up titrate as needed. Goal net negative >1L.  - received 60 IV lasix 2/19 overnight  - raymundo exchanged due to obstruction and pt put out 2L (2/20)     Heme:  h/o DVT, b/l upper thigh DVTs; PE suspected but too unstable for a CTA, Ddimer >50k initially  - Duplex 1/27: b/l above knee DVT  - heparin gtt, with modified ptt goal 60-80  Anemia, likely 2/2 blood draws  - With hematuria while on heparin, currently with improvement. S/p 1u pRBC 2/17. Hgb stable at mid-8, today 8.2    ID  Fever:  - s/p Ceftriaxone 7d course for E.coli UTI, CTX (2/5- 2/8)  - S/p empiric varun and vanc (2/9 - 2/19). D/c today  - S/p Caspofungin 2/11 - 2/18 for ? evidence of fungi on BAL  - f/u repeat blood, urine cx,  2/9- NG thus far, urine cx negative   - sputum cx 2/9/21- N resp kalpesh with some E Coli- pansensitive (sensitive to Meropenem)   - f/u BAL cultures and fungal cultures 2/9- NG thus far  - MRSA swab positive  - febrile 2/11/21- send blood cx   - 2/13/21 up-trending leukocytosis, LDH, D-dimer, CRP. will try dexamethasone 6mg daily, with a plan for slow taper (started 2/19)   - L subclavian central line to be removed today (1/29 - 2/19); LI[facility] inserted 2/18.    Ethics:  - full code  - family aware of poor prognosis

## 2021-02-20 NOTE — PROGRESS NOTE ADULT - SUBJECTIVE AND OBJECTIVE BOX
Eliceo Heatondwightgely (PGY-2) | [facility] [phone] (NS) | [facility] [phone]    INTERVAL HPI/OVERNIGHT EVENTS:  overnight, patient desaturated and FIO2 increased from 75 to 85$%. Received 60 IV lasix (instead of 40IV).   ROS: unattainable given intubated and sedated status     OBJECTIVE:    VITAL SIGNS:  ICU Vital Signs Last 24 Hrs  T(C): 37.4 (20 Feb 2021 04:00), Max: 37.4 (19 Feb 2021 12:00)  T(F): 99.3 (20 Feb 2021 04:00), Max: 99.3 (19 Feb 2021 12:00)  HR: 67 (20 Feb 2021 04:00) (62 - 71)  BP: --  BP(mean): --  ABP: 133/58 (20 Feb 2021 04:00) (111/52 - 133/58)  ABP(mean): 77 (20 Feb 2021 04:00) (69 - 77)  RR: 34 (20 Feb 2021 04:00) (28 - 36)  SpO2: 100% (20 Feb 2021 04:00) (93% - 100%)    Mode: AC/ CMV (Assist Control/ Continuous Mandatory Ventilation), RR (machine): 36, TV (machine): 43, FiO2: 85, PEEP: 14, ITime: 0.6, MAP: 26, PIP: 43    02-19 @ 07:01  -  02-20 @ 07:00  --------------------------------------------------------  IN: 3608.9 mL / OUT: 4050 mL / NET: -441.1 mL      CAPILLARY BLOOD GLUCOSE      POCT Blood Glucose.: 148 mg/dL (20 Feb 2021 05:18)      PHYSICAL EXAM:  General: intubated and sedated  HEENT: PERRL, clear conjunctiva  Neck: supple  Respiratory: CTA b/l  Cardiovascular: +S1/S2; RRR  Abdomen: soft, NT/ND; +BS x4, rectal tube in place  : raymundo in place, putting out red colored urine   Extremities: no LE edema   Skin: normal color and turgor; no rash    MEDICATIONS:  MEDICATIONS  (STANDING):  chlorhexidine 0.12% Liquid 15 milliLiter(s) Oral Mucosa every 12 hours  chlorhexidine 4% Liquid 1 Application(s) Topical <User Schedule>  dexAMETHasone  Injectable 4 milliGRAM(s) IV Push daily  dextrose 40% Gel 15 Gram(s) Oral once  dextrose 5%. 1000 milliLiter(s) (50 mL/Hr) IV Continuous <Continuous>  dextrose 5%. 1000 milliLiter(s) (100 mL/Hr) IV Continuous <Continuous>  dextrose 50% Injectable 25 Gram(s) IV Push once  dextrose 50% Injectable 12.5 Gram(s) IV Push once  dextrose 50% Injectable 25 Gram(s) IV Push once  fentaNYL   Infusion..... 4 MICROgram(s)/kG/Hr (10.4 mL/Hr) IV Continuous <Continuous>  furosemide   Injectable 60 milliGRAM(s) IV Push once  glucagon  Injectable 1 milliGRAM(s) IntraMuscular once  heparin  Infusion 1500 Unit(s)/Hr (15 mL/Hr) IV Continuous <Continuous>  insulin lispro (ADMELOG) corrective regimen sliding scale   SubCutaneous every 6 hours  ketamine Infusion. 0.25 mG/kG/Hr (3.25 mL/Hr) IV Continuous <Continuous>  norepinephrine Infusion 0.07 MICROgram(s)/kG/Min (8.53 mL/Hr) IV Continuous <Continuous>  pantoprazole  Injectable 40 milliGRAM(s) IV Push daily  polyethylene glycol 3350 17 Gram(s) Oral two times a day  potassium chloride   Powder 40 milliEquivalent(s) Oral once  propofol Infusion 50 MICROgram(s)/kG/Min (39 mL/Hr) IV Continuous <Continuous>  senna Syrup 10 milliLiter(s) Oral at bedtime    MEDICATIONS  (PRN):  acetaminophen    Suspension .. 650 milliGRAM(s) Oral every 6 hours PRN Temp greater or equal to 38C (100.4F)  sodium chloride 0.9% lock flush 10 milliLiter(s) IV Push every 1 hour PRN Pre/post blood products, medications, blood draw, and to maintain line patency      ALLERGIES:  Allergies    codeine (Unknown)    Intolerances        LABS:                        8.2    10.25 )-----------( 274      ( 20 Feb 2021 02:49 )             27.8     02-20    143  |  90<L>  |  23  ----------------------------<  127<H>  3.1<L>   |  46<HH>  |  0.64    Ca    8.1<L>      20 Feb 2021 02:49  Phos  4.1     02-20  Mg     1.9     02-20    TPro  6.8  /  Alb  2.3<L>  /  TBili  1.5<H>  /  DBili  x   /  AST  55<H>  /  ALT  26  /  AlkPhos  79  02-20    PT/INR - ( 19 Feb 2021 03:06 )   PT: 14.4 sec;   INR: 1.28 ratio         PTT - ( 20 Feb 2021 02:49 )  PTT:71.2 sec      RADIOLOGY & ADDITIONAL TESTS: Reviewed.

## 2021-02-21 NOTE — PROGRESS NOTE ADULT - ASSESSMENT
65yF with obesity, HTN, hx of LLE DVT not on AC, COVID+ on 1/17, presenting for acutely worsening SOB, intubated 1/29 for acute hypoxic respiratory failure. Still requiring high vent settings with high plateau pressures requiring ongoing ICU level care. course complicated by gram positive bacteremia and persistent fevers.     Neuro:  - propofol, ketamine, fentanyl wean as tolerated  - s/p nimbex gtts  - Unstable for transfer to CT    Respiratory:  - AC 36/430/14/75%, intubated (1/29 - )   - Failed trial of APRV  - s/p bronchoscopy 2/9- NG thus far   - s/p 10d course of Dexamethasone (1/27 - 2/5), Remdesivir d/c'ed (1/26 -1/30). restarted dexamethasone 6 mg IV (2/13 - ) i/s/o uptrending inflammatory markers  - taper to dexamethasone 2mg 2/21 for 3 days  - ideally would Prone 18/6 as needed for ARDS, but does not tolerate proning, becomes hypoxic  - bronchoscopy on 2/21: showed granulation tissue at distal ET tube site, tube exchanged and peak pressures improved.     Cards:  #Hypotension  - likely vasoplegia from sedation meds  - c/w levophed, wean as tolerated, now off    GI:  - c/w tube feeds  - Protonix 40mg qD  - senna  - S/p Relistor for longstanding constipation, with 1 large bowel movement.    Transaminitis 2/12/21  Improving     Renal:    - hyponatremia, improving. urine lytes, osmolarity not concerning  - Lasix 60 IV PRN for net negative goal of -1L/day  - raymundo exchanged due to obstruction and pt put out 2L (2/20)   - Likely has component of contraction alkalosis: will give diamox 250 once today 2/21    Heme:  h/o DVT, b/l upper thigh DVTs; PE suspected but too unstable for a CTA, Ddimer >50k initially  - Duplex 1/27: b/l above knee DVT  - heparin gtt, with modified ptt goal 60-80  Anemia, likely 2/2 blood draws  - With hematuria while on heparin, currently with improvement. S/p 1u pRBC 2/17. Hgb stable at mid-8, today 8.2    ID  Fever:  - s/p Ceftriaxone 7d course for E.coli UTI, CTX (2/5- 2/8)  - S/p empiric varun and vanc (2/9 - 2/19). D/c today  - S/p Caspofungin 2/11 - 2/18 for ? evidence of fungi on BAL  - f/u repeat blood, urine cx,  2/9- NG thus far, urine cx negative   - sputum cx 2/9/21- N resp kalpesh with some E Coli- pansensitive (sensitive to Meropenem)   - f/u BAL cultures and fungal cultures 2/9- NG thus far  - MRSA swab positive  - febrile 2/11/21- send blood cx   - 2/13/21 up-trending inflam markers, rstarted dexamethasone with slow taper -> 2mg x3 days starting 2/21  - L subclavian central line to be removed today (1/29 - 2/19); LIJ inserted 2/18.  Gram positive Bacteremia  -new fevers with + BCx of gram positive bacteremia  -given hx of fungi on BAL, and still persistent fevers, will keep broad spectrum abx: vanc + varun + caspo for now  -f/u bcx and ucx 2/21    Ethics:  - full code  - family aware of poor prognosis    65yF with obesity, HTN, hx of LLE DVT not on AC, COVID+ on 1/17, presenting for acutely worsening SOB, intubated 1/29 for acute hypoxic respiratory failure. Still requiring high vent settings with high plateau pressures requiring ongoing ICU level care. course complicated by gram positive bacteremia and persistent fevers.     Neuro:  - propofol, ketamine, fentanyl wean as tolerated  - s/p nimbex gtts  - Unstable for transfer to CT    Respiratory:  - AC 36/430/14/75%, intubated (1/29 - )   - Failed trial of APRV  - s/p bronchoscopy 2/9- NG thus far   - s/p 10d course of Dexamethasone (1/27 - 2/5), Remdesivir d/c'ed (1/26 -1/30). restarted dexamethasone 6 mg IV (2/13 - ) i/s/o uptrending inflammatory markers  - taper to dexamethasone 2mg 2/21 for 3 days  - ideally would Prone 18/6 as needed for ARDS, but does not tolerate proning, becomes hypoxic  - bronchoscopy on 2/21: showed granulation tissue at distal ET tube site, tube exchanged and peak pressures improved.     Cards:  #Hypotension  - likely vasoplegia from sedation meds  - c/w levophed, wean as tolerated, now off    GI:  - c/w tube feeds  - Protonix 40mg qD  - senna  - S/p Relistor for longstanding constipation, with 1 large bowel movement.    Transaminitis 2/12/21  Improving     Renal:    - hyponatremia, improving. urine lytes, osmolarity not concerning  - Lasix 60 IV PRN for net negative goal of -1L/day  - raymundo exchanged due to obstruction and pt put out 2L (2/20)   - Likely has component of contraction alkalosis: will give diamox 250 once today 2/21    Heme:  h/o DVT, b/l upper thigh DVTs; PE suspected but too unstable for a CTA, Ddimer >50k initially  - Duplex 1/27: b/l above knee DVT  - heparin gtt, with modified ptt goal 60-80  Anemia, likely 2/2 blood draws  - With hematuria while on heparin, currently with improvement. S/p 1u pRBC 2/17. Hgb stable at mid-8, today 8.2    ID  Fever:  - s/p Ceftriaxone 7d course for E.coli UTI, CTX (2/5- 2/8)  - S/p empiric varun and vanc (2/9 - 2/19). D/c today  - S/p Caspofungin 2/11 - 2/18 for ? evidence of fungi on BAL  - f/u repeat blood, urine cx,  2/9- NG thus far, urine cx negative   - sputum cx 2/9/21- N resp kalpesh with some E Coli- pansensitive (sensitive to Meropenem)   - f/u BAL cultures and fungal cultures 2/9- NG thus far  - MRSA swab positive  - febrile 2/11/21- send blood cx   - 2/13/21 up-trending inflam markers, rstarted dexamethasone with slow taper -> 2mg x3 days starting 2/21  - L subclavian central line to be removed today (1/29 - 2/19); LIJ inserted 2/18.  Enterococcus Bacteremia  -new fevers with + BCx with VRE enterococcus  -given hx of fungi on BAL, and still persistent fevers, will keep broad spectrum abx: linezolid+ varun + caspo for now  -f/u bcx and ucx 2/21    Ethics:  - full code  - family aware of poor prognosis

## 2021-02-21 NOTE — PROGRESS NOTE ADULT - SUBJECTIVE AND OBJECTIVE BOX
Eliceo Gilmore (PGY-2) | [facility] [phone] (NS) | [facility] [phone]    INTERVAL HPI/OVERNIGHT EVENTS:      SUBJECTIVE: unable to ascertain due to AMS/sedation      OBJECTIVE:    VITAL SIGNS:  ICU Vital Signs Last 24 Hrs  T(C): 38.2 (2021 06:00), Max: 38.4 (2021 00:00)  T(F): 100.8 (2021 06:00), Max: 101.1 (2021 00:00)  HR: 74 (2021 07:33) (73 - 83)  BP: --  BP(mean): --  ABP: 123/49 (2021 04:00) (97/52 - 135/59)  ABP(mean): 70 (2021 04:00) (65 - 85)  RR: 36 (2021 04:00) (36 - 37)  SpO2: 95% (2021 07:33) (93% - 99%)    Mode: AC/ CMV (Assist Control/ Continuous Mandatory Ventilation), RR (machine): 36, TV (machine): 430, FiO2: 75, PEEP: 14, ITime: 0.71, MAP: 27, PIP: 48    02-20 @ 07:01  -  02- @ 07:00  --------------------------------------------------------  IN: 3095.7 mL / OUT: 4400 mL / NET: -1304.3 mL      CAPILLARY BLOOD GLUCOSE      POCT Blood Glucose.: 144 mg/dL (2021 05:46)  overnight, patient continued to spike fever of up to 102. levophed requirements went up. Given Lasix 60 IV due to patient not being net negative 1L.     PHYSICAL EXAM:    General: NAD  HEENT: NC/AT; PERRL, clear conjunctiva  Neck: supple  Respiratory: CTA b/l  Cardiovascular: +S1/S2; RRR  Abdomen: soft, NT/ND; +BS x4  Extremities: WWP, 2+ peripheral pulses b/l; no LE edema  Skin: normal color and turgor; no rash  Neurological:    MEDICATIONS:  MEDICATIONS  (STANDING):  caspofungin IVPB 50 milliGRAM(s) IV Intermittent once  caspofungin IVPB      chlorhexidine 0.12% Liquid 15 milliLiter(s) Oral Mucosa every 12 hours  chlorhexidine 4% Liquid 1 Application(s) Topical <User Schedule>  dexAMETHasone  Injectable 2 milliGRAM(s) IV Push daily  dextrose 40% Gel 15 Gram(s) Oral once  dextrose 5%. 1000 milliLiter(s) (50 mL/Hr) IV Continuous <Continuous>  dextrose 5%. 1000 milliLiter(s) (100 mL/Hr) IV Continuous <Continuous>  dextrose 50% Injectable 25 Gram(s) IV Push once  dextrose 50% Injectable 12.5 Gram(s) IV Push once  dextrose 50% Injectable 25 Gram(s) IV Push once  erythromycin   Ointment 1 Application(s) Both EYES daily  fentaNYL   Infusion..... 4 MICROgram(s)/kG/Hr (10.4 mL/Hr) IV Continuous <Continuous>  glucagon  Injectable 1 milliGRAM(s) IntraMuscular once  heparin  Infusion 1500 Unit(s)/Hr (15 mL/Hr) IV Continuous <Continuous>  insulin lispro (ADMELOG) corrective regimen sliding scale   SubCutaneous every 6 hours  ketamine Infusion. 0.25 mG/kG/Hr (3.25 mL/Hr) IV Continuous <Continuous>  meropenem  IVPB 1000 milliGRAM(s) IV Intermittent every 8 hours  norepinephrine Infusion 0.07 MICROgram(s)/kG/Min (8.53 mL/Hr) IV Continuous <Continuous>  pantoprazole  Injectable 40 milliGRAM(s) IV Push daily  polyethylene glycol 3350 17 Gram(s) Oral two times a day  propofol Infusion 50 MICROgram(s)/kG/Min (39 mL/Hr) IV Continuous <Continuous>  senna Syrup 10 milliLiter(s) Oral at bedtime  vancomycin  IVPB 1750 milliGRAM(s) IV Intermittent every 8 hours    MEDICATIONS  (PRN):  acetaminophen    Suspension .. 650 milliGRAM(s) Oral every 6 hours PRN Temp greater or equal to 38C (100.4F)  sodium chloride 0.9% lock flush 10 milliLiter(s) IV Push every 1 hour PRN Pre/post blood products, medications, blood draw, and to maintain line patency      ALLERGIES:  Allergies    codeine (Unknown)    Intolerances        LABS:                        8.1    11.09 )-----------( 280      ( 2021 03:24 )             28.3         140  |  89<L>  |  22  ----------------------------<  112<H>  3.5   |  47<HH>  |  0.69    Ca    8.2<L>      2021 03:24  Phos  3.1       Mg     2.1         TPro  7.0  /  Alb  2.4<L>  /  TBili  1.0  /  DBili  x   /  AST  48<H>  /  ALT  26  /  AlkPhos  87      PT/INR - ( 2021 03:24 )   PT: 14.2 sec;   INR: 1.26 ratio         PTT - ( 2021 03:24 )  PTT:70.7 sec  Urinalysis Basic - ( 2021 16:26 )    Color: Red / Appearance: bloody / S.015 / pH: x  Gluc: x / Ketone: Negative  / Bili: Negative / Urobili: 3 mg/dL   Blood: x / Protein: 30 mg/dL / Nitrite: Negative   Leuk Esterase: Moderate / RBC: >50 /HPF / WBC 25-50 /HPF   Sq Epi: x / Non Sq Epi: x / Bacteria: Many        RADIOLOGY & ADDITIONAL TESTS: Reviewed. Eliceo Gilmore (PGY-2) | [facility] P[facility]ER [phone] (NS) | [facility] [phone]    INTERVAL HPI/OVERNIGHT EVENTS:      SUBJECTIVE: unable to ascertain due to AMS/sedation      OBJECTIVE:    VITAL SIGNS:  ICU Vital Signs Last 24 Hrs  T(C): 38.2 (2021 06:00), Max: 38.4 (2021 00:00)  T(F): 100.8 (2021 06:00), Max: 101.1 (2021 00:00)  HR: 74 (2021 07:33) (73 - 83)  BP: --  BP(mean): --  ABP: 123/49 (2021 04:00) (97/52 - 135/59)  ABP(mean): 70 (2021 04:00) (65 - 85)  RR: 36 (2021 04:00) (36 - 37)  SpO2: 95% (2021 07:33) (93% - 99%)    Mode: AC/ CMV (Assist Control/ Continuous Mandatory Ventilation), RR (machine): 36, TV (machine): 430, FiO2: 75, PEEP: 14, ITime: 0.71, MAP: 27, PIP: 48    02-20 @ 07:01  -  02- @ 07:00  --------------------------------------------------------  IN: 3095.7 mL / OUT: 4400 mL / NET: -1304.3 mL      CAPILLARY BLOOD GLUCOSE      POCT Blood Glucose.: 144 mg/dL (2021 05:46)  overnight, patient continued to spike fever of up to 102. levophed requirements went up. Given Lasix 60 IV due to patient not being net negative 1L.     PHYSICAL EXAM:  General: intubated and sedated  HEENT: PERRL, clear conjunctiva  Neck: supple  Respiratory: CTA b/l  Cardiovascular: +S1/S2; RRR  Abdomen: soft, NT/ND; +BS x4, rectal tube in place  : raymundo in place, putting out red colored urine   Extremities: 2+ pitting edema up to knees bilaterally  Skin: normal color and turgor; no rash    MEDICATIONS:  MEDICATIONS  (STANDING):  caspofungin IVPB 50 milliGRAM(s) IV Intermittent once  caspofungin IVPB      chlorhexidine 0.12% Liquid 15 milliLiter(s) Oral Mucosa every 12 hours  chlorhexidine 4% Liquid 1 Application(s) Topical <User Schedule>  dexAMETHasone  Injectable 2 milliGRAM(s) IV Push daily  dextrose 40% Gel 15 Gram(s) Oral once  dextrose 5%. 1000 milliLiter(s) (50 mL/Hr) IV Continuous <Continuous>  dextrose 5%. 1000 milliLiter(s) (100 mL/Hr) IV Continuous <Continuous>  dextrose 50% Injectable 25 Gram(s) IV Push once  dextrose 50% Injectable 12.5 Gram(s) IV Push once  dextrose 50% Injectable 25 Gram(s) IV Push once  erythromycin   Ointment 1 Application(s) Both EYES daily  fentaNYL   Infusion..... 4 MICROgram(s)/kG/Hr (10.4 mL/Hr) IV Continuous <Continuous>  glucagon  Injectable 1 milliGRAM(s) IntraMuscular once  heparin  Infusion 1500 Unit(s)/Hr (15 mL/Hr) IV Continuous <Continuous>  insulin lispro (ADMELOG) corrective regimen sliding scale   SubCutaneous every 6 hours  ketamine Infusion. 0.25 mG/kG/Hr (3.25 mL/Hr) IV Continuous <Continuous>  meropenem  IVPB 1000 milliGRAM(s) IV Intermittent every 8 hours  norepinephrine Infusion 0.07 MICROgram(s)/kG/Min (8.53 mL/Hr) IV Continuous <Continuous>  pantoprazole  Injectable 40 milliGRAM(s) IV Push daily  polyethylene glycol 3350 17 Gram(s) Oral two times a day  propofol Infusion 50 MICROgram(s)/kG/Min (39 mL/Hr) IV Continuous <Continuous>  senna Syrup 10 milliLiter(s) Oral at bedtime  vancomycin  IVPB 1750 milliGRAM(s) IV Intermittent every 8 hours    MEDICATIONS  (PRN):  acetaminophen    Suspension .. 650 milliGRAM(s) Oral every 6 hours PRN Temp greater or equal to 38C (100.4F)  sodium chloride 0.9% lock flush 10 milliLiter(s) IV Push every 1 hour PRN Pre/post blood products, medications, blood draw, and to maintain line patency      ALLERGIES:  Allergies    codeine (Unknown)    Intolerances        LABS:                        8.1    11.09 )-----------( 280      ( 2021 03:24 )             28.3         140  |  89<L>  |  22  ----------------------------<  112<H>  3.5   |  47<HH>  |  0.69    Ca    8.2<L>      2021 03:24  Phos  3.1       Mg     2.1         TPro  7.0  /  Alb  2.4<L>  /  TBili  1.0  /  DBili  x   /  AST  48<H>  /  ALT  26  /  AlkPhos  87      PT/INR - ( 2021 03:24 )   PT: 14.2 sec;   INR: 1.26 ratio         PTT - ( 2021 03:24 )  PTT:70.7 sec  Urinalysis Basic - ( 2021 16:26 )    Color: Red / Appearance: bloody / S.015 / pH: x  Gluc: x / Ketone: Negative  / Bili: Negative / Urobili: 3 mg/dL   Blood: x / Protein: 30 mg/dL / Nitrite: Negative   Leuk Esterase: Moderate / RBC: >50 /HPF / WBC 25-50 /HPF   Sq Epi: x / Non Sq Epi: x / Bacteria: Many        RADIOLOGY & ADDITIONAL TESTS: Reviewed.

## 2021-02-21 NOTE — CONSULT NOTE ADULT - ASSESSMENT
65 year old female with history of HTN and LLE DVT not currently on AC, COVID+ 15d ago, found to be in acute hypoxic respiratory failure 2/2 COVID19 PNA.    #Acute hypoxic respiratory failure 2/2 COVID19 PNA  -c/w current BiPap settings 12/6 @100%, currently rr 20s to low 30s  -ABG as needed  -c/w decadron 6mg qd x10 days    Patient is not a MICU candidate at this time    
65 year old with COVID  Associated hypoxic respiratory failure  Sepsis with VRE bacteremia    1) COVID  S/p remdesivir and steroids.  No clear indication to continue dexamehtasone at this time  Particularly in a patient with concern for sepsis.      2) Yeast on sputum culture    This can be indicative of fungal colonization  It is not typically a pathogen in the lung  Not suggestive of invasive mold    3) VRE bacteremia  ? line related vs  vs intrabdominal process  can continue linezolid/ meropenem  I would stop caspofungin    Change lines  Check CT a/p   Check Echo   repeat blood culture in 48 hours    4) Hypoxic respiratory failure  care per ICU  
64 yo F with history of HTN and LLE DVT not currently on AC, currently admitted w/ respiratory failure due to COVID. Palliative Medicine was consulted for complex medical decision making related to goals of care discussions

## 2021-02-21 NOTE — CHART NOTE - NSCHARTNOTEFT_GEN_A_CORE
Pre-Bronchoscopy Tuberculosis Risk Screening Tool  To reduce the risk for airborne transmission of Mycobacterium tuberculosis, this assessment form must be completed prior to bronchoscopy procedures being performed outside of a negative pressure environment.    Procedure Date & Time: 2/21/2021   11:20am  Health Care Provider Name:   Joceline  Reason for the Bronchoscopy: elevated peak and plateau pressures    Planned Location for the Procedure:  [ ] Emergency Department     [x] Intensive Care Unit     [ ] Operating Room     Other: ___________________    Risk Assessment  I. I have personally evaluated this patient for Mycobacterium tuberculosis and I determined the following risk:  [x] Low risk for TB     [ ] Significant risk for TB    II. Additional Findings: Covid positive    III. Based on the Determined Risk for TB the following Action(s) are Suggested:  If there is a significant risk for tuberculosis infection, the following recommendations should be followed:            a. Perform the procedure in a negative pressure room, with N95 respirator.            b. If not feasible to move the patient or defer the procedure:                    i. Use a single-bedded room low traffic area to perform the bronchoscopy procedure.                   ii. Place a portable high-efficiency particulate air (HEPA) filter in the space prior to starting the procedure and keep closed.                       Refer to the INF.1132 titled “Tuberculosis Control Strategy Plan” for additional information.                  iii. All Health Care Providers within the procedure room shall wear an N95 respirator.            c. Documentation of the tuberculosis risk assessment should be included within the patient’s medical record.      Bronchoscopy Procedure Note:  Indication: elevated peak and plateau pressures    Operators: Joceline    Pre-op Dx: Covid PNA    History: 65F h/o obesity, HTN, previous LLE DVT not on AC, COVID+ on 1/17 a/w hypoxic respiratory failure, intubated 1/29 now in ARDS. Pt's plateau pressures noted to be elevated to mid 50s compared to low 30s the previous day.     Preop medications: sedated with propofol, ketamine and fentanyl; given nimbex 20mg IVP x1 prior to procedure    Findings:  Bronchoscope inserted through ETT. ETT noted to be in good position. Airway evaluation revealed large mucus plug at distal tip of ET tube. Initial bronch aborted and ET tube exhanged (see separate note). Bronchoscope reinserted into new ET tube with noted fibronous tissue at site of ET tube balloon/tip. ET tube pulled back from 23cm --> 21cm at lip. Remainder of airway clear. Bronchoscope then withdrawn from ETT.    Specimens: n/a

## 2021-02-21 NOTE — PROCEDURE NOTE - NSTRACHPOSTINTU_RESP_A_CORE
CXR pending/Appropriate capnography/Breath sounds bilateral/Breath sounds equal/Chest excursion noted/Positive end tidal Co2 noted
bronchoscopy performed post-tube exchange, CXR pending/Appropriate capnography/Breath sounds bilateral/Breath sounds equal/Chest excursion noted/Positive end tidal Co2 noted

## 2021-02-22 NOTE — PROGRESS NOTE ADULT - ASSESSMENT
65 year old with COVID  Associated hypoxic respiratory failure  Sepsis with VRE bacteremia    1) COVID  S/p remdesivir and steroids.  No clear indication to continue dexamehtasone at this time  Particularly in a patient with concern for sepsis.      2) Yeast on sputum culture    This can be indicative of fungal colonization  It is not typically a pathogen in the lung  Not suggestive of invasive mold    3) VRE bacteremia  S/p line change  ? line vs  vs intrabdominal process  can continue linezolid/ meropenem    Check CT a/p   Check Echo   repeat blood culture     4) Hypoxic respiratory failure  care per ICU

## 2021-02-22 NOTE — CHART NOTE - NSCHARTNOTEFT_GEN_A_CORE
Nutrition Follow-Up Note   Reason for follow-up: Critical care length of stay follow- up. Medical record reviewed.    Information obtained from extensive chart review. Unable to conduct face-to-face interview due to current contact/isolation precautions in setting of COVID-19.    Clinical Course history: Medical history of obesity, HTN, hx of LLE DVT not on AC, COVID+ on 1/17, presenting for acutely worsening SOB, intubated 1/29 for acute hypoxic respiratory failure. Still requiring high vent settings with high plateau pressures requiring ongoing ICU level care. Intubated, sedated on propofol, ketamine, fentanyl. Pressor support with levo.    Propofol 39mL/hr  =  provides 1029 additional Kcal if sedation continues at this rate x24 hours.    Diet Order: Diet, NPO with Tube Feed:   Tube Feeding Modality: Orogastric  Vital HP  Total Volume for 24 Hours (mL): 816  Continuous  Starting Tube Feed Rate {mL per Hour}: 34  Increase Tube Feed Rate by (mL): 0     Every 6 hours  Until Goal Tube Feed Rate (mL per Hour): 34  Tube Feed Duration (in Hours): 24  Tube Feed Start Time: 12:00  No Carb Prosource (1pkg = 15gms Protein)     Qty per Day:  4 (02-18-21 @ 10:24)    CURRENT EN ORDER PROVIDES:  Total Volume: 816mL/day  Energy: 816Kcal/day  Protein: 131g protein/day  Free Water: 682mL/day    Nutrition Related Information: - Per I/O patient received 700mL of tube feeding x24 hours (compared to goal volume 816mL). Of note, Jevity noted in flowsheets however patient actually ordered for Vital HP formula.   - No tube feeding intolerances.   - Glucose WDL - ordered for SSI.   - Noted hypokalemia - being repleted.  - Being diuresed.    GI: - last BM 2/21 per flowsheets.  - Bowel regimen: Senna, Miralax.  - No vomiting or abdominal distention reported.    Anthropometric Measurements:   Height (cm): 177.8 (01-26-21 @ 15:14)  Weight (kg): no new weight to assess, 135.8 (2/14), 136 (2/13), 130 (1/29)  **noted weight changes, ?possibly scale discrepancies versus 2/2 fluid shifts.  BMI (kg/m2): 41.1 (1/29)  IBW: 68.2kg +/-10%  Appearance: Unable to conduct nutrition-focused physical exam at this time due to limited contact in setting of isolation precautions related to COVID-19.    Medications: MEDICATIONS  (STANDING):  dexAMETHasone  Injectable 2 milliGRAM(s) IV Push daily  diazepam    Tablet 10 milliGRAM(s) Oral every 8 hours  erythromycin   Ointment 1 Application(s) Both EYES daily  fentaNYL   Infusion..... 4 MICROgram(s)/kG/Hr (10.4 mL/Hr) IV Continuous   heparin  Infusion 1500 Unit(s)/Hr (15 mL/Hr) IV Continuous <Continuous>  insulin lispro (ADMELOG) corrective regimen sliding scale   SubCutaneous every 6 hours  ketamine Infusion. 0.25 mG/kG/Hr (3.25 mL/Hr) IV Continuous   linezolid  IVPB 600 milliGRAM(s) IV Intermittent every 12 hours  linezolid  IVPB      meropenem  IVPB 1000 milliGRAM(s) IV Intermittent every 8 hours  norepinephrine Infusion 0.07 MICROgram(s)/kG/Min (8.53 mL/Hr) IV pantoprazole  Injectable 40 milliGRAM(s) IV Push daily  petrolatum Ophthalmic Ointment 1 Application(s) Both EYES daily  polyethylene glycol 3350 17 Gram(s) Oral two times a day  propofol Infusion 50 MICROgram(s)/kG/Min (39 mL/Hr) IV Continuous   senna Syrup 10 milliLiter(s) Oral at bedtime    Labs: 02-22 @ 01:01: Sodium 138, Potassium 2.9<LL>, Calcium 8.0<L>, Magnesium 2.1, Phosphorus 3.7, BUN 23, Creatinine 0.67, Glucose 105<H>, Alk Phos 74, ALT/SGPT 22, AST/SGOT 40<H>, Albumin 2.3<L>, Prealbumin --, Total Bilirubin 1.2, Hemoglobin 7.5<L>, Hematocrit 26.5<L>, Ferritin --, C-Reactive Protein --, Creatine Kinase <<27>    Triglycerides, Serum: 246 mg/dL (02-22-21 @ 10:22)    POCT Blood Glucose.: 125 mg/dL (02-22-21 @ 11:10)  POCT Blood Glucose.: 129 mg/dL (02-22-21 @ 06:28)  POCT Blood Glucose.: 113 mg/dL (02-22-21 @ 01:12)  POCT Blood Glucose.: 110 mg/dL (02-21-21 @ 18:50)  POCT Blood Glucose.: 110 mg/dL (02-21-21 @ 17:06)    Skin: 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 Continue Vital HP @ 34mL/hr x24 hours + No Carb Prosource 4x daily [provides 816mL total volume, 816Kcal, 131g protein, 682mL free water]. With propofol (~1029Kcal), pt will receive 1845Kcal, 131g protein (~14Kcal/kg ABW, 1.9g/kg IBW).  2. Additional free water per physician discretion.   3. Consider multivitamin daily for micronutrient coverage.  4. Bowel regimen prn.   5. Trend glucose, triglycerides when on propofol and electrolytes.   6. Obtain daily weight.     Monitoring and Evaluation:   Monitor nutrition provision, tolerance to diet, weights, labs, skin integrity and GI function.   RD remains available, please consult as needed. Will follow up per protocol.  Kavya Goodwin, MS, RD, CDN, [facility] Pager #[phone].

## 2021-02-22 NOTE — PROGRESS NOTE ADULT - SUBJECTIVE AND OBJECTIVE BOX
Elliot Diaz MD, PGY-2  Available on [employer] Teams | Pager: [phone] | [facility]: [phone]  ---------------------------------------------------------------------------------------------  Patient is a 65y old  Female who presents with a chief complaint of COVID (2021 15:04)    SUBJECTIVE / OVERNIGHT EVENTS: Linezolid, varun started for bacteremia.  ADDITIONAL REVIEW OF SYSTEMS:    MEDICATIONS  (STANDING):  chlorhexidine 0.12% Liquid 15 milliLiter(s) Oral Mucosa every 12 hours  chlorhexidine 4% Liquid 1 Application(s) Topical <User Schedule>  dexAMETHasone  Injectable 2 milliGRAM(s) IV Push daily  dextrose 40% Gel 15 Gram(s) Oral once  dextrose 5%. 1000 milliLiter(s) (50 mL/Hr) IV Continuous <Continuous>  dextrose 5%. 1000 milliLiter(s) (100 mL/Hr) IV Continuous <Continuous>  dextrose 50% Injectable 25 Gram(s) IV Push once  dextrose 50% Injectable 12.5 Gram(s) IV Push once  dextrose 50% Injectable 25 Gram(s) IV Push once  erythromycin   Ointment 1 Application(s) Both EYES daily  fentaNYL   Infusion..... 4 MICROgram(s)/kG/Hr (10.4 mL/Hr) IV Continuous <Continuous>  glucagon  Injectable 1 milliGRAM(s) IntraMuscular once  heparin  Infusion 1500 Unit(s)/Hr (15 mL/Hr) IV Continuous <Continuous>  insulin lispro (ADMELOG) corrective regimen sliding scale   SubCutaneous every 6 hours  ketamine Infusion. 0.25 mG/kG/Hr (3.25 mL/Hr) IV Continuous <Continuous>  linezolid  IVPB      linezolid  IVPB 600 milliGRAM(s) IV Intermittent every 12 hours  meropenem  IVPB 1000 milliGRAM(s) IV Intermittent every 8 hours  norepinephrine Infusion 0.07 MICROgram(s)/kG/Min (8.53 mL/Hr) IV Continuous <Continuous>  pantoprazole  Injectable 40 milliGRAM(s) IV Push daily  petrolatum Ophthalmic Ointment 1 Application(s) Both EYES daily  polyethylene glycol 3350 17 Gram(s) Oral two times a day  potassium chloride  20 mEq/100 mL IVPB 20 milliEquivalent(s) IV Intermittent every 2 hours  propofol Infusion 50 MICROgram(s)/kG/Min (39 mL/Hr) IV Continuous <Continuous>  senna Syrup 10 milliLiter(s) Oral at bedtime    MEDICATIONS  (PRN):  acetaminophen    Suspension .. 650 milliGRAM(s) Oral every 6 hours PRN Temp greater or equal to 38C (100.4F)  sodium chloride 0.9% lock flush 10 milliLiter(s) IV Push every 1 hour PRN Pre/post blood products, medications, blood draw, and to maintain line patency      ICU Vital Signs Last 24 Hrs  T(F): 97 (2021 04:00), Max: 100.5 (2021 08:00)  HR: 96 (2021 04:00) (68 - 99)  BP: --  BP(mean): --  ABP: 135/62 (2021 04:00) (96/41 - 138/63)  ABP(mean): 87 (2021 04:00) (64 - 87)  RR: 35 (2021 04:00) (35 - 37)  SpO2: 91% (2021 04:00) (89% - 99%)    Mode: AC/ CMV (Assist Control/ Continuous Mandatory Ventilation)  RR (machine): 34  TV (machine): 430  FiO2: 70  PEEP: 14  ITime: 0.7  MAP: 18  PIP: 38    Physical Exam:     I&O's Summary    2021 07:01  -  2021 07:00  --------------------------------------------------------  IN: 2920.4 mL / OUT: 3450 mL / NET: -529.6 mL      MEDICATIONS  (STANDING):  chlorhexidine 0.12% Liquid 15 milliLiter(s) Oral Mucosa every 12 hours  chlorhexidine 4% Liquid 1 Application(s) Topical <User Schedule>  dexAMETHasone  Injectable 2 milliGRAM(s) IV Push daily  dextrose 40% Gel 15 Gram(s) Oral once  dextrose 5%. 1000 milliLiter(s) (50 mL/Hr) IV Continuous <Continuous>  dextrose 5%. 1000 milliLiter(s) (100 mL/Hr) IV Continuous <Continuous>  dextrose 50% Injectable 25 Gram(s) IV Push once  dextrose 50% Injectable 12.5 Gram(s) IV Push once  dextrose 50% Injectable 25 Gram(s) IV Push once  erythromycin   Ointment 1 Application(s) Both EYES daily  fentaNYL   Infusion..... 4 MICROgram(s)/kG/Hr (10.4 mL/Hr) IV Continuous <Continuous>  glucagon  Injectable 1 milliGRAM(s) IntraMuscular once  heparin  Infusion 1500 Unit(s)/Hr (15 mL/Hr) IV Continuous <Continuous>  insulin lispro (ADMELOG) corrective regimen sliding scale   SubCutaneous every 6 hours  ketamine Infusion. 0.25 mG/kG/Hr (3.25 mL/Hr) IV Continuous <Continuous>  linezolid  IVPB      linezolid  IVPB 600 milliGRAM(s) IV Intermittent every 12 hours  meropenem  IVPB 1000 milliGRAM(s) IV Intermittent every 8 hours  norepinephrine Infusion 0.07 MICROgram(s)/kG/Min (8.53 mL/Hr) IV Continuous <Continuous>  pantoprazole  Injectable 40 milliGRAM(s) IV Push daily  petrolatum Ophthalmic Ointment 1 Application(s) Both EYES daily  polyethylene glycol 3350 17 Gram(s) Oral two times a day  potassium chloride  20 mEq/100 mL IVPB 20 milliEquivalent(s) IV Intermittent every 2 hours  propofol Infusion 50 MICROgram(s)/kG/Min (39 mL/Hr) IV Continuous <Continuous>  senna Syrup 10 milliLiter(s) Oral at bedtime    MEDICATIONS  (PRN):  acetaminophen    Suspension .. 650 milliGRAM(s) Oral every 6 hours PRN Temp greater or equal to 38C (100.4F)  sodium chloride 0.9% lock flush 10 milliLiter(s) IV Push every 1 hour PRN Pre/post blood products, medications, blood draw, and to maintain line patency    Allergies    codeine (Unknown)    Intolerances        LABS:                        7.5    11.96 )-----------( 285      ( 2021 01:01 )             26.5     02-    138  |  89<L>  |  23  ----------------------------<  105<H>  2.9<LL>   |  41<H>  |  0.67    Ca    8.0<L>      2021 01:01  Phos  3.7       Mg     2.1         TPro  6.9  /  Alb  2.3<L>  /  TBili  1.2  /  DBili  x   /  AST  40<H>  /  ALT  22  /  AlkPhos  74  02-22    PT/INR - ( 2021 03:24 )   PT: 14.2 sec;   INR: 1.26 ratio         PTT - ( 2021 01:01 )  PTT:77.5 sec  ABG - ( 2021 01:01 )  pH, Arterial: 7.39  pH, Blood: x     /  pCO2: 73    /  pO2: 67    / HCO3: 40    / Base Excess: 17.5  /  SaO2: 92.6              Urinalysis Basic - ( 2021 16:26 )    Color: Red / Appearance: bloody / S.015 / pH: x  Gluc: x / Ketone: Negative  / Bili: Negative / Urobili: 3 mg/dL   Blood: x / Protein: 30 mg/dL / Nitrite: Negative   Leuk Esterase: Moderate / RBC: >50 /HPF / WBC 25-50 /HPF   Sq Epi: x / Non Sq Epi: x / Bacteria: Many      Lactate Trend        CAPILLARY BLOOD GLUCOSE      POCT Blood Glucose.: 129 mg/dL (2021 06:28)  POCT Blood Glucose.: 113 mg/dL (2021 01:12)  POCT Blood Glucose.: 110 mg/dL (2021 18:50)  POCT Blood Glucose.: 110 mg/dL (2021 17:06)  POCT Blood Glucose.: 160 mg/dL (2021 11:20)      RADIOLOGY & ADDITIONAL TESTS:  Results Reviewed:   Imaging Personally Reviewed:  Electrocardiogram Personally Reviewed: Elliot Diaz MD, PGY-2  Available on [employer] Teams | Pager: [phone] | [facility]: [phone]  ---------------------------------------------------------------------------------------------  Patient is a 65y old  Female who presents with a chief complaint of COVID (2021 15:04)    SUBJECTIVE / OVERNIGHT EVENTS: Linezolid, varun started for bacteremia. ET tube repositioned. Increased sedation for vent synchrony.   ADDITIONAL REVIEW OF SYSTEMS:    MEDICATIONS  (STANDING):  chlorhexidine 0.12% Liquid 15 milliLiter(s) Oral Mucosa every 12 hours  chlorhexidine 4% Liquid 1 Application(s) Topical <User Schedule>  dexAMETHasone  Injectable 2 milliGRAM(s) IV Push daily  dextrose 40% Gel 15 Gram(s) Oral once  dextrose 5%. 1000 milliLiter(s) (50 mL/Hr) IV Continuous <Continuous>  dextrose 5%. 1000 milliLiter(s) (100 mL/Hr) IV Continuous <Continuous>  dextrose 50% Injectable 25 Gram(s) IV Push once  dextrose 50% Injectable 12.5 Gram(s) IV Push once  dextrose 50% Injectable 25 Gram(s) IV Push once  erythromycin   Ointment 1 Application(s) Both EYES daily  fentaNYL   Infusion..... 4 MICROgram(s)/kG/Hr (10.4 mL/Hr) IV Continuous <Continuous>  glucagon  Injectable 1 milliGRAM(s) IntraMuscular once  heparin  Infusion 1500 Unit(s)/Hr (15 mL/Hr) IV Continuous <Continuous>  insulin lispro (ADMELOG) corrective regimen sliding scale   SubCutaneous every 6 hours  ketamine Infusion. 0.25 mG/kG/Hr (3.25 mL/Hr) IV Continuous <Continuous>  linezolid  IVPB      linezolid  IVPB 600 milliGRAM(s) IV Intermittent every 12 hours  meropenem  IVPB 1000 milliGRAM(s) IV Intermittent every 8 hours  norepinephrine Infusion 0.07 MICROgram(s)/kG/Min (8.53 mL/Hr) IV Continuous <Continuous>  pantoprazole  Injectable 40 milliGRAM(s) IV Push daily  petrolatum Ophthalmic Ointment 1 Application(s) Both EYES daily  polyethylene glycol 3350 17 Gram(s) Oral two times a day  potassium chloride  20 mEq/100 mL IVPB 20 milliEquivalent(s) IV Intermittent every 2 hours  propofol Infusion 50 MICROgram(s)/kG/Min (39 mL/Hr) IV Continuous <Continuous>  senna Syrup 10 milliLiter(s) Oral at bedtime    MEDICATIONS  (PRN):  acetaminophen    Suspension .. 650 milliGRAM(s) Oral every 6 hours PRN Temp greater or equal to 38C (100.4F)  sodium chloride 0.9% lock flush 10 milliLiter(s) IV Push every 1 hour PRN Pre/post blood products, medications, blood draw, and to maintain line patency      ICU Vital Signs Last 24 Hrs  T(F): 97 (2021 04:00), Max: 100.5 (2021 08:00)  HR: 96 (2021 04:00) (68 - 99)  BP: --  BP(mean): --  ABP: 135/62 (2021 04:00) (96/41 - 138/63)  ABP(mean): 87 (2021 04:00) (64 - 87)  RR: 35 (2021 04:00) (35 - 37)  SpO2: 91% (2021 04:00) (89% - 99%)    Mode: AC/ CMV (Assist Control/ Continuous Mandatory Ventilation)  RR (machine): 34  TV (machine): 430  FiO2: 70  PEEP: 14  ITime: 0.7  MAP: 18  PIP: 38    Physical Exam:     I&O's Summary    2021 07:01  -  2021 07:00  --------------------------------------------------------  IN: 2920.4 mL / OUT: 3450 mL / NET: -529.6 mL      MEDICATIONS  (STANDING):  chlorhexidine 0.12% Liquid 15 milliLiter(s) Oral Mucosa every 12 hours  chlorhexidine 4% Liquid 1 Application(s) Topical <User Schedule>  dexAMETHasone  Injectable 2 milliGRAM(s) IV Push daily  dextrose 40% Gel 15 Gram(s) Oral once  dextrose 5%. 1000 milliLiter(s) (50 mL/Hr) IV Continuous <Continuous>  dextrose 5%. 1000 milliLiter(s) (100 mL/Hr) IV Continuous <Continuous>  dextrose 50% Injectable 25 Gram(s) IV Push once  dextrose 50% Injectable 12.5 Gram(s) IV Push once  dextrose 50% Injectable 25 Gram(s) IV Push once  erythromycin   Ointment 1 Application(s) Both EYES daily  fentaNYL   Infusion..... 4 MICROgram(s)/kG/Hr (10.4 mL/Hr) IV Continuous <Continuous>  glucagon  Injectable 1 milliGRAM(s) IntraMuscular once  heparin  Infusion 1500 Unit(s)/Hr (15 mL/Hr) IV Continuous <Continuous>  insulin lispro (ADMELOG) corrective regimen sliding scale   SubCutaneous every 6 hours  ketamine Infusion. 0.25 mG/kG/Hr (3.25 mL/Hr) IV Continuous <Continuous>  linezolid  IVPB      linezolid  IVPB 600 milliGRAM(s) IV Intermittent every 12 hours  meropenem  IVPB 1000 milliGRAM(s) IV Intermittent every 8 hours  norepinephrine Infusion 0.07 MICROgram(s)/kG/Min (8.53 mL/Hr) IV Continuous <Continuous>  pantoprazole  Injectable 40 milliGRAM(s) IV Push daily  petrolatum Ophthalmic Ointment 1 Application(s) Both EYES daily  polyethylene glycol 3350 17 Gram(s) Oral two times a day  potassium chloride  20 mEq/100 mL IVPB 20 milliEquivalent(s) IV Intermittent every 2 hours  propofol Infusion 50 MICROgram(s)/kG/Min (39 mL/Hr) IV Continuous <Continuous>  senna Syrup 10 milliLiter(s) Oral at bedtime    MEDICATIONS  (PRN):  acetaminophen    Suspension .. 650 milliGRAM(s) Oral every 6 hours PRN Temp greater or equal to 38C (100.4F)  sodium chloride 0.9% lock flush 10 milliLiter(s) IV Push every 1 hour PRN Pre/post blood products, medications, blood draw, and to maintain line patency    Allergies    codeine (Unknown)    Intolerances        LABS:                        7.5    11.96 )-----------( 285      ( 2021 01:01 )             26.5     02-    138  |  89<L>  |  23  ----------------------------<  105<H>  2.9<LL>   |  41<H>  |  0.67    Ca    8.0<L>      2021 01:01  Phos  3.7       Mg     2.1         TPro  6.9  /  Alb  2.3<L>  /  TBili  1.2  /  DBili  x   /  AST  40<H>  /  ALT  22  /  AlkPhos  74  02-22    PT/INR - ( 2021 03:24 )   PT: 14.2 sec;   INR: 1.26 ratio         PTT - ( 2021 01:01 )  PTT:77.5 sec  ABG - ( 2021 01:01 )  pH, Arterial: 7.39  pH, Blood: x     /  pCO2: 73    /  pO2: 67    / HCO3: 40    / Base Excess: 17.5  /  SaO2: 92.6              Urinalysis Basic - ( 2021 16:26 )    Color: Red / Appearance: bloody / S.015 / pH: x  Gluc: x / Ketone: Negative  / Bili: Negative / Urobili: 3 mg/dL   Blood: x / Protein: 30 mg/dL / Nitrite: Negative   Leuk Esterase: Moderate / RBC: >50 /HPF / WBC 25-50 /HPF   Sq Epi: x / Non Sq Epi: x / Bacteria: Many      Lactate Trend        CAPILLARY BLOOD GLUCOSE      POCT Blood Glucose.: 129 mg/dL (2021 06:28)  POCT Blood Glucose.: 113 mg/dL (2021 01:12)  POCT Blood Glucose.: 110 mg/dL (2021 18:50)  POCT Blood Glucose.: 110 mg/dL (2021 17:06)  POCT Blood Glucose.: 160 mg/dL (2021 11:20)      RADIOLOGY & ADDITIONAL TESTS:  Results Reviewed:   Imaging Personally Reviewed:  Electrocardiogram Personally Reviewed: Elliot Diaz MD, PGY-2  Available on [employer] Teams | Pager: [phone] | [facility]: [phone]  ---------------------------------------------------------------------------------------------  Patient is a 65y old  Female who presents with a chief complaint of COVID (2021 15:04)    SUBJECTIVE / OVERNIGHT EVENTS: Linezolid, varun started for bacteremia. ET tube advanced under glidescope. Increased sedation for vent synchrony.   ADDITIONAL REVIEW OF SYSTEMS:    MEDICATIONS  (STANDING):  chlorhexidine 0.12% Liquid 15 milliLiter(s) Oral Mucosa every 12 hours  chlorhexidine 4% Liquid 1 Application(s) Topical <User Schedule>  dexAMETHasone  Injectable 2 milliGRAM(s) IV Push daily  dextrose 40% Gel 15 Gram(s) Oral once  dextrose 5%. 1000 milliLiter(s) (50 mL/Hr) IV Continuous <Continuous>  dextrose 5%. 1000 milliLiter(s) (100 mL/Hr) IV Continuous <Continuous>  dextrose 50% Injectable 25 Gram(s) IV Push once  dextrose 50% Injectable 12.5 Gram(s) IV Push once  dextrose 50% Injectable 25 Gram(s) IV Push once  erythromycin   Ointment 1 Application(s) Both EYES daily  fentaNYL   Infusion..... 4 MICROgram(s)/kG/Hr (10.4 mL/Hr) IV Continuous <Continuous>  glucagon  Injectable 1 milliGRAM(s) IntraMuscular once  heparin  Infusion 1500 Unit(s)/Hr (15 mL/Hr) IV Continuous <Continuous>  insulin lispro (ADMELOG) corrective regimen sliding scale   SubCutaneous every 6 hours  ketamine Infusion. 0.25 mG/kG/Hr (3.25 mL/Hr) IV Continuous <Continuous>  linezolid  IVPB      linezolid  IVPB 600 milliGRAM(s) IV Intermittent every 12 hours  meropenem  IVPB 1000 milliGRAM(s) IV Intermittent every 8 hours  norepinephrine Infusion 0.07 MICROgram(s)/kG/Min (8.53 mL/Hr) IV Continuous <Continuous>  pantoprazole  Injectable 40 milliGRAM(s) IV Push daily  petrolatum Ophthalmic Ointment 1 Application(s) Both EYES daily  polyethylene glycol 3350 17 Gram(s) Oral two times a day  potassium chloride  20 mEq/100 mL IVPB 20 milliEquivalent(s) IV Intermittent every 2 hours  propofol Infusion 50 MICROgram(s)/kG/Min (39 mL/Hr) IV Continuous <Continuous>  senna Syrup 10 milliLiter(s) Oral at bedtime    MEDICATIONS  (PRN):  acetaminophen    Suspension .. 650 milliGRAM(s) Oral every 6 hours PRN Temp greater or equal to 38C (100.4F)  sodium chloride 0.9% lock flush 10 milliLiter(s) IV Push every 1 hour PRN Pre/post blood products, medications, blood draw, and to maintain line patency      ICU Vital Signs Last 24 Hrs  T(F): 97 (2021 04:00), Max: 100.5 (2021 08:00)  HR: 96 (2021 04:00) (68 - 99)  ABP: 135/62 (2021 04:00) (96/41 - 138/63)  ABP(mean): 87 (2021 04:00) (64 - 87)  RR: 35 (2021 04:00) (35 - 37)  SpO2: 91% (2021 04:00) (89% - 99%)    Mode: AC/ CMV (Assist Control/ Continuous Mandatory Ventilation)  RR (machine): 34  TV (machine): 430  FiO2: 70  PEEP: 14  ITime: 0.7  MAP: 18  PIP: 38    Physical Exam:   GENERAL: intubated, sedated, desynchronous with vent  CHEST/LUNG: CTABL anteriorly, no wheezes  HEART: RRR, s1, s2  ABDOMEN: soft, mildly distended  EXTREMITIES:  2+ Peripheral Pulses, No clubbing, cyanosis, or edema  PSYCH: unable to assess  SKIN: No rashes or lesions    I&O's Summary    2021 07:01  -  2021 07:00  --------------------------------------------------------  IN: 2920.4 mL / OUT: 3450 mL / NET: -529.6 mL    MEDICATIONS  (STANDING):  chlorhexidine 0.12% Liquid 15 milliLiter(s) Oral Mucosa every 12 hours  chlorhexidine 4% Liquid 1 Application(s) Topical <User Schedule>  dexAMETHasone  Injectable 2 milliGRAM(s) IV Push daily  dextrose 40% Gel 15 Gram(s) Oral once  dextrose 5%. 1000 milliLiter(s) (50 mL/Hr) IV Continuous <Continuous>  dextrose 5%. 1000 milliLiter(s) (100 mL/Hr) IV Continuous <Continuous>  dextrose 50% Injectable 25 Gram(s) IV Push once  dextrose 50% Injectable 12.5 Gram(s) IV Push once  dextrose 50% Injectable 25 Gram(s) IV Push once  erythromycin   Ointment 1 Application(s) Both EYES daily  fentaNYL   Infusion..... 4 MICROgram(s)/kG/Hr (10.4 mL/Hr) IV Continuous <Continuous>  glucagon  Injectable 1 milliGRAM(s) IntraMuscular once  heparin  Infusion 1500 Unit(s)/Hr (15 mL/Hr) IV Continuous <Continuous>  insulin lispro (ADMELOG) corrective regimen sliding scale   SubCutaneous every 6 hours  ketamine Infusion. 0.25 mG/kG/Hr (3.25 mL/Hr) IV Continuous <Continuous>  linezolid  IVPB      linezolid  IVPB 600 milliGRAM(s) IV Intermittent every 12 hours  meropenem  IVPB 1000 milliGRAM(s) IV Intermittent every 8 hours  norepinephrine Infusion 0.07 MICROgram(s)/kG/Min (8.53 mL/Hr) IV Continuous <Continuous>  pantoprazole  Injectable 40 milliGRAM(s) IV Push daily  petrolatum Ophthalmic Ointment 1 Application(s) Both EYES daily  polyethylene glycol 3350 17 Gram(s) Oral two times a day  potassium chloride  20 mEq/100 mL IVPB 20 milliEquivalent(s) IV Intermittent every 2 hours  propofol Infusion 50 MICROgram(s)/kG/Min (39 mL/Hr) IV Continuous <Continuous>  senna Syrup 10 milliLiter(s) Oral at bedtime    MEDICATIONS  (PRN):  acetaminophen    Suspension .. 650 milliGRAM(s) Oral every 6 hours PRN Temp greater or equal to 38C (100.4F)  sodium chloride 0.9% lock flush 10 milliLiter(s) IV Push every 1 hour PRN Pre/post blood products, medications, blood draw, and to maintain line patency    Allergies    codeine (Unknown)    Intolerances        LABS:                        7.5    11.96 )-----------( 285      ( 2021 01:01 )             26.5     02-    138  |  89<L>  |  23  ----------------------------<  105<H>  2.9<LL>   |  41<H>  |  0.67    Ca    8.0<L>      2021 01:01  Phos  3.7       Mg     2.1         TPro  6.9  /  Alb  2.3<L>  /  TBili  1.2  /  DBili  x   /  AST  40<H>  /  ALT  22  /  AlkPhos  74  02-22    PT/INR - ( 2021 03:24 )   PT: 14.2 sec;   INR: 1.26 ratio         PTT - ( 2021 01:01 )  PTT:77.5 sec  ABG - ( 2021 01:01 )  pH, Arterial: 7.39  pH, Blood: x     /  pCO2: 73    /  pO2: 67    / HCO3: 40    / Base Excess: 17.5  /  SaO2: 92.6      Urinalysis Basic - ( 2021 16:26 )    Color: Red / Appearance: bloody / S.015 / pH: x  Gluc: x / Ketone: Negative  / Bili: Negative / Urobili: 3 mg/dL   Blood: x / Protein: 30 mg/dL / Nitrite: Negative   Leuk Esterase: Moderate / RBC: >50 /HPF / WBC 25-50 /HPF   Sq Epi: x / Non Sq Epi: x / Bacteria: Many    Lactate Trend    CAPILLARY BLOOD GLUCOSE  POCT Blood Glucose.: 129 mg/dL (2021 06:28)  POCT Blood Glucose.: 113 mg/dL (2021 01:12)  POCT Blood Glucose.: 110 mg/dL (2021 18:50)  POCT Blood Glucose.: 110 mg/dL (2021 17:06)  POCT Blood Glucose.: 160 mg/dL (2021 11:20)    RADIOLOGY & ADDITIONAL TESTS:  Results Reviewed:   Imaging Personally Reviewed:  Electrocardiogram Personally Reviewed:

## 2021-02-22 NOTE — PROGRESS NOTE ADULT - SUBJECTIVE AND OBJECTIVE BOX
Follow Up:      Inverval History/ROS:Patient is a 65y old  Female who presents with a chief complaint of COVID (2021 07:55)    Febrile yesterday  Intubated    S/p line change     Allergies    codeine (Unknown)    Intolerances        ANTIMICROBIALS:  linezolid  IVPB    linezolid  IVPB 600 every 12 hours  meropenem  IVPB 1000 every 8 hours      OTHER MEDS:  acetaminophen    Suspension .. 650 milliGRAM(s) Oral every 6 hours PRN  chlorhexidine 0.12% Liquid 15 milliLiter(s) Oral Mucosa every 12 hours  chlorhexidine 4% Liquid 1 Application(s) Topical <User Schedule>  dexAMETHasone  Injectable 2 milliGRAM(s) IV Push daily  dextrose 40% Gel 15 Gram(s) Oral once  dextrose 5%. 1000 milliLiter(s) IV Continuous <Continuous>  dextrose 5%. 1000 milliLiter(s) IV Continuous <Continuous>  dextrose 50% Injectable 25 Gram(s) IV Push once  dextrose 50% Injectable 12.5 Gram(s) IV Push once  dextrose 50% Injectable 25 Gram(s) IV Push once  diazepam    Tablet 10 milliGRAM(s) Oral every 8 hours  erythromycin   Ointment 1 Application(s) Both EYES daily  fentaNYL   Infusion..... 4 MICROgram(s)/kG/Hr IV Continuous <Continuous>  glucagon  Injectable 1 milliGRAM(s) IntraMuscular once  heparin  Infusion 1500 Unit(s)/Hr IV Continuous <Continuous>  insulin lispro (ADMELOG) corrective regimen sliding scale   SubCutaneous every 6 hours  ketamine Infusion. 0.25 mG/kG/Hr IV Continuous <Continuous>  norepinephrine Infusion 0.07 MICROgram(s)/kG/Min IV Continuous <Continuous>  pantoprazole  Injectable 40 milliGRAM(s) IV Push daily  petrolatum Ophthalmic Ointment 1 Application(s) Both EYES daily  polyethylene glycol 3350 17 Gram(s) Oral two times a day  propofol Infusion 50 MICROgram(s)/kG/Min IV Continuous <Continuous>  senna Syrup 10 milliLiter(s) Oral at bedtime  sodium chloride 0.9% lock flush 10 milliLiter(s) IV Push every 1 hour PRN      Vital Signs Last 24 Hrs  T(C): 37 (2021 08:00), Max: 37.9 (2021 12:00)  T(F): 98.6 (2021 08:00), Max: 100.3 (2021 12:00)  HR: 90 (2021 08:18) (68 - 99)  BP: --  BP(mean): --  RR: 36 (2021 08:00) (35 - 37)  SpO2: 94% (2021 08:18) (89% - 99%)    PHYSICAL EXAM:  General: [x ]intubated  HEAD/EYES: [ ] PERRL [ x] white sclera [ ] icterus  ENT:  [ ] normal [ ] supple [ ] thrush [ ] pharyngeal exudate  Cardiovascular:   [ ] murmur [x ] normal [ ] PPM/AICD  Respiratory:  [x ] clear to ausculation bilaterally  GI:  [x ] soft, non-tender, normal bowel sounds  :  x[ ] raymundo [ ] no CVA tenderness   Musculoskeletal:  [ x] no synovitis  Neurologic:  [ ] non-focal exam   Skin:  x[ ] no rash  Lymph: [x ] no lymphadenopathy  Psychiatric:  [ ] appropriate affect [ ] alert & oriented  Lines:  [ x] no phlebitis [x ] central line                                7.5    11.96 )-----------( 285      ( 2021 01:01 )             26.5       02-22    138  |  89<L>  |  23  ----------------------------<  105<H>  2.9<LL>   |  41<H>  |  0.67    Ca    8.0<L>      2021 01:01  Phos  3.7     02  Mg     2.1         TPro  6.9  /  Alb  2.3<L>  /  TBili  1.2  /  DBili  x   /  AST  40<H>  /  ALT  22  /  AlkPhos  74  02-22      Urinalysis Basic - ( 2021 16:26 )    Color: Red / Appearance: bloody / S.015 / pH: x  Gluc: x / Ketone: Negative  / Bili: Negative / Urobili: 3 mg/dL   Blood: x / Protein: 30 mg/dL / Nitrite: Negative   Leuk Esterase: Moderate / RBC: >50 /HPF / WBC 25-50 /HPF   Sq Epi: x / Non Sq Epi: x / Bacteria: Many        MICROBIOLOGY:Culture Results:   Growth in aerobic and anaerobic bottles: Gram positive cocci in pairs  ***Blood Panel PCR results on this specimen are available  approximately 3 hours after the Gram stain result.***  Gram stain, PCR, and/or culture results may not always  correspond due to difference in methodologies.  ************************************************************  This PCR assay was performed by multiplex PCR. This  Assay tests for 66 bacterial and resistance gene targets.  Please refer to the [facility] test directory  at [email or web address] for details. (21 @ 21:30)  Culture Results:   Growth in anaerobic bottle: Gram positive cocci in pairs  Growth in aerobic bottle: Gram Positive Cocci in Pairs and Chains (21 @ 21:30)  Culture Results:   Normal Respiratory Pilar present (21 @ 20:59)  Culture Results:   No growth (21 @ 15:13)  Culture Results:   Normal Respiratory Pilar present (21 @ 13:01)      RADIOLOGY:

## 2021-02-22 NOTE — PROGRESS NOTE ADULT - ASSESSMENT
65yF with obesity, HTN, hx of LLE DVT not on AC, COVID+ on 1/17, presenting for acutely worsening SOB, intubated 1/29 for acute hypoxic respiratory failure. Still requiring high vent settings with high plateau pressures requiring ongoing ICU level care. course complicated by gram positive bacteremia and persistent fevers.     Neuro:  - propofol, ketamine, fentanyl wean as tolerated  - s/p nimbex gtts  - Unstable for transfer to CT    Respiratory:  - AC 36/430/14/75%, intubated (1/29 - )   - Failed trial of APRV  - s/p bronchoscopy 2/9- NG thus far   - s/p 10d course of Dexamethasone (1/27 - 2/5), Remdesivir d/c'ed (1/26 -1/30). restarted dexamethasone 6 mg IV (2/13 - ) i/s/o uptrending inflammatory markers  - taper to dexamethasone 2mg 2/21 for 3 days  - ideally would Prone 18/6 as needed for ARDS, but does not tolerate proning, becomes hypoxic  - bronchoscopy on 2/21: showed granulation tissue at distal ET tube site, tube exchanged and peak pressures improved.     Cards:  #Hypotension  - likely vasoplegia from sedation meds  - c/w levophed, wean as tolerated, now off    GI:  - c/w tube feeds  - Protonix 40mg qD  - senna  - S/p Relistor for longstanding constipation, with 1 large bowel movement.    Transaminitis 2/12/21  Improving     Renal:    - hyponatremia, improving. urine lytes, osmolarity not concerning  - Lasix 60 IV PRN for net negative goal of -1L/day  - raymundo exchanged due to obstruction and pt put out 2L (2/20)   - Likely has component of contraction alkalosis: will give diamox 250 once today 2/21    Heme:  h/o DVT, b/l upper thigh DVTs; PE suspected but too unstable for a CTA, Ddimer >50k initially  - Duplex 1/27: b/l above knee DVT  - heparin gtt, with modified ptt goal 60-80  Anemia, likely 2/2 blood draws  - With hematuria while on heparin, currently with improvement. S/p 1u pRBC 2/17. Hgb stable at mid-8, today 8.2    ID  Fever:  - s/p Ceftriaxone 7d course for E.coli UTI, CTX (2/5- 2/8)  - S/p empiric varun and vanc (2/9 - 2/19). D/c today  - S/p Caspofungin 2/11 - 2/18 for ? evidence of fungi on BAL  - f/u repeat blood, urine cx,  2/9- NG thus far, urine cx negative   - sputum cx 2/9/21- N resp kalpesh with some E Coli- pansensitive (sensitive to Meropenem)   - f/u BAL cultures and fungal cultures 2/9- NG thus far  - MRSA swab positive  - febrile 2/11/21- send blood cx   - 2/13/21 up-trending inflam markers, rstarted dexamethasone with slow taper -> 2mg x3 days starting 2/21  - L subclavian central line to be removed today (1/29 - 2/19); LIJ inserted 2/18.  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 Still requiring high vent settings with high plateau pressures requiring ongoing ICU level care. course complicated by gram positive bacteremia and persistent fevers.     Neuro:  - propofol, ketamine, fentanyl wean as tolerated  - started valium 10q8 2/22  - s/p nimbex gtt      Respiratory:  - AC 36/430/14/100%, intubated (1/29 - )   - Failed trial of APRV  - s/p bronchoscopy 2/9- NG thus far   - s/p 10d course of Dexamethasone (1/27 - 2/5), Remdesivir d/c'ed (1/26 -1/30). restarted dexamethasone 6 mg IV (2/13 - ) i/s/o uptrending inflammatory markers  - taper to dexamethasone 2mg 2/21 for 3 days  - ideally would Prone 18/6 as needed for ARDS, but does not tolerate proning, becomes hypoxic  - bronchoscopy on 2/21: showed granulation tissue at distal ET tube site, tube exchanged and peak pressures improved.     Cards:  #Hypotension  - likely vasoplegia from sedation meds  - c/w levophed, wean as tolerated, now off    GI:  - c/w tube feeds  - Protonix 40mg qD  - senna  - S/p Relistor for longstanding constipation, with 1 large bowel movement.    Transaminitis 2/12/21  Improving     Renal:    - hyponatremia, improving. urine lytes, osmolarity not concerning  - Lasix 60 IV PRN for net negative goal of -1L/day  - raymundo exchanged due to obstruction and pt put out 2L (2/20)   - Likely has component of contraction alkalosis: will give diamox 250 once today 2/21    Heme:  h/o DVT, b/l upper thigh DVTs; PE suspected but too unstable for a CTA, Ddimer >50k initially  - Duplex 1/27: b/l above knee DVT  - heparin gtt, with modified ptt goal 60-80  Anemia, likely 2/2 blood draws  - With hematuria while on heparin, currently with improvement. S/p 1u pRBC 2/17. Hgb stable at mid-8, today 8.2    ID  Fever:  - s/p Ceftriaxone 7d course for E.coli UTI, CTX (2/5- 2/8)  - S/p empiric varun and vanc (2/9 - 2/19). D/c today  - S/p Caspofungin 2/11 - 2/18 for ? evidence of fungi on BAL  - f/u repeat blood, urine cx,  2/9- NG thus far, urine cx negative   - sputum cx 2/9/21- N resp kalpesh with some E Coli- pansensitive (sensitive to Meropenem)   - f/u BAL cultures and fungal cultures 2/9- NG thus far  - MRSA swab positive  - febrile 2/11/21- send blood cx   - 2/13/21 up-trending inflam markers, rstarted dexamethasone with slow taper -> 2mg x3 days starting 2/21  - L subclavian central line to be removed today (1/29 - 2/19); LIJ inserted 2/18.  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 Still requiring high vent settings with high plateau pressures requiring ongoing ICU level care. course complicated by gram positive bacteremia and persistent fevers.     Neuro:  - propofol, ketamine, fentanyl wean as tolerated  - started valium 10q8 2/22  - s/p nimbex gtt      Respiratory:  - AC 36/430/14/100%, intubated (1/29 - )   - Failed trial of APRV  - s/p bronchoscopy 2/9- NG thus far   - s/p 10d course of Dexamethasone (1/27 - 2/5), Remdesivir d/c'ed (1/26 -1/30). restarted dexamethasone 6 mg IV (2/13 - ) i/s/o uptrending inflammatory markers  - taper to dexamethasone 2mg 2/21 for 3 days  - ideally would Prone 18/6 as needed for ARDS, but does not tolerate proning, becomes hypoxic  - bronchoscopy on 2/21: showed granulation tissue at distal ET tube site, tube exchanged and peak pressures improved.     Cards:  #Hypotension  - likely vasoplegia from sedation meds  - c/w levophed, wean as tolerated    GI:  - c/w tube feeds  - Protonix 40mg qD  - senna  - S/p Relistor for longstanding constipation, with 1 large bowel movement.    Transaminitis 2/12/21  Improving     Renal:    - hyponatremia, improving. urine lytes, osmolarity not concerning  - Lasix 60 IV PRN for net negative goal of -1L/day  - raymundo exchanged due to obstruction and pt put out 2L (2/20)   - Likely has component of contraction alkalosis: will give diamox 250 once today 2/21    Heme:  h/o DVT, b/l upper thigh DVTs; PE suspected but too unstable for a CTA, Ddimer >50k initially  - Duplex 1/27: b/l above knee DVT  - heparin gtt, with modified ptt goal 60-80  Anemia, likely 2/2 blood draws  - With hematuria while on heparin, currently with improvement. S/p 1u pRBC 2/17. Hgb stable at mid-8, today 8.2    ID  Fever:  - s/p Ceftriaxone 7d course for E.coli UTI, CTX (2/5- 2/8)  - S/p empiric varun and vanc (2/9 - 2/19). D/c today  - S/p Caspofungin 2/11 - 2/18 for ? evidence of fungi on BAL  - f/u repeat blood, urine cx,  2/9- NG thus far, urine cx negative   - sputum cx 2/9/21- N resp kalpesh with some E Coli- pansensitive (sensitive to Meropenem)   - f/u BAL cultures and fungal cultures 2/9- NG thus far  - MRSA swab positive  - febrile 2/11/21- send blood cx   - 2/13/21 up-trending inflam markers, rstarted dexamethasone with slow taper -> 2mg x3 days starting 2/21  - L subclavian central line to be removed today (1/29 - 2/19); LIJ inserted 2/18.  Enterococcus Bacteremia  -BCx 2/20 with VRE; started on linezolid+ varun 2/21 -   -f/u repeat cultures sent 2/22    Ethics:  - full code  - family aware of poor prognosis    65yF with obesity, HTN, hx of LLE DVT not on AC, COVID+ on 1/17, presenting for acutely worsening SOB, intubated 1/29 for acute hypoxic respiratory failure. Still requiring high vent settings with high plateau pressures requiring ongoing ICU level care. course complicated by gram positive bacteremia and persistent fevers.     Neuro:  - propofol, ketamine, fentanyl wean as tolerated  - started valium 10q8 2/22  - s/p nimbex gtt    Respiratory:  - AC 36/430/14/100%, intubated (1/29 - )   - Failed trial of APRV  - s/p bronchoscopy 2/9- NG thus far   - s/p 10d course of Dexamethasone (1/27 - 2/5), Remdesivir d/c'ed (1/26 -1/30). restarted dexamethasone 6 mg IV (2/13 - ) i/s/o uptrending inflammatory markers  - taper to dexamethasone 2mg 2/21 for 3 days  - ideally would Prone 18/6 as needed for ARDS, but does not tolerate proning, becomes hypoxic  - bronchoscopy on 2/21: showed granulation tissue at distal ET tube site, tube exchanged and peak pressures improved.   - 2/21 ET tube had been dislodged; replaced under glidescope  - CTC w/ IV contrast shows extensive PE in L PA extending to segmental branches and subsegmental branches    Cards:  #Hypotension  - likely vasoplegia from sedation meds  - c/w levophed, wean as tolerated    GI:  - c/w tube feeds  - Protonix 40mg qD  - senna  - S/p Relistor for longstanding constipation, with 1 large bowel movement.    Transaminitis 2/12/21  Improving     Renal:    - hyponatremia, improving. urine lytes, osmolarity not concerning  - Lasix 60 IV PRN for net negative goal of -1L/day  - raymundo exchanged due to obstruction and pt put out 2L (2/20)   - Likely has component of contraction alkalosis: will give diamox 250 once today 2/21    Heme:  h/o DVT, b/l upper thigh DVTs; confirmed PE L PA extending to subsegmental branches  - Duplex 1/27: b/l above knee DVT  - heparin gtt, with modified ptt goal 60-80    Anemia, likely 2/2 blood draws  - With hematuria while on heparin, currently with improvement. S/p 1u pRBC 2/17    ID  Fever:  - s/p Ceftriaxone 7d course for E.coli UTI, CTX (2/5- 2/8)  - S/p empiric varun and vanc (2/9 - 2/19). D/c today  - S/p Caspofungin 2/11 - 2/18 for ? evidence of fungi on BAL  - f/u repeat blood, urine cx,  2/9- NG thus far, urine cx negative   - sputum cx 2/9/21- N resp kalpesh with some E Coli- pansensitive (sensitive to Meropenem)   - f/u BAL cultures and fungal cultures 2/9- NG thus far  - MRSA swab positive  - febrile 2/11/21- send blood cx   - 2/13/21 up-trending inflam markers, rstarted dexamethasone with slow taper -> 2mg x 3 days starting 2/21  - L subclavian central line to be removed today (1/29 - 2/19); LIJ inserted 2/18.  Enterococcus Bacteremia  -BCx 2/20 with VRE; started on linezolid+ varun 2/21 -   -f/u repeat cultures sent 2/22    Ethics:  - full code; family currently would pursue tracheostomy  - family aware of poor prognosis

## 2021-02-22 NOTE — CHART NOTE - NSCHARTNOTEFT_GEN_A_CORE
65yF with obesity, HTN, hx of LLE DVT not on AC, COVID+ on 1/17, who presented for acutely worsening SOB, intubated 1/29 for acute hypoxic respiratory failure. Patient has been intubated/sedated on on pressor-support since end of January. PERT called for PE seen on CTPA (left PA w/ multiple segment/subsegmental branch involvment). Patient has been titrated off levophed and continues to be vented. Notes notable for anemia, but do not have an echo, troponin or BNP to assess for RV strain. SBP in 90-130s off of levophed. Unclear of how acute this PE is given that the patient has been hospitalized since end of January.    Recs:  - please obtain trop, BNP and echo to assess for RV strain; call cardiology once obtained  - con't hep gtt  - no role for TPA at this time  - discussed w/ PERT attending, Dr. Keiko Jordan MD  Cardiology Fellow PGY-4  [facility]    Notes are not final until signed by attending  For all consults and questions:  [email or web address]   Login: [email or web address]

## 2021-02-22 NOTE — PROGRESS NOTE ADULT - ATTENDING COMMENTS
65F h/o obesity, HTN, previous LLE DVT not on AC, COVID+ on 1/17 a/w hypoxic respiratory failure, intubated 1/29 now in ARDS now c/b VRE bacteremia     Neuro: sedated on propofol, ketamine and fentanyl, valium added today due to overbreathing, if no improvement can add versed    CV: vasoplegic shock on low dose levophed  - overall improvement in fluid status, IVC ~2cm with respiratory variation and edema greatly improved  Pulm: CT chest with contrast demonstrating extensive L pulm artery PE with extension to segmental and subsegmental branches - will consult CT surgery to assess if any intervention available  - remains on full vent support with improvement in oxygenation but continued poor ventilation; PPlat remains in low 30s, DP now 18 after overnight changes, with minimal room for adjustments; pressures worsened when changed to other modes of ventilation  - ET tube had slipped to 19cm at lip, assessed with glidescope and balloon at level of vocal cords - balloon deflated and advanced under direct visualization to 22cm at lip, repeat CXR confirmed ET tube placement   - Dexamethasone weaned to 2mg today x3 days, to complete tomorrow    GI: continue enteral feeds; c/w protonix ppx   Renal: elevated bicarb 2/2 renal compensation respiratory acidosis and aggressive diuresis, improved s/p diamox x2   - monitor total output without diuresis today  - replete K+  - hemautira resolved  ID: +VRE bacteremia on Linezolid, CT ab/pel with no acute intra-abdominal process, likely translocation - can likely dc meropenum  - surveillance blood cultures q48hrs to monitor for clearance  - yeast in sputum likely colonization, caspo dc'ed  Heme: c/w heparin gtt for b/l LE DVTs and PE with lower PTT goal 60-80 given hematuria  Endo: FS at goal, continue ISS coverage; TGs 246, can continue propofol and monitor weekly    Overall prognosis poor. Full code.

## 2021-02-23 NOTE — PROGRESS NOTE ADULT - SUBJECTIVE AND OBJECTIVE BOX
Follow Up:      Inverval History/ROS:Patient is a 65y old  Female who presents with a chief complaint of COVID (23 Feb 2021 07:44)    + fever  FiO2 of 75%      Allergies    codeine (Unknown)    Intolerances        ANTIMICROBIALS:  linezolid  IVPB    linezolid  IVPB 600 every 12 hours  meropenem  IVPB 1000 every 8 hours      OTHER MEDS:  acetaminophen    Suspension .. 650 milliGRAM(s) Oral every 6 hours PRN  chlorhexidine 0.12% Liquid 15 milliLiter(s) Oral Mucosa every 12 hours  chlorhexidine 4% Liquid 1 Application(s) Topical <User Schedule>  dexAMETHasone  Injectable 2 milliGRAM(s) IV Push daily  dextrose 40% Gel 15 Gram(s) Oral once  dextrose 50% Injectable 25 Gram(s) IV Push once  dextrose 50% Injectable 12.5 Gram(s) IV Push once  dextrose 50% Injectable 25 Gram(s) IV Push once  erythromycin   Ointment 1 Application(s) Both EYES daily  fentaNYL   Infusion..... 4 MICROgram(s)/kG/Hr IV Continuous <Continuous>  glucagon  Injectable 1 milliGRAM(s) IntraMuscular once  heparin  Infusion 1500 Unit(s)/Hr IV Continuous <Continuous>  insulin lispro (ADMELOG) corrective regimen sliding scale   SubCutaneous every 6 hours  ketamine Infusion. 0.25 mG/kG/Hr IV Continuous <Continuous>  midazolam Infusion 0.02 mG/kG/Hr IV Continuous <Continuous>  norepinephrine Infusion 0.07 MICROgram(s)/kG/Min IV Continuous <Continuous>  pantoprazole  Injectable 40 milliGRAM(s) IV Push daily  petrolatum Ophthalmic Ointment 1 Application(s) Both EYES daily  polyethylene glycol 3350 17 Gram(s) Oral two times a day  potassium chloride   Powder 40 milliEquivalent(s) Oral every 4 hours  propofol Infusion 50 MICROgram(s)/kG/Min IV Continuous <Continuous>  senna Syrup 10 milliLiter(s) Oral at bedtime  sodium chloride 0.9% lock flush 10 milliLiter(s) IV Push every 1 hour PRN      Vital Signs Last 24 Hrs  T(C): 37.8 (23 Feb 2021 20:00), Max: 38.5 (23 Feb 2021 08:38)  T(F): 100.1 (23 Feb 2021 20:00), Max: 101.3 (23 Feb 2021 08:38)  HR: 96 (23 Feb 2021 20:00) (79 - 101)  BP: --  BP(mean): --  RR: 34 (23 Feb 2021 20:00) (34 - 37)  SpO2: 95% (23 Feb 2021 20:00) (93% - 98%)    PHYSICAL EXAM:  General: [x ] intubated  HEAD/EYES: [ ] PERRL [ x] white sclera [ ] icterus  ENT:  [ ] normal [x ] supple [ ] thrush [ ] pharyngeal exudate  Cardiovascular:   [ ] murmur [x ] normal [ ] PPM/AICD  Respiratory:  [ x] clear to ausculation bilaterally  GI:  [x ] soft, non-tender, normal bowel sounds  :  [x ] raymundo [ ] no CVA tenderness   Musculoskeletal:  [ ] no synovitis  Neurologic:  [ ] non-focal exam   Skin:  x[ ] no rash  Lymph: [x ] no lymphadenopathy  Psychiatric:  [ ] appropriate affect [ ] alert & oriented  Lines:  [ x] no phlebitis [ ] central line                                7.4    13.56 )-----------( 288      ( 23 Feb 2021 01:44 )             26.7       02-23    139  |  94<L>  |  29<H>  ----------------------------<  92  3.1<L>   |  36<H>  |  0.71    Ca    8.0<L>      23 Feb 2021 01:44  Phos  3.4     02-23  Mg     2.3     02-23    TPro  6.9  /  Alb  2.4<L>  /  TBili  1.1  /  DBili  x   /  AST  41<H>  /  ALT  21  /  AlkPhos  87  02-23          MICROBIOLOGY:Culture Results:   No growth to date. (02-22-21 @ 08:17)  Culture Results:   No growth to date. (02-22-21 @ 08:17)  Culture Results:   Growth in aerobic and anaerobic bottles: Enterococcus faecium (vancomycin  resistant)  ***Blood Panel PCR results on this specimen are available  approximately 3 hours after the Gram stain result.***  Gram stain, PCR, and/or culture results may notalways  correspond due to difference in methodologies.  ************************************************************  This PCR assay was performed by multiplex PCR. This  Assay tests for 66 bacterial and resistance gene targets.  Please refer to the [facility] test directory  at [email or web address] for details. (02-20-21 @ 21:30)  Culture Results:   Normal Respiratory Pilar present (02-20-21 @ 15:40)  Culture Results:   Growth in aerobic and anaerobic bottles: Enterococcus faecium See  previous culture [id number] (02-20-21 @ 15:15)  Culture Results:   No growth (02-20-21 @ 15:13)  Culture Results:   Normal Respiratory Pilar present (02-17-21 @ 13:01)      RADIOLOGY:

## 2021-02-23 NOTE — ADVANCED PRACTICE NURSE CONSULT - ASSESSMENT
General: Patient obese, intubated via ETT tube on F1O2 75%, OG tube in place, sedated, on multiple gtts. Patient bedbound, indwelling urinary catheter, incontinent of stool- perineal care provided for episode of loose stool. Skin warm, dry with increased moisture in intertriginous folds, adequate skin turgor, scattered areas of hyperpigmentation and hypopigmentation, scattered areas of ecchymosis on bilateral upper extremities, abdomen, bilateral thighs. Blanchable erythema on bilateral heels. Hyperpigmentation to right cheek.  Left knee scar.     Right lip mixed etiology COVID skin manifestation, acute skin failure, and pressure related injury measuring [id number] presenting with 100% deep purple maroon discoloration. Induration not extending past wound edges. No erythema, no increased warmth. No drainage. Periwound skin intact. Goals of care: Monitor for tissue type changes.     Right upper thigh- mixed etiology COVID skin manifestation, acute skin failure, and pressure related injury measuring 5cmx0.5cmx0.2cm with 80% deep purple maroon discoloration of epithelial layer and 20% deep purple maroon discoloration of dermis. No active drainage, no odor. Periwound skin with hyperpigmentation. No induration, no erythema, no increased warmth. Goals of care: Monitor for tissue type changes, Protect from friction and shear.     Bilateral breasts, abdominal pannus, and bilateral groin with severe moisture associated dermatitis with blanching erythema and increased moisture with multiple linear fissures exposing 100% pink moist dermis primarily to bilateral groin and abdominal pannus.     Sacral/gluteal fold- with incontinence/ moisture associated dermatitis presenting with increased moisture and blanching erythema.     Right ear mixed etiology COVID skin manifestation, acute skin failure, and pressure related injury measuring [id number] exposing dry serous base. Periwound skin with blanching erythema. No induration, no erythema, no increased warmth. Goals of care: Autolytic debridement, promote moist environment, protect periwound skin, monitor for tissue type changes.       Patient continues to be critically ill- at high risk for skin breakdown. Upon assessment patient on an air loss support surface, Z-flow pillow utilized, heels elevated off bed with heel offloading boots, moisture management and use of one incontinence pad. Turning and positioning as tolerated.  General: Patient obese, intubated via ETT tube on F1O2 75%, OG tube in place, sedated, on multiple gtts. Patient bedbound, indwelling urinary catheter, incontinent of stool- perineal care provided for episode of loose stool. Skin warm, dry with increased moisture in intertriginous folds, adequate skin turgor, scattered areas of hyperpigmentation and hypopigmentation, scattered areas of ecchymosis on bilateral upper extremities, abdomen, bilateral thighs. Blanchable erythema on bilateral heels. Hyperpigmentation to right cheek.  Left knee scar.     Right lip mixed etiology COVID skin manifestation, acute skin failure, and pressure related injury measuring [id number] presenting with 100% deep purple maroon discoloration. Induration not extending past wound edges. No erythema, no increased warmth. No drainage. Periwound skin intact. Goals of care: Monitor for tissue type changes.     Right upper thigh- mixed etiology COVID skin manifestation, acute skin failure, and pressure related injury measuring 5cmx0.5cmx0.2cm with 80% deep purple maroon discoloration of epithelial layer and 20% deep purple maroon discoloration of dermis. No active drainage, no odor. Periwound skin with hyperpigmentation. No induration, no erythema, no increased warmth. Goals of care: Monitor for tissue type changes, Protect from friction and shear.     Bilateral breasts, abdominal pannus, and bilateral groin, Vulva with severe moisture associated dermatitis with blanching erythema and increased moisture with multiple linear fissures exposing 100% pink moist dermis primarily to bilateral groin and abdominal pannus.     Sacral/gluteal fold- with incontinence/ moisture associated dermatitis presenting with increased moisture and blanching erythema.     Right ear mixed etiology COVID skin manifestation, acute skin failure, and pressure related injury measuring [id number] exposing dry serous base. Periwound skin with blanching erythema. No induration, no erythema, no increased warmth. Goals of care: Autolytic debridement, promote moist environment, protect periwound skin, monitor for tissue type changes.       Patient continues to be critically ill- at high risk for skin breakdown. Upon assessment patient on an air loss support surface, Z-flow pillow utilized, heels elevated off bed with heel offloading boots, moisture management and use of one incontinence pad. Turning and positioning as tolerated.  General: Patient obese, intubated via ETT tube on F1O2 75%, OG tube in place, sedated, on multiple gtts. Patient bedbound, indwelling urinary catheter, incontinent of stool- perineal care provided for episode of loose stool. Skin warm, dry with increased moisture in intertriginous folds, adequate skin turgor, scattered areas of hyperpigmentation and hypopigmentation, scattered areas of ecchymosis on bilateral upper extremities, abdomen, bilateral thighs. Blanchable erythema on bilateral heels. Hyperpigmentation to right cheek.  Left knee scar.     Right lip mixed etiology COVID skin manifestation, acute skin failure, and pressure related injury measuring [id number] presenting with 100% deep purple maroon discoloration. Induration not extending past wound edges. No erythema, no increased warmth. No drainage. Periwound skin intact. Goals of care: Monitor for tissue type changes.     Right upper thigh- mixed etiology COVID skin manifestation, acute skin failure, and pressure related injury measuring 5cmx0.5cmx0.2cm with 80% deep purple maroon discoloration of epithelial layer and 20% deep purple maroon discoloration of dermis. No active drainage, no odor. Periwound skin with hyperpigmentation. No induration, no erythema, no increased warmth. Goals of care: Monitor for tissue type changes, Protect from friction and shear.     Bilateral breasts, abdominal pannus, and bilateral groin, Vulva with severe moisture associated dermatitis with blanching erythema and increased moisture with multiple linear fissures exposing 100% pink moist dermis primarily to bilateral groin and abdominal pannus.     Sacral/gluteal fold- with incontinence/ moisture associated dermatitis presenting with increased moisture and blanching erythema.     Right ear mixed etiology COVID skin manifestation, acute skin failure, and pressure related injury measuring [id number] exposing dry serous base. Periwound skin with blanching erythema. No induration, no erythema, no increased warmth. Goals of care: Autolytic debridement, promote moist environment, protect periwound skin, monitor for tissue type changes.       Left elbow mixed etiology COVID skin manifestation, acute skin failure, and pressure related injury measuring 7dtm7oqy5.2cm exposing pale pink friable dermis. Moderate serosanguinous drainage, no odor. Periwound with hyperpigmentation. No induration, no erythema no increased warmth.  Goals of care: Maintain moist wound base, protect periwound skin, continue to offload, protect from friction and shear.     Patient continues to be critically ill- at high risk for skin breakdown. Upon assessment patient on an air loss support surface, Z-flow pillow utilized, heels elevated off bed with heel offloading boots, moisture management and use of one incontinence pad. Turning and positioning as tolerated.

## 2021-02-23 NOTE — PROGRESS NOTE ADULT - ATTENDING COMMENTS
65F h/o obesity, HTN, previous LLE DVT not on AC, COVID+ on 1/17 a/w hypoxic respiratory failure, intubated 1/29 now in ARDS now c/b VRE bacteremia     Neuro: sedated on propofol, ketamine and fentanyl - will add versed gtt today for better vent synchrony as pt with high pressures when overbreathing vent    CV: vasoplegic shock on low dose levophed  - IVC remains ~2cm with respiratory variation and edema greatly improved, will hold off additional diuresis at this time  Pulm: not candidate to tPA for submassive PE seen on CT yesterday, continue heparin gtt  - no improvement in airway pressures when changed from AC volume to any other vent mode, likely now in fibrotic stage ARDS  - Dexamethasone wean to complete today    GI: continue enteral feeds; c/w protonix ppx   Renal: renal function stable, HCO3 improved s/p diamox x2; trend UOP off diuretics  ID: +VRE bacteremia on BCx 2/20, repeat negative on 2/22 - continue Linezolid, Meropenum; ID following  Heme: c/w heparin gtt for b/l LE DVTs and PE with lower PTT goal 60-80 given hematuria  Endo: FS at goal, continue ISS coverage    Overall prognosis poor. Full code. D/w family by phone

## 2021-02-23 NOTE — PROGRESS NOTE ADULT - SUBJECTIVE AND OBJECTIVE BOX
Elliot Diaz MD, PGY-2  Available on [employer] Teams | Pager: [phone] | [facility]: [phone]  ---------------------------------------------------------------------------------------------  Patient is a 65y old  Female who presents with a chief complaint of COVID (22 Feb 2021 10:22)      SUBJECTIVE / OVERNIGHT EVENTS:  ADDITIONAL REVIEW OF SYSTEMS:    MEDICATIONS  (STANDING):  chlorhexidine 0.12% Liquid 15 milliLiter(s) Oral Mucosa every 12 hours  chlorhexidine 4% Liquid 1 Application(s) Topical <User Schedule>  dexAMETHasone  Injectable 2 milliGRAM(s) IV Push daily  dextrose 40% Gel 15 Gram(s) Oral once  dextrose 50% Injectable 25 Gram(s) IV Push once  dextrose 50% Injectable 12.5 Gram(s) IV Push once  dextrose 50% Injectable 25 Gram(s) IV Push once  diazepam    Tablet 10 milliGRAM(s) Oral every 8 hours  erythromycin   Ointment 1 Application(s) Both EYES daily  fentaNYL   Infusion..... 4 MICROgram(s)/kG/Hr (10.4 mL/Hr) IV Continuous <Continuous>  glucagon  Injectable 1 milliGRAM(s) IntraMuscular once  heparin  Infusion 1500 Unit(s)/Hr (14 mL/Hr) IV Continuous <Continuous>  insulin lispro (ADMELOG) corrective regimen sliding scale   SubCutaneous every 6 hours  ketamine Infusion. 0.25 mG/kG/Hr (3.25 mL/Hr) IV Continuous <Continuous>  linezolid  IVPB 600 milliGRAM(s) IV Intermittent every 12 hours  linezolid  IVPB      meropenem  IVPB 1000 milliGRAM(s) IV Intermittent every 8 hours  norepinephrine Infusion 0.07 MICROgram(s)/kG/Min (8.53 mL/Hr) IV Continuous <Continuous>  pantoprazole  Injectable 40 milliGRAM(s) IV Push daily  petrolatum Ophthalmic Ointment 1 Application(s) Both EYES daily  polyethylene glycol 3350 17 Gram(s) Oral two times a day  propofol Infusion 50 MICROgram(s)/kG/Min (39 mL/Hr) IV Continuous <Continuous>  senna Syrup 10 milliLiter(s) Oral at bedtime    MEDICATIONS  (PRN):  acetaminophen    Suspension .. 650 milliGRAM(s) Oral every 6 hours PRN Temp greater or equal to 38C (100.4F)  sodium chloride 0.9% lock flush 10 milliLiter(s) IV Push every 1 hour PRN Pre/post blood products, medications, blood draw, and to maintain line patency      ICU Vital Signs Last 24 Hrs  T(F): 97.9 (23 Feb 2021 05:00), Max: 98.6 (22 Feb 2021 08:00)  HR: 86 (23 Feb 2021 05:00) (78 - 91)  BP: --  BP(mean): --  ABP: 113/51 (23 Feb 2021 05:00) (99/49 - 117/53)  ABP(mean): 68 (23 Feb 2021 05:00) (65 - 71)  RR: 34 (23 Feb 2021 05:00) (34 - 36)  SpO2: 93% (23 Feb 2021 05:00) (90% - 96%)    Mode: AC/ CMV (Assist Control/ Continuous Mandatory Ventilation)  RR (machine): 34  TV (machine): 420  FiO2: 85  PEEP: 12  ITime: 0.66  MAP: 18  PIP: 36    Physical Exam:     I&O's Summary    22 Feb 2021 07:01  -  23 Feb 2021 07:00  --------------------------------------------------------  IN: 3562.5 mL / OUT: 2925 mL / NET: 637.5 mL      MEDICATIONS  (STANDING):  chlorhexidine 0.12% Liquid 15 milliLiter(s) Oral Mucosa every 12 hours  chlorhexidine 4% Liquid 1 Application(s) Topical <User Schedule>  dexAMETHasone  Injectable 2 milliGRAM(s) IV Push daily  dextrose 40% Gel 15 Gram(s) Oral once  dextrose 50% Injectable 25 Gram(s) IV Push once  dextrose 50% Injectable 12.5 Gram(s) IV Push once  dextrose 50% Injectable 25 Gram(s) IV Push once  diazepam    Tablet 10 milliGRAM(s) Oral every 8 hours  erythromycin   Ointment 1 Application(s) Both EYES daily  fentaNYL   Infusion..... 4 MICROgram(s)/kG/Hr (10.4 mL/Hr) IV Continuous <Continuous>  glucagon  Injectable 1 milliGRAM(s) IntraMuscular once  heparin  Infusion 1500 Unit(s)/Hr (14 mL/Hr) IV Continuous <Continuous>  insulin lispro (ADMELOG) corrective regimen sliding scale   SubCutaneous every 6 hours  ketamine Infusion. 0.25 mG/kG/Hr (3.25 mL/Hr) IV Continuous <Continuous>  linezolid  IVPB 600 milliGRAM(s) IV Intermittent every 12 hours  linezolid  IVPB      meropenem  IVPB 1000 milliGRAM(s) IV Intermittent every 8 hours  norepinephrine Infusion 0.07 MICROgram(s)/kG/Min (8.53 mL/Hr) IV Continuous <Continuous>  pantoprazole  Injectable 40 milliGRAM(s) IV Push daily  petrolatum Ophthalmic Ointment 1 Application(s) Both EYES daily  polyethylene glycol 3350 17 Gram(s) Oral two times a day  propofol Infusion 50 MICROgram(s)/kG/Min (39 mL/Hr) IV Continuous <Continuous>  senna Syrup 10 milliLiter(s) Oral at bedtime    MEDICATIONS  (PRN):  acetaminophen    Suspension .. 650 milliGRAM(s) Oral every 6 hours PRN Temp greater or equal to 38C (100.4F)  sodium chloride 0.9% lock flush 10 milliLiter(s) IV Push every 1 hour PRN Pre/post blood products, medications, blood draw, and to maintain line patency    Allergies    codeine (Unknown)    Intolerances        LABS:                        7.4    13.56 )-----------( 288      ( 23 Feb 2021 01:44 )             26.7     02-23    139  |  94<L>  |  29<H>  ----------------------------<  92  3.1<L>   |  36<H>  |  0.71    Ca    8.0<L>      23 Feb 2021 01:44  Phos  3.4     02-23  Mg     2.3     02-23    TPro  6.9  /  Alb  2.4<L>  /  TBili  1.1  /  DBili  x   /  AST  41<H>  /  ALT  21  /  AlkPhos  87  02-23    PTT - ( 23 Feb 2021 01:44 )  PTT:85.5 sec  ABG - ( 23 Feb 2021 01:44 )  pH, Arterial: 7.33  pH, Blood: x     /  pCO2: 77    /  pO2: 93    / HCO3: 36    / Base Excess: 13.0  /  SaO2: 96.8      Lactate Trend    CAPILLARY BLOOD GLUCOSE  POCT Blood Glucose.: 104 mg/dL (23 Feb 2021 06:19)  POCT Blood Glucose.: 119 mg/dL (22 Feb 2021 22:24)  POCT Blood Glucose.: 102 mg/dL (22 Feb 2021 16:56)  POCT Blood Glucose.: 125 mg/dL (22 Feb 2021 11:10)    RADIOLOGY & ADDITIONAL TESTS:  Results Reviewed:   Imaging Personally Reviewed:  Electrocardiogram Personally Reviewed: Elliot Diaz MD, PGY-2  Available on [employer] Teams | Pager: [phone] | [facility]: [phone]  ---------------------------------------------------------------------------------------------  Patient is a 65y old  Female who presents with a chief complaint of COVID (22 Feb 2021 10:22)    SUBJECTIVE / OVERNIGHT EVENTS: Pt seen and examined at bedside. Ran out of glucerna, adjusting feeds. Hep supratherapeutic, mild hematuria overnight.     MEDICATIONS  (STANDING):  chlorhexidine 0.12% Liquid 15 milliLiter(s) Oral Mucosa every 12 hours  chlorhexidine 4% Liquid 1 Application(s) Topical <User Schedule>  dexAMETHasone  Injectable 2 milliGRAM(s) IV Push daily  dextrose 40% Gel 15 Gram(s) Oral once  dextrose 50% Injectable 25 Gram(s) IV Push once  dextrose 50% Injectable 12.5 Gram(s) IV Push once  dextrose 50% Injectable 25 Gram(s) IV Push once  diazepam    Tablet 10 milliGRAM(s) Oral every 8 hours  erythromycin   Ointment 1 Application(s) Both EYES daily  fentaNYL   Infusion..... 4 MICROgram(s)/kG/Hr (10.4 mL/Hr) IV Continuous <Continuous>  glucagon  Injectable 1 milliGRAM(s) IntraMuscular once  heparin  Infusion 1500 Unit(s)/Hr (14 mL/Hr) IV Continuous <Continuous>  insulin lispro (ADMELOG) corrective regimen sliding scale   SubCutaneous every 6 hours  ketamine Infusion. 0.25 mG/kG/Hr (3.25 mL/Hr) IV Continuous <Continuous>  linezolid  IVPB 600 milliGRAM(s) IV Intermittent every 12 hours  linezolid  IVPB      meropenem  IVPB 1000 milliGRAM(s) IV Intermittent every 8 hours  norepinephrine Infusion 0.07 MICROgram(s)/kG/Min (8.53 mL/Hr) IV Continuous <Continuous>  pantoprazole  Injectable 40 milliGRAM(s) IV Push daily  petrolatum Ophthalmic Ointment 1 Application(s) Both EYES daily  polyethylene glycol 3350 17 Gram(s) Oral two times a day  propofol Infusion 50 MICROgram(s)/kG/Min (39 mL/Hr) IV Continuous <Continuous>  senna Syrup 10 milliLiter(s) Oral at bedtime    MEDICATIONS  (PRN):  acetaminophen    Suspension .. 650 milliGRAM(s) Oral every 6 hours PRN Temp greater or equal to 38C (100.4F)  sodium chloride 0.9% lock flush 10 milliLiter(s) IV Push every 1 hour PRN Pre/post blood products, medications, blood draw, and to maintain line patency      ICU Vital Signs Last 24 Hrs  T(F): 97.9 (23 Feb 2021 05:00), Max: 98.6 (22 Feb 2021 08:00)  HR: 86 (23 Feb 2021 05:00) (78 - 91)  ABP: 113/51 (23 Feb 2021 05:00) (99/49 - 117/53)  ABP(mean): 68 (23 Feb 2021 05:00) (65 - 71)  RR: 34 (23 Feb 2021 05:00) (34 - 36)  SpO2: 93% (23 Feb 2021 05:00) (90% - 96%)    Mode: AC/ CMV (Assist Control/ Continuous Mandatory Ventilation)  RR (machine): 34  TV (machine): 420  FiO2: 85  PEEP: 12  ITime: 0.66  MAP: 18  PIP: 36    Physical Exam:   GENERAL: sedated but high peak airway pressures, pulling excess volume  CHEST/LUNG: CTABL; no wheezes  HEART: Regular rate and rhythm; No murmurs, rubs, or gallops  ABDOMEN: Soft, Nontender, Nondistended; Bowel sounds present  EXTREMITIES: 2+ Peripheral Pulses, No clubbing, cyanosis. Improved edema  NEUROLOGY: non-focal  SKIN: No rashes or lesions    I&O's Summary    22 Feb 2021 07:01  -  23 Feb 2021 07:00  --------------------------------------------------------  IN: 3562.5 mL / OUT: 2925 mL / NET: 637.5 mL      MEDICATIONS  (STANDING):  chlorhexidine 0.12% Liquid 15 milliLiter(s) Oral Mucosa every 12 hours  chlorhexidine 4% Liquid 1 Application(s) Topical <User Schedule>  dexAMETHasone  Injectable 2 milliGRAM(s) IV Push daily  dextrose 40% Gel 15 Gram(s) Oral once  dextrose 50% Injectable 25 Gram(s) IV Push once  dextrose 50% Injectable 12.5 Gram(s) IV Push once  dextrose 50% Injectable 25 Gram(s) IV Push once  diazepam    Tablet 10 milliGRAM(s) Oral every 8 hours  erythromycin   Ointment 1 Application(s) Both EYES daily  fentaNYL   Infusion..... 4 MICROgram(s)/kG/Hr (10.4 mL/Hr) IV Continuous <Continuous>  glucagon  Injectable 1 milliGRAM(s) IntraMuscular once  heparin  Infusion 1500 Unit(s)/Hr (14 mL/Hr) IV Continuous <Continuous>  insulin lispro (ADMELOG) corrective regimen sliding scale   SubCutaneous every 6 hours  ketamine Infusion. 0.25 mG/kG/Hr (3.25 mL/Hr) IV Continuous <Continuous>  linezolid  IVPB 600 milliGRAM(s) IV Intermittent every 12 hours  linezolid  IVPB      meropenem  IVPB 1000 milliGRAM(s) IV Intermittent every 8 hours  norepinephrine Infusion 0.07 MICROgram(s)/kG/Min (8.53 mL/Hr) IV Continuous <Continuous>  pantoprazole  Injectable 40 milliGRAM(s) IV Push daily  petrolatum Ophthalmic Ointment 1 Application(s) Both EYES daily  polyethylene glycol 3350 17 Gram(s) Oral two times a day  propofol Infusion 50 MICROgram(s)/kG/Min (39 mL/Hr) IV Continuous <Continuous>  senna Syrup 10 milliLiter(s) Oral at bedtime    MEDICATIONS  (PRN):  acetaminophen    Suspension .. 650 milliGRAM(s) Oral every 6 hours PRN Temp greater or equal to 38C (100.4F)  sodium chloride 0.9% lock flush 10 milliLiter(s) IV Push every 1 hour PRN Pre/post blood products, medications, blood draw, and to maintain line patency    Allergies    codeine (Unknown)    Intolerances        LABS:                        7.4    13.56 )-----------( 288      ( 23 Feb 2021 01:44 )             26.7     02-23    139  |  94<L>  |  29<H>  ----------------------------<  92  3.1<L>   |  36<H>  |  0.71    Ca    8.0<L>      23 Feb 2021 01:44  Phos  3.4     02-23  Mg     2.3     02-23    TPro  6.9  /  Alb  2.4<L>  /  TBili  1.1  /  DBili  x   /  AST  41<H>  /  ALT  21  /  AlkPhos  87  02-23    PTT - ( 23 Feb 2021 01:44 )  PTT:85.5 sec  ABG - ( 23 Feb 2021 01:44 )  pH, Arterial: 7.33  pH, Blood: x     /  pCO2: 77    /  pO2: 93    / HCO3: 36    / Base Excess: 13.0  /  SaO2: 96.8      Lactate Trend    CAPILLARY BLOOD GLUCOSE  POCT Blood Glucose.: 104 mg/dL (23 Feb 2021 06:19)  POCT Blood Glucose.: 119 mg/dL (22 Feb 2021 22:24)  POCT Blood Glucose.: 102 mg/dL (22 Feb 2021 16:56)  POCT Blood Glucose.: 125 mg/dL (22 Feb 2021 11:10)    RADIOLOGY & ADDITIONAL TESTS:  Results Reviewed:   Imaging Personally Reviewed:  Electrocardiogram Personally Reviewed:

## 2021-02-23 NOTE — ADVANCED PRACTICE NURSE CONSULT - REASON FOR CONSULT
Patient seen on skin care rounds after wound care referral received for assessment of skin impairment and recommendations of topical management. Chart reviewed: WBC 13.56, BMI 41.1, H/H 7.4/26.7, Platelets 288, Serum albumin 2.4, D-dimer 1212, A1C 6.1, Codey 10, CT chest (+)PE. Patient H/O of obesity, HTN, hx of LLE DVT not on AC, COVID+ on 1/17, presenting for acutely worsening SOB, intubated 1/29 for acute hypoxic respiratory failure. Still requiring high vent settings with high plateau pressures requiring ongoing ICU level care. Course complicated by gram positive bacteremia and persistent fevers.    Patient seen on skin care rounds after wound care referral received for assessment of skin impairment and recommendations of topical management. Chart reviewed: WBC 13.56, BMI 41.1, H/H 7.4/26.7, Platelets 288, Serum albumin 2.4, D-dimer 1212, A1C 6.1, Codey 10, CT chest (+)PE. Patient H/O of obesity, HTN, hx of LLE DVT not on AC, COVID+ on 1/17, presenting for acutely worsening SOB, intubated 1/29 for acute hypoxic respiratory failure. Still requiring high vent settings with high plateau pressures requiring ongoing ICU level care. Course complicated by gram positive bacteremia, persistent fevers and (+)PE. Patient followed by Infectious disease currently being managed in critical care.

## 2021-02-23 NOTE — PROGRESS NOTE ADULT - ASSESSMENT
65yF with obesity, HTN, hx of LLE DVT not on AC, COVID+ on 1/17, presenting for acutely worsening SOB, intubated 1/29 for acute hypoxic respiratory failure. Still requiring high vent settings with high plateau pressures requiring ongoing ICU level care. course complicated by gram positive bacteremia and persistent fevers.     Neuro:  - propofol, ketamine, fentanyl wean as tolerated  - started valium 10q8 2/22  - s/p nimbex gtt    Respiratory:  - AC 36/430/14/100%, intubated (1/29 - )   - Failed trial of APRV  - s/p bronchoscopy 2/9- NG thus far   - s/p 10d course of Dexamethasone (1/27 - 2/5), Remdesivir d/c'ed (1/26 -1/30). restarted dexamethasone 6 mg IV (2/13 - ) i/s/o uptrending inflammatory markers  - taper to dexamethasone 2mg 2/21 for 3 days  - ideally would Prone 18/6 as needed for ARDS, but does not tolerate proning, becomes hypoxic  - bronchoscopy on 2/21: showed granulation tissue at distal ET tube site, tube exchanged and peak pressures improved.   - 2/21 ET tube had been dislodged; replaced under glidescope  - CTC w/ IV contrast shows extensive PE in L PA extending to segmental branches and subsegmental branches    Cards:  #Hypotension  - likely vasoplegia from sedation meds  - c/w levophed, wean as tolerated    GI:  - c/w tube feeds  - Protonix 40mg qD  - senna  - S/p Relistor for longstanding constipation, with 1 large bowel movement.    Transaminitis 2/12/21  Improving     Renal:    - hyponatremia, improving. urine lytes, osmolarity not concerning  - Lasix 60 IV PRN for net negative goal of -1L/day  - raymundo exchanged due to obstruction and pt put out 2L (2/20)   - Likely has component of contraction alkalosis: will give diamox 250 once today 2/21    Heme:  h/o DVT, b/l upper thigh DVTs; confirmed PE L PA extending to subsegmental branches  - Duplex 1/27: b/l above knee DVT  - heparin gtt, with modified ptt goal 60-80    Anemia, likely 2/2 blood draws  - With hematuria while on heparin, currently with improvement. S/p 1u pRBC 2/17    ID  Fever:  - s/p Ceftriaxone 7d course for E.coli UTI, CTX (2/5- 2/8)  - S/p empiric varun and vanc (2/9 - 2/19). D/c today  - S/p Caspofungin 2/11 - 2/18 for ? evidence of fungi on BAL  - f/u repeat blood, urine cx,  2/9- NG thus far, urine cx negative   - sputum cx 2/9/21- N resp kalpesh with some E Coli- pansensitive (sensitive to Meropenem)   - f/u BAL cultures and fungal cultures 2/9- NG thus far  - MRSA swab positive  - febrile 2/11/21- send blood cx   - 2/13/21 up-trending inflam markers, rstarted dexamethasone with slow taper -> 2mg x 3 days starting 2/21  - L subclavian central line to be removed today (1/29 - 2/19); LIJ inserted 2/18.  Enterococcus Bacteremia  -BCx 2/20 with VRE; started on linezolid+ varun 2/21 -   -f/u repeat cultures sent 2/22    Ethics:  - full code; family currently would pursue tracheostomy  - family aware of poor prognosis    65yF with obesity, HTN, hx of LLE DVT not on AC, COVID+ on 1/17, presenting for acutely worsening SOB, intubated 1/29 for acute hypoxic respiratory failure. Still requiring high vent settings with high plateau pressures requiring ongoing ICU level care. course complicated by gram positive bacteremia and persistent fevers.     Neuro:  - propofol, ketamine, fentanyl wean as tolerated  - started valium 10q8 2/22  - s/p nimbex gtt    Respiratory:  - AC 36/430/14/100%, intubated (1/29 - )   - Failed trial of APRV  - s/p bronchoscopy 2/9- NG thus far   - s/p 10d course of Dexamethasone (1/27 - 2/5), Remdesivir d/c'ed (1/26 -1/30). restarted dexamethasone 6 mg IV (2/13 - ) i/s/o uptrending inflammatory markers  - taper to dexamethasone 2mg 2/21 to 2/24   - ideally would Prone 18/6 as needed for ARDS, but does not tolerate proning, becomes hypoxic  - bronchoscopy on 2/21: showed granulation tissue at distal ET tube site, tube exchanged and peak pressures improved.   - 2/21 ET tube had been dislodged; replaced under glidescope  - CTC w/ IV contrast shows extensive PE in L PA extending to segmental branches and subsegmental branches    Cards:  #Hypotension  - likely vasoplegia from sedation meds  - c/w levophed, wean as tolerated    GI:  - c/w tube feeds  - Protonix 40mg qD  - senna  - S/p Relistor for longstanding constipation, with 1 large bowel movement.    Transaminitis 2/12/21  Improving     Renal:    - hyponatremia, improving. urine lytes, osmolarity not concerning  - Lasix 60 IV PRN for net negative goal of net even to net -1L/day  - raymundo exchanged due to obstruction and pt put out 2L (2/20)     Heme:  h/o DVT, b/l upper thigh DVTs; confirmed PE L PA extending to subsegmental branches  - Duplex 1/27: b/l above knee DVT  - heparin gtt, with modified ptt goal 60-80    Anemia, likely 2/2 blood draws  - With hematuria while on heparin, currently with improvement. S/p 1u pRBC 2/17    ID  Fever:  - s/p Ceftriaxone 7d course for E.coli UTI, CTX (2/5- 2/8)  - S/p empiric varun and vanc (2/9 - 2/19). D/c today  - S/p Caspofungin 2/11 - 2/18 for ? evidence of fungi on BAL  - f/u repeat blood, urine cx,  2/9- NG thus far, urine cx negative   - sputum cx 2/9/21- N resp kalpesh with some E Coli- pansensitive (sensitive to Meropenem)   - f/u BAL cultures and fungal cultures 2/9- NG thus far  - MRSA swab positive  - febrile 2/11/21- send blood cx   - 2/13/21 up-trending inflam markers, rstarted dexamethasone with slow taper -> 2mg x 3 days starting 2/21  - L subclavian central line to be removed today (1/29 - 2/19); LIJ inserted 2/18.  Enterococcus Bacteremia  -BCx 2/20 with VRE; started on linezolid+ varun 2/21 -   -f/u repeat cultures sent 2/22 NGTD    Ethics:  - full code; family currently would pursue tracheostomy  - family aware of poor prognosis    65yF with obesity, HTN, hx of LLE DVT not on AC, COVID+ on 1/17, presenting for acutely worsening SOB, intubated 1/29 for acute hypoxic respiratory failure. Still requiring high vent settings with high plateau pressures requiring ongoing ICU level care. course complicated by gram positive bacteremia and persistent fevers.     Neuro:  - propofol, ketamine, fentanyl wean as tolerated  - d/c valium, started versed gtt for high peak airway pressures; may have to paralyze if peak airway pressure not improved    Respiratory:  - AC 36/430/14/100%, intubated (1/29 - )   - Failed trial of APRV  - s/p bronchoscopy 2/9- NG thus far   - s/p 10d course of Dexamethasone (1/27 - 2/5), Remdesivir d/c'ed (1/26 -1/30). restarted dexamethasone 6 mg IV (2/13 - ) i/s/o uptrending inflammatory markers  - taper to dexamethasone 2mg 2/21 to 2/24   - ideally would Prone 18/6 as needed for ARDS, but does not tolerate proning, becomes hypoxic  - bronchoscopy on 2/21: showed granulation tissue at distal ET tube site, tube exchanged and peak pressures improved.   - 2/21 ET tube had been dislodged; replaced under glidescope  - CTC w/ IV contrast shows extensive PE in L PA extending to segmental branches and subsegmental branches    Cards:  #Hypotension  - likely vasoplegia from sedation meds  - c/w levophed, wean as tolerated    GI:  - c/w tube feeds  - Protonix 40mg qD  - senna  - S/p Relistor for longstanding constipation, with 1 large bowel movement.    Transaminitis 2/12/21  Improving     Renal:    - hyponatremia, improving. urine lytes, osmolarity not concerning  - Lasix 60 IV PRN for net negative goal of net even to net -1L/day  - raymundo exchanged due to obstruction and pt put out 2L (2/20)     Heme:  h/o DVT, b/l upper thigh DVTs; confirmed PE L PA extending to subsegmental branches  - Duplex 1/27: b/l above knee DVT  - heparin gtt, with modified ptt goal 60-80    Anemia, likely 2/2 blood draws  - With hematuria while on heparin, currently with improvement. S/p 1u pRBC 2/17    ID  Fever:  - s/p Ceftriaxone 7d course for E.coli UTI, CTX (2/5- 2/8)  - S/p empiric varun and vanc (2/9 - 2/19). D/c today  - S/p Caspofungin 2/11 - 2/18 for ? evidence of fungi on BAL  - f/u repeat blood, urine cx,  2/9- NG thus far, urine cx negative   - sputum cx 2/9/21- N resp kalpesh with some E Coli- pansensitive (sensitive to Meropenem)   - f/u BAL cultures and fungal cultures 2/9- NG thus far  - MRSA swab positive  - febrile 2/11/21- send blood cx   - 2/13/21 up-trending inflam markers, rstarted dexamethasone with slow taper -> 2mg x 3 days starting 2/21  - L subclavian central line to be removed today (1/29 - 2/19); LIJ inserted 2/18.  Enterococcus Bacteremia  -BCx 2/20 with VRE; started on linezolid+ varun 2/21 -   -f/u repeat cultures sent 2/22 NGTD    Ethics:  - full code; family currently would pursue tracheostomy  - family aware of poor prognosis    65yF with obesity, HTN, hx of LLE DVT not on AC, COVID+ on 1/17, presenting for acutely worsening SOB, intubated 1/29 for acute hypoxic respiratory failure. Still requiring high vent settings with high plateau pressures requiring ongoing ICU level care. course complicated by gram positive bacteremia and persistent fevers.     Neuro:  - propofol, ketamine, fentanyl wean as tolerated  - d/c valium, started versed gtt for high peak airway pressures; may have to paralyze if peak airway pressure not improved    Respiratory:  - AC 36/430/14/100%, intubated (1/29 - )   - Failed trial of APRV  - s/p bronchoscopy 2/9- NG thus far   - s/p 10d course of Dexamethasone (1/27 - 2/5), Remdesivir d/c'ed (1/26 -1/30). restarted dexamethasone 6 mg IV (2/13 - ) i/s/o uptrending inflammatory markers  - taper to dexamethasone 2mg 2/21 to 2/24   - ideally would Prone 18/6 as needed for ARDS, but does not tolerate proning, becomes hypoxic  - bronchoscopy on 2/21: showed granulation tissue at distal ET tube site, tube exchanged and peak pressures improved.   - 2/21 ET tube had been dislodged; replaced under glidescope  - CTC w/ IV contrast shows extensive PE in L PA extending to segmental branches and subsegmental branches    Cards:  #Hypotension  - likely vasoplegia from sedation meds  - c/w levophed, wean as tolerated    GI:  - c/w tube feeds  - Protonix 40mg qD  - senna  - S/p Relistor for longstanding constipation, with 1 large bowel movement.    Transaminitis 2/12/21  Improving     Renal:    - hyponatremia, improving. urine lytes, osmolarity not concerning  - Lasix 60 IV PRN for net negative goal of net even to net -1L/day  - raymundo exchanged due to obstruction and pt put out 2L (2/20)     Heme:  h/o DVT, b/l upper thigh DVTs; confirmed PE L PA extending to subsegmental branches  - Duplex 1/27: b/l above knee DVT  - heparin gtt, with modified ptt goal 60-80    Anemia, likely 2/2 blood draws  - With hematuria while on heparin, currently with improvement. S/p 1u pRBC 2/17    ID  Fever:  - s/p Ceftriaxone 7d course for E.coli UTI, CTX (2/5- 2/8)  - S/p empiric varun and vanc (2/9 - 2/19). D/c today  - S/p Caspofungin 2/11 - 2/18 for ? evidence of fungi on BAL  - f/u repeat blood, urine cx,  2/9- NG thus far, urine cx negative   - sputum cx 2/9/21- N resp kalpesh with some E Coli- pansensitive (sensitive to Meropenem)   - f/u BAL cultures and fungal cultures 2/9- NG thus far  - MRSA swab positive  - febrile 2/11/21- send blood cx   - 2/13/21 up-trending inflam markers, rstarted dexamethasone with slow taper -> 2mg x 3 days starting 2/21  - L subclavian central line to be removed today (1/29 - 2/19); LIJ inserted 2/18.  Enterococcus Bacteremia  -BCx 2/20 with VRE; started on linezolid+ varun 2/21 -   -f/u repeat cultures sent 2/22 NGTD    Lines:  - [facility] 2/21    Ethics:  - full code; family currently would pursue tracheostomy  - family aware of poor prognosis    65yF with obesity, HTN, hx of LLE DVT not on AC, COVID+ on 1/17, presenting for acutely worsening SOB, intubated 1/29 for acute hypoxic respiratory failure. Still requiring high vent settings with high plateau pressures requiring ongoing ICU level care. course complicated by gram positive bacteremia and persistent fevers.     Neuro:  - propofol, ketamine, fentanyl wean as tolerated  - d/c valium, started versed gtt for high peak airway pressures; may have to paralyze if peak airway pressure not improved    Respiratory:  - AC 36/430/14/100%, intubated (1/29 - )   - Failed trial of APRV  - s/p bronchoscopy 2/9- NG thus far   - s/p 10d course of Dexamethasone (1/27 - 2/5), Remdesivir d/c'ed (1/26 -1/30). restarted dexamethasone 6 mg IV (2/13 - ) i/s/o uptrending inflammatory markers  - taper to dexamethasone 2mg 2/21 to 2/24   - ideally would Prone 18/6 as needed for ARDS, but does not tolerate proning, becomes hypoxic  - bronchoscopy on 2/21: showed granulation tissue at distal ET tube site, tube exchanged and peak pressures improved.   - 2/21 ET tube had been dislodged; replaced under glidescope  - CTC w/ IV contrast shows extensive PE in L PA extending to segmental branches and subsegmental branches    Cards:  #Hypotension  - likely vasoplegia from sedation meds  - c/w levophed, wean as tolerated    GI:  - c/w tube feeds  - Protonix 40mg qD  - senna  - S/p Relistor for longstanding constipation, with 1 large bowel movement.    Transaminitis 2/12/21  Improving     Renal:    - hyponatremia, improving. urine lytes, osmolarity not concerning  - Lasix 60 IV PRN for net negative goal of net even to net -1L/day  - raymundo exchanged due to obstruction and pt put out 2L (2/20)     Heme:  h/o DVT, b/l upper thigh DVTs; confirmed PE L PA extending to subsegmental branches  - Duplex 1/27: b/l above knee DVT  - heparin gtt, with modified ptt goal 60-80    Anemia, likely 2/2 blood draws  - With hematuria while on heparin, currently with improvement. S/p 1u pRBC 2/17    ID  Fever:  - s/p Ceftriaxone 7d course for E.coli UTI, CTX (2/5- 2/8)  - S/p empiric varun and vanc (2/9 - 2/19). D/c today  - S/p Caspofungin 2/11 - 2/18 for ? evidence of fungi on BAL  - f/u repeat blood, urine cx,  2/9- NG thus far, urine cx negative   - sputum cx 2/9/21- N resp kalpesh with some E Coli- pansensitive (sensitive to Meropenem)   - f/u BAL cultures and fungal cultures 2/9- NG thus far  - MRSA swab positive  - 2/13/21 up-trending inflam markers, rstarted dexamethasone with slow taper -> 2mg x 3 days starting 2/21  - L subclavian central line to be removed today (1/29 - 2/19); LIJ inserted 2/18. Removed. RIJ 2/21  Enterococcus Bacteremia  -BCx 2/20 with VRE; started on linezolid+ varun 2/21 -   -f/u repeat cultures sent 2/22 NGTD  - CTC/A/P without suspicious fluid collections    Lines:  - [facility] 2/21    Ethics:  - full code; family currently would pursue tracheostomy  - family aware of poor prognosis

## 2021-02-23 NOTE — ADVANCED PRACTICE NURSE CONSULT - RECOMMEDATIONS
Recommend Bariatric bed     Topical Recommendations:     Right lip: Apply mouth moisturizer daily. Monitor for tissue type changes.     Right ear: Cleanse with NS, pat dry. Apply Liquid barrier film to periwound skin (allow to dry). Cover with silicone foam with borders. Change daily.     Right upper thigh- Cleanse with NS, pat dry. Apply Liquid barrier film to periwound skin (allow to dry). Cover with silicone foam with border. Change daily.     Bilateral breasts, abdominal pannus and bilateral groin- Cleanse well with soap and water, pat dry. Apply Interdry textile sheeting, under intertriginous folds leaving 2 inches exposed at ends to wick, remove to wash & dry affected area, then replace. Individual sheeting may be used for up to 5 days unless soiled.     Sacral/gluteal fold- Cleanse with skin cleanser, pat dry. Apply TRIAD paste twice a day and PRN with incontinent episodes. (If patient continues to have loose stools, recommend application of bowel management system.)    Continue low air loss bed therapy, continue or initiate seat cushion, heel elevation, turn & reposition q2h, continue moisture management with barrier creams & single breathable pad, continue measures to decrease friction/shear.     Please contact Wound Care Service Line if we can be of further assistance (ext [phone]).          Recommend Bariatric bed     Topical Recommendations:     Right lip: Apply mouth moisturizer daily. Monitor for tissue type changes.     Right ear: Cleanse with NS, pat dry. Apply Liquid barrier film to periwound skin (allow to dry). Cover with silicone foam with borders. Change daily.     Right upper thigh- Cleanse with NS, pat dry. Apply Liquid barrier film to periwound skin (allow to dry). Cover with silicone foam with border. Change daily.     Left elbow- Cleanse with NS, pat dry. Apply Liquid barrier film to periwound skin (allow to dry). Cover with silicone foam with border, change every other day.     Bilateral breasts, abdominal pannus and bilateral groin- Cleanse well with soap and water, pat dry. Apply Interdry textile sheeting, under intertriginous folds leaving 2 inches exposed at ends to wick, remove to wash & dry affected area, then replace. Individual sheeting may be used for up to 5 days unless soiled.     Sacral/gluteal fold- Cleanse with skin cleanser, pat dry. Apply TRIAD paste twice a day and PRN with incontinent episodes. (If patient continues to have loose stools, recommend application of bowel management system.)    Continue low air loss bed therapy, continue or initiate seat cushion, heel elevation, turn & reposition q2h, continue moisture management with barrier creams & single breathable pad, continue measures to decrease friction/shear.     Please contact Wound Care Service Line if we can be of further assistance (ext [phone]).

## 2021-02-23 NOTE — PROGRESS NOTE ADULT - ASSESSMENT
65 year old with COVID  Associated hypoxic respiratory failure  Sepsis with VRE bacteremia    1) COVID  S/p remdesivir and steroids.  No clear indication to continue dexamehtasone at this time  Particularly in a patient with concern for sepsis.      2) Yeast on sputum culture    This can be indicative of fungal colonization  It is not typically a pathogen in the lung  Not suggestive of invasive mold    3) VRE bacteremia  S/p line change  ? line vs  vs intrabdominal process  can continue linezolid/ meropenem    Check CT a/p   Check Echo     4) Hypoxic respiratory failure  care per ICU  CT angio with PE

## 2021-02-24 NOTE — PROGRESS NOTE ADULT - ASSESSMENT
65yF with obesity, HTN, hx of LLE DVT not on AC, COVID+ on 1/17, presenting for acutely worsening SOB, intubated 1/29 for acute hypoxic respiratory failure. Still requiring high vent settings with high plateau pressures requiring ongoing ICU level care. course complicated by gram positive bacteremia and persistent fevers.     Neuro:  - propofol, ketamine, fentanyl wean as tolerated  - d/c valium, started versed gtt for high peak airway pressures; may have to paralyze if peak airway pressure not improved    Respiratory:  - Intubated (1/29 - ); on full AC support  - s/p bronchoscopy 2/9 - rare yeast, do not need to treat   - s/p 10d course of Dexamethasone (1/27 - 2/5), Remdesivir d/c'ed (1/26 -1/30). restarted dexamethasone 6 mg IV (2/13 - ) i/s/o uptrending inflammatory markers  - taper to dexamethasone 2mg 2/21 to 2/24   - ideally would Prone 18/6 as needed for ARDS, but does not tolerate proning, becomes hypoxic  - bronchoscopy on 2/21: showed granulation tissue at distal ET tube site, tube exchanged and peak pressures improved.   - 2/21 ET tube had been dislodged; replaced under glidescope  - CTC w/ IV contrast shows extensive PE in L PA extending to segmental branches and subsegmental branches    Cards:  #Hypotension  - likely vasoplegia from sedation meds  - c/w levophed, wean as tolerated    GI:  - c/w tube feeds  - Protonix 40mg qD  - senna  - S/p Relistor for longstanding constipation, with 1 large bowel movement.    Transaminitis 2/12/21, recurrent 2/24  - f/u RUQ ultrasound    Renal:    - Lasix 60 IV PRN for net negative goal of net even to net -1L/day  - raymundo exchanged due to obstruction and pt put out 2L (2/20)   - hematuria i/s/o hep gtt, continue to monitor    Heme:  h/o DVT, b/l upper thigh DVTs; confirmed PE L PA extending to subsegmental branches  - Duplex 1/27: b/l above knee DVT  - heparin gtt, with modified ptt goal 60-80    Anemia, likely 2/2 blood draws  - With hematuria while on heparin, currently with improvement. S/p 1u pRBC 2/17    ID  Fever:  - s/p Ceftriaxone 7d course for E.coli UTI, CTX (2/5- 2/8)  - S/p empiric varun and vanc (2/9 - 2/19). D/c today  - S/p Caspofungin 2/11 - 2/18 for ? evidence of fungi on BAL  - f/u repeat blood, urine cx,  2/9- NG thus far, urine cx negative   - sputum cx 2/9/21- N resp kalpesh with some E Coli- pansensitive (sensitive to Meropenem)   - f/u BAL cultures and fungal cultures 2/9- NG thus far  - MRSA swab positive  - 2/13/21 up-trending inflam markers, rstarted dexamethasone with slow taper -> 2mg x 3 days starting 2/21    Enterococcus Bacteremia  -BCx 2/20 with VRE; started on linezolid+ varun 2/21 -   -f/u repeat cultures sent 2/22 NGTD  - CTC/A/P without suspicious fluid collections    Lines:  - [facility] 2/21, L [facility] 2/23    Ethics:  - full code; family currently would pursue tracheostomy  - family aware of poor prognosis

## 2021-02-24 NOTE — PROCEDURE NOTE - NSPROCNAME_GEN_A_CORE
Arterial Puncture/Cannulation
Central Line Insertion
Tracheal Intubation
Tracheal Intubation
Central Line Insertion

## 2021-02-24 NOTE — PROCEDURE NOTE - NSSITEPREP_SKIN_A_CORE
chlorhexidine
chlorhexidine/Adherence to aseptic technique: hand hygiene prior to donning barriers (gown, gloves), don cap and mask, sterile drape over patient
chlorhexidine/Adherence to aseptic technique: hand hygiene prior to donning barriers (gown, gloves), don cap and mask, sterile drape over patient

## 2021-02-24 NOTE — PROCEDURE NOTE - NSPROCDETAILS_GEN_ALL_CORE
patient pre-oxygenated, tube inserted, placement confirmed
guidewire recovered/lumen(s) aspirated and flushed/sterile dressing applied/sterile technique, catheter placed/ultrasound guidance with use of sterile gel and probe cove
location identified, draped/prepped, sterile technique used, needle inserted/introduced/positive blood return obtained via catheter/connected to a pressurized flush line/sutured in place/hemostasis with direct pressure, dressing applied/Seldinger technique/all materials/supplies accounted for at end of procedure
guidewire recovered/lumen(s) aspirated and flushed/sterile dressing applied/sterile technique, catheter placed/ultrasound guidance with use of sterile gel and probe cove
location identified, draped/prepped, sterile technique used, needle inserted/introduced/positive blood return obtained via catheter/connected to a pressurized flush line/sutured in place/hemostasis with direct pressure, dressing applied/Seldinger technique/all materials/supplies accounted for at end of procedure
patient pre-oxygenated, tube inserted, placement confirmed
location identified, draped/prepped, sterile technique used, needle inserted/introduced/positive blood return obtained via catheter/connected to a pressurized flush line/sutured in place/Seldinger technique/all materials/supplies accounted for at end of procedure

## 2021-02-24 NOTE — PROCEDURE NOTE - NSINDICATIONS_GEN_A_CORE
Covid/airway protection/respiratory distress
ET tube exchange/respiratory failure
critical patient
critical illness/emergency venous access
emergency venous access/venous access
critical patient
critical patient

## 2021-02-24 NOTE — PROGRESS NOTE ADULT - ATTENDING COMMENTS
65F h/o obesity, HTN, previous LLE DVT not on AC, COVID+ on 1/17 a/w hypoxic respiratory failure, intubated 1/29 now in ARDS now c/b VRE bacteremia     Neuro: sedated on propofol, ketamine, fentanyl, and versed  CV: vasoplegic shock, remains on low dose levophed  Pulm: not candidate to tPA for submassive PE, continue heparin gtt  - worsening acidosis, though likely metabolic 2/2 downtrending HCO3 after previous renal compensation for now chronic hypercapnic resp failure - ~4 of auto-PEEP noted this AM, flow increased from 60 -->70 to improve I:E ratio, repeat ABG  - s/p Dexamethasone    GI: hold enteral feeds for probable trach today  - c/w protonix ppx   - uptrending LFTs on AM labs, to obtain RUQ sono to assess GB, especially given VRE bacteremia  Renal: renal function stable, HCO3 continues to downtrend with otherwise stable renal function, continue to monitor  ID: +VRE bacteremia on BCx 2/20, repeat negative on 2/22 - continue Linezolid, Meropenum; ID following  Heme: Hb downtrending in setting of mild hematuria, critical illness and phlebotomy, transfuse 1unit pRBC; given extensive PE/DVT, will continue heparin gtt for full AC  Endo: FS at goal, continue ISS coverage    Overall prognosis poor. Full code. D/w family by phone.

## 2021-02-25 NOTE — PROGRESS NOTE ADULT - ATTENDING COMMENTS
65F h/o obesity, HTN, previous LLE DVT not on AC, COVID+ on 1/17 a/w hypoxic respiratory failure, intubated 1/29 now in ARDS now c/b VRE bacteremia     Neuro: sedated on propofol, ketamine, fentanyl, and versed; phenobarb load today  CV: vasoplegic shock, remains on low dose levophed  Pulm: not candidate to tPA for submassive PE in L main pulm artery with extension to segmentals and subsegmentals, continue heparin gtt - can change to lovenox after trach/PEG  - elevated pressures when pt triggers breaths, will attempt improve sedation with phenobarb load, if no improvement may require paralysis for vent synchrony  - s/p Dexamethasone    - anterior thyroid artery visualized on US, unable to do bedside perc trach, CT surgery consulted, plan for trach/PEG early next week  GI: continue enteral feeds, protonix ppx   - LFTs improved today, RUQ sono with sludge  Renal: renal function stable, dose lasix today, goal net neutral to slightly negative  ID: +VRE bacteremia on BCx 2/20, repeat negative on 2/22 - continue Linezolid  Heme: Hb appropriate response to 1unit pRBC yesterday, continue full AC with heparin gtt pending procedures  Endo: FS at goal, continue ISS coverage    Overall prognosis poor. Full code. D/w family by phone

## 2021-02-25 NOTE — PROGRESS NOTE ADULT - SUBJECTIVE AND OBJECTIVE BOX
Elliot Diaz MD, PGY-2  Available on [employer] Teams | Pager: [phone] | [facility]: [phone]  ---------------------------------------------------------------------------------------------  Patient is a 65y old  Female who presents with a chief complaint of COVID (24 Feb 2021 07:51)    SUBJECTIVE / OVERNIGHT EVENTS: No acute events overnight. Pt seen and examined at bedside. Still with high peak airway pressures.     MEDICATIONS  (STANDING):  chlorhexidine 0.12% Liquid 15 milliLiter(s) Oral Mucosa every 12 hours  chlorhexidine 4% Liquid 1 Application(s) Topical <User Schedule>  cisatracurium Injectable 20 milliGRAM(s) IV Push once  dexAMETHasone  Injectable 2 milliGRAM(s) IV Push daily  dextrose 40% Gel 15 Gram(s) Oral once  dextrose 50% Injectable 25 Gram(s) IV Push once  dextrose 50% Injectable 12.5 Gram(s) IV Push once  dextrose 50% Injectable 25 Gram(s) IV Push once  erythromycin   Ointment 1 Application(s) Both EYES daily  fentaNYL    Injectable 100 MICROGram(s) IV Push once  fentaNYL   Infusion..... 4 MICROgram(s)/kG/Hr (10.4 mL/Hr) IV Continuous <Continuous>  furosemide   Injectable 60 milliGRAM(s) IV Push once  glucagon  Injectable 1 milliGRAM(s) IntraMuscular once  heparin  Infusion 1500 Unit(s)/Hr (14 mL/Hr) IV Continuous <Continuous>  insulin lispro (ADMELOG) corrective regimen sliding scale   SubCutaneous every 6 hours  ketamine Infusion. 0.2 mG/kG/Hr (2.6 mL/Hr) IV Continuous <Continuous>  linezolid  IVPB      linezolid  IVPB 600 milliGRAM(s) IV Intermittent every 12 hours  meropenem  IVPB 1000 milliGRAM(s) IV Intermittent every 8 hours  midazolam Infusion 0.02 mG/kG/Hr (2.6 mL/Hr) IV Continuous <Continuous>  norepinephrine Infusion 0.07 MICROgram(s)/kG/Min (8.53 mL/Hr) IV Continuous <Continuous>  pantoprazole  Injectable 40 milliGRAM(s) IV Push daily  petrolatum Ophthalmic Ointment 1 Application(s) Both EYES daily  PHENobarbital IVPB 700 milliGRAM(s) IV Intermittent once  polyethylene glycol 3350 17 Gram(s) Oral two times a day  propofol Infusion 50 MICROgram(s)/kG/Min (39 mL/Hr) IV Continuous <Continuous>  senna Syrup 10 milliLiter(s) Oral at bedtime    MEDICATIONS  (PRN):  acetaminophen    Suspension .. 650 milliGRAM(s) Oral every 6 hours PRN Temp greater or equal to 38C (100.4F)  sodium chloride 0.9% lock flush 10 milliLiter(s) IV Push every 1 hour PRN Pre/post blood products, medications, blood draw, and to maintain line patency      ICU Vital Signs Last 24 Hrs  T(F): 98.4 (25 Feb 2021 08:00), Max: 98.4 (24 Feb 2021 16:00)  HR: 89 (25 Feb 2021 09:00) (72 - 92)  ABP: 107/48 (25 Feb 2021 09:00) (101/50 - 130/57)  ABP(mean): 65 (25 Feb 2021 09:00) (65 - 82)  RR: 34 (25 Feb 2021 09:00) (34 - 35)  SpO2: 92% (25 Feb 2021 09:00) (90% - 100%)    Mode: AC/ CMV (Assist Control/ Continuous Mandatory Ventilation)  RR (machine): 34  TV (machine): 420  FiO2: 65  PEEP: 12  ITime: 0.8  MAP: 20  PIP: 35    Physical Exam:   GENERAL: sedated but high peak airway pressures, pulling excess volume  CHEST/LUNG: CTABL; no wheezes  HEART: Regular rate and rhythm; No murmurs, rubs, or gallops  ABDOMEN: Soft, Nontender, Nondistended; Bowel sounds present  EXTREMITIES: 2+ Peripheral Pulses, No clubbing, cyanosis. Improved edema  : hematuria  SKIN: No rashes or lesions    I&O's Summary    24 Feb 2021 07:01  -  25 Feb 2021 07:00  --------------------------------------------------------  IN: 3080.8 mL / OUT: 2638 mL / NET: 442.8 mL    25 Feb 2021 07:01  -  25 Feb 2021 10:23  --------------------------------------------------------  IN: 166 mL / OUT: 500 mL / NET: -334 mL    MEDICATIONS  (STANDING):  chlorhexidine 0.12% Liquid 15 milliLiter(s) Oral Mucosa every 12 hours  chlorhexidine 4% Liquid 1 Application(s) Topical <User Schedule>  cisatracurium Injectable 20 milliGRAM(s) IV Push once  dexAMETHasone  Injectable 2 milliGRAM(s) IV Push daily  dextrose 40% Gel 15 Gram(s) Oral once  dextrose 50% Injectable 25 Gram(s) IV Push once  dextrose 50% Injectable 12.5 Gram(s) IV Push once  dextrose 50% Injectable 25 Gram(s) IV Push once  erythromycin   Ointment 1 Application(s) Both EYES daily  fentaNYL    Injectable 100 MICROGram(s) IV Push once  fentaNYL   Infusion..... 4 MICROgram(s)/kG/Hr (10.4 mL/Hr) IV Continuous <Continuous>  furosemide   Injectable 60 milliGRAM(s) IV Push once  glucagon  Injectable 1 milliGRAM(s) IntraMuscular once  heparin  Infusion 1500 Unit(s)/Hr (14 mL/Hr) IV Continuous <Continuous>  insulin lispro (ADMELOG) corrective regimen sliding scale   SubCutaneous every 6 hours  ketamine Infusion. 0.2 mG/kG/Hr (2.6 mL/Hr) IV Continuous <Continuous>  linezolid  IVPB      linezolid  IVPB 600 milliGRAM(s) IV Intermittent every 12 hours  meropenem  IVPB 1000 milliGRAM(s) IV Intermittent every 8 hours  midazolam Infusion 0.02 mG/kG/Hr (2.6 mL/Hr) IV Continuous <Continuous>  norepinephrine Infusion 0.07 MICROgram(s)/kG/Min (8.53 mL/Hr) IV Continuous <Continuous>  pantoprazole  Injectable 40 milliGRAM(s) IV Push daily  petrolatum Ophthalmic Ointment 1 Application(s) Both EYES daily  PHENobarbital IVPB 700 milliGRAM(s) IV Intermittent once  polyethylene glycol 3350 17 Gram(s) Oral two times a day  propofol Infusion 50 MICROgram(s)/kG/Min (39 mL/Hr) IV Continuous <Continuous>  senna Syrup 10 milliLiter(s) Oral at bedtime    MEDICATIONS  (PRN):  acetaminophen    Suspension .. 650 milliGRAM(s) Oral every 6 hours PRN Temp greater or equal to 38C (100.4F)  sodium chloride 0.9% lock flush 10 milliLiter(s) IV Push every 1 hour PRN Pre/post blood products, medications, blood draw, and to maintain line patency    Allergies    codeine (Unknown)    Intolerances    LABS:                        8.2    12.21 )-----------( 242      ( 25 Feb 2021 03:15 )             27.5     02-25    141  |  102  |  23  ----------------------------<  106<H>  3.5   |  33<H>  |  0.51    Ca    8.1<L>      25 Feb 2021 03:12  Phos  3.1     02-25  Mg     2.4     02-25    TPro  6.7  /  Alb  2.3<L>  /  TBili  1.6<H>  /  DBili  x   /  AST  105<H>  /  ALT  77<H>  /  AlkPhos  184<H>  02-25    PT/INR - ( 24 Feb 2021 02:14 )   PT: 14.2 sec;   INR: 1.26 ratio         PTT - ( 24 Feb 2021 21:17 )  PTT:69.5 sec  ABG - ( 25 Feb 2021 03:12 )  pH, Arterial: 7.32  pH, Blood: x     /  pCO2: 68    /  pO2: 93    / HCO3: 32    / Base Excess: 8.5   /  SaO2: 97.1      Lactate Trend  CAPILLARY BLOOD GLUCOSE  POCT Blood Glucose.: 108 mg/dL (25 Feb 2021 05:22)  POCT Blood Glucose.: 118 mg/dL (25 Feb 2021 00:52)  POCT Blood Glucose.: 104 mg/dL (24 Feb 2021 15:14)      RADIOLOGY & ADDITIONAL TESTS:  Results Reviewed:   Imaging Personally Reviewed:  Electrocardiogram Personally Reviewed:

## 2021-02-25 NOTE — PROGRESS NOTE ADULT - ASSESSMENT
65yF with obesity, HTN, hx of LLE DVT not on AC, COVID+ on 1/17, presenting for acutely worsening SOB, intubated 1/29 for acute hypoxic respiratory failure. Still requiring high vent settings with high plateau pressures requiring ongoing ICU level care. course complicated by gram positive bacteremia and persistent fevers.     Neuro:  - propofol, ketamine, fentanyl, versed gtt for vent synchrony  - add phenobarb for high peak airway pressures  - may have to paralyze if peak airway pressure not improved    Respiratory:  - Intubated (1/29 - ); on full AC support  - s/p bronchoscopy 2/9 - rare yeast, do not need to treat   - s/p 10d course of Dexamethasone (1/27 - 2/5), Remdesivir d/c'ed (1/26 -1/30). restarted dexamethasone 6 mg IV (2/13 - ) i/s/o uptrending inflammatory markers, dexamethasone 2mg 2/21 to 2/24   - did not tolerate proning  - bronchoscopy on 2/21: showed granulation tissue at distal ET tube site, tube exchanged and peak pressures improved.   - 2/21 ET tube had been dislodged; replaced under glidescope  - CTC w/ IV contrast shows extensive PE in L PA extending to segmental branches and subsegmental branches  - unable to perform trach 2/25 due to overlying blood vessel; will consult CT surgery    Cards:  #Hypotension  - likely vasoplegia from sedation meds  - c/w levophed, wean as tolerated    GI:  - c/w tube feeds  - Protonix 40mg qD  - senna  - S/p Relistor for longstanding constipation, with 1 large bowel movement.    Transaminitis 2/12/21, recurrent 2/24  - f/u RUQ ultrasound, now downtrending    Renal:    - Lasix 60 IV PRN for net negative goal of net even to net -1L/day  - raymundo exchanged due to obstruction and pt put out 2L (2/20)   - hematuria i/s/o hep gtt, continue to monitor    Heme:  h/o DVT, b/l upper thigh DVTs; confirmed PE L PA extending to subsegmental branches  - Duplex 1/27: b/l above knee DVT  - heparin gtt, with modified ptt goal 60-80    Anemia, likely 2/2 blood draws  - With hematuria while on heparin, currently with improvement. S/p 1u pRBC 2/17    ID  Fever:  - s/p Ceftriaxone 7d course for E.coli UTI, CTX (2/5- 2/8)  - S/p empiric varun and vanc (2/9 - 2/19). D/c today  - S/p Caspofungin 2/11 - 2/18 for ? evidence of fungi on BAL  - f/u repeat blood, urine cx,  2/9- NG thus far, urine cx negative   - sputum cx 2/9/21- N resp kalpesh with some E Coli- pansensitive (sensitive to Meropenem)   - f/u BAL cultures and fungal cultures 2/9- NG thus far  - MRSA swab positive  - 2/13/21 up-trending inflam markers, rstarted dexamethasone with slow taper -> 2mg x 3 days starting 2/21    Enterococcus Bacteremia  -BCx 2/20 with VRE; started on linezolid+ varun 2/21 -   -f/u repeat cultures sent 2/22 NGTD  - CTC/A/P without suspicious fluid collections    Lines:  - SUSHMA 2/21, L Radial Linda 2/23    Ethics:  - full code; family currently would pursue tracheostomy  - family aware of poor prognosis  65yF with obesity, HTN, hx of LLE DVT not on AC, COVID+ on 1/17, presenting for acutely worsening SOB, intubated 1/29 for acute hypoxic respiratory failure. Still requiring high vent settings with high plateau pressures requiring ongoing ICU level care. course complicated by gram positive bacteremia and persistent fevers.     Neuro:  - propofol, ketamine, fentanyl, versed gtt for vent synchrony  - add phenobarb for high peak airway pressures  - may have to paralyze if peak airway pressure not improved    Respiratory:  - Intubated (1/29 - ); on full AC support  - s/p bronchoscopy 2/9 - rare yeast, do not need to treat   - s/p 10d course of Dexamethasone (1/27 - 2/4), Remdesivir d/c'ed (1/26 -1/30). restarted dexamethasone 6 mg IV (2/13 - 2/19), tapered 4 mg (2/19 - 2/21), 2mg 2/21 to 2/24  - did not tolerate proning  - bronchoscopy on 2/21: showed granulation tissue at distal ET tube site, tube exchanged and peak pressures improved.   - 2/21 ET tube had been dislodged; replaced under glidescope  - CTC w/ IV contrast shows extensive PE in L PA extending to segmental branches and subsegmental branches  - unable to perform trach 2/25 due to overlying blood vessel; will consult CT surgery    Cards:  #Hypotension  - likely vasoplegia from sedation meds  - c/w levophed, wean as tolerated    GI:  - c/w tube feeds  - Protonix 40mg qD  - senna  - S/p Relistor for longstanding constipation, with 1 large bowel movement.    Transaminitis 2/12/21, recurrent 2/24  - f/u RUQ ultrasound, now downtrending    Renal:    - Lasix 60 IV PRN for net negative goal of net even to net -1L/day  - raymundo exchanged due to obstruction and pt put out 2L (2/20)   - hematuria i/s/o hep gtt, continue to monitor    Heme:  h/o DVT, b/l upper thigh DVTs; confirmed PE L PA extending to subsegmental branches  - Duplex 1/27: b/l above knee DVT  - heparin gtt, with modified ptt goal 60-80    Anemia, likely 2/2 blood draws  - With hematuria while on heparin, currently with improvement. S/p 1u pRBC 2/17    ID  Fever:  - s/p Ceftriaxone 7d course for E.coli UTI, CTX (2/5- 2/8)  - S/p empiric varun and vanc (2/9 - 2/19)   - S/p Caspofungin 2/11 - 2/18 for ? evidence of fungi on BAL  - BCx 2/20 with VRE  - BCx 2/22 NGTD    Enterococcus Bacteremia  -BCx 2/20 with VRE; started on linezolid+ varun 2/21 -   - CTC/A/P without suspicious fluid collections  - f/u ID recs    Lines:  - SUSHMA 2/21, L Radial Linda 2/23    Ethics:  - full code; family currently would pursue tracheostomy  - family aware of poor prognosis

## 2021-02-25 NOTE — CONSULT NOTE ADULT - SUBJECTIVE AND OBJECTIVE BOX
HPI:  64 yo F with history of HTN and LLE DVT not currently on AC, diagnosed COVID+ 15d ago, presenting from PMD office (Dr. Pompa) for acutely worsening SOB in past 2 days. States she was tested postive for covid at an urgent care not associated with [facility]. Patient reported she lives by herself. However, daughter who lives next door was sick. Patient has been experiencing worsening sob. and cough prior to admission. In the ED, patient was placed on bipap for resp distress. MICU was consulted. Pt was intubated 1/29 for acute hypoxic respiratory failure. She is still requiring high vent settings with high plateau pressures requiring ongoing ICU level care in [facility]        PAST MEDICAL & SURGICAL HISTORY:  DVT (deep venous thrombosis)  No significant past surgical history        REVIEW OF SYSTEMS  unable to obtain as pt is sedated on mechanical ventilation        MEDICATIONS  (STANDING):  chlorhexidine 0.12% Liquid 15 milliLiter(s) Oral Mucosa every 12 hours  chlorhexidine 4% Liquid 1 Application(s) Topical <User Schedule>  dextrose 40% Gel 15 Gram(s) Oral once  dextrose 50% Injectable 25 Gram(s) IV Push once  dextrose 50% Injectable 12.5 Gram(s) IV Push once  dextrose 50% Injectable 25 Gram(s) IV Push once  erythromycin   Ointment 1 Application(s) Both EYES daily  fentaNYL   Infusion..... 4 MICROgram(s)/kG/Hr (10.4 mL/Hr) IV Continuous <Continuous>  glucagon  Injectable 1 milliGRAM(s) IntraMuscular once  heparin  Infusion 1500 Unit(s)/Hr (14 mL/Hr) IV Continuous <Continuous>  insulin lispro (ADMELOG) corrective regimen sliding scale   SubCutaneous every 6 hours  ketamine Infusion. 0.2 mG/kG/Hr (2.6 mL/Hr) IV Continuous <Continuous>  linezolid  IVPB      linezolid  IVPB 600 milliGRAM(s) IV Intermittent every 12 hours  midazolam Infusion 0.02 mG/kG/Hr (2.6 mL/Hr) IV Continuous <Continuous>  norepinephrine Infusion 0.07 MICROgram(s)/kG/Min (8.53 mL/Hr) IV Continuous <Continuous>  pantoprazole  Injectable 40 milliGRAM(s) IV Push daily  petrolatum Ophthalmic Ointment 1 Application(s) Both EYES daily  polyethylene glycol 3350 17 Gram(s) Oral two times a day  propofol Infusion 50 MICROgram(s)/kG/Min (39 mL/Hr) IV Continuous <Continuous>  senna Syrup 10 milliLiter(s) Oral at bedtime    MEDICATIONS  (PRN):  acetaminophen    Suspension .. 650 milliGRAM(s) Oral every 6 hours PRN Temp greater or equal to 38C (100.4F)  sodium chloride 0.9% lock flush 10 milliLiter(s) IV Push every 1 hour PRN Pre/post blood products, medications, blood draw, and to maintain line patency      Allergies  codeine (Unknown)  Intolerances        Social History (marital status, living situation, occupation, tobacco use, alcohol and drug use, and sexual history): single, lives alone, lives next door to daughter, Reported no active toxic habits at time of H&P    FAMILY HISTORY:  unless noted, no significant family hx with Mother, Father, Siblings    Vital Signs Last 24 Hrs  T(C): 36.9 (25 Feb 2021 08:00), Max: 36.9 (24 Feb 2021 16:00)  T(F): 98.4 (25 Feb 2021 08:00), Max: 98.4 (24 Feb 2021 16:00)  HR: 83 (25 Feb 2021 11:00) (72 - 92)  BP: --  BP(mean): --  RR: 35 (25 Feb 2021 11:00) (34 - 35)  SpO2: 91% (25 Feb 2021 11:00) (90% - 100%)    Mode: AC/ CMV (Assist Control/ Continuous Mandatory Ventilation)  RR (machine): 34  TV (machine): 420  FiO2: 65  PEEP: 12  ITime: 0.8  MAP: 20  PIP: 35    General: obese, sedated on mechanical ventilation and pressors  Neurology: sedated  Eyes: Scleras clear, PERRLA/ EOMI,   ENT: pt intubated grossly patent pharynx,   Neck: Neck supple, trachea midline, No JVD,   Respiratory: CTA B/L, No wheezing, rales, rhonchi  CV: RRR, S1S2, no murmurs, rubs or gallops  Abdominal: Soft, NT, ND +BS,   Extremities: No edema, + peripheral pulses  Skin: No Rashes, Hematoma, Ecchymosis  Lymphatic: No Neck, axilla, groin LAD      LABS:                        8.2    12.21 )-----------( 242      ( 25 Feb 2021 03:15 )             27.5     02-25    141  |  102  |  23  ----------------------------<  106<H>  3.5   |  33<H>  |  0.51    Ca    8.1<L>      25 Feb 2021 03:12  Phos  3.1     02-25  Mg     2.4     02-25    TPro  6.7  /  Alb  2.3<L>  /  TBili  1.6<H>  /  DBili  x   /  AST  105<H>  /  ALT  77<H>  /  AlkPhos  184<H>  02-25    PT/INR - ( 24 Feb 2021 02:14 )   PT: 14.2 sec;   INR: 1.26 ratio         PTT - ( 24 Feb 2021 21:17 )  PTT:69.5 sec      RADIOLOGY & ADDITIONAL STUDIES:    ASSESSMENT:   65yFemalePAST MEDICAL & SURGICAL HISTORY:  DVT (deep venous thrombosis)  No significant past surgical history    HEALTH ISSUES - PROBLEM Dx:  Palliative care encounter  Palliative care encounter  Advance care planning  advance care planning  2019 novel coronavirus disease (COVID-19)  2019 novel coronavirus disease (COVID-19)  Functional quadriplegia  Functional quadriplegia  Acute respiratory failure, unspecified whether with hypoxia or hypercapnia  Acute respiratory failure, unspecified whether with hypoxia or hypercapnia  Other encephalopathy  Other encephalopathy  Prophylactic measure  Prophylactic measure  Suspected deep vein thrombosis (DVT)  Anxiety  Anxiety  DVT (deep venous thrombosis)  DVT (deep venous thrombosis)  COVID-19  COVID-19  Respiratory distress  Respiratory distress        HEALTH ISSUES - R/O PROBLEM Dx: request for trach PEG      PLAN:  pt seen and examined with Dr Alanis  will add pt on for trach and PEG early next week  continue care per primary [facility] team    Brianda MONTAÑO [id number]
HPI:  66 yo F with history of   ·	HTN  ·	LLE DVT not currently on AC    Who   ·	Developed COVID on          -----s/p remdesivir         ----s/p dexamethasone -, -, taper -,  to present  ·	Presented on  with increased SOB and Cough  ·	Required intubation on   ·	Had Urine culture positve for E coli on  and sputum with H influenza- Treated with Ceftriaxone -  ·	Had sputum culture with E coli on - treated with meropenem -  ·	Had sputum culture with yeast- given caspofungin -    Recent increase pressor requirement.  Now with VRE in blood.   Resumed abx with meropenem/ linezolid/ caspofungin    Intubated with high FiO2. On pressors      PAST MEDICAL & SURGICAL HISTORY:  DVT (deep venous thrombosis)    No significant past surgical history        Allergies    codeine (Unknown)    Intolerances        ANTIMICROBIALS:  caspofungin IVPB    linezolid  IVPB    meropenem  IVPB 1000 every 8 hours      OTHER MEDS:  acetaminophen    Suspension .. 650 milliGRAM(s) Oral every 6 hours PRN  chlorhexidine 0.12% Liquid 15 milliLiter(s) Oral Mucosa every 12 hours  chlorhexidine 4% Liquid 1 Application(s) Topical <User Schedule>  dexAMETHasone  Injectable 2 milliGRAM(s) IV Push daily  dextrose 40% Gel 15 Gram(s) Oral once  dextrose 5%. 1000 milliLiter(s) IV Continuous <Continuous>  dextrose 5%. 1000 milliLiter(s) IV Continuous <Continuous>  dextrose 50% Injectable 25 Gram(s) IV Push once  dextrose 50% Injectable 12.5 Gram(s) IV Push once  dextrose 50% Injectable 25 Gram(s) IV Push once  erythromycin   Ointment 1 Application(s) Both EYES daily  fentaNYL   Infusion..... 4 MICROgram(s)/kG/Hr IV Continuous <Continuous>  glucagon  Injectable 1 milliGRAM(s) IntraMuscular once  heparin  Infusion 1500 Unit(s)/Hr IV Continuous <Continuous>  insulin lispro (ADMELOG) corrective regimen sliding scale   SubCutaneous every 6 hours  ketamine Infusion. 0.25 mG/kG/Hr IV Continuous <Continuous>  norepinephrine Infusion 0.07 MICROgram(s)/kG/Min IV Continuous <Continuous>  pantoprazole  Injectable 40 milliGRAM(s) IV Push daily  petrolatum Ophthalmic Ointment 1 Application(s) Both EYES daily  polyethylene glycol 3350 17 Gram(s) Oral two times a day  propofol Infusion 50 MICROgram(s)/kG/Min IV Continuous <Continuous>  senna Syrup 10 milliLiter(s) Oral at bedtime  sodium chloride 0.9% lock flush 10 milliLiter(s) IV Push every 1 hour PRN      SOCIAL HISTORY:  Intubated  unable to provide    FAMILY HISTORY:  Intubated  unable to provide    REVIEW OF SYSTEMS  [x  ] ROS unobtainable because:   Intubated  unable to provide  [  ] All other systems negative except as noted below:	    Constitutional:  [ ] fever [ ] chills  [ ] weight loss  [ ] weakness  Skin:  [ ] rash [ ] phlebitis	  Eyes: [ ] icterus [ ] pain  [ ] discharge	  ENMT: [ ] sore throat  [ ] thrush [ ] ulcers [ ] exudates  Respiratory: [ ] dyspnea [ ] hemoptysis [ ] cough [ ] sputum	  Cardiovascular:  [ ] chest pain [ ] palpitations [ ] edema	  Gastrointestinal:  [ ] nausea [ ] vomiting [ ] diarrhea [ ] constipation [ ] pain	  Genitourinary:  [ ] dysuria [ ] frequency [ ] hematuria [ ] discharge [ ] flank pain  [ ] incontinence  Musculoskeletal:  [ ] myalgias [ ] arthralgias [ ] arthritis  [ ] back pain  Neurological:  [ ] headache [ ] seizures  [ ] confusion/altered mental status  Psychiatric:  [ ] anxiety [ ] depression	  Hematology/Lymphatics:  [ ] lymphadenopathy  Endocrine:  [ ] adrenal [ ] thyroid  Allergic/Immunologic:	 [ ] transplant [ ] seasonal    PHYSICAL EXAM:  General: [x ] intubated  HEAD/EYES: [ ] PERRL [ x] white sclera [ ] icterus  ENT:  [ ] normal [x ] supple [ ] thrush [ ] pharyngeal exudate  Cardiovascular:   [ ] murmur x[ ] normal [ ] PPM/AICD  Respiratory:  [x ]course BS  GI:  [ x] soft, non-tender, normal bowel sounds  :  [ x] raymundo [ ] no CVA tenderness   Musculoskeletal:  [ ] no synovitis  Neurologic:  [ ] non-focal exam   Skin:  [x ] no rash  Lymph: [ ] no lymphadenopathy  Psychiatric:  [ ] appropriate affect [ ] alert & oriented  Lines:  [x ] no phlebitis [x ] central line          Drug Dosing Weight  Height (cm): 177.8 (2021 15:14)  Weight (kg): 130 (2021 20:00)  BMI (kg/m2): 41.1 (2021 20:00)  BSA (m2): 2.43 (2021 20:00)    Vital Signs Last 24 Hrs  T(F): 100.3 (21 @ 12:00), Max: 102 (02-15-21 @ 20:00)    Vital Signs Last 24 Hrs  HR: 88 (21 @ 12:00) (73 - 99)  BP: --  RR: 37 (21 @ 12:00)  SpO2: 99% (21 @ 12:00) (93% - 99%)  Wt(kg): --                          8.1    11.09 )-----------( 280      ( 2021 03:24 )             28.3           140  |  89<L>  |  22  ----------------------------<  112<H>  3.5   |  47<HH>  |  0.69    Ca    8.2<L>      2021 03:24  Phos  3.1       Mg     2.1         TPro  7.0  /  Alb  2.4<L>  /  TBili  1.0  /  DBili  x   /  AST  48<H>  /  ALT  26  /  AlkPhos  87        Urinalysis Basic - ( 2021 16:26 )    Color: Red / Appearance: bloody / S.015 / pH: x  Gluc: x / Ketone: Negative  / Bili: Negative / Urobili: 3 mg/dL   Blood: x / Protein: 30 mg/dL / Nitrite: Negative   Leuk Esterase: Moderate / RBC: >50 /HPF / WBC 25-50 /HPF   Sq Epi: x / Non Sq Epi: x / Bacteria: Many        MICROBIOLOGY:  Culture - Blood (21 @ 21:30)    Gram Stain:   Growth in anaerobic bottle: Gram positive cocci in pairs  Growth in aerobic bottle: Gram Positive Cocci in Pairs and Chains    Specimen Source: .Blood Blood-Peripheral    Culture Results:   Growth in anaerobic bottle: Gram positive cocci in pairs  Growth in aerobic bottle: Gram Positive Cocci in Pairs and Chains        RADIOLOGY:    < from: Xray Chest 1 View- PORTABLE-Urgent (Xray Chest 1 View- PORTABLE-Urgent .) (21 @ 13:11) >  FINDINGS/  IMPRESSION:  ET tube tip is 4.1cm above the laurie.  Left IJ venous catheter tip overlies SVC.  NG tube in stomach - tip is off the film.  Heart is similar in size.  The lungs show similar patchy infiltrates.  No large pleural effusion. No pneumothorax.    < end of copied text >  
  CHIEF COMPLAINT:    HPI:    PAST MEDICAL & SURGICAL HISTORY:  DVT (deep venous thrombosis)    No significant past surgical history        FAMILY HISTORY:      SOCIAL HISTORY:  Smoking: [ ] Never Smoked [ ] Former Smoker (__ packs x ___ years) [ ] Current Smoker  (__ packs x ___ years)  Substance Use: [ ] Never Used [ ] Used ____  EtOH Use:  Marital Status: [ ] Single [ ]  [ ]  [ ]   Sexual History:   Occupation:  Recent Travel:  Country of Birth:  Advance Directives:    Allergies    codeine (Unknown)    Intolerances        HOME MEDICATIONS:  Home Medications:  ALPRAZOLAM 0.5 MG TABLET: TAKE 1 TABLET BY MOUTH TWICE A DAY AS NEEDED (26 Jan 2021 19:25)  QUETIAPINE FUMARATE 200 MG TAB: TAKE 2 TABLETS BY MOUTH EVERY DAY AT BEDTIME (26 Jan 2021 19:25)  ZOLPIDEM TARTRATE 10 MG TABLET: TAKE 1 TABLET AT BEDTIME (26 Jan 2021 19:25)      REVIEW OF SYSTEMS:  Constitutional: [ ] negative [ ] fevers [ ] chills [ ] weight loss [ ] weight gain [ ] malaise  HEENT: [ ] negative [ ] visual disturbances [ ] postnasal drip [ ] nasal congestion [ ] sore throat  CV: [ ] negative [ ] chest pain [ ] palpitations [ ] orthopnea   Resp: [ ] negative [ ] cough [ ] shortness of breath [ ] wheezing [ ] sputum [ ] hemoptysis  GI: [ ] negative [ ] nausea [ ] vomiting [ ] abdominal pain [ ] dysphagia [ ] diarrhea [ ] melena [ ] hematochezia  : [ ] negative [ ] dysuria [ ] nocturia [ ] hematuria [ ] increased urinary frequency  Musculoskeletal: [ ] negative [ ] back pain [ ] myalgias [ ] arthralgias [ ] fracture  Skin: [ ] negative [ ] rash [ ] pruritus  Neurological: [ ] negative [ ] headache [ ] dizziness [ ] syncope [ ] weakness [ ] numbness  Psychiatric: [ ] negative [ ] anxiety [ ] depression  Endocrine: [ ] negative [ ] diabetes [ ] thyroid problem  Hematologic/Lymphatic: [ ] negative [ ] anemia [ ] bleeding problem  Allergic/Immunologic: [ ] hives [ ] angioedema  [ ] All other systems negative  [ ] Unable to assess ROS because ________    OBJECTIVE:  ICU Vital Signs Last 24 Hrs  T(C): 36.7 (29 Jan 2021 10:45), Max: 38.2 (28 Jan 2021 22:15)  T(F): 98 (29 Jan 2021 10:45), Max: 100.8 (28 Jan 2021 22:15)  HR: 80 (29 Jan 2021 11:23) (66 - 98)  BP: 120/66 (29 Jan 2021 10:45) (110/57 - 120/66)  BP(mean): --  ABP: --  ABP(mean): --  RR: 22 (29 Jan 2021 10:45) (18 - 22)  SpO2: 90% (29 Jan 2021 11:23) (90% - 99%)        CAPILLARY BLOOD GLUCOSE      POCT Blood Glucose.: 134 mg/dL (29 Jan 2021 11:52)      PHYSICAL EXAM:  General:   HEENT:   Lymph Nodes:  Neck:   Respiratory:   Cardiovascular:   Abdomen:   Extremities:   Skin:   Neurological:  Psychiatry:    LINES:     HOSPITAL MEDICATIONS:  Standing Meds:  dexAMETHasone  Injectable 6 milliGRAM(s) IV Push daily  dextrose 5%. 1000 milliLiter(s) IV Continuous <Continuous>  enoxaparin Injectable 130 milliGRAM(s) SubCutaneous two times a day  insulin lispro (ADMELOG) corrective regimen sliding scale   SubCutaneous three times a day before meals  LORazepam   Injectable 0.5 milliGRAM(s) IV Push once  melatonin 3 milliGRAM(s) Oral at bedtime  ondansetron Injectable 4 milliGRAM(s) IV Push every 8 hours  pantoprazole  Injectable 40 milliGRAM(s) IV Push daily  QUEtiapine 400 milliGRAM(s) Oral at bedtime  remdesivir  IVPB   IV Intermittent   remdesivir  IVPB 100 milliGRAM(s) IV Intermittent every 24 hours      PRN Meds:  ALPRAZolam 0.5 milliGRAM(s) Oral two times a day PRN  zolpidem 5 milliGRAM(s) Oral at bedtime PRN  zolpidem 5 milliGRAM(s) Oral at bedtime PRN      LABS:                        14.4   14.04 )-----------( 171      ( 28 Jan 2021 10:03 )             45.3     Hgb Trend: 14.4<--, 14.4<--, 14.9<--  01-29    139  |  106  |  33<H>  ----------------------------<  120<H>  4.2   |  21<L>  |  0.85    Ca    8.3<L>      29 Jan 2021 05:32    TPro  7.5  /  Alb  3.3  /  TBili  0.7  /  DBili  x   /  AST  33<H>  /  ALT  19  /  AlkPhos  116  01-29    Creatinine Trend: 0.85<--, 0.80<--, 0.77<--, 1.09<--      Arterial Blood Gas:  01-27 @ 19:22  7.41/35/56/23/87.5/-2.3  ABG lactate: --        MICROBIOLOGY:       RADIOLOGY:  [ ] Reviewed and interpreted by me    PULMONARY FUNCTION TESTS:    EKG:
CHIEF COMPLAINT: sob    HPI: 65 year old female with history of HTN and LLE DVT not currently on AC, COVID+ 15d ago, presenting from PMD office (Dr. Pompa) for acutely worsening SOB in past 2 days. States she was tested postive for covid at an urgent care not associated with [facility].    No feversor CP. Reports her SOB is better now that she is placed on the BiPaP.       PAST MEDICAL & SURGICAL HISTORY:  DVT (deep venous thrombosis)    No significant past surgical history      FAMILY HISTORY:      Allergies    codeine (Unknown)    Intolerances    HOME MEDICATIONS:  Xanax BID PRN  Quetiapine 400mg qhs  Ambien 10mg qhs    OBJECTIVE:  ICU Vital Signs Last 24 Hrs  T(C): 36.2 (26 Jan 2021 15:51), Max: 36.2 (26 Jan 2021 15:51)  T(F): 97.2 (26 Jan 2021 15:51), Max: 97.2 (26 Jan 2021 15:51)  HR: 101 (26 Jan 2021 15:51) (98 - 102)  BP: 126/73 (26 Jan 2021 15:51) (126/73 - 132/81)  BP(mean): --  ABP: --  ABP(mean): --  RR: 40 (26 Jan 2021 15:51) (40 - 40)  SpO2: 89% (26 Jan 2021 15:51) (71% - 97%)    PHYSICAL EXAM:  GENERAL: nad on bipap, speaking in full sentences, obese lady  EYES: conjunctiva and sclera clear  HEART: Regular rate and rhythm; No murmurs, rubs, or gallops  RESPIRATORY:: bilateral basilar crackles appreciated   ABDOMEN: Soft, Nontender, Nondistended; Bowel sounds present  NEUROLOGY: A&Ox3  EXTREMITIES: No clubbing, cyanosis, or edema          HOSPITAL MEDICATIONS:  MEDICATIONS  (STANDING):    MEDICATIONS  (PRN):      LABS:                    MICROBIOLOGY:     RADIOLOGY:  [ ] Reviewed and interpreted by me    EKG:
Due to the nature of the patient's COVID isolation status and efforts to prevent spread of infection while preserving PPE, this encounter was done virtually from our office. A chart review of the provider's notes and diagnostic data was performed. The patient's symptoms were discussed with his providers. At this time, a physical examination was not performed. Face-to-face visits and PE's have been limited, as per policy, to patients for whom it is required for medical-decision making.     Pls refer to the primary team's note for pertinent examination findings.     =======================================================  HPI:  64 yo F with history of HTN and LLE DVT not currently on AC, currently admitted w/ respiratory failure due to COVID. Palliative Medicine was consulted for complex medical decision making related to goals of care discussions    =======================================================  2/17/2021    - Chart reviewed. Discussed with [facility] team.  - I reached out to the patient's daughter, Sarah @ [phone]. In summary:    1) She says she knows why I'm calling  2) She says she understands her mother's condition. Medical team updates her daily. She has also been speaking with her brother. "I'm not in denial."  3) Does not want to speak to Palliative Care. "I don't want to talk about those things"  4) I told her I would respect her space for the time being but to call if she had any questions or concerns.     =======================================================  ----- SYMPTOM ASSESSMENT:   [ X]Unable to obtain due to poor mentation: information obtained from team/ contact    PAIN ASSESSMENT:   Site-   Onset-   Character-   Radiation-   Associated symptoms-   Timing and duration-   Exacerbating factors  Severity-     Effect on QOL-   PAIN AD Score: 0    Dyspnea:  Yes [ ] No [ ] - [ ]Mild [ ]Moderate [ ]Severe  Anxiety:    Yes [ ] No [ ] - [ ]Mild [ ]Moderate [ ]Severe  Fatigue:    Yes [ ] No [ ] - [ ]Mild [ ]Moderate [ ]Severe  Nausea:    Yes [ ] No [ ] - [ ]Mild [ ]Moderate [ ]Severe                         Loss of appetite: Yes [ ] No [ ] - [ ]Mild [ ]Moderate [ ]Severe             Constipation:  Yes [ ] No [ ] - [ ]Mild [ ]Moderate [ ]Severe  Grief: Yes [ ] No [ ]     [X ]All other review of systems negative, including: weakness, cough, colds, blurry vision, headaches, dysuria, pruritus, palpitations, muscle cramps, easy bruising, epistaxis, rashes    =======================================================  ----- PERTINENT PMH/ SXH/ FHX  DVT (deep venous thrombosis)    No pertinent past medical history      No significant past surgical history      FAMILY HISTORY:      ----- SOCIAL HISTORY:   [ ] Unable to elicit  Significant other/partner:    Children: YES  Congregational/Spirituality:  Substance hx: Yes[ ]  No [ ]   Tobacco hx:  Yes [ ] No [ ]   Alcohol hx: Yes [ ] No [ ]   Home Opioid hx:  [ ] I-Stop Reference No:  Living Situation: [ ]Home  [ ]Long term care  [ ]Rehab [ ]Other    ----- ADVANCE DIRECTIVES:    DNR  MOLST  [ ]  Living Will  [ ]   DECISION MAKER(s):  [ ] Health Care Proxy(s)  [ ] Surrogate(s)  [ ] Guardian           Name(s): Phone Number(s):  Sarah @ [phone]    ----- BASELINE (I)ADL(s) (prior to admission):  [place]: [ ]Total  [ ] Moderate [ ]Dependent  Palliative Performance Status Version 2:  10    =======================================================  -----MEDICATIONS AND ALLERGIES:  Allergies    codeine (Unknown)    Intolerances    MEDICATIONS  (STANDING):  caspofungin IVPB      caspofungin IVPB 50 milliGRAM(s) IV Intermittent every 24 hours  chlorhexidine 0.12% Liquid 15 milliLiter(s) Oral Mucosa every 12 hours  chlorhexidine 4% Liquid 1 Application(s) Topical <User Schedule>  cisatracurium Infusion 3 MICROgram(s)/kG/Min (23.4 mL/Hr) IV Continuous <Continuous>  dexAMETHasone  Injectable 6 milliGRAM(s) IV Push daily  dextrose 40% Gel 15 Gram(s) Oral once  dextrose 5%. 1000 milliLiter(s) (50 mL/Hr) IV Continuous <Continuous>  dextrose 5%. 1000 milliLiter(s) (100 mL/Hr) IV Continuous <Continuous>  dextrose 50% Injectable 25 Gram(s) IV Push once  dextrose 50% Injectable 12.5 Gram(s) IV Push once  dextrose 50% Injectable 25 Gram(s) IV Push once  fentaNYL   Infusion..... 0.5 MICROgram(s)/kG/Hr (1.3 mL/Hr) IV Continuous <Continuous>  furosemide   Injectable 40 milliGRAM(s) IV Push once  furosemide   Injectable 40 milliGRAM(s) IV Push once  glucagon  Injectable 1 milliGRAM(s) IntraMuscular once  heparin  Infusion 1800 Unit(s)/Hr (16 mL/Hr) IV Continuous <Continuous>  insulin lispro (ADMELOG) corrective regimen sliding scale   SubCutaneous every 6 hours  ketamine Infusion. 0.25 mG/kG/Hr (3.25 mL/Hr) IV Continuous <Continuous>  meropenem  IVPB      meropenem  IVPB 1000 milliGRAM(s) IV Intermittent every 8 hours  norepinephrine Infusion 0.05 MICROgram(s)/kG/Min (6.09 mL/Hr) IV Continuous <Continuous>  pantoprazole  Injectable 40 milliGRAM(s) IV Push daily  polyethylene glycol 3350 17 Gram(s) Oral two times a day  propofol Infusion 50 MICROgram(s)/kG/Min (39 mL/Hr) IV Continuous <Continuous>  senna Syrup 10 milliLiter(s) Oral at bedtime    MEDICATIONS  (PRN):  acetaminophen    Suspension .. 650 milliGRAM(s) Oral every 6 hours PRN Temp greater or equal to 38C (100.4F)  heparin   Injectable 03823 Unit(s) IV Push every 6 hours PRN For aPTT less than 40  heparin   Injectable 5000 Unit(s) IV Push every 6 hours PRN For aPTT between 40 - 57  sodium chloride 0.9% lock flush 10 milliLiter(s) IV Push every 1 hour PRN Pre/post blood products, medications, blood draw, and to maintain line patency      =======================================================  ----- PHYSICAL EXAM:  Vital Signs Last 24 Hrs  T(C): 38 (17 Feb 2021 11:00), Max: 38 (17 Feb 2021 11:00)  T(F): 100.4 (17 Feb 2021 11:00), Max: 100.4 (17 Feb 2021 11:00)  HR: 79 (17 Feb 2021 11:00) (60 - 84)  BP: --  BP(mean): --  RR: 34 (17 Feb 2021 11:00) (33 - 34)  SpO2: 94% (17 Feb 2021 11:00) (94% - 98%) I&O's Summary    16 Feb 2021 07:01  -  17 Feb 2021 07:00  --------------------------------------------------------  IN: 1947.4 mL / OUT: 3325 mL / NET: -1377.6 mL    17 Feb 2021 07:01  -  17 Feb 2021 12:17  --------------------------------------------------------  IN: 140 mL / OUT: 325 mL / NET: -185 mL      =======================================================  ----- LABS:                        8.6    12.44 )-----------( 254      ( 17 Feb 2021 02:55 )             28.6   02-17    137  |  92<L>  |  22  ----------------------------<  142<H>  4.6   |  37<H>  |  0.81    Ca    8.4      17 Feb 2021 02:55  Phos  3.4     02-17  Mg     2.2     02-17    TPro  6.8  /  Alb  2.4<L>  /  TBili  0.8  /  DBili  x   /  AST  29  /  ALT  20  /  AlkPhos  69  02-17  PT/INR - ( 17 Feb 2021 02:55 )   PT: 14.4 sec;   INR: 1.28 ratio         PTT - ( 17 Feb 2021 02:55 )  PTT:97.4 sec      -----RADIOLOGY & ADDITIONAL STUDIES:    PROTEIN CALORIE MALNUTRITION PRESENT: [ ] Yes [ ] No  [ ] PPSV2 < or = to 30% [ ] significant weight loss  [ ] poor nutritional intake [ ] catabolic state [ ] anasarca     Albumin, Serum: 2.4 g/dL (02-17-21 @ 02:55)      REFERRALS:   [ ]Chaplaincy  [ ] Hospice  [ ]Child Life  [ ]Social Work  [ ]Case management [ ]Holistic Therapy   Goals of Care Discussion Document:     ************************************************************************  PALLIATIVE MEDICINE COORDINATION OF CARE DOCUMENTATION  [x] Inpatient Consult  [ ] Other:  ************************************************************************  MEDICATION REVIEW:  --- Pls refer to current medicatons in the body of this note  ISTOP REFERENCE: [id number]    Patient Name: Angelica Smith Date: 1955  Address: [address] [place] [address] [place], [place] [address]Mbg: Female  Rx Written	Rx Dispensed	Drug	Quantity	Days Supply	Prescriber Name	Payment Method	Dispenser  08/24/2020	08/27/2020	alprazolam 0.5 mg tablet	30	30	Gunyan, Boy	[facility]  08/19/2020	08/22/2020	zolpidem tartrate 10 mg tablet	30	30	Otf Jung MD	[facility]  07/27/2020	07/27/2020	alprazolam 0.5 mg tablet	30	30	Gunyan, [facility]  07/23/2020	07/23/2020	zolpidem tartrate 10 mg tablet	30	30	Romelia Laura ([facility]  06/29/2020	06/29/2020	alprazolam 0.5 mg tablet	30	30	Gunyan, [facility]  06/03/2020	06/03/2020	alprazolam 0.5 mg tablet	30	30	Gunyan, [facility]  05/07/2020	05/07/2020	zolpidem tartrate 5 mg tablet	30	30	Gunyan, [facility]  05/06/2020	05/06/2020	alprazolam 0.5 mg tablet	30	30	Gunyan, [facility]  04/08/2020	04/08/2020	alprazolam 0.5 mg tablet	30	30	Gunyan, [facility]  03/13/2020	03/13/2020	alprazolam 0.25 mg tablet	30	30	Gunyan, [facility]    --- PRN usage: NO PRN  ------------------------------------------------------------------------  COORDINATION OF CARE:  --- Palliative Care consulted for: [facility]  --- Patient assessed:   --- Patient previously seen by Palliative Care service: NO  ADVANCE CARE PLANNING  --- Code status: FULL  --- MOLST reviewed in chart: NONE  --- HCP/ Surrogate: SARAH  --- GOC document found in Alpha: NONE  --- HCP/ Living will/ Other advanced directives in Alpha: NONE  ------------------------------------------------------------------------  CARE PROVIDER DOCUMENTATION:  --- Discussed with SURGE B  --- MICU: Notes reviewed  --- CM/SW: 201 note reviewed  PLAN OF CARE  --- Known admissions in past year: 1  --- Current admit date: 1/26  --- LOS: 22  --- LACE score: 13  --- Current dispo plan: TO BE DETERMINED  ------------------------------------------------------------------------  --- Chart reviewed: 30 Minutes [including time used to gather, review and transfer data to this note]  --- Start: 12:30  --- End: 1:00  Prolonged services rendered, as part of this patient's care provided by Palliative Medicine, include: i.chart review for provider and ancillary service documentation, ii.pertinent diagnostics including laboratory and imaging studies,iii. medication review including PRN use, iv. admission history including previous palliative care encounters and GOC notes, v.advance care planning documents including HCP and MOLST forms in Alpha. Part of Palliative Medicine extended evaluation and management also involves coordination of care with our IDT, the primary and consulting kavitha, and unit CM/SW and Hospice if eligible. Recommendations based on the information gathered and discussed are outline in the AP of our notes.    ************************************************************************

## 2021-02-25 NOTE — PROGRESS NOTE ADULT - ASSESSMENT
65 year old with COVID  Associated hypoxic respiratory failure  Course complicated by PE    Sepsis with VRE bacteremia    1) COVID  S/p remdesivir and steroids.  No clear indication to continue dexamehtasone at this time  Particularly in a patient with concern for sepsis.    2) Hypoxic respiratory failure  COVID/ PE contributing      3) VRE bacteremia  S/p line change  Ct without intra-abd focus  Monitor LFTS (US with sludge)   can continue linezolid (day 4)     Check Echo

## 2021-02-25 NOTE — CONSULT NOTE ADULT - CONSULT REASON
COVID
Hypoxemic Respiratory Failure due to COVID-19
acute respiratory failure
request for trach / PEG
GOC

## 2021-02-25 NOTE — CHART NOTE - NSCHARTNOTEFT_GEN_A_CORE
: Mely    INDICATION: Evaluation for Percutaneous Tracheostomy    PROCEDURE:  [ ] LIMITED ECHO  [ ] LIMITED CHEST  [ ] LIMITED RETROPERITONEAL  [ ] LIMITED ABDOMINAL  [ ] LIMITED DVT  [ ] NEEDLE GUIDANCE VASCULAR  [ ] NEEDLE GUIDANCE THORACENTESIS  [ ] NEEDLE GUIDANCE PARACENTESIS  [ ] NEEDLE GUIDANCE PERICARDIOCENTESIS  [X] LIMITED NECK    FINDINGS: The thyroid cartilage, cricoid cartilage and tracheal rings were located. The thyroid and isthmus was located. An arterial pulsating vessel was noted to travel vertically across the neck anterior to the cricoid cartilage and all identifiable tracheal rings at the site of the proposed/possible percutaneous tracheostomy. Not safe site for percutaneous tracheostomy insertion at bedside.      INTERPRETATION: Not a candidate for bedside percutaneous tracheostomy. Recommend CTS consult for open tracheostomy.     Images stored on [employer].

## 2021-02-25 NOTE — PROGRESS NOTE ADULT - SUBJECTIVE AND OBJECTIVE BOX
Follow Up:      Inverval History/ROS:Patient is a 65y old  Female who presents with a chief complaint of COVID (25 Feb 2021 11:50)    No fever  Intubated    Allergies    codeine (Unknown)    Intolerances        ANTIMICROBIALS:  linezolid  IVPB    linezolid  IVPB 600 every 12 hours      OTHER MEDS:  acetaminophen    Suspension .. 650 milliGRAM(s) Oral every 6 hours PRN  chlorhexidine 0.12% Liquid 15 milliLiter(s) Oral Mucosa every 12 hours  chlorhexidine 4% Liquid 1 Application(s) Topical <User Schedule>  cisatracurium Infusion 3 MICROgram(s)/kG/Min IV Continuous <Continuous>  cisatracurium Injectable 20 milliGRAM(s) IV Push once  dextrose 40% Gel 15 Gram(s) Oral once  dextrose 50% Injectable 25 Gram(s) IV Push once  dextrose 50% Injectable 12.5 Gram(s) IV Push once  dextrose 50% Injectable 25 Gram(s) IV Push once  erythromycin   Ointment 1 Application(s) Both EYES daily  fentaNYL   Infusion..... 4 MICROgram(s)/kG/Hr IV Continuous <Continuous>  glucagon  Injectable 1 milliGRAM(s) IntraMuscular once  heparin  Infusion 1500 Unit(s)/Hr IV Continuous <Continuous>  insulin lispro (ADMELOG) corrective regimen sliding scale   SubCutaneous every 6 hours  ketamine Infusion. 0.2 mG/kG/Hr IV Continuous <Continuous>  midazolam Infusion 0.02 mG/kG/Hr IV Continuous <Continuous>  norepinephrine Infusion 0.07 MICROgram(s)/kG/Min IV Continuous <Continuous>  pantoprazole  Injectable 40 milliGRAM(s) IV Push daily  petrolatum Ophthalmic Ointment 1 Application(s) Both EYES daily  polyethylene glycol 3350 17 Gram(s) Oral two times a day  propofol Infusion 50 MICROgram(s)/kG/Min IV Continuous <Continuous>  senna Syrup 10 milliLiter(s) Oral at bedtime  sodium chloride 0.9% lock flush 10 milliLiter(s) IV Push every 1 hour PRN      Vital Signs Last 24 Hrs  T(C): 36.8 (25 Feb 2021 16:00), Max: 37.1 (25 Feb 2021 12:00)  T(F): 98.3 (25 Feb 2021 16:00), Max: 98.7 (25 Feb 2021 12:00)  HR: 85 (25 Feb 2021 16:00) (76 - 92)  BP: --  BP(mean): --  RR: 35 (25 Feb 2021 16:00) (34 - 35)  SpO2: 89% (25 Feb 2021 16:00) (89% - 100%)    PHYSICAL EXAM:  General: [x ] intubated  HEAD/EYES: [ ] PERRL [ x] white sclera [ ] icterus  ENT:  [ ] normal [x ] supple [ ] thrush [ ] pharyngeal exudate  Cardiovascular:   [ ] murmur [ x] normal [ ] PPM/AICD  Respiratory:  [x ] clear to ausculation bilaterally  GI:  [x ] soft, non-tender, normal bowel sounds  :  [ ] raymundo [ ] no CVA tenderness   Musculoskeletal:  [ ] no synovitis  Neurologic:  [ ] non-focal exam   Skin:  [x ] no rash  Lymph: [ x] no lymphadenopathy  Psychiatric:  [ ] appropriate affect [ ] alert & oriented  Lines:  [ x] no phlebitis x[ ] central line                                8.2    12.21 )-----------( 242      ( 25 Feb 2021 03:15 )             27.5       02-25    141  |  102  |  23  ----------------------------<  106<H>  3.5   |  33<H>  |  0.51    Ca    8.1<L>      25 Feb 2021 03:12  Phos  3.1     02-25  Mg     2.4     02-25    TPro  6.7  /  Alb  2.3<L>  /  TBili  1.6<H>  /  DBili  x   /  AST  105<H>  /  ALT  77<H>  /  AlkPhos  184<H>  02-25          MICROBIOLOGY:Culture Results:   No growth to date. (02-22-21 @ 08:17)  Culture Results:   No growth to date. (02-22-21 @ 08:17)  Culture Results:   Growth in aerobic and anaerobic bottles: Enterococcus faecium (vancomycin  resistant)  ***Blood Panel PCR results on this specimen are available  approximately 3 hours after the Gram stain result.***  Gram stain, PCR, and/or culture results may notalways  correspond due to difference in methodologies.  ************************************************************  This PCR assay was performed by multiplex PCR. This  Assay tests for 66 bacterial and resistance gene targets.  Please refer to the [facility] [employer] test directory  at [email or web address] for details. (02-20-21 @ 21:30)  Culture Results:   Normal Respiratory Pilar present (02-20-21 @ 15:40)  Culture Results:   Growth in aerobic and anaerobic bottles: Enterococcus faecium See  previous culture [id number] (02-20-21 @ 15:15)  Culture Results:   No growth (02-20-21 @ 15:13)      RADIOLOGY:

## 2021-02-26 NOTE — PROGRESS NOTE ADULT - ASSESSMENT
65 year old with COVID  Associated hypoxic respiratory failure  Course complicated by PE    Sepsis with VRE bacteremia    1) COVID  S/p remdesivir and steroids.  No clear indication to continue dexamehtasone at this time  Particularly in a patient with concern for sepsis.    2) Hypoxic respiratory failure  COVID/ PE contributing    3) VRE bacteremia  S/p line change  Ct without intra-abd focus  Monitor LFTS (US with sludge)   can continue linezolid (day 5)     Check Echo     Call the ID service with questions or concerns over the weekend.  [phone]

## 2021-02-26 NOTE — PROGRESS NOTE ADULT - ATTENDING COMMENTS
Gong: I have seen and examined the patient face to face, have reviewed and addended the HPI, PE and a/p as necessary.    66 yo F with obesity, HTN, previous LLE DVT not on AC, COVID + on 1/17 a/w acute hypoxic respiratory failure, intubated on 1/29 with course complicated by ARDS, VRE bacteremia, and now submassive PE, not a tPA candidate at this time.      Neuro: sedated on propofol, ketamine and fentanyl, paralytics re-started overnight for vent dyssynchrony, c/w phenobarb load.   CV: Vasoplegic shock c/w levophed weaning as tolerated   Pulm: Acute respiratory failure with ARDS; unable to tolerate proning, s/p multiple bronchoscopies; now becoming more hypercarbic despite maximal vent settings. pending tracheostomy; cultures noted to have rare yeast s/p antifungal treatments.   GI: c/w tube feeds; protonix.  c/w senna  Renal/Metabolic: Renal fn wnl; continue with lasix 60mg IVP PRN for goal of net even to -1L/daily  ID: + VRE bacteremia on 2/20, with repeat neg on 2/22 will continue with Linezolid  Heme: Full dose AC with heparin gtt for submassive PE and DVTs;   Endo: FS at goal, c/w ISS  Overall poor prognosis.  Remains full code.  Discussed with family.

## 2021-02-26 NOTE — PROGRESS NOTE ADULT - SUBJECTIVE AND OBJECTIVE BOX
Elliot Diaz MD, PGY-2  Available on [employer] Teams | Pager: [phone] | [facility]: [id number]  ---------------------------------------------------------------------------------------------  Patient is a 65y old  Female who presents with a chief complaint of COVID (25 Feb 2021 16:50)      SUBJECTIVE / OVERNIGHT EVENTS: Paralyzed yesterday for high peak airway pressures. No other events. Intubated, sedated, paralyzed.     MEDICATIONS  (STANDING):  chlorhexidine 0.12% Liquid 15 milliLiter(s) Oral Mucosa every 12 hours  chlorhexidine 4% Liquid 1 Application(s) Topical <User Schedule>  cisatracurium Infusion 3 MICROgram(s)/kG/Min (23.4 mL/Hr) IV Continuous <Continuous>  dextrose 40% Gel 15 Gram(s) Oral once  dextrose 50% Injectable 25 Gram(s) IV Push once  dextrose 50% Injectable 12.5 Gram(s) IV Push once  dextrose 50% Injectable 25 Gram(s) IV Push once  erythromycin   Ointment 1 Application(s) Both EYES daily  glucagon  Injectable 1 milliGRAM(s) IntraMuscular once  heparin  Infusion 1500 Unit(s)/Hr (14 mL/Hr) IV Continuous <Continuous>  insulin lispro (ADMELOG) corrective regimen sliding scale   SubCutaneous every 6 hours  ketamine Infusion. 0.2 mG/kG/Hr (2.6 mL/Hr) IV Continuous <Continuous>  linezolid  IVPB      linezolid  IVPB 600 milliGRAM(s) IV Intermittent every 12 hours  midazolam Infusion 0.02 mG/kG/Hr (2.6 mL/Hr) IV Continuous <Continuous>  norepinephrine Infusion 0.07 MICROgram(s)/kG/Min (8.53 mL/Hr) IV Continuous <Continuous>  pantoprazole  Injectable 40 milliGRAM(s) IV Push daily  petrolatum Ophthalmic Ointment 1 Application(s) Both EYES daily  polyethylene glycol 3350 17 Gram(s) Oral two times a day  propofol Infusion 50 MICROgram(s)/kG/Min (39 mL/Hr) IV Continuous <Continuous>  senna Syrup 10 milliLiter(s) Oral at bedtime    MEDICATIONS  (PRN):  acetaminophen    Suspension .. 650 milliGRAM(s) Oral every 6 hours PRN Temp greater or equal to 38C (100.4F)  sodium chloride 0.9% lock flush 10 milliLiter(s) IV Push every 1 hour PRN Pre/post blood products, medications, blood draw, and to maintain line patency      ICU Vital Signs Last 24 Hrs  T(F): 99 (26 Feb 2021 08:00), Max: 99.1 (25 Feb 2021 23:00)  HR: 95 (26 Feb 2021 08:03) (83 - 585)  BP: --  BP(mean): --  ABP: 127/55 (26 Feb 2021 08:00) (99/44 - 127/55)  ABP(mean): 77 (26 Feb 2021 08:00) (63 - 77)  RR: 36 (26 Feb 2021 08:00) (34 - 36)  SpO2: 92% (26 Feb 2021 08:03) (89% - 96%)    Mode: AC/ CMV (Assist Control/ Continuous Mandatory Ventilation)  RR (machine): 36  TV (machine): 420  FiO2: 70  PEEP: 12  ITime: 0.67  MAP: 21  PIP: 39    Physical Exam:   GENERAL: sedated and paralyzed  CHEST/LUNG: CTABL; no wheezes  HEART: Regular rate and rhythm; No murmurs, rubs, or gallops  ABDOMEN: Soft, Nontender, Nondistended; Bowel sounds present  EXTREMITIES: 2+ Peripheral Pulses, No clubbing, cyanosis. Improved edema  : slight hematuria  SKIN: No rashes or lesions         I&O's Summary    25 Feb 2021 07:01  -  26 Feb 2021 07:00  --------------------------------------------------------  IN: 3915.8 mL / OUT: 3925 mL / NET: -9.2 mL    26 Feb 2021 07:01  -  26 Feb 2021 08:33  --------------------------------------------------------  IN: 181.1 mL / OUT: 275 mL / NET: -93.9 mL      MEDICATIONS  (STANDING):  chlorhexidine 0.12% Liquid 15 milliLiter(s) Oral Mucosa every 12 hours  chlorhexidine 4% Liquid 1 Application(s) Topical <User Schedule>  cisatracurium Infusion 3 MICROgram(s)/kG/Min (23.4 mL/Hr) IV Continuous <Continuous>  dextrose 40% Gel 15 Gram(s) Oral once  dextrose 50% Injectable 25 Gram(s) IV Push once  dextrose 50% Injectable 12.5 Gram(s) IV Push once  dextrose 50% Injectable 25 Gram(s) IV Push once  erythromycin   Ointment 1 Application(s) Both EYES daily  glucagon  Injectable 1 milliGRAM(s) IntraMuscular once  heparin  Infusion 1500 Unit(s)/Hr (14 mL/Hr) IV Continuous <Continuous>  insulin lispro (ADMELOG) corrective regimen sliding scale   SubCutaneous every 6 hours  ketamine Infusion. 0.2 mG/kG/Hr (2.6 mL/Hr) IV Continuous <Continuous>  linezolid  IVPB      linezolid  IVPB 600 milliGRAM(s) IV Intermittent every 12 hours  midazolam Infusion 0.02 mG/kG/Hr (2.6 mL/Hr) IV Continuous <Continuous>  norepinephrine Infusion 0.07 MICROgram(s)/kG/Min (8.53 mL/Hr) IV Continuous <Continuous>  pantoprazole  Injectable 40 milliGRAM(s) IV Push daily  petrolatum Ophthalmic Ointment 1 Application(s) Both EYES daily  polyethylene glycol 3350 17 Gram(s) Oral two times a day  propofol Infusion 50 MICROgram(s)/kG/Min (39 mL/Hr) IV Continuous <Continuous>  senna Syrup 10 milliLiter(s) Oral at bedtime    MEDICATIONS  (PRN):  acetaminophen    Suspension .. 650 milliGRAM(s) Oral every 6 hours PRN Temp greater or equal to 38C (100.4F)  sodium chloride 0.9% lock flush 10 milliLiter(s) IV Push every 1 hour PRN Pre/post blood products, medications, blood draw, and to maintain line patency    Allergies    codeine (Unknown)    Intolerances        LABS:                        8.0    12.25 )-----------( 264      ( 26 Feb 2021 01:56 )             28.6     02-26    140  |  98  |  19  ----------------------------<  108<H>  3.1<L>   |  36<H>  |  0.58    Ca    8.3<L>      26 Feb 2021 01:56  Phos  2.8     02-26  Mg     2.2     02-26    TPro  7.1  /  Alb  2.4<L>  /  TBili  1.4<H>  /  DBili  x   /  AST  130<H>  /  ALT  94<H>  /  AlkPhos  223<H>  02-26    PT/INR - ( 26 Feb 2021 01:56 )   PT: 14.2 sec;   INR: 1.26 ratio         PTT - ( 26 Feb 2021 01:56 )  PTT:74.5 sec  ABG - ( 26 Feb 2021 01:56 )  pH, Arterial: 7.27  pH, Blood: x     /  pCO2: 79    /  pO2: 78    / HCO3: 31    / Base Excess: 8.2   /  SaO2: 95.7      Lactate Trend  CAPILLARY BLOOD GLUCOSE  POCT Blood Glucose.: 163 mg/dL (26 Feb 2021 05:41)  POCT Blood Glucose.: 105 mg/dL (25 Feb 2021 23:20)  POCT Blood Glucose.: 115 mg/dL (25 Feb 2021 17:36)  POCT Blood Glucose.: 107 mg/dL (25 Feb 2021 11:42)    RADIOLOGY & ADDITIONAL TESTS:  Results Reviewed:   Imaging Personally Reviewed:  Electrocardiogram Personally Reviewed:

## 2021-02-26 NOTE — PROGRESS NOTE ADULT - SUBJECTIVE AND OBJECTIVE BOX
Follow Up:      Inverval History/ROS: Patient is a 65y old  Female who presents with a chief complaint of COVID (26 Feb 2021 08:32)    Afebrile.  FiO2 @60%  On pressors    Allergies    codeine (Unknown)    Intolerances        ANTIMICROBIALS:  linezolid  IVPB    linezolid  IVPB 600 every 12 hours      OTHER MEDS:  acetaminophen    Suspension .. 650 milliGRAM(s) Oral every 6 hours PRN  chlorhexidine 0.12% Liquid 15 milliLiter(s) Oral Mucosa every 12 hours  chlorhexidine 4% Liquid 1 Application(s) Topical <User Schedule>  cisatracurium Infusion 3 MICROgram(s)/kG/Min IV Continuous <Continuous>  dextrose 40% Gel 15 Gram(s) Oral once  dextrose 50% Injectable 25 Gram(s) IV Push once  dextrose 50% Injectable 12.5 Gram(s) IV Push once  dextrose 50% Injectable 25 Gram(s) IV Push once  erythromycin   Ointment 1 Application(s) Both EYES daily  glucagon  Injectable 1 milliGRAM(s) IntraMuscular once  heparin  Infusion 1500 Unit(s)/Hr IV Continuous <Continuous>  insulin lispro (ADMELOG) corrective regimen sliding scale   SubCutaneous every 6 hours  ketamine Infusion. 0.2 mG/kG/Hr IV Continuous <Continuous>  midazolam Infusion 0.02 mG/kG/Hr IV Continuous <Continuous>  norepinephrine Infusion 0.07 MICROgram(s)/kG/Min IV Continuous <Continuous>  pantoprazole  Injectable 40 milliGRAM(s) IV Push daily  petrolatum Ophthalmic Ointment 1 Application(s) Both EYES daily  polyethylene glycol 3350 17 Gram(s) Oral daily  propofol Infusion 50 MICROgram(s)/kG/Min IV Continuous <Continuous>  senna Syrup 10 milliLiter(s) Oral at bedtime  sodium chloride 0.9% lock flush 10 milliLiter(s) IV Push every 1 hour PRN      Vital Signs Last 24 Hrs  T(C): 36.9 (26 Feb 2021 12:00), Max: 37.3 (25 Feb 2021 23:00)  T(F): 98.4 (26 Feb 2021 12:00), Max: 99.1 (25 Feb 2021 23:00)  HR: 92 (26 Feb 2021 12:00) (85 - 585)  BP: --  BP(mean): --  RR: 36 (26 Feb 2021 12:00) (34 - 36)  SpO2: 91% (26 Feb 2021 12:00) (89% - 96%)    PHYSICAL EXAM:  General: [x ] intubated  HEAD/EYES: [ ] PERRL [x ] white sclera [ ] icterus  ENT:  [ ] normal [ x] supple [ ] thrush [ ] pharyngeal exudate  Cardiovascular:   [ ] murmur [x ] normal [ ] PPM/AICD  Respiratory:  [ x] course BS  GI:  [x ] soft, non-tender, normal bowel sounds  :  [ ] raymundo [x ] no CVA tenderness   Musculoskeletal:  [ ] no synovitis  Neurologic:  [ ] non-focal exam   Skin:  [x ] no rash  Lymph: [ x] no lymphadenopathy  Psychiatric:  [ ] appropriate affect [ ] alert & oriented  Lines:  [ x] no phlebitis [ ] central line                                8.0    12.25 )-----------( 264      ( 26 Feb 2021 01:56 )             28.6       02-26    140  |  98  |  19  ----------------------------<  108<H>  3.1<L>   |  36<H>  |  0.58    Ca    8.3<L>      26 Feb 2021 01:56  Phos  2.8     02-26  Mg     2.2     02-26    TPro  7.1  /  Alb  2.4<L>  /  TBili  1.4<H>  /  DBili  x   /  AST  130<H>  /  ALT  94<H>  /  AlkPhos  223<H>  02-26          MICROBIOLOGY:Culture Results:   No growth to date. (02-22-21 @ 08:17)  Culture Results:   No growth to date. (02-22-21 @ 08:17)  Culture Results:   Growth in aerobic and anaerobic bottles: Enterococcus faecium (vancomycin  resistant)  ***Blood Panel PCR results on this specimen are available  approximately 3 hours after the Gram stain result.***  Gram stain, PCR, and/or culture results may notalways  correspond due to difference in methodologies.  ************************************************************  This PCR assay was performed by multiplex PCR. This  Assay tests for 66 bacterial and resistance gene targets.  Please refer to the [facility] [employer] test directory  at [email or web address] for details. (02-20-21 @ 21:30)  Culture Results:   Normal Respiratory Pilar present (02-20-21 @ 15:40)  Culture Results:   Growth in aerobic and anaerobic bottles: Enterococcus faecium See  previous culture [id number] (02-20-21 @ 15:15)  Culture Results:   No growth (02-20-21 @ 15:13)      RADIOLOGY:

## 2021-02-26 NOTE — PROGRESS NOTE ADULT - ASSESSMENT
65yF with obesity, HTN, hx of LLE DVT not on AC, COVID+ on 1/17, presenting for acutely worsening SOB, intubated 1/29 for acute hypoxic respiratory failure. Still requiring high vent settings with high plateau pressures requiring ongoing ICU level care. course complicated by gram positive bacteremia and persistent fevers.     Neuro:  - propofol, ketamine, fentanyl, versed gtt and nimbex for vent synchrony    Respiratory:  - Intubated (1/29 - ); on full AC support  - s/p bronchoscopy 2/9 - rare yeast, do not need to treat   - s/p 10d course of Dexamethasone (1/27 - 2/4), Remdesivir d/c'ed (1/26 -1/30). restarted dexamethasone 6 mg IV (2/13 - 2/19), tapered 4 mg (2/19 - 2/21), 2mg 2/21 to 2/24  - did not tolerate proning  - bronchoscopy on 2/21: showed granulation tissue at distal ET tube site, tube exchanged and peak pressures improved.   - 2/21 ET tube had been dislodged; replaced under glidescope  - CTC w/ IV contrast shows extensive PE in L PA extending to segmental branches and subsegmental branches  - unable to perform trach 2/25 due to overlying blood vessel; CTSx consulted    Cards:  #Hypotension  - likely vasoplegia from sedation meds  - c/w levophed, wean as tolerated    GI:  - c/w tube feeds  - Protonix 40mg qD  - senna  - S/p Relistor for longstanding constipation, with 1 large bowel movement.    Transaminitis 2/12/21, recurrent 2/24  - f/u RUQ ultrasound, now downtrending    Renal:    - Lasix 60 IV PRN for net negative goal of net even to net -1L/day  - raymundo exchanged due to obstruction and pt put out 2L (2/20)   - hematuria i/s/o hep gtt, continue to monitor    Heme:  h/o DVT, b/l upper thigh DVTs; confirmed PE L PA extending to subsegmental branches  - Duplex 1/27: b/l above knee DVT  - heparin gtt, with modified ptt goal 60-80    Anemia, likely 2/2 blood draws  - With hematuria while on heparin, currently with improvement. S/p 1u pRBC 2/17    ID  Fever:  - s/p Ceftriaxone 7d course for E.coli UTI, CTX (2/5- 2/8)  - S/p empiric varun and vanc (2/9 - 2/19)   - S/p Caspofungin 2/11 - 2/18 for ? evidence of fungi on BAL  - BCx 2/20 with VRE  - BCx 2/22 NGTD    Enterococcus Bacteremia  -BCx 2/20 with VRE; started on linezolid+ varun 2/21 -   - CTC/A/P without suspicious fluid collections  - f/u ID recs    Lines:  - SUSHMA 2/21, L Radial [place] 2/23    Ethics:   - full code; family currently would pursue tracheostomy and PEG  - family aware of poor prognosis

## 2021-02-27 NOTE — PROGRESS NOTE ADULT - SUBJECTIVE AND OBJECTIVE BOX
Elliot Diaz MD, PGY-2  Available on [employer] Teams | Pager: [phone] | [facility]: [phone]  ---------------------------------------------------------------------------------------------  Patient is a 65y old  Female who presents with a chief complaint of COVID (26 Feb 2021 12:05)    SUBJECTIVE / OVERNIGHT EVENTS:  No acute events overnight. Pt seen and examined at bedside. Intubated and sedated.     MEDICATIONS  (STANDING):  chlorhexidine 0.12% Liquid 15 milliLiter(s) Oral Mucosa every 12 hours  chlorhexidine 4% Liquid 1 Application(s) Topical <User Schedule>  cisatracurium Infusion 3 MICROgram(s)/kG/Min (23.4 mL/Hr) IV Continuous <Continuous>  dextrose 40% Gel 15 Gram(s) Oral once  dextrose 50% Injectable 25 Gram(s) IV Push once  dextrose 50% Injectable 12.5 Gram(s) IV Push once  dextrose 50% Injectable 25 Gram(s) IV Push once  erythromycin   Ointment 1 Application(s) Both EYES daily  glucagon  Injectable 1 milliGRAM(s) IntraMuscular once  heparin  Infusion 1500 Unit(s)/Hr (14 mL/Hr) IV Continuous <Continuous>  insulin lispro (ADMELOG) corrective regimen sliding scale   SubCutaneous every 6 hours  ketamine Infusion. 0.2 mG/kG/Hr (2.6 mL/Hr) IV Continuous <Continuous>  linezolid  IVPB      linezolid  IVPB 600 milliGRAM(s) IV Intermittent every 12 hours  midazolam Infusion 0.02 mG/kG/Hr (2.6 mL/Hr) IV Continuous <Continuous>  norepinephrine Infusion 0.07 MICROgram(s)/kG/Min (8.53 mL/Hr) IV Continuous <Continuous>  pantoprazole  Injectable 40 milliGRAM(s) IV Push daily  petrolatum Ophthalmic Ointment 1 Application(s) Both EYES daily  polyethylene glycol 3350 17 Gram(s) Oral daily  potassium phosphate / sodium phosphate Powder (PHOS-NaK) 1 Packet(s) Oral every 2 hours  propofol Infusion 50 MICROgram(s)/kG/Min (39 mL/Hr) IV Continuous <Continuous>  senna Syrup 10 milliLiter(s) Oral at bedtime    MEDICATIONS  (PRN):  acetaminophen    Suspension .. 650 milliGRAM(s) Oral every 6 hours PRN Temp greater or equal to 38C (100.4F)  sodium chloride 0.9% lock flush 10 milliLiter(s) IV Push every 1 hour PRN Pre/post blood products, medications, blood draw, and to maintain line patency    ICU Vital Signs Last 24 Hrs  T(F): 98.6 (27 Feb 2021 04:00), Max: 99.3 (27 Feb 2021 00:00)  HR: 87 (27 Feb 2021 07:00) (78 - 95)  ABP: 127/59 (27 Feb 2021 07:00) (114/52 - 135/60)  ABP(mean): 81 (27 Feb 2021 07:00) (70 - 84)  RR: 36 (27 Feb 2021 07:00) (36 - 36)  SpO2: 96% (27 Feb 2021 07:00) (91% - 97%)    Mode: AC/ CMV (Assist Control/ Continuous Mandatory Ventilation)  RR (machine): 36  TV (machine): 450  FiO2: 65  PEEP: 12  ITime: 0.6  MAP: 21  PIP: 37    Physical Exam:   GENERAL: sedated and paralyzed  CHEST/LUNG: CTABL; no wheezes  HEART: Regular rate and rhythm; No murmurs, rubs, or gallops  ABDOMEN: Soft, Nontender, Nondistended; Bowel sounds present  EXTREMITIES: 2+ Peripheral Pulses, No clubbing, cyanosis. Improved edema  : slight hematuria  SKIN: No rashes or lesions  I&O's Summary    26 Feb 2021 07:01  -  27 Feb 2021 07:00  --------------------------------------------------------  IN: 3912.8 mL / OUT: 5300 mL / NET: -1387.2 mL      MEDICATIONS  (STANDING):  chlorhexidine 0.12% Liquid 15 milliLiter(s) Oral Mucosa every 12 hours  chlorhexidine 4% Liquid 1 Application(s) Topical <User Schedule>  cisatracurium Infusion 3 MICROgram(s)/kG/Min (23.4 mL/Hr) IV Continuous <Continuous>  dextrose 40% Gel 15 Gram(s) Oral once  dextrose 50% Injectable 25 Gram(s) IV Push once  dextrose 50% Injectable 12.5 Gram(s) IV Push once  dextrose 50% Injectable 25 Gram(s) IV Push once  erythromycin   Ointment 1 Application(s) Both EYES daily  glucagon  Injectable 1 milliGRAM(s) IntraMuscular once  heparin  Infusion 1500 Unit(s)/Hr (14 mL/Hr) IV Continuous <Continuous>  insulin lispro (ADMELOG) corrective regimen sliding scale   SubCutaneous every 6 hours  ketamine Infusion. 0.2 mG/kG/Hr (2.6 mL/Hr) IV Continuous <Continuous>  linezolid  IVPB      linezolid  IVPB 600 milliGRAM(s) IV Intermittent every 12 hours  midazolam Infusion 0.02 mG/kG/Hr (2.6 mL/Hr) IV Continuous <Continuous>  norepinephrine Infusion 0.07 MICROgram(s)/kG/Min (8.53 mL/Hr) IV Continuous <Continuous>  pantoprazole  Injectable 40 milliGRAM(s) IV Push daily  petrolatum Ophthalmic Ointment 1 Application(s) Both EYES daily  polyethylene glycol 3350 17 Gram(s) Oral daily  potassium phosphate / sodium phosphate Powder (PHOS-NaK) 1 Packet(s) Oral every 2 hours  propofol Infusion 50 MICROgram(s)/kG/Min (39 mL/Hr) IV Continuous <Continuous>  senna Syrup 10 milliLiter(s) Oral at bedtime    MEDICATIONS  (PRN):  acetaminophen    Suspension .. 650 milliGRAM(s) Oral every 6 hours PRN Temp greater or equal to 38C (100.4F)  sodium chloride 0.9% lock flush 10 milliLiter(s) IV Push every 1 hour PRN Pre/post blood products, medications, blood draw, and to maintain line patency    Allergies    codeine (Unknown)    Intolerances    LABS:                        7.8    10.54 )-----------( 242      ( 27 Feb 2021 02:25 )             27.1     02-27    141  |  98  |  16  ----------------------------<  150<H>  3.6   |  39<H>  |  0.46<L>    Ca    8.2<L>      27 Feb 2021 02:25  Phos  2.4     02-27  Mg     2.0     02-27    TPro  7.3  /  Alb  2.5<L>  /  TBili  1.0  /  DBili  x   /  AST  72<H>  /  ALT  76<H>  /  AlkPhos  198<H>  02-27    PT/INR - ( 26 Feb 2021 01:56 )   PT: 14.2 sec;   INR: 1.26 ratio         PTT - ( 27 Feb 2021 02:25 )  PTT:86.0 sec  ABG - ( 27 Feb 2021 02:25 )  pH, Arterial: 7.32  pH, Blood: x     /  pCO2: 78    /  pO2: 87    / HCO3: 35    / Base Excess: 12.4  /  SaO2: 96.6          Lactate Trend  CAPILLARY BLOOD GLUCOSE  POCT Blood Glucose.: 152 mg/dL (27 Feb 2021 05:35)  POCT Blood Glucose.: 151 mg/dL (27 Feb 2021 00:30)  POCT Blood Glucose.: 173 mg/dL (26 Feb 2021 16:44)  POCT Blood Glucose.: 162 mg/dL (26 Feb 2021 10:25)      RADIOLOGY & ADDITIONAL TESTS:  Results Reviewed:   Imaging Personally Reviewed:  Electrocardiogram Personally Reviewed:

## 2021-02-27 NOTE — PROGRESS NOTE ADULT - ASSESSMENT
65F h/o COVID-19 pneumonia induced hypoxemic respiratory failure w/resultant ARDS and subsequent intubation requiring prolonged mechanical ventilatory support        - Requiring moderate support on the VENT [PEEP 12, FIO2 65%]. Trach on hold until ventilatory settings decrease  - Will continue to follow    Aris Colon PGY5  Thoracic Surgery  [id number]

## 2021-02-27 NOTE — PROGRESS NOTE ADULT - ATTENDING COMMENTS
Gong: I have seen and examined the patient face to face, have reviewed and addended the HPI, PE and a/p as necessary.    66 yo F with obesity, HTN, previous LLE DVT not on AC, COVID + on 1/17 a/w acute hypoxic respiratory failure, intubated on 1/29 with course complicated by ARDS, VRE bacteremia, and now submassive PE, not a tPA candidate at this time.      Neuro: sedated on propofol, ketamine and fentanyl, paralytics re-started overnight for vent dyssynchrony, c/w phenobarb load.   CV: Vasoplegic shock c/w levophed weaning as tolerated; continues to have dilated IVC, with flow into hepatic veins, will try to further diurese.    Pulm: Acute respiratory failure with ARDS; unable to tolerate proning, s/p multiple bronchoscopies; remains hypercarbic despite maximal vent settings. pending tracheostomy if vent settings improve; cultures noted to have rare yeast s/p antifungal treatments.   GI: c/w tube feeds; protonix.  c/w senna  Renal/Metabolic: Renal fn wnl; continue with lasix 40mg IVP BID today for goal of net even to -1L/daily, IVC with no respiratory variation today.  ID: + VRE bacteremia on 2/20, with repeat neg on 2/22 will continue with Linezolid  Heme: Full dose AC with heparin gtt for submassive PE and DVTs;   Endo: FS at goal, c/w ISS  Overall poor prognosis.  Remains full code.  Discussed with family.

## 2021-02-27 NOTE — PROGRESS NOTE ADULT - ASSESSMENT
65yF with obesity, HTN, hx of LLE DVT not on AC, COVID+ on 1/17, presenting for acutely worsening SOB, intubated 1/29 for acute hypoxic respiratory failure. Still requiring high vent settings with high plateau pressures requiring ongoing ICU level care. course complicated by gram positive bacteremia and persistent fevers.     Neuro:  - propofol, ketamine, fentanyl, versed gtt and nimbex for vent synchrony    Respiratory:  - Intubated (1/29 - ); on full AC support  - s/p bronchoscopy 2/9 - rare yeast, do not need to treat   - s/p 10d course of Dexamethasone (1/27 - 2/4), Remdesivir d/c'ed (1/26 -1/30). restarted dexamethasone 6 mg IV (2/13 - 2/19), tapered 4 mg (2/19 - 2/21), 2mg 2/21 to 2/24  - did not tolerate proning  - bronchoscopy on 2/21: showed granulation tissue at distal ET tube site, tube exchanged and peak pressures improved.   - 2/21 ET tube had been dislodged; replaced under glidescope  - CTC w/ IV contrast shows extensive PE in L PA extending to segmental branches and subsegmental branches  - unable to perform trach 2/25 due to overlying blood vessel; CTSx consulted plan for trach/PEG early next week    Cards:  #Hypotension  - likely vasoplegia from sedation meds  - c/w levophed, wean as tolerated    GI:  - c/w tube feeds  - Protonix 40mg qD  - senna  - S/p Relistor for longstanding constipation, with 1 large bowel movement.    Transaminitis 2/12/21, recurrent 2/24  - f/u RUQ ultrasound, now downtrending    Renal:    - Lasix 60 IV PRN for net negative goal of net even to net -1L/day  - raymundo exchanged due to obstruction and pt put out 2L (2/20)   - hematuria i/s/o hep gtt, continue to monitor    Heme:  h/o DVT, b/l upper thigh DVTs; confirmed PE L PA extending to subsegmental branches  - Duplex 1/27: b/l above knee DVT  - heparin gtt, with modified ptt goal 60-80    Anemia, likely 2/2 blood draws  - With hematuria while on heparin, currently with improvement. S/p 1u pRBC 2/17    ID  Fever:  - s/p Ceftriaxone 7d course for E.coli UTI, CTX (2/5- 2/8)  - S/p empiric varun and vanc (2/9 - 2/19)   - S/p Caspofungin 2/11 - 2/18 for ? evidence of fungi on BAL  - BCx 2/20 with VRE  - BCx 2/22 NGTD    Enterococcus Bacteremia  -BCx 2/20 with VRE; started on linezolid+ varun 2/21 -   - CTC/A/P without suspicious fluid collections  - f/u ID recs    Lines:  - SUSHMA 2/21, L Radial [place] 2/23    Ethics:   - full code; family currently would pursue tracheostomy and PEG  - family aware of poor prognosis  65yF with obesity, HTN, hx of LLE DVT not on AC, COVID+ on 1/17, presenting for acutely worsening SOB, intubated 1/29 for acute hypoxic respiratory failure. Still requiring high vent settings with high plateau pressures requiring ongoing ICU level care. course complicated by gram positive bacteremia and persistent fevers.     Neuro:  - propofol, ketamine, fentanyl, versed gtt and nimbex for vent synchrony    Respiratory:  - Intubated (1/29 - ); on full AC support  - s/p bronchoscopy 2/9 - rare yeast, do not need to treat   - s/p 10d course of Dexamethasone (1/27 - 2/4), Remdesivir d/c'ed (1/26 -1/30). restarted dexamethasone 6 mg IV (2/13 - 2/19), tapered 4 mg (2/19 - 2/21), 2mg 2/21 to 2/24  - did not tolerate proning earlier in admission likely due to body habitus  - bronchoscopy on 2/21: showed granulation tissue at distal ET tube site, tube exchanged and peak pressures improved.   - 2/21 ET tube had been dislodged; replaced under glidescope  - CTC w/ IV contrast shows extensive PE in L PA extending to segmental branches and subsegmental branches  - unable to perform trach 2/25 due to overlying blood vessel; CTSx consulted plan for trach/PEG early next week - on hold due to high vent settings    Cards:  #Hypotension  - likely vasoplegia from sedation meds  - c/w levophed, wean as tolerated    GI:  - c/w tube feeds  - Protonix 40mg qD  - c/w senna/miralax  - S/p Relistor for longstanding constipation, with 1 large bowel movement.    Transaminitis 2/12/21, recurrent 2/24  - f/u RUQ ultrasound, now downtrending    Renal:    - Lasix 60 IV PRN for net negative goal of net even to net -1L/day  - raymundo exchanged due to obstruction and pt put out 2L (2/20)   - hematuria i/s/o hep gtt, continue to monitor    Heme:  h/o DVT, b/l upper thigh DVTs; confirmed PE L PA extending to subsegmental branches  - Duplex 1/27: b/l above knee DVT  - heparin gtt, with modified ptt goal 60-80    Anemia, likely 2/2 blood draws  - With hematuria while on heparin, currently with improvement. S/p 1u pRBC 2/17    Eosinophilia  - Intermittently elevated eosinophils, unclear significance, no rash  - continue to monitor    ID  Fever:  - s/p Ceftriaxone 7d course for E.coli UTI, CTX (2/5- 2/8)  - S/p empiric varun and vanc (2/9 - 2/19)   - S/p Caspofungin 2/11 - 2/18 for ? evidence of fungi on BAL  - BCx 2/20 with VRE  - BCx 2/22 NGTD    Enterococcus Bacteremia  -BCx 2/20 with VRE; started on linezolid+ varun 2/21 -   - CTC/A/P without suspicious fluid collections  - f/u ID recs    Lines:  - SUSHMA 2/21, L Radial [place] 2/23    Ethics:   - full code; family currently would pursue tracheostomy and PEG  - family aware of poor prognosis

## 2021-02-27 NOTE — PROGRESS NOTE ADULT - SUBJECTIVE AND OBJECTIVE BOX
Thoracic Surgery  CC: hypoxemic respiratory failure   HPI: 65F h/o COVID-19 pneumonia induced hypoxemic respiratory failure w/resultant ARDS and subsequent intubation requiring prolonged mechanical ventilatory support  24/Overnight events: Patient seen at bedside. Requiring moderate support on the VENT [PEEP 12, FIO2 65%].          Objective:  Vital Signs Last 24 Hrs  T(C): 37 (27 Feb 2021 04:00), Max: 37.4 (27 Feb 2021 00:00)  T(F): 98.6 (27 Feb 2021 04:00), Max: 99.3 (27 Feb 2021 00:00)  HR: 87 (27 Feb 2021 07:00) (78 - 95)  BP: --  BP(mean): --  RR: 36 (27 Feb 2021 07:00) (36 - 36)  SpO2: 96% (27 Feb 2021 07:00) (91% - 97%)    Physical Exam:  General: intubated / sedated   Respiratory: mechanical ventilatory support [PEEP 12; FIO2 65%]     MEDICATIONS  (STANDING):  chlorhexidine 0.12% Liquid 15 milliLiter(s) Oral Mucosa every 12 hours  chlorhexidine 4% Liquid 1 Application(s) Topical <User Schedule>  cisatracurium Infusion 3 MICROgram(s)/kG/Min (23.4 mL/Hr) IV Continuous <Continuous>  dextrose 40% Gel 15 Gram(s) Oral once  dextrose 50% Injectable 25 Gram(s) IV Push once  dextrose 50% Injectable 12.5 Gram(s) IV Push once  dextrose 50% Injectable 25 Gram(s) IV Push once  erythromycin   Ointment 1 Application(s) Both EYES daily  glucagon  Injectable 1 milliGRAM(s) IntraMuscular once  heparin  Infusion 1500 Unit(s)/Hr (14 mL/Hr) IV Continuous <Continuous>  insulin lispro (ADMELOG) corrective regimen sliding scale   SubCutaneous every 6 hours  ketamine Infusion. 0.2 mG/kG/Hr (2.6 mL/Hr) IV Continuous <Continuous>  linezolid  IVPB      linezolid  IVPB 600 milliGRAM(s) IV Intermittent every 12 hours  midazolam Infusion 0.02 mG/kG/Hr (2.6 mL/Hr) IV Continuous <Continuous>  norepinephrine Infusion 0.07 MICROgram(s)/kG/Min (8.53 mL/Hr) IV Continuous <Continuous>  pantoprazole  Injectable 40 milliGRAM(s) IV Push daily  petrolatum Ophthalmic Ointment 1 Application(s) Both EYES daily  polyethylene glycol 3350 17 Gram(s) Oral daily  potassium phosphate / sodium phosphate Powder (PHOS-NaK) 1 Packet(s) Oral every 2 hours  propofol Infusion 50 MICROgram(s)/kG/Min (39 mL/Hr) IV Continuous <Continuous>  senna Syrup 10 milliLiter(s) Oral at bedtime    MEDICATIONS  (PRN):  acetaminophen    Suspension .. 650 milliGRAM(s) Oral every 6 hours PRN Temp greater or equal to 38C (100.4F)  sodium chloride 0.9% lock flush 10 milliLiter(s) IV Push every 1 hour PRN Pre/post blood products, medications, blood draw, and to maintain line patency    I&O's Detail    26 Feb 2021 07:01  -  27 Feb 2021 07:00  --------------------------------------------------------  IN:    Cisatracurium: 358.8 mL    Enteral Tube Flush: 60 mL    Heparin: 322 mL    IV PiggyBack: 300 mL    Jevity: 770 mL    Ketamine: 546 mL    Midazolam: 448.5 mL    Norepinephrine: 210.5 mL    Propofol: 897 mL  Total IN: 3912.8 mL    OUT:    Indwelling Catheter - Urethral (mL): 5000 mL    Stool (mL): 300 mL  Total OUT: 5300 mL    Total NET: -1387.2 mL       LABS:                        7.8    10.54 )-----------( 242      ( 27 Feb 2021 02:25 )             27.1     02-27    141  |  98  |  16  ----------------------------<  150<H>  3.6   |  39<H>  |  0.46<L>    Ca    8.2<L>      27 Feb 2021 02:25  Phos  2.4     02-27  Mg     2.0     02-27    TPro  7.3  /  Alb  2.5<L>  /  TBili  1.0  /  DBili  x   /  AST  72<H>  /  ALT  76<H>  /  AlkPhos  198<H>  02-27    PT/INR - ( 26 Feb 2021 01:56 )   PT: 14.2 sec;   INR: 1.26 ratio         PTT - ( 27 Feb 2021 02:25 )  PTT:86.0 sec      RADIOLOGY & ADDITIONAL STUDIES:

## 2021-02-28 NOTE — PROVIDER CONTACT NOTE (CHANGE IN STATUS NOTIFICATION) - SITUATION
New small amount of vaginal blood noted. Not present in raymundo or rectal tube.
Patient with tears observed coming from her eyes, blinking noted. Patients' TOF is 4/4 and patient is synchronous and compliant with VENT. Concerned that we have weaned sedation down too much while patient is paralyzed. Fentanyl and propofol increased.
MAP 64, levophed increased, now at .2

## 2021-02-28 NOTE — PROVIDER CONTACT NOTE (CHANGE IN STATUS NOTIFICATION) - ACTION/TREATMENT ORDERED:
MD made aware. No new orders at this time. Per MD continue with heparin gtt.
Per MD sedate patient with fentanyl 5 and propofol 40. Continue to monitor and if more sedation is needed MD will increase dose of ketamine or restart versed gtt.
Albumin ordered

## 2021-02-28 NOTE — PROGRESS NOTE ADULT - SUBJECTIVE AND OBJECTIVE BOX
INTERVAL HPI/OVERNIGHT EVENTS:    Vent adjusted overnight, continues to be hypercarbic and hypoxic  despite maximal ventilatory settings, PEEP weaned however required increasing 2/2 low SpO2; tylenol given for fever overnight, motrin ordered this AM on rounds.      GLOBAL ISSUE/BEST PRACTICE:  Analgesia: Fentanyl  Sedation: Versed, Propofol, Ketamine  HOB elevation: yes  Stress ulcer prophylaxis: Protonix  VTE prophylaxis: HSQ  Oral Care: Chlorhexidine  Glycemic control: ISS/Lantus  Nutrition: Jevity    REVIEW OF SYSTEMS: [x] Unable to obtain because: intubated sedated and paralyzed    PHYSICAL EXAM:  Gen: intubated and sedated and paralyzed  HEENT: Intubated with ETT  CARD -s1s2, RRR, no M,G,R;   PULM - Coarse vented breath sounds, symmetric breath sounds;   ABD -  +BS, ND, NT, soft, no guarding, no rebound, no masses;   EXT - symmetric pulses, 2+ dp, + 3+ edema;   NEURO - no focal neuro deficits, no slurred speech     ICU Vital Signs Last 24 Hrs  T(C): 38.6 (28 Feb 2021 08:00), Max: 38.6 (28 Feb 2021 08:00)  T(F): 101.5 (28 Feb 2021 08:00), Max: 101.5 (28 Feb 2021 08:00)  HR: 115 (28 Feb 2021 08:00) (101 - 115)  BP: --  BP(mean): --  ABP: 112/52 (28 Feb 2021 08:00) (105/49 - 117/56)  ABP(mean): 69 (28 Feb 2021 08:00) (65 - 75)  RR: 36 (28 Feb 2021 08:00) (36 - 36)  SpO2: 89% (28 Feb 2021 08:00) (89% - 95%)    I&O's Detail    27 Feb 2021 07:01  -  28 Feb 2021 07:00  --------------------------------------------------------  IN:    Cisatracurium: 327.6 mL    FentaNYL: 44.2 mL    Heparin: 294 mL    IV PiggyBack: 600 mL    Jevity: 735 mL    Ketamine: 546 mL    Midazolam: 410.4 mL    Norepinephrine: 117.7 mL    Propofol: 426.2 mL  Total IN: 3501.1 mL    OUT:    Indwelling Catheter - Urethral (mL): 5095 mL    Stool (mL): 0 mL  Total OUT: 5095 mL    Total NET: -1593.9 mL    28 Feb 2021 07:01  -  28 Feb 2021 10:32  --------------------------------------------------------  IN:    Cisatracurium: 15.6 mL    FentaNYL: 2.6 mL    Heparin: 14 mL    Jevity: 35 mL    Ketamine: 26 mL    Midazolam: 19.6 mL    Norepinephrine: 6.1 mL    Propofol: 15.6 mL  Total IN: 134.5 mL    OUT:    Indwelling Catheter - Urethral (mL): 225 mL  Total OUT: 225 mL    Total NET: -90.5 mL        MEDICATIONS  NEURO  Meds: acetaminophen    Suspension .. 650 milliGRAM(s) Oral every 6 hours PRN Temp greater or equal to 38C (100.4F)  cisatracurium Infusion 3 MICROgram(s)/kG/Min (23.4 mL/Hr) IV Continuous <Continuous>  fentaNYL   Infusion..... 0.5 MICROgram(s)/kG/Hr (1.3 mL/Hr) IV Continuous <Continuous>  ketamine Infusion. 0.2 mG/kG/Hr (2.6 mL/Hr) IV Continuous <Continuous>  midazolam Infusion 0.02 mG/kG/Hr (2.6 mL/Hr) IV Continuous <Continuous>  propofol Infusion 50 MICROgram(s)/kG/Min (39 mL/Hr) IV Continuous <Continuous>    RESPIRATORY  ABG - ( 28 Feb 2021 04:38 )  pH: 7.31  /  pCO2: 86    /  pO2: 85    / HCO3: 38    / Base Excess: 15.7  /  SaO2: 95.6    Lactate: x                Meds:   CARDIOVASCULAR  Meds: furosemide   Injectable 40 milliGRAM(s) IV Push two times a day  norepinephrine Infusion 0.07 MICROgram(s)/kG/Min (8.53 mL/Hr) IV Continuous <Continuous>    GI/NUTRITION  Meds: pantoprazole  Injectable 40 milliGRAM(s) IV Push daily  polyethylene glycol 3350 17 Gram(s) Oral daily  senna Syrup 10 milliLiter(s) Oral at bedtime    GENITOURINARY  Meds: sodium chloride 0.9% lock flush 10 milliLiter(s) IV Push every 1 hour PRN Pre/post blood products, medications, blood draw, and to maintain line patency    HEMATOLOGIC  Meds: heparin  Infusion 1500 Unit(s)/Hr IV Continuous <Continuous>    [x] VTE Prophylaxis  INFECTIOUS DISEASES  Meds: linezolid  IVPB 600 milliGRAM(s) IV Intermittent every 12 hours  linezolid  IVPB        ENDOCRINE  CAPILLARY BLOOD GLUCOSE      POCT Blood Glucose.: 202 mg/dL (28 Feb 2021 10:16)  POCT Blood Glucose.: 171 mg/dL (28 Feb 2021 05:12)  POCT Blood Glucose.: 145 mg/dL (27 Feb 2021 23:41)  POCT Blood Glucose.: 165 mg/dL (27 Feb 2021 16:04)    Meds: insulin lispro (ADMELOG) corrective regimen sliding scale   SubCutaneous every 6 hours    OTHER MEDICATIONS:  chlorhexidine 0.12% Liquid 15 milliLiter(s) Oral Mucosa every 12 hours  chlorhexidine 4% Liquid 1 Application(s) Topical <User Schedule>  erythromycin   Ointment 1 Application(s) Both EYES daily  nystatin Powder 1 Application(s) Topical two times a day  petrolatum Ophthalmic Ointment 1 Application(s) Both EYES daily  :    LABS:                        7.9    14.35 )-----------( 255      ( 28 Feb 2021 04:38 )             28.6      02-28    142  |  96<L>  |  19  ----------------------------<  154<H>  3.7   |  40<H>  |  0.46<L>    Ca    7.9<L>      28 Feb 2021 04:38  Phos  2.8     02-28  Mg     2.0     02-28    TPro  7.4  /  Alb  2.6<L>  /  TBili  0.9  /  DBili  x   /  AST  70<H>  /  ALT  69<H>  /  AlkPhos  189<H>  02-28    PTT - ( 28 Feb 2021 04:38 )  PTT:85.9 sec  ## COVID Panel  D-Dimer Assay, Quantitative: 2300 ng/mL DDU (02-26-21 @ 01:56)  Procalcitonin, Serum: 0.39 ng/mL (02-26-21 @ 01:56)    Mode: AC/ CMV (Assist Control/ Continuous Mandatory Ventilation), RR (machine): 36, TV (machine): 420, FiO2: 70, PEEP: 8, ITime: 0.9, MAP: 21, PIP: 41      RADIOLOGY & ADDITIONAL STUDIES:

## 2021-02-28 NOTE — PROGRESS NOTE ADULT - ASSESSMENT
66 yo F with obesity, HTN, previous LLE DVT not on AC, COVID + on 1/17 a/w acute hypoxic respiratory failure, intubated on 1/29 with course complicated by ARDS, VRE bacteremia, and now submassive PE, not a tPA candidate at this time.      Neuro: sedated on propofol, ketamine and fentanyl, versed paralytics re-started overnight for vent dyssynchrony, wean versed as tolerated.  CV: Vasoplegic shock c/w levophed weaning as tolerated; continues to have dilated IVC, with flow into hepatic veins, will try to further diurese.    Pulm: Acute respiratory failure with ARDS; unable to tolerate proning, s/p multiple bronchoscopies; remains hypercarbic despite maximal vent settings. Pending tracheostomy if vent settings improve; cultures noted to have rare yeast s/p antifungal treatments. Follow up repeat ABG.  GI: c/w tube feeds; protonix.  c/w senna  Renal/Metabolic: Renal fn wnl; continue with lasix 40mg IVP BID today for goal of net even to -1L/daily, IVC with no respiratory variation today.  ID: + VRE bacteremia on 2/20, with repeat neg on 2/22 will continue with Linezolid; trend fever curve, continue with tylenol; given motrin today.    Heme: Full dose AC with heparin gtt for submassive PE and DVTs;   Endo: FS at goal, c/w ISS    Overall poor prognosis.  Remains full code.  Discussed with family.

## 2021-02-28 NOTE — PROGRESS NOTE ADULT - SUBJECTIVE AND OBJECTIVE BOX
Thoracic Surgery  CC: hypoxemic respiratory failure   HPI: 65F h/o COVID-19 pneumonia induced hypoxemic respiratory failure w/resultant ARDS and subsequent intubation requiring prolonged mechanical ventilatory support  24/Overnight events: Patient seen at bedside. Improving oxygenation, still requiring moderate support on the VENT [PEEP 8, FIO2 70%], although slowly improving.          Objective:  Vital Signs Last 24 Hrs  T(C): 37.5 (28 Feb 2021 04:00), Max: 38 (28 Feb 2021 00:00)  T(F): 99.5 (28 Feb 2021 04:00), Max: 100.4 (28 Feb 2021 00:00)  HR: 115 (28 Feb 2021 06:00) (85 - 115)  BP: --  BP(mean): --  RR: 36 (28 Feb 2021 06:00) (36 - 36)  SpO2: 95% (28 Feb 2021 06:00) (90% - 95%)      Physical Exam:  General: intubated / sedated   Respiratory: mechanical ventilatory support [PEEP 8; FIO2 70%]     MEDICATIONS  (STANDING):  chlorhexidine 0.12% Liquid 15 milliLiter(s) Oral Mucosa every 12 hours  chlorhexidine 4% Liquid 1 Application(s) Topical <User Schedule>  cisatracurium Infusion 3 MICROgram(s)/kG/Min (23.4 mL/Hr) IV Continuous <Continuous>  dextrose 40% Gel 15 Gram(s) Oral once  dextrose 50% Injectable 25 Gram(s) IV Push once  dextrose 50% Injectable 12.5 Gram(s) IV Push once  dextrose 50% Injectable 25 Gram(s) IV Push once  erythromycin   Ointment 1 Application(s) Both EYES daily  fentaNYL   Infusion..... 0.5 MICROgram(s)/kG/Hr (1.3 mL/Hr) IV Continuous <Continuous>  glucagon  Injectable 1 milliGRAM(s) IntraMuscular once  heparin  Infusion 1500 Unit(s)/Hr (14 mL/Hr) IV Continuous <Continuous>  insulin lispro (ADMELOG) corrective regimen sliding scale   SubCutaneous every 6 hours  ketamine Infusion. 0.2 mG/kG/Hr (2.6 mL/Hr) IV Continuous <Continuous>  linezolid  IVPB      linezolid  IVPB 600 milliGRAM(s) IV Intermittent every 12 hours  midazolam Infusion 0.02 mG/kG/Hr (2.6 mL/Hr) IV Continuous <Continuous>  norepinephrine Infusion 0.07 MICROgram(s)/kG/Min (8.53 mL/Hr) IV Continuous <Continuous>  nystatin Powder 1 Application(s) Topical two times a day  pantoprazole  Injectable 40 milliGRAM(s) IV Push daily  petrolatum Ophthalmic Ointment 1 Application(s) Both EYES daily  polyethylene glycol 3350 17 Gram(s) Oral daily  propofol Infusion 50 MICROgram(s)/kG/Min (39 mL/Hr) IV Continuous <Continuous>  senna Syrup 10 milliLiter(s) Oral at bedtime    MEDICATIONS  (PRN):  acetaminophen    Suspension .. 650 milliGRAM(s) Oral every 6 hours PRN Temp greater or equal to 38C (100.4F)  sodium chloride 0.9% lock flush 10 milliLiter(s) IV Push every 1 hour PRN Pre/post blood products, medications, blood draw, and to maintain line patency    I&O's Detail    27 Feb 2021 07:01  -  28 Feb 2021 07:00  --------------------------------------------------------  IN:    Cisatracurium: 312 mL    FentaNYL: 41.6 mL    Heparin: 280 mL    IV PiggyBack: 300 mL    Jevity: 700 mL    Ketamine: 520 mL    Midazolam: 390.8 mL    Norepinephrine: 111.6 mL    Propofol: 410.6 mL  Total IN: 3066.6 mL    OUT:    Indwelling Catheter - Urethral (mL): 5095 mL    Stool (mL): 0 mL  Total OUT: 5095 mL    Total NET: -2028.4 mL       LABS:                        7.9    14.35 )-----------( 255      ( 28 Feb 2021 04:38 )             28.6     02-28    142  |  96<L>  |  19  ----------------------------<  154<H>  3.7   |  40<H>  |  0.46<L>    Ca    7.9<L>      28 Feb 2021 04:38  Phos  2.8     02-28  Mg     2.0     02-28    TPro  7.4  /  Alb  2.6<L>  /  TBili  0.9  /  DBili  x   /  AST  70<H>  /  ALT  69<H>  /  AlkPhos  189<H>  02-28    PTT - ( 28 Feb 2021 04:38 )  PTT:85.9 sec

## 2021-02-28 NOTE — PROGRESS NOTE ADULT - ASSESSMENT
65F h/o COVID-19 pneumonia induced hypoxemic respiratory failure w/resultant ARDS and subsequent intubation requiring prolonged mechanical ventilatory support        -  Improving oxygenation, still requiring moderate support on the VENT [PEEP 8, FIO2 70%], although slowly improving. Trach on hold until ventilatory settings decrease; hopefully early this coming week if ventilatory settings continue to decrease.  - Will continue to follow    Aris Colon PGY5  Thoracic Surgery  [id number]

## 2021-03-01 NOTE — PROGRESS NOTE ADULT - MINUTES
30
36
41
35
35
38
40
45
35
38
39
39
40
45
30
35
37
38
39
40
45
35
30
35
37
40
45
30
40
45

## 2021-03-01 NOTE — CHART NOTE - NSCHARTNOTESELECT_GEN_ALL_CORE
Bronchoscopy/Event Note
Death Note/Event Note
Event Note
Follow-up/Nutrition Services
IV Contrast Consent/Event Note
Ultrasound Note - Neck
Bedside Bronchoscopy
Bronchoscopy Note/Event Note
Event Note
Event Note
Follow-Up/Nutrition Services
Follow-Up/Nutrition Services
MICU/Transfer Note
MOLST/Event Note
PERT

## 2021-03-01 NOTE — PROGRESS NOTE ADULT - REASON FOR ADMISSION
COVID

## 2021-03-01 NOTE — PROGRESS NOTE ADULT - SUBJECTIVE AND OBJECTIVE BOX
Follow Up:      Inverval History/ROS:Patient is a 65y old  Female who presents with a chief complaint of COVID (01 Mar 2021 07:43)    + fever  Hypotensive    Hypoxic  Requiring prone ventilation    Allergies    codeine (Unknown)    Intolerances        ANTIMICROBIALS:  caspofungin IVPB    linezolid  IVPB    linezolid  IVPB 600 every 12 hours  piperacillin/tazobactam IVPB.. 3.375 every 8 hours      OTHER MEDS:  acetaminophen    Suspension .. 650 milliGRAM(s) Oral every 6 hours PRN  chlorhexidine 0.12% Liquid 15 milliLiter(s) Oral Mucosa every 12 hours  chlorhexidine 4% Liquid 1 Application(s) Topical <User Schedule>  cisatracurium Infusion 3 MICROgram(s)/kG/Min IV Continuous <Continuous>  dextrose 40% Gel 15 Gram(s) Oral once  dextrose 50% Injectable 25 Gram(s) IV Push once  dextrose 50% Injectable 12.5 Gram(s) IV Push once  dextrose 50% Injectable 25 Gram(s) IV Push once  erythromycin   Ointment 1 Application(s) Both EYES daily  fentaNYL   Infusion..... 0.5 MICROgram(s)/kG/Hr IV Continuous <Continuous>  glucagon  Injectable 1 milliGRAM(s) IntraMuscular once  heparin  Infusion 1500 Unit(s)/Hr IV Continuous <Continuous>  insulin lispro (ADMELOG) corrective regimen sliding scale   SubCutaneous every 6 hours  ketamine Infusion. 0.2 mG/kG/Hr IV Continuous <Continuous>  lactated ringers Bolus 500 milliLiter(s) IV Bolus once  midazolam Infusion 0.02 mG/kG/Hr IV Continuous <Continuous>  norepinephrine Infusion 0.07 MICROgram(s)/kG/Min IV Continuous <Continuous>  nystatin Powder 1 Application(s) Topical two times a day  pantoprazole  Injectable 40 milliGRAM(s) IV Push daily  petrolatum Ophthalmic Ointment 1 Application(s) Both EYES daily  polyethylene glycol 3350 17 Gram(s) Oral daily  propofol Infusion 50 MICROgram(s)/kG/Min IV Continuous <Continuous>  senna Syrup 10 milliLiter(s) Oral at bedtime  sodium chloride 0.9% lock flush 10 milliLiter(s) IV Push every 1 hour PRN  vasopressin Infusion 0.04 Unit(s)/Min IV Continuous <Continuous>      Vital Signs Last 24 Hrs  T(C): 37.8 (01 Mar 2021 08:00), Max: 39 (01 Mar 2021 00:00)  T(F): 100 (01 Mar 2021 08:00), Max: 102.2 (01 Mar 2021 00:00)  HR: 124 (01 Mar 2021 11:10) (101 - 176)  BP: --  BP(mean): --  RR: 36 (01 Mar 2021 09:00) (36 - 36)  SpO2: 80% (01 Mar 2021 11:10) (78% - 94%)    PHYSICAL EXAM:  General: [x ] prone  HEAD/EYES: [ ] PERRL [x ] white sclera [ ] icterus  ENT:  [ ] normal [x ] supple [ ] thrush [ ] pharyngeal exudate  Cardiovascular:   [ ] murmur [x ] normal [ ] PPM/AICD  Respiratory:  x[ ] course BS  GI:  [x ] soft, non-tender, normal bowel sounds  :  [x ] raymundo [ ] no CVA tenderness   Musculoskeletal:  [ ] no synovitis  Neurologic:  [ ] non-focal exam   Skin:  [x ] no rash  Lymph: [x ] no lymphadenopathy  Psychiatric:  [ ] appropriate affect [ ] alert & oriented  Lines:  [x ] no phlebitis [x ] central line                                7.2    13.50 )-----------( 224      ( 01 Mar 2021 01:45 )             24.4       03-01    138  |  94<L>  |  27<H>  ----------------------------<  140<H>  3.6   |  36<H>  |  0.70    Ca    7.8<L>      01 Mar 2021 01:44  Phos  1.5     03-01  Mg     1.7     03-01    TPro  7.0  /  Alb  2.7<L>  /  TBili  2.4<H>  /  DBili  x   /  AST  174<H>  /  ALT  93<H>  /  AlkPhos  179<H>  03-01          MICROBIOLOGY:    RADIOLOGY:

## 2021-03-01 NOTE — PROGRESS NOTE ADULT - ASSESSMENT
65 year old with COVID  Associated hypoxic respiratory failure  Course complicated by PE    Sepsis with VRE bacteremia    1) COVID  S/p remdesivir and steroids.  No clear indication to continue dexamehtasone at this time  Particularly in a patient with concern for sepsis.    2) Hypoxic respiratory failure  COVID/ PE contributing    3) VRE bacteremia  S/p line change  Ct without intra-abd focus  Monitor LFTS (US with sludge)   can continue linezolid (day 8)     4) fever with hypotension    Continue linezolid/zosyn/ caspofungin    Repeat cultures    Prognosis grave

## 2021-03-01 NOTE — PROGRESS NOTE ADULT - SUBJECTIVE AND OBJECTIVE BOX
Contact Information:  Delon Mcfarlane II, MD, MPH  PGY-2, Internal Medicine  Pager: [phone] ([facility]) /// [phone] ([facility])    JOEL MARTINEZ, [id number]    Patient is a 65y old  Female who presents with a chief complaint of COVID (28 Feb 2021 10:32)      INTERVAL/OVERNIGHT EVENTS:    SUBJECTIVE:    CONSTITUTIONAL: No weakness. No fatigue. No fever.  HEAD: No head trauma.   EYES: No vision changes.  ENT: No hearing changes or tinnitus. No ear pain. No changes in smell. No nasal congestion or discharge. No sore throat. No voice hoarseness.   NECK: No neck pain or stiffness. No lumps.  RESPIRATORY: No cough. No SOB. No wheezing. No hemoptysis.   CARDIOVASCULAR: No chest pain. No palpitations.   GASTROINTESTINAL: No dysphagia. No ABD pain. No distension. No constipation. No diarrhea. No pain with defecation. No hematemesis. No hematochezia or melena.  BACK: No back pain.  GENITOURINARY: No dysuria. No frequency or urgency. No hesitancy. No incontinence. No urinary retention. No suprapubic pain. No hematuria.  EXTREMITY: No swelling.  MUSCULOSKELETAL: No joint pain or swelling. No fractures. No stiffness.    SKIN: No rashes. No itching. No skin, hair, or nail changes.  NEUROLOGICAL: No weakness or paralysis. No lightheadedness or dizziness. No HA. No numbness or tingling.   PSYCHIATRIC: No depression.       OBJECTIVE:  ICU Vital Signs Last 24 Hrs  T(C): 37.5 (01 Mar 2021 04:00), Max: 39 (01 Mar 2021 00:00)  T(F): 99.5 (01 Mar 2021 04:00), Max: 102.2 (01 Mar 2021 00:00)  HR: 129 (01 Mar 2021 07:15) (101 - 132)  BP: --  BP(mean): --  ABP: 115/61 (01 Mar 2021 07:00) (95/39 - 138/65)  ABP(mean): 81 (01 Mar 2021 07:00) (58 - 84)  RR: 36 (01 Mar 2021 07:00) (36 - 36)  SpO2: 81% (01 Mar 2021 07:15) (78% - 94%)    Daily     Daily   I&O's Summary    28 Feb 2021 07:01  -  01 Mar 2021 07:00  --------------------------------------------------------  IN: 5233.2 mL / OUT: 2530 mL / NET: 2703.2 mL      Adult Advanced Hemodynamics Last 24 Hrs  CVP(mm Hg): --  CVP(cm H2O): --  CO: --  CI: --  PA: --  PA(mean): --  PCWP: --  SVR: --  SVRI: --  PVR: --  PVRI: --  Mode: AC/ CMV (Assist Control/ Continuous Mandatory Ventilation)  RR (machine): 36  TV (machine): 420  FiO2: 100  PEEP: 12  ITime: 0.69  MAP: 24  PIP: 45      MEDICATIONS  (STANDING):  caspofungin IVPB      caspofungin IVPB 70 milliGRAM(s) IV Intermittent once  chlorhexidine 0.12% Liquid 15 milliLiter(s) Oral Mucosa every 12 hours  chlorhexidine 4% Liquid 1 Application(s) Topical <User Schedule>  cisatracurium Infusion 3 MICROgram(s)/kG/Min (23.4 mL/Hr) IV Continuous <Continuous>  dextrose 40% Gel 15 Gram(s) Oral once  dextrose 50% Injectable 25 Gram(s) IV Push once  dextrose 50% Injectable 12.5 Gram(s) IV Push once  dextrose 50% Injectable 25 Gram(s) IV Push once  erythromycin   Ointment 1 Application(s) Both EYES daily  fentaNYL   Infusion..... 0.5 MICROgram(s)/kG/Hr (1.3 mL/Hr) IV Continuous <Continuous>  glucagon  Injectable 1 milliGRAM(s) IntraMuscular once  heparin  Infusion 1500 Unit(s)/Hr (14 mL/Hr) IV Continuous <Continuous>  insulin lispro (ADMELOG) corrective regimen sliding scale   SubCutaneous every 6 hours  ketamine Infusion. 0.2 mG/kG/Hr (2.6 mL/Hr) IV Continuous <Continuous>  lactated ringers Bolus 500 milliLiter(s) IV Bolus once  linezolid  IVPB      linezolid  IVPB 600 milliGRAM(s) IV Intermittent every 12 hours  midazolam Infusion 0.02 mG/kG/Hr (2.6 mL/Hr) IV Continuous <Continuous>  norepinephrine Infusion 0.07 MICROgram(s)/kG/Min (8.53 mL/Hr) IV Continuous <Continuous>  nystatin Powder 1 Application(s) Topical two times a day  pantoprazole  Injectable 40 milliGRAM(s) IV Push daily  petrolatum Ophthalmic Ointment 1 Application(s) Both EYES daily  piperacillin/tazobactam IVPB.. 3.375 Gram(s) IV Intermittent every 8 hours  polyethylene glycol 3350 17 Gram(s) Oral daily  propofol Infusion 50 MICROgram(s)/kG/Min (39 mL/Hr) IV Continuous <Continuous>  senna Syrup 10 milliLiter(s) Oral at bedtime  vasopressin Infusion 0.04 Unit(s)/Min (2.4 mL/Hr) IV Continuous <Continuous>    MEDICATIONS  (PRN):  acetaminophen    Suspension .. 650 milliGRAM(s) Oral every 6 hours PRN Temp greater or equal to 38C (100.4F)  sodium chloride 0.9% lock flush 10 milliLiter(s) IV Push every 1 hour PRN Pre/post blood products, medications, blood draw, and to maintain line patency    Allergies    codeine (Unknown)    Intolerances        CONSTITUTIONAL: No acute distress. Awake and alert.  HEAD: No evidence of trauma. Structures WNL.  EYES: +PERRL. +EOMI. No scleral icterus. No conjunctival injection.  ENT: Moist oral mucosa. No erythema. No pharyngeal exudates.   NECK: Supple. Appropriate ROM. No stiffness. No masses or lymphadenopathy.  RESPIRATORY: CTAB. No wheezes, rales, or rhonchi. No accessory muscle use. No apparent respiratory distress.  CARDIOVASCULAR: +S1/S2. No audible S3/S4. Regular rate and rhythm. No murmurs, rubs, or gallops. 2+ radial pulses x b/l UE; 2+ DP pulses x b/l LE.   GASTROINTESTINAL: Soft, nontender, nondistended. +BS. No rebound or guarding.   BACK: No spinal or paraspinal tenderness. No CVA tenderness.  EXTREMITY: No LE swelling or edema. EXTs warm to touch.  MUSCULOSKELETAL: Spontaneous movement in all extremities.  DERMATOLOGICAL: No abnormal rashes or lesions.  NEUROLOGICAL: CN 2-12 grossly intact. No focal deficits. Sensation intact x 4EXT. A&Ox3 (oriented to person, place, and time).  PSYCHIATRIC: Appropriate affect.                            7.2    13.50 )-----------( 224      ( 01 Mar 2021 01:45 )             24.4     PT/INR - ( 01 Mar 2021 01:45 )   PT: 14.9 sec;   INR: 1.32 ratio         PTT - ( 01 Mar 2021 01:45 )  PTT:75.7 sec  03-01    138  |  94<L>  |  27<H>  ----------------------------<  140<H>  3.6   |  36<H>  |  0.70    Ca    7.8<L>      01 Mar 2021 01:44  Phos  1.5     03-01  Mg     1.7     03-01    TPro  7.0  /  Alb  2.7<L>  /  TBili  2.4<H>  /  DBili  x   /  AST  174<H>  /  ALT  93<H>  /  AlkPhos  179<H>  03-01    CAPILLARY BLOOD GLUCOSE      POCT Blood Glucose.: 143 mg/dL (01 Mar 2021 06:11)  POCT Blood Glucose.: 135 mg/dL (01 Mar 2021 01:30)  POCT Blood Glucose.: 200 mg/dL (28 Feb 2021 16:35)  POCT Blood Glucose.: 202 mg/dL (28 Feb 2021 10:16)    LIVER FUNCTIONS - ( 01 Mar 2021 01:44 )  Alb: 2.7 g/dL / Pro: 7.0 g/dL / ALK PHOS: 179 U/L / ALT: 93 U/L / AST: 174 U/L / GGT: x           CARDIAC MARKERS ( 28 Feb 2021 11:58 )  x     / x     / 45 U/L / x     / x              ABG - ( 01 Mar 2021 01:44 )  pH, Arterial: 7.28  pH, Blood: x     /  pCO2: 83    /  pO2: 49    / HCO3: 33    / Base Excess: 10.9  /  SaO2: 79.5                RADIOLOGY AND ADDITIONAL TESTS:    CONSULTANT NOTES REVIEWED:    CARE DISCUSSED WITH THE FOLLOWING CONSULTANTS/PROVIDERS: Contact Information:  Delon Mcfarlane II, MD, MPH  PGY-2, Internal Medicine  Pager: [phone] ([facility]) /// [phone] ([facility])    JOEL MARTINEZ, [id number]    Patient is a 65y old  Female who presents with a chief complaint of COVID (28 Feb 2021 10:32)      INTERVAL/OVERNIGHT EVENTS: Overnight, patient desaturating to 70-80s. Plan to prone to improve oxygenation. Bronchoscopy with no significant findings.     SUBJECTIVE: Intubated and sedated.    ROS unable to obtain.       OBJECTIVE:  ICU Vital Signs Last 24 Hrs  T(C): 37.5 (01 Mar 2021 04:00), Max: 39 (01 Mar 2021 00:00)  T(F): 99.5 (01 Mar 2021 04:00), Max: 102.2 (01 Mar 2021 00:00)  HR: 129 (01 Mar 2021 07:15) (101 - 132)  BP: --  BP(mean): --  ABP: 115/61 (01 Mar 2021 07:00) (95/39 - 138/65)  ABP(mean): 81 (01 Mar 2021 07:00) (58 - 84)  RR: 36 (01 Mar 2021 07:00) (36 - 36)  SpO2: 81% (01 Mar 2021 07:15) (78% - 94%)    Daily     Daily   I&O's Summary    28 Feb 2021 07:01  -  01 Mar 2021 07:00  --------------------------------------------------------  IN: 5233.2 mL / OUT: 2530 mL / NET: 2703.2 mL      Adult Advanced Hemodynamics Last 24 Hrs  CVP(mm Hg): --  CVP(cm H2O): --  CO: --  CI: --  PA: --  PA(mean): --  PCWP: --  SVR: --  SVRI: --  PVR: --  PVRI: --  Mode: AC/ CMV (Assist Control/ Continuous Mandatory Ventilation)  RR (machine): 36  TV (machine): 420  FiO2: 100  PEEP: 12  ITime: 0.69  MAP: 24  PIP: 45      MEDICATIONS  (STANDING):  caspofungin IVPB      caspofungin IVPB 70 milliGRAM(s) IV Intermittent once  chlorhexidine 0.12% Liquid 15 milliLiter(s) Oral Mucosa every 12 hours  chlorhexidine 4% Liquid 1 Application(s) Topical <User Schedule>  cisatracurium Infusion 3 MICROgram(s)/kG/Min (23.4 mL/Hr) IV Continuous <Continuous>  dextrose 40% Gel 15 Gram(s) Oral once  dextrose 50% Injectable 25 Gram(s) IV Push once  dextrose 50% Injectable 12.5 Gram(s) IV Push once  dextrose 50% Injectable 25 Gram(s) IV Push once  erythromycin   Ointment 1 Application(s) Both EYES daily  fentaNYL   Infusion..... 0.5 MICROgram(s)/kG/Hr (1.3 mL/Hr) IV Continuous <Continuous>  glucagon  Injectable 1 milliGRAM(s) IntraMuscular once  heparin  Infusion 1500 Unit(s)/Hr (14 mL/Hr) IV Continuous <Continuous>  insulin lispro (ADMELOG) corrective regimen sliding scale   SubCutaneous every 6 hours  ketamine Infusion. 0.2 mG/kG/Hr (2.6 mL/Hr) IV Continuous <Continuous>  lactated ringers Bolus 500 milliLiter(s) IV Bolus once  linezolid  IVPB      linezolid  IVPB 600 milliGRAM(s) IV Intermittent every 12 hours  midazolam Infusion 0.02 mG/kG/Hr (2.6 mL/Hr) IV Continuous <Continuous>  norepinephrine Infusion 0.07 MICROgram(s)/kG/Min (8.53 mL/Hr) IV Continuous <Continuous>  nystatin Powder 1 Application(s) Topical two times a day  pantoprazole  Injectable 40 milliGRAM(s) IV Push daily  petrolatum Ophthalmic Ointment 1 Application(s) Both EYES daily  piperacillin/tazobactam IVPB.. 3.375 Gram(s) IV Intermittent every 8 hours  polyethylene glycol 3350 17 Gram(s) Oral daily  propofol Infusion 50 MICROgram(s)/kG/Min (39 mL/Hr) IV Continuous <Continuous>  senna Syrup 10 milliLiter(s) Oral at bedtime  vasopressin Infusion 0.04 Unit(s)/Min (2.4 mL/Hr) IV Continuous <Continuous>    MEDICATIONS  (PRN):  acetaminophen    Suspension .. 650 milliGRAM(s) Oral every 6 hours PRN Temp greater or equal to 38C (100.4F)  sodium chloride 0.9% lock flush 10 milliLiter(s) IV Push every 1 hour PRN Pre/post blood products, medications, blood draw, and to maintain line patency    Allergies    codeine (Unknown)    Intolerances        CONSTITUTIONAL: Intubated and sedated.  ENT: ETT in place.  RESPIRATORY: Mechanical ventilator sounds.  CARDIOVASCULAR: +S1/S2. No audible S3/S4. Intermittently tachycardic with regular rhythm. No murmurs, rubs, or gallops.   GASTROINTESTINAL: Soft, nondistended. +BS.   EXTREMITY: No LE swelling or edema. EXTs warm to touch.  MUSCULOSKELETAL: Spontaneous movement in all extremities.  DERMATOLOGICAL: No abnormal rashes or lesions.  NEUROLOGICAL: CN 2-12 grossly intact. No focal deficits. Sensation intact x 4EXT. A&Ox3 (oriented to person, place, and time).  PSYCHIATRIC: Appropriate affect.                            7.2    13.50 )-----------( 224      ( 01 Mar 2021 01:45 )             24.4     PT/INR - ( 01 Mar 2021 01:45 )   PT: 14.9 sec;   INR: 1.32 ratio         PTT - ( 01 Mar 2021 01:45 )  PTT:75.7 sec  03-01    138  |  94<L>  |  27<H>  ----------------------------<  140<H>  3.6   |  36<H>  |  0.70    Ca    7.8<L>      01 Mar 2021 01:44  Phos  1.5     03-01  Mg     1.7     03-01    TPro  7.0  /  Alb  2.7<L>  /  TBili  2.4<H>  /  DBili  x   /  AST  174<H>  /  ALT  93<H>  /  AlkPhos  179<H>  03-01    CAPILLARY BLOOD GLUCOSE      POCT Blood Glucose.: 143 mg/dL (01 Mar 2021 06:11)  POCT Blood Glucose.: 135 mg/dL (01 Mar 2021 01:30)  POCT Blood Glucose.: 200 mg/dL (28 Feb 2021 16:35)  POCT Blood Glucose.: 202 mg/dL (28 Feb 2021 10:16)    LIVER FUNCTIONS - ( 01 Mar 2021 01:44 )  Alb: 2.7 g/dL / Pro: 7.0 g/dL / ALK PHOS: 179 U/L / ALT: 93 U/L / AST: 174 U/L / GGT: x           CARDIAC MARKERS ( 28 Feb 2021 11:58 )  x     / x     / 45 U/L / x     / x              ABG - ( 01 Mar 2021 01:44 )  pH, Arterial: 7.28  pH, Blood: x     /  pCO2: 83    /  pO2: 49    / HCO3: 33    / Base Excess: 10.9  /  SaO2: 79.5                RADIOLOGY AND ADDITIONAL TESTS:    CONSULTANT NOTES REVIEWED:    CARE DISCUSSED WITH THE FOLLOWING CONSULTANTS/PROVIDERS: Contact Information:  Delon Mcfarlane II, MD, MPH  PGY-2, Internal Medicine  Pager: [phone] ([facility]) /// [phone] ([facility])    JOEL MARTINEZ, [id number]    Patient is a 65y old  Female who presents with a chief complaint of COVID (28 Feb 2021 10:32)      INTERVAL/OVERNIGHT EVENTS: Overnight, patient desaturating to 70-80s. Plan to prone to improve oxygenation. Bronchoscopy with no significant findings.     SUBJECTIVE: Intubated and sedated.    ROS unable to obtain.       OBJECTIVE:  ICU Vital Signs Last 24 Hrs  T(C): 37.5 (01 Mar 2021 04:00), Max: 39 (01 Mar 2021 00:00)  T(F): 99.5 (01 Mar 2021 04:00), Max: 102.2 (01 Mar 2021 00:00)  HR: 129 (01 Mar 2021 07:15) (101 - 132)  BP: --  BP(mean): --  ABP: 115/61 (01 Mar 2021 07:00) (95/39 - 138/65)  ABP(mean): 81 (01 Mar 2021 07:00) (58 - 84)  RR: 36 (01 Mar 2021 07:00) (36 - 36)  SpO2: 81% (01 Mar 2021 07:15) (78% - 94%)    Daily     Daily   I&O's Summary    28 Feb 2021 07:01  -  01 Mar 2021 07:00  --------------------------------------------------------  IN: 5233.2 mL / OUT: 2530 mL / NET: 2703.2 mL      Adult Advanced Hemodynamics Last 24 Hrs  CVP(mm Hg): --  CVP(cm H2O): --  CO: --  CI: --  PA: --  PA(mean): --  PCWP: --  SVR: --  SVRI: --  PVR: --  PVRI: --  Mode: AC/ CMV (Assist Control/ Continuous Mandatory Ventilation)  RR (machine): 36  TV (machine): 420  FiO2: 100  PEEP: 12  ITime: 0.69  MAP: 24  PIP: 45      MEDICATIONS  (STANDING):  caspofungin IVPB      caspofungin IVPB 70 milliGRAM(s) IV Intermittent once  chlorhexidine 0.12% Liquid 15 milliLiter(s) Oral Mucosa every 12 hours  chlorhexidine 4% Liquid 1 Application(s) Topical <User Schedule>  cisatracurium Infusion 3 MICROgram(s)/kG/Min (23.4 mL/Hr) IV Continuous <Continuous>  dextrose 40% Gel 15 Gram(s) Oral once  dextrose 50% Injectable 25 Gram(s) IV Push once  dextrose 50% Injectable 12.5 Gram(s) IV Push once  dextrose 50% Injectable 25 Gram(s) IV Push once  erythromycin   Ointment 1 Application(s) Both EYES daily  fentaNYL   Infusion..... 0.5 MICROgram(s)/kG/Hr (1.3 mL/Hr) IV Continuous <Continuous>  glucagon  Injectable 1 milliGRAM(s) IntraMuscular once  heparin  Infusion 1500 Unit(s)/Hr (14 mL/Hr) IV Continuous <Continuous>  insulin lispro (ADMELOG) corrective regimen sliding scale   SubCutaneous every 6 hours  ketamine Infusion. 0.2 mG/kG/Hr (2.6 mL/Hr) IV Continuous <Continuous>  lactated ringers Bolus 500 milliLiter(s) IV Bolus once  linezolid  IVPB      linezolid  IVPB 600 milliGRAM(s) IV Intermittent every 12 hours  midazolam Infusion 0.02 mG/kG/Hr (2.6 mL/Hr) IV Continuous <Continuous>  norepinephrine Infusion 0.07 MICROgram(s)/kG/Min (8.53 mL/Hr) IV Continuous <Continuous>  nystatin Powder 1 Application(s) Topical two times a day  pantoprazole  Injectable 40 milliGRAM(s) IV Push daily  petrolatum Ophthalmic Ointment 1 Application(s) Both EYES daily  piperacillin/tazobactam IVPB.. 3.375 Gram(s) IV Intermittent every 8 hours  polyethylene glycol 3350 17 Gram(s) Oral daily  propofol Infusion 50 MICROgram(s)/kG/Min (39 mL/Hr) IV Continuous <Continuous>  senna Syrup 10 milliLiter(s) Oral at bedtime  vasopressin Infusion 0.04 Unit(s)/Min (2.4 mL/Hr) IV Continuous <Continuous>    MEDICATIONS  (PRN):  acetaminophen    Suspension .. 650 milliGRAM(s) Oral every 6 hours PRN Temp greater or equal to 38C (100.4F)  sodium chloride 0.9% lock flush 10 milliLiter(s) IV Push every 1 hour PRN Pre/post blood products, medications, blood draw, and to maintain line patency    Allergies    codeine (Unknown)    Intolerances        CONSTITUTIONAL: Intubated and sedated.  ENT: ETT in place.  RESPIRATORY: Mechanical ventilator sounds.  CARDIOVASCULAR: +S1/S2. No audible S3/S4. Intermittently tachycardic with regular rhythm. No murmurs, rubs, or gallops.   GASTROINTESTINAL: Soft, nondistended. +BS.   EXTREMITY: No LE swelling or edema. EXTs warm to touch.  NEUROLOGICAL: Intubated and sedated.                            7.2    13.50 )-----------( 224      ( 01 Mar 2021 01:45 )             24.4     PT/INR - ( 01 Mar 2021 01:45 )   PT: 14.9 sec;   INR: 1.32 ratio         PTT - ( 01 Mar 2021 01:45 )  PTT:75.7 sec  03-01    138  |  94<L>  |  27<H>  ----------------------------<  140<H>  3.6   |  36<H>  |  0.70    Ca    7.8<L>      01 Mar 2021 01:44  Phos  1.5     03-01  Mg     1.7     03-01    TPro  7.0  /  Alb  2.7<L>  /  TBili  2.4<H>  /  DBili  x   /  AST  174<H>  /  ALT  93<H>  /  AlkPhos  179<H>  03-01    CAPILLARY BLOOD GLUCOSE      POCT Blood Glucose.: 143 mg/dL (01 Mar 2021 06:11)  POCT Blood Glucose.: 135 mg/dL (01 Mar 2021 01:30)  POCT Blood Glucose.: 200 mg/dL (28 Feb 2021 16:35)  POCT Blood Glucose.: 202 mg/dL (28 Feb 2021 10:16)    LIVER FUNCTIONS - ( 01 Mar 2021 01:44 )  Alb: 2.7 g/dL / Pro: 7.0 g/dL / ALK PHOS: 179 U/L / ALT: 93 U/L / AST: 174 U/L / GGT: x           CARDIAC MARKERS ( 28 Feb 2021 11:58 )  x     / x     / 45 U/L / x     / x              ABG - ( 01 Mar 2021 01:44 )  pH, Arterial: 7.28  pH, Blood: x     /  pCO2: 83    /  pO2: 49    / HCO3: 33    / Base Excess: 10.9  /  SaO2: 79.5                RADIOLOGY AND ADDITIONAL TESTS:    CONSULTANT NOTES REVIEWED:    CARE DISCUSSED WITH THE FOLLOWING CONSULTANTS/PROVIDERS:

## 2021-03-01 NOTE — PROGRESS NOTE ADULT - ATTENDING COMMENTS
65 F with history above here with acute hypoxemic and hypercapnic respiratory failure / ARDS due to COVID-19 pneumonia. Course c/b VRE bacteremia and PE. She has worsening clinical status. Required urgent prone ventilation for refractory hypoxemia and hypercapnia. Her plateau pressures are > 40 cmH2O. She has severe sepsis with septic shock requiring escalating vasopressor support. Antibiotic coverage broadened today. Family coming in today given overall poor prognosis. Remainder of plan as detailed above.

## 2021-03-01 NOTE — PROGRESS NOTE ADULT - ASSESSMENT
66 yo F with obesity, HTN, previous LLE DVT not on AC, COVID + on 1/17 a/w acute hypoxic respiratory failure, intubated on 1/29 with course complicated by ARDS, VRE bacteremia, and now submassive PE, not a tPA candidate at this time.      Neuro: sedated on propofol, ketamine and fentanyl, versed paralytics re-started overnight for vent dyssynchrony, wean versed as tolerated.  CV: Vasoplegic shock c/w levophed weaning as tolerated; continues to have dilated IVC, with flow into hepatic veins, will try to further diurese.    Pulm: Acute respiratory failure with ARDS; unable to tolerate proning, s/p multiple bronchoscopies; remains hypercarbic despite maximal vent settings. Pending tracheostomy if vent settings improve; cultures noted to have rare yeast s/p antifungal treatments. Follow up repeat ABG.  GI: c/w tube feeds; protonix.  c/w senna  Renal/Metabolic: Renal fn wnl; continue with lasix 40mg IVP BID today for goal of net even to -1L/daily, IVC with no respiratory variation today.  ID: + VRE bacteremia on 2/20, with repeat neg on 2/22 will continue with Linezolid; trend fever curve, continue with tylenol; given motrin today.    Heme: Full dose AC with heparin gtt for submassive PE and DVTs;   Endo: FS at goal, c/w ISS    Overall poor prognosis.  Remains full code.  Discussed with family.     64 yo F with obesity, HTN, previous LLE DVT not on AC, COVID + on 1/17 a/w acute hypoxic respiratory failure, intubated on 1/29 with course complicated by ARDS, VRE bacteremia, and now submassive PE (not candidate for tPA, on heparin drip). Decompensating on maximum oxygen settings, prognosis poor and death imminent.       #Neuro  - Intubated and sedated on propofol, ketamine and fentanyl; paralyzed on Nimbex for vent synchrony  - Wean as tolerated    #CV  **Shock   - Likely in setting of sedation  - Currently on levophed; weaning as tolerated     #Respiratry  **Acute respiratory failure with ARDS  - 2/2 COVID pneumonia  - On maximum vent settings (FiO2 100%) yet still suboptimal saturations with hypercarbia. Multiple bronchoscopies unrevealing  - Pending tracheostomy if vent settings improve    #GI  - C/w tube feeds  - Protonix for GI ppx   - Bowel regimen    #Renal  - Cr stable, though increased from 0.46 to 0.7   - Monitor UO to ensure at least net neutral    #ID  **VRE bacteremia on 2/20, with repeat neg on 2/22 will continue with Linezolid  - Trend fever curve      #Heme  **PE and DVTs  - US Duplex LE with b/l acute above the knee DVT; CT chest 2/21 with PE in L pulmonary artery  - Heparin drip for PE and DVTs    **Miscellaneous  - Slightly improving leukocytosis  - Stable anemia in the 7 range  - Platelets WNL  - Trend CBC    #Endo  - C/w low ISS  - -200; monitor and maintain within 140-180 range     GOC: Overall poor prognosis. Per discussion with family, DNR.

## 2021-03-01 NOTE — DISCHARGE NOTE FOR THE EXPIRED PATIENT - HOSPITAL COURSE
65F w/ PMH of HTN, and LLE DVT (not on AC) and recent dx of COVID-19 infection presenting from PCP office for acutely worsening SOB x2 days.  In the ED, placed on BIPAP for AHRF.   65F w/ PMH of HTN, and LLE DVT (not on AC) and recent dx of COVID-19 infection presenting from PCP office for acutely worsening SOB x2 days.  In the ED, placed on BIPAP for AHRF.  Intubated 1/29 for ARDS.  S/p remdesivir and steroids. Bronchoscopy on 2/9.  Yeast on sputum cx not suggestive of invasive mold; caspofungin d/c  'ed.  Linezolid/meropenem for VRE bacteremia.  Has proven difficult to wean from vent.  Palliative c/s 2/19 for GOC, but dtr Sarah not ready.  ET tube exchanged 2/21.  CT Sx c/s 2/25, planned for Trach/PEG the following week.  On 3/1, pt acutely decompensated w/ SpO2 in the 50s on max vent support.  Family notified, visited.  Met w/ resident and attending.  Decided to withdraw care, and palliatively extubate.  Pt stopped breathing 16:18.  Pronounced by ICU team 16:19.  Family at bedside and allowed to say goodbye.   65F w/ PMH of HTN, and LLE DVT (not on AC) and recent dx of COVID-19 infection presenting from PCP office for acutely worsening SOB x2 days.  In the ED, placed on BIPAP for AHRF.  Intubated  for ARDS.  S/p remdesivir and steroids. Bronchoscopy on .  Yeast on sputum cx not suggestive of invasive mold; caspofungin d/c'ed.  Linezolid + meropenem for VRE bacteremia.  Has proven difficult to wean from vent.  Palliative c/s  for GOC, but dtr Sarah not ready.  ET tube exchanged .  CT Sx c/s , planned for Trach/PEG the following week.  On 3/1, pt acutely decompensated w/ SpO2 in the 50s on max vent support.  Family notified, visited.  Met w/ resident and attending.  Decided to withdraw care, and palliatively extubate.  Pt stopped breathing 16:18.  Pronounced  by ICU team 16:19.  Family at bedside and allowed to say goodbye.

## 2021-03-01 NOTE — CHART NOTE - NSCHARTNOTEFT_GEN_A_CORE
MEDICINE DEATH NOTE    Called to bedside to evaluate the patient for lack of spontaneous breathing.     On physical exam, patient did not respond to verbal or noxious stimuli.  No spontaneous respirations.  Absent heart and breath sounds.  Absent radial and carotid pulses.   Pupils are fixed and dilated, no corneal reflex.  Tele shows asystole.   Patient pronounced dead at 16:19.  Attending notified.  Family ____ autopsy.    Jeremy Jerez MD  [facility]  IM, PGY2 MEDICINE DEATH NOTE    Called to bedside to evaluate the patient for lack of spontaneous breathing.     On physical exam, patient did not respond to verbal or noxious stimuli.  No spontaneous respirations.  Absent heart and breath sounds.  Absent radial and carotid pulses.   Pupils are fixed and dilated, no corneal reflex.  Tele shows asystole.   Patient pronounced dead at 16:19.  Attending notified.  No autopsy offered during COVID surge.    Jeremy Jerez MD  [facility]  IM, PGY2

## 2021-03-02 LAB
GRAM STN FLD: SIGNIFICANT CHANGE UP
METHOD TYPE: SIGNIFICANT CHANGE UP
P AERUGINOSA DNA BLD POS NAA+NON-PROBE: SIGNIFICANT CHANGE UP

## 2021-03-03 LAB — GRAM STN FLD: SIGNIFICANT CHANGE UP

## 2021-03-05 LAB
-  AMIKACIN: SIGNIFICANT CHANGE UP
-  AZTREONAM: SIGNIFICANT CHANGE UP
-  CEFEPIME: SIGNIFICANT CHANGE UP
-  CEFTAZIDIME: SIGNIFICANT CHANGE UP
-  CIPROFLOXACIN: SIGNIFICANT CHANGE UP
-  GENTAMICIN: SIGNIFICANT CHANGE UP
-  IMIPENEM: SIGNIFICANT CHANGE UP
-  LEVOFLOXACIN: SIGNIFICANT CHANGE UP
-  MEROPENEM: SIGNIFICANT CHANGE UP
-  PIPERACILLIN/TAZOBACTAM: SIGNIFICANT CHANGE UP
-  TOBRAMYCIN: SIGNIFICANT CHANGE UP
CULTURE RESULTS: SIGNIFICANT CHANGE UP
CULTURE RESULTS: SIGNIFICANT CHANGE UP
METHOD TYPE: SIGNIFICANT CHANGE UP
ORGANISM # SPEC MICROSCOPIC CNT: SIGNIFICANT CHANGE UP
SPECIMEN SOURCE: SIGNIFICANT CHANGE UP
SPECIMEN SOURCE: SIGNIFICANT CHANGE UP

## 2021-03-11 LAB
CULTURE RESULTS: SIGNIFICANT CHANGE UP
CULTURE RESULTS: SIGNIFICANT CHANGE UP
SPECIMEN SOURCE: SIGNIFICANT CHANGE UP
SPECIMEN SOURCE: SIGNIFICANT CHANGE UP

## 2022-07-25 NOTE — PATIENT PROFILE ADULT - FUNCTIONAL SCREEN CURRENT LEVEL: SWALLOWING (IF SCORE 2 OR MORE FOR ANY ITEM, CONSULT REHAB SERVICES), MLM)
0 = swallows foods/liquids without difficulty Niacinamide Counseling: I recommended taking niacin or niacinamide, also know as vitamin B3, twice daily. Recent evidence suggests that taking vitamin B3 (500 mg twice daily) can reduce the risk of actinic keratoses and non-melanoma skin cancers. Side effects of vitamin B3 include flushing and headache.

## 2023-01-04 NOTE — PROGRESS NOTE ADULT - ASSESSMENT
66 yo F with history of HTN and LLE DVT not currently on AC, diagnosed COVID+ 15d ago, presenting for acutely worsening SOB. show

## 2024-01-03 NOTE — PROGRESS NOTE ADULT - ATTENDING COMMENTS
Brennan: I have seen and examined the patient face to face, have reviewed and addended the HPI, PE and a/p as necessary.     65yF with obesity, HTN, hx of LLE DVT not on AC, COVID+ on 1/17, presenting for acutely worsening SOB, intubated 1/29 for acute hypoxic respiratory failure. Still requiring high vent settings with high plateau pressures requiring ongoing ICU level care.    Neuro: Intubated and sedated on Propofol, Ketamine and Fentanyl; s/p paralytics.    CV: HD stable, maintain MAP>65; s/p vasopressors  Pulm: COVID 19 Pneumonia and ARDS - remains on AC 34/430/14/90; with persistent elevated plateau pressures. Required proning for ARDS, though become hypoxic.    GI: s/p relastor with large bowel movement; c/w protonix 40mg;   Renal/Metabolic: Hyponatremia - resolving; overall fluid overloaded - will start lasix 40mg IVP with goal net negative >1L.   ID: Fever - uptrending leukocytosis with procalcitonin downtrending; possible fungal pneumonia on BAL on caspofungin continue for 7 days through 2/18/21; s/p empiric varun and vanco completes today 2/15/021;   Heme: h/o Dvt unable to obtain CTA; c/w heparin gtt; Duplex 1/27: b/l above knee DVT  Endo: ISS  Dispo: Remains full code; overall prognosis poor; will consult palliative. on the discharge service for the patient. I have reviewed and made amendments to the documentation where necessary.

## 2024-06-11 NOTE — H&P ADULT - BIRTH SEX
"HPI:      Limitations to History: None  Additional History Obtained from: N/A  External records reviewed: Prior EMR notes    Chief Complaint   Patient presents with    Groin Pain          Tip Lynn is a 52 y.o. male with past medical history of HTN, and T2DM presenting to the ED with intermittent right groin and scrotal tenderness.  Patient states that over the last few days, he has had increasing pain, primarily with coughing.  Declines any prior history of hernia, notes spontaneous resolution of pain.  Has taken Tylenol at home with no improvement.  Denies any concerns for STIs, states that he \"does not have sex\".  Declines any fevers, chills, chest pain, shortness of breath, nausea, vomiting, or any changes in bowel movements.      Past Medical History:   Diagnosis Date    Cough, unspecified 03/22/2017    Cough    Myalgia, other site 12/19/2022    Chronic musculoskeletal pain    Other disorders of lung     Lung trouble    Personal history of other diseases of the circulatory system     History of coronary artery disease    Personal history of other diseases of the circulatory system     History of hypertension    Personal history of other diseases of the nervous system and sense organs     History of sleep apnea    Personal history of other specified conditions     History of chest pain    Personal history of other specified conditions     History of heartburn    Unspecified convulsions (Multi) 11/14/2022    Seizure     Past Surgical History:   Procedure Laterality Date    ANKLE SURGERY  03/24/2014    Ankle Surgery    CT ANGIO CORONARY ART WITH HEARTFLOW IF SCORE >30%  3/14/2020    [place] HEART CORONARY [place]GIOGRAM [place] [facility] [place]    OTHER SURGICAL HISTORY  04/25/2019    Ear pressure equalization tube insertion    RECTAL POLYPECTOMY  06/30/2016    Rectal Surgery Polypectomy     Social History     Tobacco Use    Smoking status: Never    Smokeless tobacco: Never   Substance Use Topics    Alcohol use: " Never    Drug use: Never     Allergies   Allergen Reactions    Aspirin Anaphylaxis    Topiramate Anxiety and Other     delirium    Salicylates Swelling     Tolerates ibuprofen    Penicillins Hives, Itching and Swelling    Lidopatch [Lidocaine-Menthol] Itching and Rash     No current facility-administered medications on file prior to encounter.     Current Outpatient Medications on File Prior to Encounter   Medication Sig    albuterol 90 mcg/actuation inhaler Inhale 1 puff every 6 hours if needed for wheezing or shortness of breath.    allopurinol (Zyloprim) 100 mg tablet TAKE 1 TABLET BY MOUTH ONCE DAILY    amLODIPine (Norvasc) 10 mg tablet Take 1 tablet (10 mg) by mouth once daily. As directed    amLODIPine (Norvasc) 5 mg tablet Take 1 tablet (5 mg) by mouth once daily.    atorvastatin (Lipitor) 40 mg tablet Take 1 tablet (40 mg) by mouth once daily at bedtime.    blood sugar diagnostic strip TEST FOUR TIMES A DAY    budesonide-formoteroL (Symbicort) 80-4.5 mcg/actuation inhaler Inhale 2 puffs 2 times a day. Rinse mouth with water after use to reduce aftertaste and incidence of candidiasis. Do not swallow.    calcium carbonate (Tums Ultra) 400 mg calcium (1,000 mg) chewable tablet Chew 1 tablet (1,000 mg) every 2 hours if needed for indigestion (antacid).    ciclopirox (Penlac) 8 % solution Apply topically 1 (one) time per week. GENTLY MASSAGE INTO L big toenail daily to top, under and around nail. wipe off with alcohol and trim.    clindamycin (Cleocin) 300 mg capsule Take 1 capsule (300 mg) by mouth 3 times a day.    clotrimazole (Lotrimin) 1 % cream Apply 1 Application topically twice a day. To affected area    colchicine, gout, 0.6 mg tablet Take 1 tablet (0.6 mg) by mouth once daily.    cyclobenzaprine (Flexeril) 5 mg tablet Take 1 tablet (5 mg) by mouth.    diclofenac sodium (Voltaren) 1 % gel Apply 4.5 inches (4 g) topically 4 times a day.    docusate sodium (Colace) 100 mg capsule Take 1 capsule (100 mg) by  mouth 2 times a day.    Easy Touch Alcohol Prep Pads pads, medicated 3 times a day. Use as directed    escitalopram (Lexapro) 10 mg tablet Take 1 tablet (10 mg) by mouth once daily.    FreeStyle Juliette 2 Sensor kit Use as instructed    FreeStyle Lite Strips strip once daily as needed. Use as directed    hydroCHLOROthiazide (HYDRODiuril) 12.5 mg tablet Take 1 tablet (12.5 mg) by mouth once daily.    hydroCHLOROthiazide (HYDRODiuril) 25 mg tablet Take 1 tablet (25 mg) by mouth once daily.    hydrocortisone 2.5 % lotion Apply topically 2 times a day. To rash    ibuprofen 600 mg tablet Take 1 tablet (600 mg) by mouth every 8 hours if needed (pain).    LISINOPRIL ORAL Take by mouth.    lisinopriL-hydrochlorothiazide 10-12.5 mg tablet Take 1 tablet by mouth once daily.    meclizine (Antivert) 25 mg tablet Take 1 tablet (25 mg) by mouth 3 times a day as needed for nausea or dizziness.    melatonin 3 mg capsule Take 1 capsule by mouth as needed at bedtime.    meloxicam (Mobic) 15 mg tablet Take 1 tablet (15 mg) by mouth once daily.    meloxicam (Mobic) 15 mg tablet Take 1 tablet (15 mg) by mouth once daily for 15 days.    metFORMIN (Glucophage) 500 mg tablet Take 1 tablet (500 mg) by mouth 2 times daily (morning and late afternoon). Morning and evening meals    OneTouch Delica Plus Lancet 30 gauge misc 3 times a day. As directed    OneTouch Ultra Test strip 2 times a day.    OneTouch UltraSoft Lancets misc 2 times a day.    Ozempic 0.25 mg or 0.5 mg (2 mg/3 mL) pen injector Inject 0.25 mg under the skin 1 (one) time per week.    pantoprazole (ProtoNix) 40 mg EC tablet Take 1 tablet (40 mg) by mouth once daily.    polyethylene glycol (Glycolax, Miralax) 17 gram/dose powder Take 17 g by mouth once daily. IN 8 OUNCES OF LIQUID AND DRINK; TITRATE TO 1 BOWEL MOVEMENT PER DAY    psyllium (Konsyl) 6 gram packet Take by mouth once daily. 1-3 tsp - mix with 8oz of water    psyllium (Metamucil, sugar,) powder Take by mouth once daily.  1 tbsp in liquid    traZODone (Desyrel) 100 mg tablet     Trulicity 0.75 mg/0.5 mL pen injector Inject 0.75 mg under the skin 1 (one) time per week.    venlafaxine XR (Effexor-XR) 37.5 mg 24 hr capsule Take 1 capsule (37.5 mg) by mouth once daily.    [DISCONTINUED] acetaminophen (Tylenol Arthritis Pain) 650 mg ER tablet Take 1 tablet (650 mg) by mouth 3 times a day as needed (back pain).    [DISCONTINUED] acetaminophen (Tylenol) 500 mg tablet Take 2 tablets (1,000 mg) by mouth every 6 hours.      --------------------------------------------------------------------------------------------------------------------------------------    VS: As documented in the triage note and EMR flowsheet from this visit were reviewed.  Temp 36.8 °C (98.2 °F) HR 91 BP (!) 147/101 RR 18 Sat 95 % on      Physical Exam:  GEN:  no acute distress, appears comfortable. Conversational and appropriate.    HEENT: Normocephalic, atraumatic. Conjunctiva pink with no redness or exudates. Hearing grossly intact. Moist mucous membranes.  CARDIO: Normal rate and regular rhythm. Normal S1, S2  without murmurs, rubs, or gallops.   PULM: Clear to auscultation bilaterally. No rales, rhonchi, or wheezes. No accessory muscle use or stridor. Speaking in full sentences.  GI: Soft, non-tender, non-distended. No rebound tenderness or guarding.   : Normal-appearing scrotum without any discoloration or bulging noted to suggest hernia.  Tenderness within the inguinal canal.  No rashes or lesions noted.  No transverse lie to suggest torsion.  SKIN: Warm and dry, no rashes, lesions, petechiae, or purpura.  ---------------------------------------------------------------------------------------------------------------------------------------  Given in the ED:   Medications   ketorolac (Toradol) injection 30 mg (30 mg intramuscular Given 6/11/24 0032)   acetaminophen (Tylenol) tablet 975 mg (975 mg oral Given 6/11/24 0032)     ED Medication Administration from  06/10/2024 2001 to 06/11/2024 0107         Date/Time Order Dose Route Action Action by     06/11/2024 0032 EDT acetaminophen (Tylenol) tablet 975 mg 975 mg oral Given TRA Shin     06/11/2024 0032 EDT ketorolac (Toradol) injection 30 mg 30 mg intramuscular Given TRA Shin              Work up: All labs and imaging were independently reviewed by me.    Labs Reviewed   URINALYSIS WITH REFLEX CULTURE AND MICROSCOPIC - Abnormal       Result Value    Color, Urine Yellow      Appearance, Urine Clear      Specific Gravity, Urine 1.028      pH, Urine 6.0      Protein, Urine 10 (TRACE)      Glucose, Urine Normal      Blood, Urine NEGATIVE      Ketones, Urine TRACE (*)     Bilirubin, Urine NEGATIVE      Urobilinogen, Urine 3 (1+) (*)     Nitrite, Urine NEGATIVE      Leukocyte Esterase, Urine NEGATIVE     URINALYSIS WITH REFLEX CULTURE AND MICROSCOPIC    Narrative:     The following orders were created for panel order Urinalysis with Reflex Culture and Microscopic.                  Procedure                               Abnormality         Status                                     ---------                               -----------         ------                                     Urinalysis with Reflex C...[[id number]]  Abnormal            Final result                               Extra Urine Gray Tube[[id number]]                            In process                                                   Please view results for these tests on the individual orders.   EXTRA URINE GRAY TUBE   URINALYSIS MICROSCOPIC WITH REFLEX CULTURE    WBC, Urine 1-5      RBC, Urine NONE      Mucus, Urine FEW         US scrotum w doppler   Final Result   1. Unremarkable scrotal/testicular ultrasound. No testicular torsion   is evident.   2. Moderate sized bilateral hydroceles with internal debris.             I personally reviewed the images/study and I agree with the findings   as stated by resident physician Dr. Rosa Harris.         MACRO:   None        Signed by: Lex Silverman 6/10/2024 11:24 PM   Dictation workstation:   [id number]            MDM:  Briefly, this is a 52 y.o. male who was seen here for right scrotal pain, and was found to have hydrocele on ultrasound.  Low suspicion for torsion, and hernia given no obvious mass, no other lesions or rashes noted to suggest STI, in conjunction with low concern from patient.  UA did not show any acute findings for cystitis.  Pain was well-managed in the ED with Toradol.  Discussed follow-up with urology for further management, will send home with prescription for Tylenol and naproxen.  Patient was agreeable to this at this time.  Discharged in stable condition.    ED Course as of 06/11/24 0239   Mon Devin 10, 2024   2246 Urinalysis with Reflex Culture and Microscopic(!)  Unremarkable, no UTI [AD]      ED Course User Index  [AD] Fernando Estes DO         Diagnoses as of 06/11/24 0239   Hydrocele, unspecified hydrocele type   Right groin pain       Social Determinants of Health: None identified.    Discharge Medications: Tylenol, naproxen    Plan and Disposition: Discharge home with urology follow-up, advised to return if worse. They understand return precautions and discharge instructions. Patient was in agreement with this plan.    Fernando Estes DO  Emergency Medicine, PGY-2    Patient was seen and evaluated by the attending physician. The attending ED physician agrees with the plan. Patient and/or patient´s representative was counseled regarding labs, imaging, likely diagnosis, and plan. All questions were answered.  Disclaimer: This note was dictated by speech recognition.  Attempt at proofreading was made to minimize errors.  Errors in transcription may be present.  Please call if questions.     Fernando Estes DO  Resident  06/11/24 0239     Female

## 2024-11-18 NOTE — PROGRESS NOTE ADULT - SUBJECTIVE AND OBJECTIVE BOX
The episodes of syncope sound somewhat vasovagal in origin, or may be related to POTS.  I advised her to increase her intake of noncaffeinated, nonalcoholic beverages to at least 100-120 ounces daily.      I advised the patient that  the following should be avoided: driving, operating power equipment, climbing ladders, and situations where if the patient were to pass out, there could be potential harm to the patient or bystanders.     I advised the patient to contact me should the patient develop persistent symptoms after making these lifestyle modifications.  The patient understands and agrees.    Orders:    Holter Or Event Cardiac Monitor; Future    Follow Up In Cardiology; Future     *** INCOMPLETE NOTE ***    INTERVAL HPI/OVERNIGHT EVENTS:  Vancomycin was discontinued as vancomycin level >55. PCO2 worsened on ABG. CXR was performed. Advanced ETT by night team.    SUBJECTIVE: Patient seen and examined at bedside.     CONSTITUTIONAL: No weakness, fevers or chills  EYES/ENT: No visual changes;  No vertigo or throat pain   NECK: No pain or stiffness  RESPIRATORY: No cough, wheezing, hemoptysis; No shortness of breath  CARDIOVASCULAR: No chest pain or palpitations  GASTROINTESTINAL: No abdominal or epigastric pain. No nausea, vomiting, or hematemesis; No diarrhea or constipation. No melena or hematochezia.  GENITOURINARY: No dysuria, frequency or hematuria  NEUROLOGICAL: No numbness or weakness  SKIN: No itching, rashes    OBJECTIVE:    VITAL SIGNS:  ICU Vital Signs Last 24 Hrs  T(C): 36.5 (16 Feb 2021 07:00), Max: 38.9 (15 Feb 2021 20:00)  T(F): 97.7 (16 Feb 2021 07:00), Max: 102 (15 Feb 2021 20:00)  HR: 76 (16 Feb 2021 07:00) (76 - 119)  BP: --  BP(mean): --  ABP: 106/58 (16 Feb 2021 07:00) (105/58 - 124/64)  ABP(mean): 71 (16 Feb 2021 07:00) (71 - 100)  RR: 34 (16 Feb 2021 07:00) (26 - 34)  SpO2: 97% (16 Feb 2021 07:00) (96% - 98%)    Mode: AC/ CMV (Assist Control/ Continuous Mandatory Ventilation), RR (machine): 34, TV (machine): 430, FiO2: 90, PEEP: 14, ITime: 0.84, MAP: 26, PIP: 43    02-15 @ 07:01  -  02-16 @ 07:00  --------------------------------------------------------  IN: 3354.3 mL / OUT: 5050 mL / NET: -1695.7 mL      CAPILLARY BLOOD GLUCOSE      POCT Blood Glucose.: 187 mg/dL (15 Feb 2021 22:17)      PHYSICAL EXAM:    General: NAD  HEENT: NC/AT; PERRL, clear conjunctiva  Neck: supple  Respiratory: CTA b/l  Cardiovascular: +S1/S2; RRR  Abdomen: soft, NT/ND; +BS x4  Extremities: WWP, 2+ peripheral pulses b/l; no LE edema  Skin: normal color and turgor; no rash  Neurological:    MEDICATIONS:  MEDICATIONS  (STANDING):  caspofungin IVPB      caspofungin IVPB 50 milliGRAM(s) IV Intermittent every 24 hours  chlorhexidine 0.12% Liquid 15 milliLiter(s) Oral Mucosa every 12 hours  chlorhexidine 4% Liquid 1 Application(s) Topical <User Schedule>  cisatracurium Infusion 3 MICROgram(s)/kG/Min (23.4 mL/Hr) IV Continuous <Continuous>  dexAMETHasone  Injectable 6 milliGRAM(s) IV Push daily  dextrose 40% Gel 15 Gram(s) Oral once  dextrose 5%. 1000 milliLiter(s) (50 mL/Hr) IV Continuous <Continuous>  dextrose 5%. 1000 milliLiter(s) (100 mL/Hr) IV Continuous <Continuous>  dextrose 50% Injectable 25 Gram(s) IV Push once  dextrose 50% Injectable 12.5 Gram(s) IV Push once  dextrose 50% Injectable 25 Gram(s) IV Push once  fentaNYL   Infusion..... 0.5 MICROgram(s)/kG/Hr (1.3 mL/Hr) IV Continuous <Continuous>  glucagon  Injectable 1 milliGRAM(s) IntraMuscular once  heparin  Infusion 1800 Unit(s)/Hr (18 mL/Hr) IV Continuous <Continuous>  insulin lispro (ADMELOG) corrective regimen sliding scale   SubCutaneous every 6 hours  meropenem  IVPB      meropenem  IVPB 1000 milliGRAM(s) IV Intermittent every 8 hours  pantoprazole  Injectable 40 milliGRAM(s) IV Push daily  polyethylene glycol 3350 17 Gram(s) Oral two times a day  propofol Infusion 50 MICROgram(s)/kG/Min (39 mL/Hr) IV Continuous <Continuous>  senna Syrup 10 milliLiter(s) Oral at bedtime    MEDICATIONS  (PRN):  acetaminophen    Suspension .. 650 milliGRAM(s) Oral every 6 hours PRN Temp greater or equal to 38C (100.4F)  heparin   Injectable 64943 Unit(s) IV Push every 6 hours PRN For aPTT less than 40  heparin   Injectable 5000 Unit(s) IV Push every 6 hours PRN For aPTT between 40 - 57  sodium chloride 0.9% lock flush 10 milliLiter(s) IV Push every 1 hour PRN Pre/post blood products, medications, blood draw, and to maintain line patency      ALLERGIES:  Allergies    codeine (Unknown)    Intolerances        LABS:                        7.7    12.77 )-----------( 270      ( 16 Feb 2021 03:39 )             27.0     02-16    138  |  94<L>  |  18  ----------------------------<  149<H>  4.8   |  37<H>  |  0.70    Ca    8.2<L>      16 Feb 2021 03:39  Phos  3.3     02-16  Mg     2.3     02-16    TPro  6.7  /  Alb  2.3<L>  /  TBili  0.6  /  DBili  x   /  AST  34<H>  /  ALT  21  /  AlkPhos  72  02-16    PT/INR - ( 16 Feb 2021 03:39 )   PT: 14.4 sec;   INR: 1.27 ratio         PTT - ( 16 Feb 2021 03:39 )  PTT:91.7 sec      RADIOLOGY & ADDITIONAL TESTS: Reviewed. INTERVAL HPI/OVERNIGHT EVENTS:  Tmax 102 overnight. Vancomycin was discontinued as vancomycin level >55. CXR was performed. Advanced ETT by night team. PCO2 worsened on ABG to 7.27/86/80/34 this AM.    SUBJECTIVE: Patient seen and examined at bedside. Intubated and sedated. ROS unable to obtain. Noted to have hematuria in [place], more brisk in color than yesterday.    OBJECTIVE:    VITAL SIGNS:  ICU Vital Signs Last 24 Hrs  T(C): 36.5 (16 Feb 2021 07:00), Max: 38.9 (15 Feb 2021 20:00)  T(F): 97.7 (16 Feb 2021 07:00), Max: 102 (15 Feb 2021 20:00)  HR: 76 (16 Feb 2021 07:00) (76 - 119)  BP: --  BP(mean): --  ABP: 106/58 (16 Feb 2021 07:00) (105/58 - 124/64)  ABP(mean): 71 (16 Feb 2021 07:00) (71 - 100)  RR: 34 (16 Feb 2021 07:00) (26 - 34)  SpO2: 97% (16 Feb 2021 07:00) (96% - 98%)    Mode: AC/ CMV (Assist Control/ Continuous Mandatory Ventilation), RR (machine): 34, TV (machine): 430, FiO2: 90, PEEP: 14, ITime: 0.84, MAP: 26, PIP: 43    02-15 @ 07:01  -  02-16 @ 07:00  --------------------------------------------------------  IN: 3354.3 mL / OUT: 5050 mL / NET: -1695.7 mL      CAPILLARY BLOOD GLUCOSE      POCT Blood Glucose.: 187 mg/dL (15 Feb 2021 22:17)      PHYSICAL EXAM:    General: intubated, sedated.  HEENT: Clear conjunctiva  Neck: supple  Respiratory: Coarse breath sounds  Cardiovascular: +S1/S2; RRR  Abdomen: Soft  Extremities: No LE edema  Skin: normal color and turgor; no rash    MEDICATIONS:  MEDICATIONS  (STANDING):  caspofungin IVPB      caspofungin IVPB 50 milliGRAM(s) IV Intermittent every 24 hours  chlorhexidine 0.12% Liquid 15 milliLiter(s) Oral Mucosa every 12 hours  chlorhexidine 4% Liquid 1 Application(s) Topical <User Schedule>  cisatracurium Infusion 3 MICROgram(s)/kG/Min (23.4 mL/Hr) IV Continuous <Continuous>  dexAMETHasone  Injectable 6 milliGRAM(s) IV Push daily  dextrose 40% Gel 15 Gram(s) Oral once  dextrose 5%. 1000 milliLiter(s) (50 mL/Hr) IV Continuous <Continuous>  dextrose 5%. 1000 milliLiter(s) (100 mL/Hr) IV Continuous <Continuous>  dextrose 50% Injectable 25 Gram(s) IV Push once  dextrose 50% Injectable 12.5 Gram(s) IV Push once  dextrose 50% Injectable 25 Gram(s) IV Push once  fentaNYL   Infusion..... 0.5 MICROgram(s)/kG/Hr (1.3 mL/Hr) IV Continuous <Continuous>  glucagon  Injectable 1 milliGRAM(s) IntraMuscular once  heparin  Infusion 1800 Unit(s)/Hr (18 mL/Hr) IV Continuous <Continuous>  insulin lispro (ADMELOG) corrective regimen sliding scale   SubCutaneous every 6 hours  meropenem  IVPB      meropenem  IVPB 1000 milliGRAM(s) IV Intermittent every 8 hours  pantoprazole  Injectable 40 milliGRAM(s) IV Push daily  polyethylene glycol 3350 17 Gram(s) Oral two times a day  propofol Infusion 50 MICROgram(s)/kG/Min (39 mL/Hr) IV Continuous <Continuous>  senna Syrup 10 milliLiter(s) Oral at bedtime    MEDICATIONS  (PRN):  acetaminophen    Suspension .. 650 milliGRAM(s) Oral every 6 hours PRN Temp greater or equal to 38C (100.4F)  heparin   Injectable 40284 Unit(s) IV Push every 6 hours PRN For aPTT less than 40  heparin   Injectable 5000 Unit(s) IV Push every 6 hours PRN For aPTT between 40 - 57  sodium chloride 0.9% lock flush 10 milliLiter(s) IV Push every 1 hour PRN Pre/post blood products, medications, blood draw, and to maintain line patency      ALLERGIES:  Allergies    codeine (Unknown)    Intolerances        LABS:                        7.7    12.77 )-----------( 270      ( 16 Feb 2021 03:39 )             27.0     02-16    138  |  94<L>  |  18  ----------------------------<  149<H>  4.8   |  37<H>  |  0.70    Ca    8.2<L>      16 Feb 2021 03:39  Phos  3.3     02-16  Mg     2.3     02-16    TPro  6.7  /  Alb  2.3<L>  /  TBili  0.6  /  DBili  x   /  AST  34<H>  /  ALT  21  /  AlkPhos  72  02-16    PT/INR - ( 16 Feb 2021 03:39 )   PT: 14.4 sec;   INR: 1.27 ratio         PTT - ( 16 Feb 2021 03:39 )  PTT:91.7 sec      RADIOLOGY & ADDITIONAL TESTS: Reviewed.